# Patient Record
Sex: FEMALE | Race: BLACK OR AFRICAN AMERICAN | NOT HISPANIC OR LATINO | Employment: OTHER | URBAN - METROPOLITAN AREA
[De-identification: names, ages, dates, MRNs, and addresses within clinical notes are randomized per-mention and may not be internally consistent; named-entity substitution may affect disease eponyms.]

---

## 2020-02-06 ENCOUNTER — OFFICE VISIT (OUTPATIENT)
Dept: OBSTETRICS AND GYNECOLOGY | Facility: CLINIC | Age: 65
End: 2020-02-06
Payer: MEDICARE

## 2020-02-06 VITALS
SYSTOLIC BLOOD PRESSURE: 124 MMHG | DIASTOLIC BLOOD PRESSURE: 76 MMHG | HEIGHT: 64 IN | WEIGHT: 222.69 LBS | BODY MASS INDEX: 38.02 KG/M2

## 2020-02-06 DIAGNOSIS — Z01.419 ENCOUNTER FOR GYNECOLOGICAL EXAMINATION (GENERAL) (ROUTINE) WITHOUT ABNORMAL FINDINGS: Primary | ICD-10-CM

## 2020-02-06 DIAGNOSIS — N90.89 VULVAR IRRITATION: ICD-10-CM

## 2020-02-06 DIAGNOSIS — Z12.4 SCREENING FOR CERVICAL CANCER: ICD-10-CM

## 2020-02-06 DIAGNOSIS — N81.11 CYSTOCELE, MIDLINE: ICD-10-CM

## 2020-02-06 PROCEDURE — 3078F DIAST BP <80 MM HG: CPT | Mod: CPTII,S$GLB,, | Performed by: OBSTETRICS & GYNECOLOGY

## 2020-02-06 PROCEDURE — G0101 CA SCREEN;PELVIC/BREAST EXAM: HCPCS | Mod: S$GLB,,, | Performed by: OBSTETRICS & GYNECOLOGY

## 2020-02-06 PROCEDURE — 3074F SYST BP LT 130 MM HG: CPT | Mod: CPTII,S$GLB,, | Performed by: OBSTETRICS & GYNECOLOGY

## 2020-02-06 PROCEDURE — 3078F PR MOST RECENT DIASTOLIC BLOOD PRESSURE < 80 MM HG: ICD-10-PCS | Mod: CPTII,S$GLB,, | Performed by: OBSTETRICS & GYNECOLOGY

## 2020-02-06 PROCEDURE — 99999 PR PBB SHADOW E&M-NEW PATIENT-LVL II: ICD-10-PCS | Mod: PBBFAC,,, | Performed by: OBSTETRICS & GYNECOLOGY

## 2020-02-06 PROCEDURE — 3074F PR MOST RECENT SYSTOLIC BLOOD PRESSURE < 130 MM HG: ICD-10-PCS | Mod: CPTII,S$GLB,, | Performed by: OBSTETRICS & GYNECOLOGY

## 2020-02-06 PROCEDURE — 99999 PR PBB SHADOW E&M-NEW PATIENT-LVL II: CPT | Mod: PBBFAC,,, | Performed by: OBSTETRICS & GYNECOLOGY

## 2020-02-06 PROCEDURE — G0101 PR CA SCREEN;PELVIC/BREAST EXAM: ICD-10-PCS | Mod: S$GLB,,, | Performed by: OBSTETRICS & GYNECOLOGY

## 2020-02-06 RX ORDER — ESTRADIOL 10 UG/1
INSERT VAGINAL
Qty: 18 TABLET | Refills: 11 | Status: SHIPPED | OUTPATIENT
Start: 2020-02-06 | End: 2020-02-10 | Stop reason: SDUPTHER

## 2020-02-06 RX ORDER — CLOTRIMAZOLE AND BETAMETHASONE DIPROPIONATE 10; .64 MG/G; MG/G
CREAM TOPICAL 2 TIMES DAILY
Qty: 45 G | Refills: 0 | Status: SHIPPED | OUTPATIENT
Start: 2020-02-06 | End: 2020-02-10 | Stop reason: SDUPTHER

## 2020-02-06 RX ORDER — PANTOPRAZOLE SODIUM 40 MG/1
TABLET, DELAYED RELEASE ORAL DAILY
COMMUNITY
Start: 2020-01-16 | End: 2023-12-11 | Stop reason: SDUPTHER

## 2020-02-06 NOTE — PROGRESS NOTES
Subjective:       Patient ID: Mariah Meza is a 64 y.o. female.    Chief Complaint:  Well Woman      History of Present Illness  HPI  Annual Exam-Postmenopausal  Patient presents for annual exam. The patient c/op increase vaginal pressure and burning sensation since 2019.pt reports takes a long time to urinate--denies leakage;  The patient is not currently sexually active--last intercourse last year; ; . GYN screening history: last pap: was normal and patient does not recall when last pap was and last mammogram: approximate date 10/2019 and was normal. The patient is not currently taking hormone replacement therapy. Patient denies post-menopausal vaginal bleeding. The patient wears seatbelts: yes. The patient participates in regular exercise: no. Has the patient ever been transfused or tattooed?: no. The patient reports that there is not domestic violence in her life.    Denies dysuria;   Previously seen by urologist (dr espinosa); started on 3 different ax;      colonscopy , due in   Reports had bmd  GYN & OB History  Patient's last menstrual period was 02/10/1984 (approximate).   Date of Last Pap: No result found    OB History    Para Term  AB Living   2 2 2     2   SAB TAB Ectopic Multiple Live Births           2      # Outcome Date GA Lbr Gonsalo/2nd Weight Sex Delivery Anes PTL Lv   2 Term      Vag-Spont   HAROON   1 Term      Vag-Spont   HAROON       Review of Systems  Review of Systems   Genitourinary: Positive for dyspareunia, vaginal pain and vaginal dryness.   All other systems reviewed and are negative.          Objective:      Physical Exam:   Constitutional: She appears well-developed.     Eyes: Pupils are equal, round, and reactive to light. Conjunctivae and EOM are normal.    Neck: Normal range of motion. Neck supple.     Pulmonary/Chest: Effort normal. Right breast exhibits no mass, no nipple discharge, no skin change and no tenderness. Left breast exhibits no mass, no  nipple discharge, no skin change and no tenderness. Breasts are symmetrical.        Abdominal: Soft.     Genitourinary: Rectum normal and vagina normal. Pelvic exam was performed with patient supine. Uterus is absent. Right adnexum displays no mass and no tenderness. Left adnexum displays no mass and no tenderness. There is cystocele in the vagina. No erythema, bleeding, rectocele or unspecified prolapse of vaginal walls in the vagina. No vaginal discharge found. Vaginal cuff normal.Labial bartholins normal.Cervix exhibits absence.   Genitourinary Comments: atrophic           Musculoskeletal: Normal range of motion.       Neurological: She is alert.    Skin: Skin is warm.    Psychiatric: She has a normal mood and affect.           Assessment:     Encounter Diagnoses   Name Primary?    Encounter for gynecological examination (general) (routine) without abnormal findings Yes    Screening for cervical cancer     Cystocele, midline     Vulvar irritation              Plan:      Continue annual well woman exam.  Pap not indicated due to hx of nml pap and hx of lamar  mammo current continue yearly until age 73  rx sent for lotrisone, continue gentle skin cleaning  Advised vaginal estrogen  reviewed use of pessary vs anterior repair; pt prefers observation  Osteoporosis prevention; 1200mg calcium/d with source of vitamin d; will get bmd results for review  Continue diet, exercise, weight loss

## 2020-02-07 ENCOUNTER — TELEPHONE (OUTPATIENT)
Dept: OBSTETRICS AND GYNECOLOGY | Facility: CLINIC | Age: 65
End: 2020-02-07

## 2020-02-07 DIAGNOSIS — N90.89 VULVAR IRRITATION: ICD-10-CM

## 2020-02-07 DIAGNOSIS — Z01.419 ENCOUNTER FOR GYNECOLOGICAL EXAMINATION (GENERAL) (ROUTINE) WITHOUT ABNORMAL FINDINGS: ICD-10-CM

## 2020-02-07 DIAGNOSIS — N81.11 CYSTOCELE, MIDLINE: ICD-10-CM

## 2020-02-07 NOTE — TELEPHONE ENCOUNTER
Spoke with pt, pt states insurance is not paying for estradiol vaginal suppository , and would like to know what else she can get/ take. Message forward to Dr Anthony. Marilu MONTIEL

## 2020-02-07 NOTE — TELEPHONE ENCOUNTER
Spoke with pt. Pt states she wants rx sent to [facility] mail in pharmacy. Pt also states current prescription can be left the same and that she will just pay for those. Thank you. LYN Luna      ----- Message from Yvonne Mcfarlane sent at 2/7/2020  1:38 PM CST -----  Contact: Pt   Pt is calling regarding requesting to have nurse call pt  back. Pt stets that call is in reference to a script that pt needs to be sent to another script. Pt did not want to name the script. .[phone] (home)         .Thank You  Yvonne Mcfarlane

## 2020-02-10 RX ORDER — ESTRADIOL 10 UG/1
INSERT VAGINAL
Qty: 18 TABLET | Refills: 11 | Status: SHIPPED | OUTPATIENT
Start: 2020-02-10 | End: 2021-05-27

## 2020-02-10 RX ORDER — CLOTRIMAZOLE AND BETAMETHASONE DIPROPIONATE 10; .64 MG/G; MG/G
CREAM TOPICAL 2 TIMES DAILY
Qty: 45 G | Refills: 0 | Status: SHIPPED | OUTPATIENT
Start: 2020-02-10 | End: 2021-05-27

## 2020-02-10 NOTE — TELEPHONE ENCOUNTER
rx was prescribed for vagifem and lotrisone    Does she want both meds to be sent to [facility]?

## 2020-02-14 ENCOUNTER — TELEPHONE (OUTPATIENT)
Dept: OBSTETRICS AND GYNECOLOGY | Facility: CLINIC | Age: 65
End: 2020-02-14

## 2020-02-14 NOTE — TELEPHONE ENCOUNTER
----- Message from Winnie Garduno sent at 2/14/2020  8:52 AM CST -----  Contact: Pt  Please give pt a call at [phone] regarding some questions about a medication.

## 2020-02-14 NOTE — TELEPHONE ENCOUNTER
Patient asking if prescription sent to [facility], and it was.  Patient asking about pessary and was told she will need an appointment for that procedure.  Patient stated she would call back to schedule.

## 2020-02-18 ENCOUNTER — TELEPHONE (OUTPATIENT)
Dept: OBSTETRICS AND GYNECOLOGY | Facility: CLINIC | Age: 65
End: 2020-02-18

## 2020-02-18 NOTE — TELEPHONE ENCOUNTER
Left message for patient that only one dose vaginal estrogen.  Welcome to bring formulary or supply web site. Can try OTC Replens.    Patient can return call to [phone].

## 2020-02-18 NOTE — TELEPHONE ENCOUNTER
Spoke with pt. Instructed pt to call formulary and see which medication is covered. Pt verbalized understanding and will call clinic back later today. LYN Luna

## 2020-02-18 NOTE — TELEPHONE ENCOUNTER
There is only one dose of vaginal estrogen    Pt is welcomed to bring in her formulary for us to review or give us a web site    Since there are no alternatives can use otc replens

## 2020-02-18 NOTE — TELEPHONE ENCOUNTER
----- Message from Susanne Blood sent at 2/18/2020 10:10 AM CST -----  Contact: pt  Please call pt @ [phone] regarding Estradiol medication, pt states it's to expensive, pt need something else call in to [employer] [facility]

## 2020-02-18 NOTE — TELEPHONE ENCOUNTER
Pt states that the mg is too high and that the medication is a tier 4 medication that is why the medications price is extremely high. Formulary states if she can use a lower mg then the medication will be cheaper. Pt would like a lower mg in same medication. Thanks! LYN Luna      ----- Message from Markus Garcia sent at 2/18/2020  3:21 PM CST -----  Contact: Pt  Pt is calling the staff regarding a medication  Pt call back [phone]    Thanks

## 2020-02-19 ENCOUNTER — TELEPHONE (OUTPATIENT)
Dept: OBSTETRICS AND GYNECOLOGY | Facility: CLINIC | Age: 65
End: 2020-02-19

## 2020-02-19 NOTE — TELEPHONE ENCOUNTER
Spoke to the pt and advised her that there is only one dose vaginal estrogen.  Welcome to bring formulary or supply web site. Can try OTC Replens. Pt acknowledged understanding. marty Coy    Left message for patient that only one dose vaginal estrogen.  Welcome to bring formulary or supply web site. Can try OTC Replens.

## 2020-12-01 ENCOUNTER — TELEPHONE (OUTPATIENT)
Dept: OBSTETRICS AND GYNECOLOGY | Facility: CLINIC | Age: 65
End: 2020-12-01

## 2020-12-01 NOTE — TELEPHONE ENCOUNTER
----- Message from Tl Flores sent at 12/1/2020  2:31 PM CST -----      Name of Who is Calling: MACK MORSE [[id number]]      What is the request in detail: Pt would like to speak with the Dr regarding a strong smell from her vagina.Please contact to further discuss and advise.          Can the clinic reply by [facility]: N      What Number to Call Back if not in [facility]: [phone]

## 2020-12-02 ENCOUNTER — TELEPHONE (OUTPATIENT)
Dept: OBSTETRICS AND GYNECOLOGY | Facility: CLINIC | Age: 65
End: 2020-12-02

## 2020-12-02 NOTE — TELEPHONE ENCOUNTER
----- Message from Lizbeth Garcia sent at 12/2/2020  8:41 AM CST -----  Regarding: return call  .Type:  Patient Returning Call    Who Called: pt  Who Left Message for Patient: Cori   Does the patient know what this is regarding?:   Would the patient rather a call back or a response via [facility]? Call back   Best Call Back Number: [phone]  Additional Information:

## 2021-04-13 ENCOUNTER — OFFICE VISIT (OUTPATIENT)
Dept: INTERNAL MEDICINE | Facility: CLINIC | Age: 66
End: 2021-04-13
Payer: MEDICARE

## 2021-04-13 ENCOUNTER — APPOINTMENT (OUTPATIENT)
Dept: RADIOLOGY | Facility: HOSPITAL | Age: 66
End: 2021-04-13
Attending: FAMILY MEDICINE
Payer: MEDICARE

## 2021-04-13 VITALS
BODY MASS INDEX: 39.41 KG/M2 | HEART RATE: 64 BPM | TEMPERATURE: 97 F | WEIGHT: 229.63 LBS | OXYGEN SATURATION: 100 % | RESPIRATION RATE: 18 BRPM | SYSTOLIC BLOOD PRESSURE: 136 MMHG | DIASTOLIC BLOOD PRESSURE: 80 MMHG

## 2021-04-13 DIAGNOSIS — M54.2 ACUTE NECK PAIN: Primary | ICD-10-CM

## 2021-04-13 DIAGNOSIS — M54.12 CERVICAL RADICULOPATHY AT C5: ICD-10-CM

## 2021-04-13 DIAGNOSIS — M54.2 ACUTE NECK PAIN: ICD-10-CM

## 2021-04-13 PROCEDURE — 72050 XR CERVICAL SPINE COMPLETE 5 VIEW: ICD-10-PCS | Mod: 26,,, | Performed by: RADIOLOGY

## 2021-04-13 PROCEDURE — 99999 PR PBB SHADOW E&M-EST. PATIENT-LVL IV: ICD-10-PCS | Mod: PBBFAC,,, | Performed by: FAMILY MEDICINE

## 2021-04-13 PROCEDURE — 72050 X-RAY EXAM NECK SPINE 4/5VWS: CPT | Mod: TC,PO

## 2021-04-13 PROCEDURE — 1159F MED LIST DOCD IN RCRD: CPT | Mod: S$GLB,,, | Performed by: FAMILY MEDICINE

## 2021-04-13 PROCEDURE — 99204 OFFICE O/P NEW MOD 45 MIN: CPT | Mod: 25,S$GLB,, | Performed by: FAMILY MEDICINE

## 2021-04-13 PROCEDURE — 96372 PR INJECTION,THERAP/PROPH/DIAG2ST, IM OR SUBCUT: ICD-10-PCS | Mod: S$GLB,,, | Performed by: FAMILY MEDICINE

## 2021-04-13 PROCEDURE — 96372 THER/PROPH/DIAG INJ SC/IM: CPT | Mod: S$GLB,,, | Performed by: FAMILY MEDICINE

## 2021-04-13 PROCEDURE — 1159F PR MEDICATION LIST DOCUMENTED IN MEDICAL RECORD: ICD-10-PCS | Mod: S$GLB,,, | Performed by: FAMILY MEDICINE

## 2021-04-13 PROCEDURE — 1125F PR PAIN SEVERITY QUANTIFIED, PAIN PRESENT: ICD-10-PCS | Mod: S$GLB,,, | Performed by: FAMILY MEDICINE

## 2021-04-13 PROCEDURE — 72050 X-RAY EXAM NECK SPINE 4/5VWS: CPT | Mod: 26,,, | Performed by: RADIOLOGY

## 2021-04-13 PROCEDURE — 3008F BODY MASS INDEX DOCD: CPT | Mod: CPTII,S$GLB,, | Performed by: FAMILY MEDICINE

## 2021-04-13 PROCEDURE — 99204 PR OFFICE/OUTPT VISIT, NEW, LEVL IV, 45-59 MIN: ICD-10-PCS | Mod: 25,S$GLB,, | Performed by: FAMILY MEDICINE

## 2021-04-13 PROCEDURE — 3008F PR BODY MASS INDEX (BMI) DOCUMENTED: ICD-10-PCS | Mod: CPTII,S$GLB,, | Performed by: FAMILY MEDICINE

## 2021-04-13 PROCEDURE — 99999 PR PBB SHADOW E&M-EST. PATIENT-LVL IV: CPT | Mod: PBBFAC,,, | Performed by: FAMILY MEDICINE

## 2021-04-13 PROCEDURE — 1125F AMNT PAIN NOTED PAIN PRSNT: CPT | Mod: S$GLB,,, | Performed by: FAMILY MEDICINE

## 2021-04-13 RX ORDER — KETOROLAC TROMETHAMINE 30 MG/ML
30 INJECTION, SOLUTION INTRAMUSCULAR; INTRAVENOUS ONCE
Status: COMPLETED | OUTPATIENT
Start: 2021-04-13 | End: 2021-04-13

## 2021-04-13 RX ORDER — KETOROLAC TROMETHAMINE 30 MG/ML
30 INJECTION, SOLUTION INTRAMUSCULAR; INTRAVENOUS ONCE
Status: DISCONTINUED | OUTPATIENT
Start: 2021-04-13 | End: 2021-04-13

## 2021-04-13 RX ORDER — VALSARTAN AND HYDROCHLOROTHIAZIDE 320; 25 MG/1; MG/1
1 TABLET, FILM COATED ORAL DAILY
COMMUNITY
End: 2021-08-06

## 2021-04-13 RX ADMIN — KETOROLAC TROMETHAMINE 30 MG: 30 INJECTION, SOLUTION INTRAMUSCULAR; INTRAVENOUS at 03:04

## 2021-05-10 ENCOUNTER — TELEPHONE (OUTPATIENT)
Dept: OBSTETRICS AND GYNECOLOGY | Facility: CLINIC | Age: 66
End: 2021-05-10

## 2021-05-10 DIAGNOSIS — Z12.31 ENCOUNTER FOR SCREENING MAMMOGRAM FOR BREAST CANCER: Primary | ICD-10-CM

## 2021-05-13 ENCOUNTER — APPOINTMENT (OUTPATIENT)
Dept: RADIOLOGY | Facility: HOSPITAL | Age: 66
End: 2021-05-13
Attending: FAMILY MEDICINE
Payer: MEDICARE

## 2021-05-13 ENCOUNTER — OFFICE VISIT (OUTPATIENT)
Dept: INTERNAL MEDICINE | Facility: CLINIC | Age: 66
End: 2021-05-13
Payer: MEDICARE

## 2021-05-13 VITALS
WEIGHT: 230.69 LBS | HEART RATE: 73 BPM | SYSTOLIC BLOOD PRESSURE: 112 MMHG | DIASTOLIC BLOOD PRESSURE: 70 MMHG | OXYGEN SATURATION: 97 % | BODY MASS INDEX: 39.6 KG/M2 | RESPIRATION RATE: 18 BRPM | TEMPERATURE: 99 F

## 2021-05-13 DIAGNOSIS — E66.01 CLASS 3 SEVERE OBESITY WITH SERIOUS COMORBIDITY AND BODY MASS INDEX (BMI) OF 40.0 TO 44.9 IN ADULT, UNSPECIFIED OBESITY TYPE: ICD-10-CM

## 2021-05-13 DIAGNOSIS — E74.818 OTHER DISORDERS OF GLUCOSE TRANSPORT: ICD-10-CM

## 2021-05-13 DIAGNOSIS — Z13.820 SCREENING FOR OSTEOPOROSIS: ICD-10-CM

## 2021-05-13 DIAGNOSIS — M25.551 RIGHT HIP PAIN: ICD-10-CM

## 2021-05-13 DIAGNOSIS — R10.9 FLANK PAIN: Primary | ICD-10-CM

## 2021-05-13 DIAGNOSIS — M94.0 COSTOCHONDRITIS: ICD-10-CM

## 2021-05-13 DIAGNOSIS — Z00.00 ENCOUNTER FOR WELLNESS EXAMINATION IN ADULT: ICD-10-CM

## 2021-05-13 DIAGNOSIS — M54.12 CERVICAL RADICULOPATHY: ICD-10-CM

## 2021-05-13 DIAGNOSIS — M81.8 OTHER OSTEOPOROSIS WITHOUT CURRENT PATHOLOGICAL FRACTURE: ICD-10-CM

## 2021-05-13 LAB
BILIRUB SERPL-MCNC: ABNORMAL MG/DL
BLOOD URINE, POC: ABNORMAL
COLOR, POC UA: ABNORMAL
GLUCOSE UR QL STRIP: ABNORMAL
KETONES UR QL STRIP: ABNORMAL
LEUKOCYTE ESTERASE URINE, POC: ABNORMAL
NITRITE, POC UA: ABNORMAL
PH, POC UA: 5
PROTEIN, POC: ABNORMAL
SPECIFIC GRAVITY, POC UA: 1.01
UROBILINOGEN, POC UA: ABNORMAL

## 2021-05-13 PROCEDURE — 99999 PR PBB SHADOW E&M-EST. PATIENT-LVL V: ICD-10-PCS | Mod: PBBFAC,,, | Performed by: FAMILY MEDICINE

## 2021-05-13 PROCEDURE — 81001 POCT URINALYSIS, DIPSTICK OR TABLET REAGENT, AUTOMATED, WITH MICROSCOP: ICD-10-PCS | Mod: S$GLB,,, | Performed by: FAMILY MEDICINE

## 2021-05-13 PROCEDURE — 1159F PR MEDICATION LIST DOCUMENTED IN MEDICAL RECORD: ICD-10-PCS | Mod: S$GLB,,, | Performed by: FAMILY MEDICINE

## 2021-05-13 PROCEDURE — 1159F MED LIST DOCD IN RCRD: CPT | Mod: S$GLB,,, | Performed by: FAMILY MEDICINE

## 2021-05-13 PROCEDURE — 73521 X-RAY EXAM HIPS BI 2 VIEWS: CPT | Mod: 26,,, | Performed by: RADIOLOGY

## 2021-05-13 PROCEDURE — 3008F BODY MASS INDEX DOCD: CPT | Mod: CPTII,S$GLB,, | Performed by: FAMILY MEDICINE

## 2021-05-13 PROCEDURE — 3008F PR BODY MASS INDEX (BMI) DOCUMENTED: ICD-10-PCS | Mod: CPTII,S$GLB,, | Performed by: FAMILY MEDICINE

## 2021-05-13 PROCEDURE — 99999 PR PBB SHADOW E&M-EST. PATIENT-LVL V: CPT | Mod: PBBFAC,,, | Performed by: FAMILY MEDICINE

## 2021-05-13 PROCEDURE — 81001 URINALYSIS AUTO W/SCOPE: CPT | Mod: S$GLB,,, | Performed by: FAMILY MEDICINE

## 2021-05-13 PROCEDURE — 99214 OFFICE O/P EST MOD 30 MIN: CPT | Mod: 25,S$GLB,, | Performed by: FAMILY MEDICINE

## 2021-05-13 PROCEDURE — 1125F AMNT PAIN NOTED PAIN PRSNT: CPT | Mod: S$GLB,,, | Performed by: FAMILY MEDICINE

## 2021-05-13 PROCEDURE — 73521 X-RAY EXAM HIPS BI 2 VIEWS: CPT | Mod: TC,PO

## 2021-05-13 PROCEDURE — 99214 PR OFFICE/OUTPT VISIT, EST, LEVL IV, 30-39 MIN: ICD-10-PCS | Mod: 25,S$GLB,, | Performed by: FAMILY MEDICINE

## 2021-05-13 PROCEDURE — 73521 XR HIPS BILATERAL 2 VIEW INCL AP PELVIS: ICD-10-PCS | Mod: 26,,, | Performed by: RADIOLOGY

## 2021-05-13 PROCEDURE — 87086 URINE CULTURE/COLONY COUNT: CPT | Performed by: FAMILY MEDICINE

## 2021-05-13 PROCEDURE — 1125F PR PAIN SEVERITY QUANTIFIED, PAIN PRESENT: ICD-10-PCS | Mod: S$GLB,,, | Performed by: FAMILY MEDICINE

## 2021-05-13 RX ORDER — DICLOFENAC SODIUM 25 MG/1
25 TABLET, DELAYED RELEASE ORAL DAILY PRN
Qty: 30 TABLET | Refills: 1 | Status: SHIPPED | OUTPATIENT
Start: 2021-05-13 | End: 2021-06-07 | Stop reason: SINTOL

## 2021-05-15 LAB — BACTERIA UR CULT: NO GROWTH

## 2021-05-25 ENCOUNTER — TELEPHONE (OUTPATIENT)
Dept: INTERNAL MEDICINE | Facility: CLINIC | Age: 66
End: 2021-05-25

## 2021-05-26 ENCOUNTER — PATIENT OUTREACH (OUTPATIENT)
Dept: ADMINISTRATIVE | Facility: OTHER | Age: 66
End: 2021-05-26

## 2021-05-27 ENCOUNTER — OFFICE VISIT (OUTPATIENT)
Dept: OBSTETRICS AND GYNECOLOGY | Facility: CLINIC | Age: 66
End: 2021-05-27
Payer: MEDICARE

## 2021-05-27 ENCOUNTER — HOSPITAL ENCOUNTER (OUTPATIENT)
Dept: RADIOLOGY | Facility: HOSPITAL | Age: 66
Discharge: HOME | End: 2021-05-27
Attending: OBSTETRICS & GYNECOLOGY
Payer: MEDICARE

## 2021-05-27 VITALS
SYSTOLIC BLOOD PRESSURE: 140 MMHG | WEIGHT: 231.69 LBS | BODY MASS INDEX: 39.55 KG/M2 | DIASTOLIC BLOOD PRESSURE: 72 MMHG | HEIGHT: 64 IN

## 2021-05-27 DIAGNOSIS — Z01.419 ENCOUNTER FOR GYNECOLOGICAL EXAMINATION (GENERAL) (ROUTINE) WITHOUT ABNORMAL FINDINGS: Primary | ICD-10-CM

## 2021-05-27 DIAGNOSIS — Z12.31 ENCOUNTER FOR SCREENING MAMMOGRAM FOR BREAST CANCER: ICD-10-CM

## 2021-05-27 DIAGNOSIS — Z12.31 SCREENING MAMMOGRAM, ENCOUNTER FOR: ICD-10-CM

## 2021-05-27 DIAGNOSIS — L75.0 URINARY BODY ODOR: ICD-10-CM

## 2021-05-27 DIAGNOSIS — N95.1 SYMPTOMATIC MENOPAUSAL OR FEMALE CLIMACTERIC STATES: ICD-10-CM

## 2021-05-27 DIAGNOSIS — Z12.4 SCREENING FOR CERVICAL CANCER: ICD-10-CM

## 2021-05-27 DIAGNOSIS — R82.90 UNSPECIFIED ABNORMAL FINDINGS IN URINE: ICD-10-CM

## 2021-05-27 PROCEDURE — G0101 PR CA SCREEN;PELVIC/BREAST EXAM: ICD-10-PCS | Mod: S$GLB,,, | Performed by: OBSTETRICS & GYNECOLOGY

## 2021-05-27 PROCEDURE — 1101F PR PT FALLS ASSESS DOC 0-1 FALLS W/OUT INJ PAST YR: ICD-10-PCS | Mod: CPTII,S$GLB,, | Performed by: OBSTETRICS & GYNECOLOGY

## 2021-05-27 PROCEDURE — 1126F PR PAIN SEVERITY QUANTIFIED, NO PAIN PRESENT: ICD-10-PCS | Mod: S$GLB,,, | Performed by: OBSTETRICS & GYNECOLOGY

## 2021-05-27 PROCEDURE — 99999 PR PBB SHADOW E&M-EST. PATIENT-LVL III: ICD-10-PCS | Mod: PBBFAC,,, | Performed by: OBSTETRICS & GYNECOLOGY

## 2021-05-27 PROCEDURE — 77067 SCR MAMMO BI INCL CAD: CPT | Mod: 26,,, | Performed by: RADIOLOGY

## 2021-05-27 PROCEDURE — 77063 BREAST TOMOSYNTHESIS BI: CPT | Mod: 26,,, | Performed by: RADIOLOGY

## 2021-05-27 PROCEDURE — 3288F PR FALLS RISK ASSESSMENT DOCUMENTED: ICD-10-PCS | Mod: CPTII,S$GLB,, | Performed by: OBSTETRICS & GYNECOLOGY

## 2021-05-27 PROCEDURE — G0101 CA SCREEN;PELVIC/BREAST EXAM: HCPCS | Mod: S$GLB,,, | Performed by: OBSTETRICS & GYNECOLOGY

## 2021-05-27 PROCEDURE — 77063 MAMMO DIGITAL SCREENING BILAT WITH TOMO: ICD-10-PCS | Mod: 26,,, | Performed by: RADIOLOGY

## 2021-05-27 PROCEDURE — 1101F PT FALLS ASSESS-DOCD LE1/YR: CPT | Mod: CPTII,S$GLB,, | Performed by: OBSTETRICS & GYNECOLOGY

## 2021-05-27 PROCEDURE — 77067 SCR MAMMO BI INCL CAD: CPT | Mod: TC

## 2021-05-27 PROCEDURE — 3008F PR BODY MASS INDEX (BMI) DOCUMENTED: ICD-10-PCS | Mod: CPTII,S$GLB,, | Performed by: OBSTETRICS & GYNECOLOGY

## 2021-05-27 PROCEDURE — 99999 PR PBB SHADOW E&M-EST. PATIENT-LVL III: CPT | Mod: PBBFAC,,, | Performed by: OBSTETRICS & GYNECOLOGY

## 2021-05-27 PROCEDURE — 77067 MAMMO DIGITAL SCREENING BILAT WITH TOMO: ICD-10-PCS | Mod: 26,,, | Performed by: RADIOLOGY

## 2021-05-27 PROCEDURE — 1126F AMNT PAIN NOTED NONE PRSNT: CPT | Mod: S$GLB,,, | Performed by: OBSTETRICS & GYNECOLOGY

## 2021-05-27 PROCEDURE — 3288F FALL RISK ASSESSMENT DOCD: CPT | Mod: CPTII,S$GLB,, | Performed by: OBSTETRICS & GYNECOLOGY

## 2021-05-27 PROCEDURE — 3008F BODY MASS INDEX DOCD: CPT | Mod: CPTII,S$GLB,, | Performed by: OBSTETRICS & GYNECOLOGY

## 2021-06-01 ENCOUNTER — TELEPHONE (OUTPATIENT)
Dept: OBSTETRICS AND GYNECOLOGY | Facility: CLINIC | Age: 66
End: 2021-06-01

## 2021-06-07 ENCOUNTER — LAB VISIT (OUTPATIENT)
Dept: LAB | Facility: HOSPITAL | Age: 66
End: 2021-06-07
Attending: FAMILY MEDICINE
Payer: MEDICARE

## 2021-06-07 ENCOUNTER — OFFICE VISIT (OUTPATIENT)
Dept: INTERNAL MEDICINE | Facility: CLINIC | Age: 66
End: 2021-06-07
Payer: MEDICARE

## 2021-06-07 VITALS
DIASTOLIC BLOOD PRESSURE: 74 MMHG | OXYGEN SATURATION: 97 % | HEIGHT: 64 IN | WEIGHT: 234.31 LBS | BODY MASS INDEX: 40 KG/M2 | SYSTOLIC BLOOD PRESSURE: 138 MMHG | HEART RATE: 63 BPM | TEMPERATURE: 98 F

## 2021-06-07 DIAGNOSIS — R22.41 LOCALIZED SWELLING OF RIGHT LOWER EXTREMITY: Primary | ICD-10-CM

## 2021-06-07 DIAGNOSIS — M79.89 SWELLING OF LEFT LOWER EXTREMITY: ICD-10-CM

## 2021-06-07 DIAGNOSIS — R22.41 LOCALIZED SWELLING OF RIGHT LOWER EXTREMITY: ICD-10-CM

## 2021-06-07 DIAGNOSIS — E78.2 MIXED HYPERLIPIDEMIA: ICD-10-CM

## 2021-06-07 DIAGNOSIS — R60.1 GENERALIZED EDEMA: ICD-10-CM

## 2021-06-07 DIAGNOSIS — I11.9 HYPERTENSIVE HEART DISEASE WITHOUT HEART FAILURE: ICD-10-CM

## 2021-06-07 DIAGNOSIS — M94.0 COSTOCHONDRITIS: ICD-10-CM

## 2021-06-07 PROCEDURE — 1125F AMNT PAIN NOTED PAIN PRSNT: CPT | Mod: S$GLB,,, | Performed by: FAMILY MEDICINE

## 2021-06-07 PROCEDURE — 3008F PR BODY MASS INDEX (BMI) DOCUMENTED: ICD-10-PCS | Mod: CPTII,S$GLB,, | Performed by: FAMILY MEDICINE

## 2021-06-07 PROCEDURE — 83880 ASSAY OF NATRIURETIC PEPTIDE: CPT | Performed by: FAMILY MEDICINE

## 2021-06-07 PROCEDURE — 1159F MED LIST DOCD IN RCRD: CPT | Mod: S$GLB,,, | Performed by: FAMILY MEDICINE

## 2021-06-07 PROCEDURE — 99999 PR PBB SHADOW E&M-EST. PATIENT-LVL IV: CPT | Mod: PBBFAC,,, | Performed by: FAMILY MEDICINE

## 2021-06-07 PROCEDURE — 80053 COMPREHEN METABOLIC PANEL: CPT | Performed by: FAMILY MEDICINE

## 2021-06-07 PROCEDURE — 3008F BODY MASS INDEX DOCD: CPT | Mod: CPTII,S$GLB,, | Performed by: FAMILY MEDICINE

## 2021-06-07 PROCEDURE — 84484 ASSAY OF TROPONIN QUANT: CPT | Performed by: FAMILY MEDICINE

## 2021-06-07 PROCEDURE — 99214 OFFICE O/P EST MOD 30 MIN: CPT | Mod: S$GLB,,, | Performed by: FAMILY MEDICINE

## 2021-06-07 PROCEDURE — 99999 PR PBB SHADOW E&M-EST. PATIENT-LVL IV: ICD-10-PCS | Mod: PBBFAC,,, | Performed by: FAMILY MEDICINE

## 2021-06-07 PROCEDURE — 1159F PR MEDICATION LIST DOCUMENTED IN MEDICAL RECORD: ICD-10-PCS | Mod: S$GLB,,, | Performed by: FAMILY MEDICINE

## 2021-06-07 PROCEDURE — 99214 PR OFFICE/OUTPT VISIT, EST, LEVL IV, 30-39 MIN: ICD-10-PCS | Mod: S$GLB,,, | Performed by: FAMILY MEDICINE

## 2021-06-07 PROCEDURE — 36415 COLL VENOUS BLD VENIPUNCTURE: CPT | Mod: PO | Performed by: FAMILY MEDICINE

## 2021-06-07 PROCEDURE — 1125F PR PAIN SEVERITY QUANTIFIED, PAIN PRESENT: ICD-10-PCS | Mod: S$GLB,,, | Performed by: FAMILY MEDICINE

## 2021-06-07 RX ORDER — FUROSEMIDE 40 MG/1
40 TABLET ORAL 2 TIMES DAILY PRN
Qty: 60 TABLET | Refills: 1 | Status: SHIPPED | OUTPATIENT
Start: 2021-06-07 | End: 2022-08-30

## 2021-06-07 RX ORDER — TIZANIDINE HYDROCHLORIDE 2 MG/1
2 CAPSULE, GELATIN COATED ORAL
COMMUNITY
End: 2021-09-29

## 2021-06-07 RX ORDER — DEXTROMETHORPHAN HYDROBROMIDE, GUAIFENESIN 5; 100 MG/5ML; MG/5ML
650 LIQUID ORAL DAILY PRN
COMMUNITY

## 2021-06-07 RX ORDER — LORATADINE 10 MG/1
10 TABLET ORAL DAILY PRN
COMMUNITY
End: 2022-06-23

## 2021-06-07 RX ORDER — CALCIUM CARBONATE 600 MG
600 TABLET ORAL ONCE
COMMUNITY
End: 2021-08-06

## 2021-06-08 LAB
ALBUMIN SERPL BCP-MCNC: 4.1 G/DL (ref 3.5–5.2)
ALP SERPL-CCNC: 52 U/L (ref 55–135)
ALT SERPL W/O P-5'-P-CCNC: 23 U/L (ref 10–44)
ANION GAP SERPL CALC-SCNC: 14 MMOL/L (ref 8–16)
AST SERPL-CCNC: 22 U/L (ref 10–40)
BILIRUB SERPL-MCNC: 0.6 MG/DL (ref 0.1–1)
BNP SERPL-MCNC: 36 PG/ML (ref 0–99)
BUN SERPL-MCNC: 18 MG/DL (ref 8–23)
CALCIUM SERPL-MCNC: 10 MG/DL (ref 8.7–10.5)
CHLORIDE SERPL-SCNC: 107 MMOL/L (ref 95–110)
CO2 SERPL-SCNC: 21 MMOL/L (ref 23–29)
CREAT SERPL-MCNC: 0.9 MG/DL (ref 0.5–1.4)
EST. GFR  (AFRICAN AMERICAN): >60 ML/MIN/1.73 M^2
EST. GFR  (NON AFRICAN AMERICAN): >60 ML/MIN/1.73 M^2
GLUCOSE SERPL-MCNC: 75 MG/DL (ref 70–110)
POTASSIUM SERPL-SCNC: 3.9 MMOL/L (ref 3.5–5.1)
PROT SERPL-MCNC: 8 G/DL (ref 6–8.4)
SODIUM SERPL-SCNC: 142 MMOL/L (ref 136–145)
TROPONIN I SERPL DL<=0.01 NG/ML-MCNC: <0.006 NG/ML (ref 0–0.03)

## 2021-06-14 ENCOUNTER — OFFICE VISIT (OUTPATIENT)
Dept: INTERNAL MEDICINE | Facility: CLINIC | Age: 66
End: 2021-06-14
Payer: MEDICARE

## 2021-06-14 VITALS
WEIGHT: 232.81 LBS | DIASTOLIC BLOOD PRESSURE: 66 MMHG | HEART RATE: 61 BPM | RESPIRATION RATE: 18 BRPM | SYSTOLIC BLOOD PRESSURE: 124 MMHG | TEMPERATURE: 98 F | BODY MASS INDEX: 39.96 KG/M2 | OXYGEN SATURATION: 97 %

## 2021-06-14 DIAGNOSIS — M79.89 LEG SWELLING: Primary | ICD-10-CM

## 2021-06-14 PROCEDURE — 1159F PR MEDICATION LIST DOCUMENTED IN MEDICAL RECORD: ICD-10-PCS | Mod: S$GLB,,, | Performed by: FAMILY MEDICINE

## 2021-06-14 PROCEDURE — 99999 PR PBB SHADOW E&M-EST. PATIENT-LVL III: ICD-10-PCS | Mod: PBBFAC,,, | Performed by: FAMILY MEDICINE

## 2021-06-14 PROCEDURE — 3288F PR FALLS RISK ASSESSMENT DOCUMENTED: ICD-10-PCS | Mod: CPTII,S$GLB,, | Performed by: FAMILY MEDICINE

## 2021-06-14 PROCEDURE — 1159F MED LIST DOCD IN RCRD: CPT | Mod: S$GLB,,, | Performed by: FAMILY MEDICINE

## 2021-06-14 PROCEDURE — 1125F AMNT PAIN NOTED PAIN PRSNT: CPT | Mod: S$GLB,,, | Performed by: FAMILY MEDICINE

## 2021-06-14 PROCEDURE — 3288F FALL RISK ASSESSMENT DOCD: CPT | Mod: CPTII,S$GLB,, | Performed by: FAMILY MEDICINE

## 2021-06-14 PROCEDURE — 1125F PR PAIN SEVERITY QUANTIFIED, PAIN PRESENT: ICD-10-PCS | Mod: S$GLB,,, | Performed by: FAMILY MEDICINE

## 2021-06-14 PROCEDURE — 99214 OFFICE O/P EST MOD 30 MIN: CPT | Mod: S$GLB,,, | Performed by: FAMILY MEDICINE

## 2021-06-14 PROCEDURE — 99214 PR OFFICE/OUTPT VISIT, EST, LEVL IV, 30-39 MIN: ICD-10-PCS | Mod: S$GLB,,, | Performed by: FAMILY MEDICINE

## 2021-06-14 PROCEDURE — 3008F PR BODY MASS INDEX (BMI) DOCUMENTED: ICD-10-PCS | Mod: CPTII,S$GLB,, | Performed by: FAMILY MEDICINE

## 2021-06-14 PROCEDURE — 1101F PR PT FALLS ASSESS DOC 0-1 FALLS W/OUT INJ PAST YR: ICD-10-PCS | Mod: CPTII,S$GLB,, | Performed by: FAMILY MEDICINE

## 2021-06-14 PROCEDURE — 3008F BODY MASS INDEX DOCD: CPT | Mod: CPTII,S$GLB,, | Performed by: FAMILY MEDICINE

## 2021-06-14 PROCEDURE — 99999 PR PBB SHADOW E&M-EST. PATIENT-LVL III: CPT | Mod: PBBFAC,,, | Performed by: FAMILY MEDICINE

## 2021-06-14 PROCEDURE — 1101F PT FALLS ASSESS-DOCD LE1/YR: CPT | Mod: CPTII,S$GLB,, | Performed by: FAMILY MEDICINE

## 2021-06-15 ENCOUNTER — OFFICE VISIT (OUTPATIENT)
Dept: OPHTHALMOLOGY | Facility: CLINIC | Age: 66
End: 2021-06-15
Payer: COMMERCIAL

## 2021-06-15 DIAGNOSIS — H04.123 DRY EYES, BILATERAL: Primary | ICD-10-CM

## 2021-06-15 DIAGNOSIS — I10 ESSENTIAL HYPERTENSION: ICD-10-CM

## 2021-06-15 DIAGNOSIS — H52.7 REFRACTIVE DISORDER: ICD-10-CM

## 2021-06-15 PROCEDURE — 92015 PR REFRACTION: ICD-10-PCS | Mod: S$GLB,,, | Performed by: STUDENT IN AN ORGANIZED HEALTH CARE EDUCATION/TRAINING PROGRAM

## 2021-06-15 PROCEDURE — 99999 PR PBB SHADOW E&M-EST. PATIENT-LVL II: CPT | Mod: PBBFAC,,, | Performed by: STUDENT IN AN ORGANIZED HEALTH CARE EDUCATION/TRAINING PROGRAM

## 2021-06-15 PROCEDURE — 99999 PR PBB SHADOW E&M-EST. PATIENT-LVL II: ICD-10-PCS | Mod: PBBFAC,,, | Performed by: STUDENT IN AN ORGANIZED HEALTH CARE EDUCATION/TRAINING PROGRAM

## 2021-06-15 PROCEDURE — 92004 PR EYE EXAM, NEW PATIENT,COMPREHESV: ICD-10-PCS | Mod: S$GLB,,, | Performed by: STUDENT IN AN ORGANIZED HEALTH CARE EDUCATION/TRAINING PROGRAM

## 2021-06-15 PROCEDURE — 92004 COMPRE OPH EXAM NEW PT 1/>: CPT | Mod: S$GLB,,, | Performed by: STUDENT IN AN ORGANIZED HEALTH CARE EDUCATION/TRAINING PROGRAM

## 2021-06-15 PROCEDURE — 92015 DETERMINE REFRACTIVE STATE: CPT | Mod: S$GLB,,, | Performed by: STUDENT IN AN ORGANIZED HEALTH CARE EDUCATION/TRAINING PROGRAM

## 2021-07-01 ENCOUNTER — TELEPHONE (OUTPATIENT)
Dept: INTERNAL MEDICINE | Facility: CLINIC | Age: 66
End: 2021-07-01

## 2021-07-01 DIAGNOSIS — I10 ESSENTIAL HYPERTENSION: Primary | ICD-10-CM

## 2021-07-01 RX ORDER — OLMESARTAN MEDOXOMIL AND HYDROCHLOROTHIAZIDE 20/12.5 20; 12.5 MG/1; MG/1
1 TABLET ORAL DAILY
Qty: 30 TABLET | Refills: 0 | Status: SHIPPED | OUTPATIENT
Start: 2021-07-01 | End: 2021-08-06 | Stop reason: SDUPTHER

## 2021-07-08 ENCOUNTER — CLINICAL SUPPORT (OUTPATIENT)
Dept: INTERNAL MEDICINE | Facility: CLINIC | Age: 66
End: 2021-07-08
Payer: MEDICARE

## 2021-07-08 VITALS — DIASTOLIC BLOOD PRESSURE: 72 MMHG | SYSTOLIC BLOOD PRESSURE: 138 MMHG

## 2021-07-15 ENCOUNTER — HOSPITAL ENCOUNTER (OUTPATIENT)
Dept: CARDIOLOGY | Facility: HOSPITAL | Age: 66
Discharge: HOME | End: 2021-07-15
Attending: FAMILY MEDICINE
Payer: MEDICARE

## 2021-07-15 VITALS — WEIGHT: 232 LBS | HEIGHT: 65 IN | BODY MASS INDEX: 38.65 KG/M2

## 2021-07-15 DIAGNOSIS — R60.1 GENERALIZED EDEMA: ICD-10-CM

## 2021-07-15 DIAGNOSIS — I11.9 HYPERTENSIVE HEART DISEASE WITHOUT HEART FAILURE: ICD-10-CM

## 2021-07-15 DIAGNOSIS — E78.2 MIXED HYPERLIPIDEMIA: ICD-10-CM

## 2021-07-15 DIAGNOSIS — M79.89 SWELLING OF LEFT LOWER EXTREMITY: ICD-10-CM

## 2021-07-15 DIAGNOSIS — R22.41 LOCALIZED SWELLING OF RIGHT LOWER EXTREMITY: ICD-10-CM

## 2021-07-15 LAB
ASCENDING AORTA: 2.62 CM
AV INDEX (PROSTH): 0.71
AV MEAN GRADIENT: 6 MMHG
AV PEAK GRADIENT: 12 MMHG
AV VALVE AREA: 2.17 CM2
AV VELOCITY RATIO: 0.73
BSA FOR ECHO PROCEDURE: 2.2 M2
CV ECHO LV RWT: 0.49 CM
CV STRESS BASE HR: 67 BPM
DIASTOLIC BLOOD PRESSURE: 75 MMHG
DOP CALC AO PEAK VEL: 1.75 M/S
DOP CALC AO VTI: 40.11 CM
DOP CALC LVOT AREA: 3.1 CM2
DOP CALC LVOT DIAMETER: 1.98 CM
DOP CALC LVOT PEAK VEL: 1.28 M/S
DOP CALC LVOT STROKE VOLUME: 87.06 CM3
DOP CALC RVOT PEAK VEL: 0.72 M/S
DOP CALC RVOT VTI: 17.78 CM
DOP CALCLVOT PEAK VEL VTI: 28.29 CM
E WAVE DECELERATION TIME: 194.16 MSEC
E/A RATIO: 1.13
E/E' RATIO: 9.29 M/S
ECHO LV POSTERIOR WALL: 1.1 CM (ref 0.6–1.1)
EJECTION FRACTION: 60 %
FRACTIONAL SHORTENING: 33 % (ref 28–44)
INTERVENTRICULAR SEPTUM: 1.13 CM (ref 0.6–1.1)
IVRT: 82.78 MSEC
LA MAJOR: 4.43 CM
LA MINOR: 4.73 CM
LA WIDTH: 3.57 CM
LEFT ATRIUM SIZE: 3.64 CM
LEFT ATRIUM VOLUME INDEX: 23.9 ML/M2
LEFT ATRIUM VOLUME: 50.53 CM3
LEFT INTERNAL DIMENSION IN SYSTOLE: 2.99 CM (ref 2.1–4)
LEFT VENTRICLE DIASTOLIC VOLUME INDEX: 43.36 ML/M2
LEFT VENTRICLE DIASTOLIC VOLUME: 91.49 ML
LEFT VENTRICLE MASS INDEX: 84 G/M2
LEFT VENTRICLE SYSTOLIC VOLUME INDEX: 16.5 ML/M2
LEFT VENTRICLE SYSTOLIC VOLUME: 34.85 ML
LEFT VENTRICULAR INTERNAL DIMENSION IN DIASTOLE: 4.48 CM (ref 3.5–6)
LEFT VENTRICULAR MASS: 177.15 G
LV LATERAL E/E' RATIO: 9.88 M/S
LV SEPTAL E/E' RATIO: 8.78 M/S
MV PEAK A VEL: 0.7 M/S
MV PEAK E VEL: 0.79 M/S
MV STENOSIS PRESSURE HALF TIME: 56.31 MS
MV VALVE AREA P 1/2 METHOD: 3.91 CM2
OHS CV CPX 1 MINUTE RECOVERY HEART RATE: 130 BPM
OHS CV CPX 85 PERCENT MAX PREDICTED HEART RATE MALE: 125
OHS CV CPX MAX PREDICTED HEART RATE: 147
OHS CV CPX PATIENT IS FEMALE: 1
OHS CV CPX PATIENT IS MALE: 0
OHS CV CPX PEAK DIASTOLIC BLOOD PRESSURE: 36 MMHG
OHS CV CPX PEAK HEAR RATE: 131 BPM
OHS CV CPX PEAK RATE PRESSURE PRODUCT: ABNORMAL
OHS CV CPX PEAK SYSTOLIC BLOOD PRESSURE: 210 MMHG
OHS CV CPX PERCENT MAX PREDICTED HEART RATE ACHIEVED: 89
OHS CV CPX RATE PRESSURE PRODUCT PRESENTING: ABNORMAL
PISA TR MAX VEL: 2.5 M/S
PULM VEIN S/D RATIO: 1.38
PV MEAN GRADIENT: 1 MMHG
PV PEAK D VEL: 0.45 M/S
PV PEAK S VEL: 0.62 M/S
PV PEAK VELOCITY: 0.83 CM/S
RA MAJOR: 4.33 CM
RA WIDTH: 3.25 CM
RIGHT VENTRICULAR END-DIASTOLIC DIMENSION: 3.03 CM
SINUS: 2.25 CM
STJ: 1.98 CM
STRESS ECHO POST EXERCISE DUR MIN: 9 MINUTES
STRESS ECHO POST EXERCISE DUR SEC: 0 SECONDS
SYSTOLIC BLOOD PRESSURE: 157 MMHG
TDI LATERAL: 0.08 M/S
TDI SEPTAL: 0.09 M/S
TDI: 0.09 M/S
TR MAX PG: 25 MMHG

## 2021-07-15 PROCEDURE — 93351 STRESS TTE COMPLETE: CPT

## 2021-07-15 PROCEDURE — 93351 STRESS TTE COMPLETE: CPT | Mod: 26,,, | Performed by: INTERNAL MEDICINE

## 2021-07-15 PROCEDURE — 63600175 PHARM REV CODE 636 W HCPCS: Performed by: FAMILY MEDICINE

## 2021-07-15 PROCEDURE — 93352 ADMIN ECG CONTRAST AGENT: CPT | Mod: ,,, | Performed by: INTERNAL MEDICINE

## 2021-07-15 PROCEDURE — 93351 STRESS ECHO (CUPID ONLY): ICD-10-PCS | Mod: 26,,, | Performed by: INTERNAL MEDICINE

## 2021-07-15 PROCEDURE — 93352 STRESS ECHO (CUPID ONLY): ICD-10-PCS | Mod: ,,, | Performed by: INTERNAL MEDICINE

## 2021-07-15 PROCEDURE — 25500020 PHARM REV CODE 255: Performed by: FAMILY MEDICINE

## 2021-07-15 RX ORDER — ATROPINE SULFATE 0.1 MG/ML
0.25 INJECTION INTRAVENOUS ONCE
Status: COMPLETED | OUTPATIENT
Start: 2021-07-15 | End: 2021-07-15

## 2021-07-15 RX ORDER — DOBUTAMINE HYDROCHLORIDE 400 MG/100ML
10 INJECTION INTRAVENOUS CONTINUOUS
Status: DISCONTINUED | OUTPATIENT
Start: 2021-07-15 | End: 2024-03-04

## 2021-07-15 RX ADMIN — DOBUTAMINE HYDROCHLORIDE 10 MCG/KG/MIN: 400 INJECTION INTRAVENOUS at 11:07

## 2021-07-15 RX ADMIN — HUMAN ALBUMIN MICROSPHERES AND PERFLUTREN 0.66 MG: 10; .22 INJECTION, SOLUTION INTRAVENOUS at 10:07

## 2021-07-15 RX ADMIN — ATROPINE SULFATE 0.25 MG: 0.1 INJECTION INTRAVENOUS at 11:07

## 2021-07-19 ENCOUNTER — TELEPHONE (OUTPATIENT)
Dept: INTERNAL MEDICINE | Facility: CLINIC | Age: 66
End: 2021-07-19

## 2021-07-20 ENCOUNTER — TELEPHONE (OUTPATIENT)
Dept: INTERNAL MEDICINE | Facility: CLINIC | Age: 66
End: 2021-07-20

## 2021-08-06 ENCOUNTER — OFFICE VISIT (OUTPATIENT)
Dept: INTERNAL MEDICINE | Facility: CLINIC | Age: 66
End: 2021-08-06
Payer: MEDICARE

## 2021-08-06 VITALS
DIASTOLIC BLOOD PRESSURE: 88 MMHG | OXYGEN SATURATION: 98 % | HEART RATE: 67 BPM | HEIGHT: 65 IN | WEIGHT: 230.63 LBS | BODY MASS INDEX: 38.42 KG/M2 | SYSTOLIC BLOOD PRESSURE: 128 MMHG | TEMPERATURE: 98 F

## 2021-08-06 DIAGNOSIS — Z09 FOLLOW UP: Primary | ICD-10-CM

## 2021-08-06 DIAGNOSIS — R00.2 PALPITATIONS: ICD-10-CM

## 2021-08-06 DIAGNOSIS — I10 ESSENTIAL HYPERTENSION: ICD-10-CM

## 2021-08-06 PROCEDURE — 93010 ELECTROCARDIOGRAM REPORT: CPT | Mod: S$GLB,,, | Performed by: INTERNAL MEDICINE

## 2021-08-06 PROCEDURE — 3074F SYST BP LT 130 MM HG: CPT | Mod: CPTII,S$GLB,, | Performed by: NURSE PRACTITIONER

## 2021-08-06 PROCEDURE — 99999 PR PBB SHADOW E&M-EST. PATIENT-LVL IV: CPT | Mod: PBBFAC,,, | Performed by: NURSE PRACTITIONER

## 2021-08-06 PROCEDURE — 3079F PR MOST RECENT DIASTOLIC BLOOD PRESSURE 80-89 MM HG: ICD-10-PCS | Mod: CPTII,S$GLB,, | Performed by: NURSE PRACTITIONER

## 2021-08-06 PROCEDURE — 1159F PR MEDICATION LIST DOCUMENTED IN MEDICAL RECORD: ICD-10-PCS | Mod: CPTII,S$GLB,, | Performed by: NURSE PRACTITIONER

## 2021-08-06 PROCEDURE — 1159F MED LIST DOCD IN RCRD: CPT | Mod: CPTII,S$GLB,, | Performed by: NURSE PRACTITIONER

## 2021-08-06 PROCEDURE — 3074F PR MOST RECENT SYSTOLIC BLOOD PRESSURE < 130 MM HG: ICD-10-PCS | Mod: CPTII,S$GLB,, | Performed by: NURSE PRACTITIONER

## 2021-08-06 PROCEDURE — 3008F PR BODY MASS INDEX (BMI) DOCUMENTED: ICD-10-PCS | Mod: CPTII,S$GLB,, | Performed by: NURSE PRACTITIONER

## 2021-08-06 PROCEDURE — 93005 EKG 12-LEAD: ICD-10-PCS | Mod: S$GLB,,, | Performed by: NURSE PRACTITIONER

## 2021-08-06 PROCEDURE — 99999 PR PBB SHADOW E&M-EST. PATIENT-LVL IV: ICD-10-PCS | Mod: PBBFAC,,, | Performed by: NURSE PRACTITIONER

## 2021-08-06 PROCEDURE — 1126F AMNT PAIN NOTED NONE PRSNT: CPT | Mod: CPTII,S$GLB,, | Performed by: NURSE PRACTITIONER

## 2021-08-06 PROCEDURE — 99213 OFFICE O/P EST LOW 20 MIN: CPT | Mod: S$GLB,,, | Performed by: NURSE PRACTITIONER

## 2021-08-06 PROCEDURE — 3044F PR MOST RECENT HEMOGLOBIN A1C LEVEL <7.0%: ICD-10-PCS | Mod: CPTII,S$GLB,, | Performed by: NURSE PRACTITIONER

## 2021-08-06 PROCEDURE — 3079F DIAST BP 80-89 MM HG: CPT | Mod: CPTII,S$GLB,, | Performed by: NURSE PRACTITIONER

## 2021-08-06 PROCEDURE — 99213 PR OFFICE/OUTPT VISIT, EST, LEVL III, 20-29 MIN: ICD-10-PCS | Mod: S$GLB,,, | Performed by: NURSE PRACTITIONER

## 2021-08-06 PROCEDURE — 93005 ELECTROCARDIOGRAM TRACING: CPT | Mod: S$GLB,,, | Performed by: NURSE PRACTITIONER

## 2021-08-06 PROCEDURE — 1160F PR REVIEW ALL MEDS BY PRESCRIBER/CLIN PHARMACIST DOCUMENTED: ICD-10-PCS | Mod: CPTII,S$GLB,, | Performed by: NURSE PRACTITIONER

## 2021-08-06 PROCEDURE — 1126F PR PAIN SEVERITY QUANTIFIED, NO PAIN PRESENT: ICD-10-PCS | Mod: CPTII,S$GLB,, | Performed by: NURSE PRACTITIONER

## 2021-08-06 PROCEDURE — 93010 EKG 12-LEAD: ICD-10-PCS | Mod: S$GLB,,, | Performed by: INTERNAL MEDICINE

## 2021-08-06 PROCEDURE — 1160F RVW MEDS BY RX/DR IN RCRD: CPT | Mod: CPTII,S$GLB,, | Performed by: NURSE PRACTITIONER

## 2021-08-06 PROCEDURE — 3008F BODY MASS INDEX DOCD: CPT | Mod: CPTII,S$GLB,, | Performed by: NURSE PRACTITIONER

## 2021-08-06 PROCEDURE — 3044F HG A1C LEVEL LT 7.0%: CPT | Mod: CPTII,S$GLB,, | Performed by: NURSE PRACTITIONER

## 2021-08-06 RX ORDER — OLMESARTAN MEDOXOMIL AND HYDROCHLOROTHIAZIDE 20/12.5 20; 12.5 MG/1; MG/1
1 TABLET ORAL DAILY
Qty: 30 TABLET | Refills: 1 | Status: SHIPPED | OUTPATIENT
Start: 2021-08-06 | End: 2021-08-20 | Stop reason: SINTOL

## 2021-08-20 ENCOUNTER — OFFICE VISIT (OUTPATIENT)
Dept: INTERNAL MEDICINE | Facility: CLINIC | Age: 66
End: 2021-08-20
Payer: MEDICARE

## 2021-08-20 VITALS
DIASTOLIC BLOOD PRESSURE: 97 MMHG | TEMPERATURE: 98 F | SYSTOLIC BLOOD PRESSURE: 165 MMHG | OXYGEN SATURATION: 96 % | WEIGHT: 233.69 LBS | BODY MASS INDEX: 38.93 KG/M2 | HEIGHT: 65 IN | HEART RATE: 68 BPM

## 2021-08-20 DIAGNOSIS — R39.11 URINARY HESITANCY: ICD-10-CM

## 2021-08-20 DIAGNOSIS — R51.9 ACUTE NONINTRACTABLE HEADACHE, UNSPECIFIED HEADACHE TYPE: ICD-10-CM

## 2021-08-20 DIAGNOSIS — I10 ESSENTIAL HYPERTENSION: Primary | ICD-10-CM

## 2021-08-20 PROCEDURE — 81001 URINALYSIS AUTO W/SCOPE: CPT | Performed by: NURSE PRACTITIONER

## 2021-08-20 PROCEDURE — 1125F AMNT PAIN NOTED PAIN PRSNT: CPT | Mod: CPTII,S$GLB,, | Performed by: NURSE PRACTITIONER

## 2021-08-20 PROCEDURE — 1159F PR MEDICATION LIST DOCUMENTED IN MEDICAL RECORD: ICD-10-PCS | Mod: CPTII,S$GLB,, | Performed by: NURSE PRACTITIONER

## 2021-08-20 PROCEDURE — 3044F PR MOST RECENT HEMOGLOBIN A1C LEVEL <7.0%: ICD-10-PCS | Mod: CPTII,S$GLB,, | Performed by: NURSE PRACTITIONER

## 2021-08-20 PROCEDURE — 3008F PR BODY MASS INDEX (BMI) DOCUMENTED: ICD-10-PCS | Mod: CPTII,S$GLB,, | Performed by: NURSE PRACTITIONER

## 2021-08-20 PROCEDURE — 3288F PR FALLS RISK ASSESSMENT DOCUMENTED: ICD-10-PCS | Mod: CPTII,S$GLB,, | Performed by: NURSE PRACTITIONER

## 2021-08-20 PROCEDURE — 1159F MED LIST DOCD IN RCRD: CPT | Mod: CPTII,S$GLB,, | Performed by: NURSE PRACTITIONER

## 2021-08-20 PROCEDURE — 1125F PR PAIN SEVERITY QUANTIFIED, PAIN PRESENT: ICD-10-PCS | Mod: CPTII,S$GLB,, | Performed by: NURSE PRACTITIONER

## 2021-08-20 PROCEDURE — 87086 URINE CULTURE/COLONY COUNT: CPT | Performed by: NURSE PRACTITIONER

## 2021-08-20 PROCEDURE — 3008F BODY MASS INDEX DOCD: CPT | Mod: CPTII,S$GLB,, | Performed by: NURSE PRACTITIONER

## 2021-08-20 PROCEDURE — 1160F PR REVIEW ALL MEDS BY PRESCRIBER/CLIN PHARMACIST DOCUMENTED: ICD-10-PCS | Mod: CPTII,S$GLB,, | Performed by: NURSE PRACTITIONER

## 2021-08-20 PROCEDURE — 1101F PR PT FALLS ASSESS DOC 0-1 FALLS W/OUT INJ PAST YR: ICD-10-PCS | Mod: CPTII,S$GLB,, | Performed by: NURSE PRACTITIONER

## 2021-08-20 PROCEDURE — 1101F PT FALLS ASSESS-DOCD LE1/YR: CPT | Mod: CPTII,S$GLB,, | Performed by: NURSE PRACTITIONER

## 2021-08-20 PROCEDURE — 99214 PR OFFICE/OUTPT VISIT, EST, LEVL IV, 30-39 MIN: ICD-10-PCS | Mod: S$GLB,,, | Performed by: NURSE PRACTITIONER

## 2021-08-20 PROCEDURE — 99999 PR PBB SHADOW E&M-EST. PATIENT-LVL IV: CPT | Mod: PBBFAC,,, | Performed by: NURSE PRACTITIONER

## 2021-08-20 PROCEDURE — 3080F DIAST BP >= 90 MM HG: CPT | Mod: CPTII,S$GLB,, | Performed by: NURSE PRACTITIONER

## 2021-08-20 PROCEDURE — 3077F SYST BP >= 140 MM HG: CPT | Mod: CPTII,S$GLB,, | Performed by: NURSE PRACTITIONER

## 2021-08-20 PROCEDURE — 3044F HG A1C LEVEL LT 7.0%: CPT | Mod: CPTII,S$GLB,, | Performed by: NURSE PRACTITIONER

## 2021-08-20 PROCEDURE — 3288F FALL RISK ASSESSMENT DOCD: CPT | Mod: CPTII,S$GLB,, | Performed by: NURSE PRACTITIONER

## 2021-08-20 PROCEDURE — 1160F RVW MEDS BY RX/DR IN RCRD: CPT | Mod: CPTII,S$GLB,, | Performed by: NURSE PRACTITIONER

## 2021-08-20 PROCEDURE — 99214 OFFICE O/P EST MOD 30 MIN: CPT | Mod: S$GLB,,, | Performed by: NURSE PRACTITIONER

## 2021-08-20 PROCEDURE — 3077F PR MOST RECENT SYSTOLIC BLOOD PRESSURE >= 140 MM HG: ICD-10-PCS | Mod: CPTII,S$GLB,, | Performed by: NURSE PRACTITIONER

## 2021-08-20 PROCEDURE — 99999 PR PBB SHADOW E&M-EST. PATIENT-LVL IV: ICD-10-PCS | Mod: PBBFAC,,, | Performed by: NURSE PRACTITIONER

## 2021-08-20 PROCEDURE — 3080F PR MOST RECENT DIASTOLIC BLOOD PRESSURE >= 90 MM HG: ICD-10-PCS | Mod: CPTII,S$GLB,, | Performed by: NURSE PRACTITIONER

## 2021-08-20 RX ORDER — OLMESARTAN MEDOXOMIL AND HYDROCHLOROTHIAZIDE 40/25 40; 25 MG/1; MG/1
1 TABLET ORAL DAILY
Qty: 30 TABLET | Refills: 0 | Status: SHIPPED | OUTPATIENT
Start: 2021-08-20 | End: 2021-08-26

## 2021-08-21 LAB
BACTERIA #/AREA URNS AUTO: ABNORMAL /HPF
BILIRUB UR QL STRIP: NEGATIVE
CLARITY UR REFRACT.AUTO: ABNORMAL
COLOR UR AUTO: YELLOW
GLUCOSE UR QL STRIP: NEGATIVE
HGB UR QL STRIP: NEGATIVE
HYALINE CASTS UR QL AUTO: 1 /LPF
KETONES UR QL STRIP: NEGATIVE
LEUKOCYTE ESTERASE UR QL STRIP: ABNORMAL
MICROSCOPIC COMMENT: ABNORMAL
NITRITE UR QL STRIP: NEGATIVE
PH UR STRIP: 5 [PH] (ref 5–8)
PROT UR QL STRIP: NEGATIVE
RBC #/AREA URNS AUTO: 0 /HPF (ref 0–4)
SP GR UR STRIP: 1.02 (ref 1–1.03)
SQUAMOUS #/AREA URNS AUTO: 2 /HPF
URN SPEC COLLECT METH UR: ABNORMAL
WBC #/AREA URNS AUTO: 2 /HPF (ref 0–5)

## 2021-08-22 LAB — BACTERIA UR CULT: NO GROWTH

## 2021-08-24 ENCOUNTER — TELEPHONE (OUTPATIENT)
Dept: INTERNAL MEDICINE | Facility: CLINIC | Age: 66
End: 2021-08-24

## 2021-08-26 RX ORDER — OLMESARTAN MEDOXOMIL AND HYDROCHLOROTHIAZIDE 20/12.5 20; 12.5 MG/1; MG/1
1 TABLET ORAL DAILY
Qty: 90 TABLET | Refills: 3 | Status: SHIPPED | OUTPATIENT
Start: 2021-08-26 | End: 2021-09-22

## 2021-08-31 ENCOUNTER — HOSPITAL ENCOUNTER (OUTPATIENT)
Dept: RADIOLOGY | Facility: HOSPITAL | Age: 66
Discharge: HOME | End: 2021-08-31
Attending: NURSE PRACTITIONER
Payer: MEDICARE

## 2021-08-31 DIAGNOSIS — R51.9 ACUTE NONINTRACTABLE HEADACHE, UNSPECIFIED HEADACHE TYPE: ICD-10-CM

## 2021-08-31 DIAGNOSIS — I10 ESSENTIAL HYPERTENSION: ICD-10-CM

## 2021-08-31 PROCEDURE — 70450 CT HEAD/BRAIN W/O DYE: CPT | Mod: 26,,, | Performed by: RADIOLOGY

## 2021-08-31 PROCEDURE — 70450 CT HEAD/BRAIN W/O DYE: CPT | Mod: TC

## 2021-08-31 PROCEDURE — 70450 CT HEAD WITHOUT CONTRAST: ICD-10-PCS | Mod: 26,,, | Performed by: RADIOLOGY

## 2021-09-03 ENCOUNTER — TELEPHONE (OUTPATIENT)
Dept: INTERNAL MEDICINE | Facility: CLINIC | Age: 66
End: 2021-09-03

## 2021-09-17 ENCOUNTER — OFFICE VISIT (OUTPATIENT)
Dept: INTERNAL MEDICINE | Facility: CLINIC | Age: 66
End: 2021-09-17
Payer: MEDICARE

## 2021-09-17 ENCOUNTER — TELEPHONE (OUTPATIENT)
Dept: INTERNAL MEDICINE | Facility: CLINIC | Age: 66
End: 2021-09-17

## 2021-09-17 VITALS
WEIGHT: 236.31 LBS | SYSTOLIC BLOOD PRESSURE: 156 MMHG | HEIGHT: 64 IN | OXYGEN SATURATION: 98 % | HEART RATE: 66 BPM | DIASTOLIC BLOOD PRESSURE: 80 MMHG | BODY MASS INDEX: 40.34 KG/M2 | TEMPERATURE: 99 F

## 2021-09-17 DIAGNOSIS — E78.2 MIXED HYPERLIPIDEMIA: ICD-10-CM

## 2021-09-17 DIAGNOSIS — Z09 FOLLOW UP: Primary | ICD-10-CM

## 2021-09-17 DIAGNOSIS — M54.50 BILATERAL LOW BACK PAIN, UNSPECIFIED CHRONICITY, UNSPECIFIED WHETHER SCIATICA PRESENT: ICD-10-CM

## 2021-09-17 DIAGNOSIS — R10.9 RIGHT FLANK DISCOMFORT: ICD-10-CM

## 2021-09-17 DIAGNOSIS — I10 ESSENTIAL HYPERTENSION: ICD-10-CM

## 2021-09-17 PROCEDURE — 3008F PR BODY MASS INDEX (BMI) DOCUMENTED: ICD-10-PCS | Mod: CPTII,S$GLB,, | Performed by: NURSE PRACTITIONER

## 2021-09-17 PROCEDURE — 99213 PR OFFICE/OUTPT VISIT, EST, LEVL III, 20-29 MIN: ICD-10-PCS | Mod: S$GLB,,, | Performed by: NURSE PRACTITIONER

## 2021-09-17 PROCEDURE — 3079F PR MOST RECENT DIASTOLIC BLOOD PRESSURE 80-89 MM HG: ICD-10-PCS | Mod: CPTII,S$GLB,, | Performed by: NURSE PRACTITIONER

## 2021-09-17 PROCEDURE — 3079F DIAST BP 80-89 MM HG: CPT | Mod: CPTII,S$GLB,, | Performed by: NURSE PRACTITIONER

## 2021-09-17 PROCEDURE — 1101F PR PT FALLS ASSESS DOC 0-1 FALLS W/OUT INJ PAST YR: ICD-10-PCS | Mod: CPTII,S$GLB,, | Performed by: NURSE PRACTITIONER

## 2021-09-17 PROCEDURE — 3077F SYST BP >= 140 MM HG: CPT | Mod: CPTII,S$GLB,, | Performed by: NURSE PRACTITIONER

## 2021-09-17 PROCEDURE — 1160F PR REVIEW ALL MEDS BY PRESCRIBER/CLIN PHARMACIST DOCUMENTED: ICD-10-PCS | Mod: CPTII,S$GLB,, | Performed by: NURSE PRACTITIONER

## 2021-09-17 PROCEDURE — 3077F PR MOST RECENT SYSTOLIC BLOOD PRESSURE >= 140 MM HG: ICD-10-PCS | Mod: CPTII,S$GLB,, | Performed by: NURSE PRACTITIONER

## 2021-09-17 PROCEDURE — 1125F PR PAIN SEVERITY QUANTIFIED, PAIN PRESENT: ICD-10-PCS | Mod: CPTII,S$GLB,, | Performed by: NURSE PRACTITIONER

## 2021-09-17 PROCEDURE — 1160F RVW MEDS BY RX/DR IN RCRD: CPT | Mod: CPTII,S$GLB,, | Performed by: NURSE PRACTITIONER

## 2021-09-17 PROCEDURE — 3288F PR FALLS RISK ASSESSMENT DOCUMENTED: ICD-10-PCS | Mod: CPTII,S$GLB,, | Performed by: NURSE PRACTITIONER

## 2021-09-17 PROCEDURE — 99999 PR PBB SHADOW E&M-EST. PATIENT-LVL IV: ICD-10-PCS | Mod: PBBFAC,,, | Performed by: NURSE PRACTITIONER

## 2021-09-17 PROCEDURE — 3044F PR MOST RECENT HEMOGLOBIN A1C LEVEL <7.0%: ICD-10-PCS | Mod: CPTII,S$GLB,, | Performed by: NURSE PRACTITIONER

## 2021-09-17 PROCEDURE — 99999 PR PBB SHADOW E&M-EST. PATIENT-LVL IV: CPT | Mod: PBBFAC,,, | Performed by: NURSE PRACTITIONER

## 2021-09-17 PROCEDURE — 3008F BODY MASS INDEX DOCD: CPT | Mod: CPTII,S$GLB,, | Performed by: NURSE PRACTITIONER

## 2021-09-17 PROCEDURE — 99213 OFFICE O/P EST LOW 20 MIN: CPT | Mod: S$GLB,,, | Performed by: NURSE PRACTITIONER

## 2021-09-17 PROCEDURE — 1159F PR MEDICATION LIST DOCUMENTED IN MEDICAL RECORD: ICD-10-PCS | Mod: CPTII,S$GLB,, | Performed by: NURSE PRACTITIONER

## 2021-09-17 PROCEDURE — 3288F FALL RISK ASSESSMENT DOCD: CPT | Mod: CPTII,S$GLB,, | Performed by: NURSE PRACTITIONER

## 2021-09-17 PROCEDURE — 1101F PT FALLS ASSESS-DOCD LE1/YR: CPT | Mod: CPTII,S$GLB,, | Performed by: NURSE PRACTITIONER

## 2021-09-17 PROCEDURE — 3044F HG A1C LEVEL LT 7.0%: CPT | Mod: CPTII,S$GLB,, | Performed by: NURSE PRACTITIONER

## 2021-09-17 PROCEDURE — 1159F MED LIST DOCD IN RCRD: CPT | Mod: CPTII,S$GLB,, | Performed by: NURSE PRACTITIONER

## 2021-09-17 PROCEDURE — 1125F AMNT PAIN NOTED PAIN PRSNT: CPT | Mod: CPTII,S$GLB,, | Performed by: NURSE PRACTITIONER

## 2021-09-20 ENCOUNTER — TELEPHONE (OUTPATIENT)
Dept: INTERNAL MEDICINE | Facility: CLINIC | Age: 66
End: 2021-09-20

## 2021-09-20 ENCOUNTER — PATIENT OUTREACH (OUTPATIENT)
Dept: ADMINISTRATIVE | Facility: HOSPITAL | Age: 66
End: 2021-09-20

## 2021-09-22 ENCOUNTER — TELEPHONE (OUTPATIENT)
Dept: INTERNAL MEDICINE | Facility: CLINIC | Age: 66
End: 2021-09-22

## 2021-09-22 RX ORDER — OLMESARTAN MEDOXOMIL AND HYDROCHLOROTHIAZIDE 40/25 40; 25 MG/1; MG/1
1 TABLET ORAL DAILY
Qty: 30 TABLET | Refills: 11 | Status: SHIPPED | OUTPATIENT
Start: 2021-09-22 | End: 2021-09-23 | Stop reason: SDUPTHER

## 2021-09-23 RX ORDER — OLMESARTAN MEDOXOMIL AND HYDROCHLOROTHIAZIDE 40/25 40; 25 MG/1; MG/1
1 TABLET ORAL DAILY
Qty: 90 TABLET | Refills: 3 | Status: SHIPPED | OUTPATIENT
Start: 2021-09-23 | End: 2022-06-01

## 2021-09-25 ENCOUNTER — NURSE TRIAGE (OUTPATIENT)
Dept: ADMINISTRATIVE | Facility: CLINIC | Age: 66
End: 2021-09-25

## 2021-09-27 ENCOUNTER — TELEPHONE (OUTPATIENT)
Dept: INTERNAL MEDICINE | Facility: CLINIC | Age: 66
End: 2021-09-27

## 2021-09-27 DIAGNOSIS — R79.89 ELEVATED SERUM CREATININE: Primary | ICD-10-CM

## 2021-09-29 ENCOUNTER — OFFICE VISIT (OUTPATIENT)
Dept: INTERNAL MEDICINE | Facility: CLINIC | Age: 66
End: 2021-09-29
Payer: MEDICARE

## 2021-09-29 ENCOUNTER — LAB VISIT (OUTPATIENT)
Dept: LAB | Facility: HOSPITAL | Age: 66
End: 2021-09-29
Attending: NURSE PRACTITIONER
Payer: MEDICARE

## 2021-09-29 VITALS
BODY MASS INDEX: 40.16 KG/M2 | TEMPERATURE: 99 F | HEIGHT: 64 IN | OXYGEN SATURATION: 96 % | HEART RATE: 62 BPM | SYSTOLIC BLOOD PRESSURE: 156 MMHG | DIASTOLIC BLOOD PRESSURE: 86 MMHG | WEIGHT: 235.25 LBS

## 2021-09-29 DIAGNOSIS — R22.1 NECK SWELLING: ICD-10-CM

## 2021-09-29 DIAGNOSIS — M47.26 OSTEOARTHRITIS OF SPINE WITH RADICULOPATHY, LUMBAR REGION: ICD-10-CM

## 2021-09-29 DIAGNOSIS — Z09 FOLLOW UP: Primary | ICD-10-CM

## 2021-09-29 DIAGNOSIS — R79.89 ELEVATED SERUM CREATININE: ICD-10-CM

## 2021-09-29 DIAGNOSIS — E78.2 MIXED HYPERLIPIDEMIA: ICD-10-CM

## 2021-09-29 DIAGNOSIS — I10 ESSENTIAL HYPERTENSION: ICD-10-CM

## 2021-09-29 LAB
ANION GAP SERPL CALC-SCNC: 9 MMOL/L (ref 8–16)
ANION GAP SERPL CALC-SCNC: 9 MMOL/L (ref 8–16)
BUN SERPL-MCNC: 14 MG/DL (ref 8–23)
BUN SERPL-MCNC: 14 MG/DL (ref 8–23)
CALCIUM SERPL-MCNC: 10 MG/DL (ref 8.7–10.5)
CALCIUM SERPL-MCNC: 10 MG/DL (ref 8.7–10.5)
CHLORIDE SERPL-SCNC: 106 MMOL/L (ref 95–110)
CHLORIDE SERPL-SCNC: 106 MMOL/L (ref 95–110)
CHOLEST SERPL-MCNC: 194 MG/DL (ref 120–199)
CHOLEST/HDLC SERPL: 3.7 {RATIO} (ref 2–5)
CO2 SERPL-SCNC: 28 MMOL/L (ref 23–29)
CO2 SERPL-SCNC: 28 MMOL/L (ref 23–29)
CREAT SERPL-MCNC: 1 MG/DL (ref 0.5–1.4)
CREAT SERPL-MCNC: 1 MG/DL (ref 0.5–1.4)
EST. GFR  (AFRICAN AMERICAN): >60 ML/MIN/1.73 M^2
EST. GFR  (AFRICAN AMERICAN): >60 ML/MIN/1.73 M^2
EST. GFR  (NON AFRICAN AMERICAN): 58.4 ML/MIN/1.73 M^2
EST. GFR  (NON AFRICAN AMERICAN): 58.4 ML/MIN/1.73 M^2
GLUCOSE SERPL-MCNC: 91 MG/DL (ref 70–110)
GLUCOSE SERPL-MCNC: 91 MG/DL (ref 70–110)
HDLC SERPL-MCNC: 52 MG/DL (ref 40–75)
HDLC SERPL: 26.8 % (ref 20–50)
LDLC SERPL CALC-MCNC: 124.6 MG/DL (ref 63–159)
NONHDLC SERPL-MCNC: 142 MG/DL
POTASSIUM SERPL-SCNC: 3.4 MMOL/L (ref 3.5–5.1)
POTASSIUM SERPL-SCNC: 3.4 MMOL/L (ref 3.5–5.1)
SODIUM SERPL-SCNC: 143 MMOL/L (ref 136–145)
SODIUM SERPL-SCNC: 143 MMOL/L (ref 136–145)
TRIGL SERPL-MCNC: 87 MG/DL (ref 30–150)

## 2021-09-29 PROCEDURE — 3044F HG A1C LEVEL LT 7.0%: CPT | Mod: CPTII,S$GLB,, | Performed by: NURSE PRACTITIONER

## 2021-09-29 PROCEDURE — 1159F MED LIST DOCD IN RCRD: CPT | Mod: CPTII,S$GLB,, | Performed by: NURSE PRACTITIONER

## 2021-09-29 PROCEDURE — 99214 PR OFFICE/OUTPT VISIT, EST, LEVL IV, 30-39 MIN: ICD-10-PCS | Mod: 25,S$GLB,, | Performed by: NURSE PRACTITIONER

## 2021-09-29 PROCEDURE — 1125F PR PAIN SEVERITY QUANTIFIED, PAIN PRESENT: ICD-10-PCS | Mod: CPTII,S$GLB,, | Performed by: NURSE PRACTITIONER

## 2021-09-29 PROCEDURE — 3044F PR MOST RECENT HEMOGLOBIN A1C LEVEL <7.0%: ICD-10-PCS | Mod: CPTII,S$GLB,, | Performed by: NURSE PRACTITIONER

## 2021-09-29 PROCEDURE — 3008F BODY MASS INDEX DOCD: CPT | Mod: CPTII,S$GLB,, | Performed by: NURSE PRACTITIONER

## 2021-09-29 PROCEDURE — 96372 PR INJECTION,THERAP/PROPH/DIAG2ST, IM OR SUBCUT: ICD-10-PCS | Mod: S$GLB,,, | Performed by: NURSE PRACTITIONER

## 2021-09-29 PROCEDURE — 80048 BASIC METABOLIC PNL TOTAL CA: CPT | Performed by: NURSE PRACTITIONER

## 2021-09-29 PROCEDURE — 99214 OFFICE O/P EST MOD 30 MIN: CPT | Mod: 25,S$GLB,, | Performed by: NURSE PRACTITIONER

## 2021-09-29 PROCEDURE — 3288F PR FALLS RISK ASSESSMENT DOCUMENTED: ICD-10-PCS | Mod: CPTII,S$GLB,, | Performed by: NURSE PRACTITIONER

## 2021-09-29 PROCEDURE — 3079F PR MOST RECENT DIASTOLIC BLOOD PRESSURE 80-89 MM HG: ICD-10-PCS | Mod: CPTII,S$GLB,, | Performed by: NURSE PRACTITIONER

## 2021-09-29 PROCEDURE — 36415 COLL VENOUS BLD VENIPUNCTURE: CPT | Mod: PO | Performed by: NURSE PRACTITIONER

## 2021-09-29 PROCEDURE — 99999 PR PBB SHADOW E&M-EST. PATIENT-LVL V: CPT | Mod: PBBFAC,,, | Performed by: NURSE PRACTITIONER

## 2021-09-29 PROCEDURE — 1101F PT FALLS ASSESS-DOCD LE1/YR: CPT | Mod: CPTII,S$GLB,, | Performed by: NURSE PRACTITIONER

## 2021-09-29 PROCEDURE — 3288F FALL RISK ASSESSMENT DOCD: CPT | Mod: CPTII,S$GLB,, | Performed by: NURSE PRACTITIONER

## 2021-09-29 PROCEDURE — 3077F SYST BP >= 140 MM HG: CPT | Mod: CPTII,S$GLB,, | Performed by: NURSE PRACTITIONER

## 2021-09-29 PROCEDURE — 99999 PR PBB SHADOW E&M-EST. PATIENT-LVL V: ICD-10-PCS | Mod: PBBFAC,,, | Performed by: NURSE PRACTITIONER

## 2021-09-29 PROCEDURE — 1159F PR MEDICATION LIST DOCUMENTED IN MEDICAL RECORD: ICD-10-PCS | Mod: CPTII,S$GLB,, | Performed by: NURSE PRACTITIONER

## 2021-09-29 PROCEDURE — 3079F DIAST BP 80-89 MM HG: CPT | Mod: CPTII,S$GLB,, | Performed by: NURSE PRACTITIONER

## 2021-09-29 PROCEDURE — 1125F AMNT PAIN NOTED PAIN PRSNT: CPT | Mod: CPTII,S$GLB,, | Performed by: NURSE PRACTITIONER

## 2021-09-29 PROCEDURE — 3077F PR MOST RECENT SYSTOLIC BLOOD PRESSURE >= 140 MM HG: ICD-10-PCS | Mod: CPTII,S$GLB,, | Performed by: NURSE PRACTITIONER

## 2021-09-29 PROCEDURE — 96372 THER/PROPH/DIAG INJ SC/IM: CPT | Mod: S$GLB,,, | Performed by: NURSE PRACTITIONER

## 2021-09-29 PROCEDURE — 80061 LIPID PANEL: CPT | Performed by: NURSE PRACTITIONER

## 2021-09-29 PROCEDURE — 3008F PR BODY MASS INDEX (BMI) DOCUMENTED: ICD-10-PCS | Mod: CPTII,S$GLB,, | Performed by: NURSE PRACTITIONER

## 2021-09-29 PROCEDURE — 1101F PR PT FALLS ASSESS DOC 0-1 FALLS W/OUT INJ PAST YR: ICD-10-PCS | Mod: CPTII,S$GLB,, | Performed by: NURSE PRACTITIONER

## 2021-09-29 RX ORDER — CYCLOBENZAPRINE HCL 10 MG
10 TABLET ORAL 3 TIMES DAILY PRN
Qty: 30 TABLET | Refills: 0 | Status: SHIPPED | OUTPATIENT
Start: 2021-09-29 | End: 2021-10-09

## 2021-09-29 RX ORDER — KETOROLAC TROMETHAMINE 30 MG/ML
30 INJECTION, SOLUTION INTRAMUSCULAR; INTRAVENOUS
Status: COMPLETED | OUTPATIENT
Start: 2021-09-29 | End: 2021-09-29

## 2021-09-29 RX ADMIN — KETOROLAC TROMETHAMINE 30 MG: 30 INJECTION, SOLUTION INTRAMUSCULAR; INTRAVENOUS at 10:09

## 2021-10-20 ENCOUNTER — PATIENT OUTREACH (OUTPATIENT)
Dept: ADMINISTRATIVE | Facility: OTHER | Age: 66
End: 2021-10-20

## 2021-10-21 ENCOUNTER — HOSPITAL ENCOUNTER (OUTPATIENT)
Dept: RADIOLOGY | Facility: HOSPITAL | Age: 66
Discharge: HOME | End: 2021-10-21
Attending: NURSE PRACTITIONER
Payer: MEDICARE

## 2021-10-21 ENCOUNTER — OFFICE VISIT (OUTPATIENT)
Dept: PHYSICAL MEDICINE AND REHAB | Facility: CLINIC | Age: 66
End: 2021-10-21
Payer: MEDICARE

## 2021-10-21 VITALS
RESPIRATION RATE: 13 BRPM | SYSTOLIC BLOOD PRESSURE: 223 MMHG | HEIGHT: 64 IN | HEART RATE: 62 BPM | WEIGHT: 235 LBS | BODY MASS INDEX: 40.12 KG/M2 | DIASTOLIC BLOOD PRESSURE: 103 MMHG

## 2021-10-21 DIAGNOSIS — R22.1 NECK SWELLING: ICD-10-CM

## 2021-10-21 DIAGNOSIS — E04.2 MULTIPLE THYROID NODULES: ICD-10-CM

## 2021-10-21 DIAGNOSIS — M54.16 LUMBAR RADICULOPATHY: ICD-10-CM

## 2021-10-21 PROCEDURE — 3044F HG A1C LEVEL LT 7.0%: CPT | Mod: CPTII,S$GLB,, | Performed by: PHYSICAL MEDICINE & REHABILITATION

## 2021-10-21 PROCEDURE — 95886 PR EMG COMPLETE, W/ NERVE CONDUCTION STUDIES, 5+ MUSCLES: ICD-10-PCS | Mod: S$GLB,,, | Performed by: PHYSICAL MEDICINE & REHABILITATION

## 2021-10-21 PROCEDURE — 1125F PR PAIN SEVERITY QUANTIFIED, PAIN PRESENT: ICD-10-PCS | Mod: CPTII,S$GLB,, | Performed by: PHYSICAL MEDICINE & REHABILITATION

## 2021-10-21 PROCEDURE — 3008F BODY MASS INDEX DOCD: CPT | Mod: CPTII,S$GLB,, | Performed by: PHYSICAL MEDICINE & REHABILITATION

## 2021-10-21 PROCEDURE — 99999 PR PBB SHADOW E&M-EST. PATIENT-LVL III: ICD-10-PCS | Mod: PBBFAC,,, | Performed by: PHYSICAL MEDICINE & REHABILITATION

## 2021-10-21 PROCEDURE — 99203 OFFICE O/P NEW LOW 30 MIN: CPT | Mod: 25,S$GLB,, | Performed by: PHYSICAL MEDICINE & REHABILITATION

## 2021-10-21 PROCEDURE — 95886 MUSC TEST DONE W/N TEST COMP: CPT | Mod: S$GLB,,, | Performed by: PHYSICAL MEDICINE & REHABILITATION

## 2021-10-21 PROCEDURE — 95908 PR NERVE CONDUCTION STUDY; 3-4 STUDIES: ICD-10-PCS | Mod: S$GLB,,, | Performed by: PHYSICAL MEDICINE & REHABILITATION

## 2021-10-21 PROCEDURE — 1160F RVW MEDS BY RX/DR IN RCRD: CPT | Mod: CPTII,S$GLB,, | Performed by: PHYSICAL MEDICINE & REHABILITATION

## 2021-10-21 PROCEDURE — 1160F PR REVIEW ALL MEDS BY PRESCRIBER/CLIN PHARMACIST DOCUMENTED: ICD-10-PCS | Mod: CPTII,S$GLB,, | Performed by: PHYSICAL MEDICINE & REHABILITATION

## 2021-10-21 PROCEDURE — 99203 PR OFFICE/OUTPT VISIT, NEW, LEVL III, 30-44 MIN: ICD-10-PCS | Mod: 25,S$GLB,, | Performed by: PHYSICAL MEDICINE & REHABILITATION

## 2021-10-21 PROCEDURE — 76536 US EXAM OF HEAD AND NECK: CPT | Mod: 26,,, | Performed by: RADIOLOGY

## 2021-10-21 PROCEDURE — 3080F DIAST BP >= 90 MM HG: CPT | Mod: CPTII,S$GLB,, | Performed by: PHYSICAL MEDICINE & REHABILITATION

## 2021-10-21 PROCEDURE — 1125F AMNT PAIN NOTED PAIN PRSNT: CPT | Mod: CPTII,S$GLB,, | Performed by: PHYSICAL MEDICINE & REHABILITATION

## 2021-10-21 PROCEDURE — 3080F PR MOST RECENT DIASTOLIC BLOOD PRESSURE >= 90 MM HG: ICD-10-PCS | Mod: CPTII,S$GLB,, | Performed by: PHYSICAL MEDICINE & REHABILITATION

## 2021-10-21 PROCEDURE — 1159F MED LIST DOCD IN RCRD: CPT | Mod: CPTII,S$GLB,, | Performed by: PHYSICAL MEDICINE & REHABILITATION

## 2021-10-21 PROCEDURE — 76536 US EXAM OF HEAD AND NECK: CPT | Mod: TC

## 2021-10-21 PROCEDURE — 3008F PR BODY MASS INDEX (BMI) DOCUMENTED: ICD-10-PCS | Mod: CPTII,S$GLB,, | Performed by: PHYSICAL MEDICINE & REHABILITATION

## 2021-10-21 PROCEDURE — 3077F PR MOST RECENT SYSTOLIC BLOOD PRESSURE >= 140 MM HG: ICD-10-PCS | Mod: CPTII,S$GLB,, | Performed by: PHYSICAL MEDICINE & REHABILITATION

## 2021-10-21 PROCEDURE — 3077F SYST BP >= 140 MM HG: CPT | Mod: CPTII,S$GLB,, | Performed by: PHYSICAL MEDICINE & REHABILITATION

## 2021-10-21 PROCEDURE — 99999 PR PBB SHADOW E&M-EST. PATIENT-LVL III: CPT | Mod: PBBFAC,,, | Performed by: PHYSICAL MEDICINE & REHABILITATION

## 2021-10-21 PROCEDURE — 95908 NRV CNDJ TST 3-4 STUDIES: CPT | Mod: S$GLB,,, | Performed by: PHYSICAL MEDICINE & REHABILITATION

## 2021-10-21 PROCEDURE — 76536 US SOFT TISSUE HEAD NECK THYROID: ICD-10-PCS | Mod: 26,,, | Performed by: RADIOLOGY

## 2021-10-21 PROCEDURE — 1159F PR MEDICATION LIST DOCUMENTED IN MEDICAL RECORD: ICD-10-PCS | Mod: CPTII,S$GLB,, | Performed by: PHYSICAL MEDICINE & REHABILITATION

## 2021-10-21 PROCEDURE — 3044F PR MOST RECENT HEMOGLOBIN A1C LEVEL <7.0%: ICD-10-PCS | Mod: CPTII,S$GLB,, | Performed by: PHYSICAL MEDICINE & REHABILITATION

## 2021-10-25 ENCOUNTER — OFFICE VISIT (OUTPATIENT)
Dept: OTOLARYNGOLOGY | Facility: CLINIC | Age: 66
End: 2021-10-25
Payer: MEDICARE

## 2021-10-25 VITALS — WEIGHT: 232.81 LBS | BODY MASS INDEX: 39.96 KG/M2 | TEMPERATURE: 98 F

## 2021-10-25 DIAGNOSIS — E04.2 MULTIPLE THYROID NODULES: ICD-10-CM

## 2021-10-25 DIAGNOSIS — H69.93 DYSFUNCTION OF BOTH EUSTACHIAN TUBES: Primary | ICD-10-CM

## 2021-10-25 DIAGNOSIS — K21.9 GASTROESOPHAGEAL REFLUX DISEASE, UNSPECIFIED WHETHER ESOPHAGITIS PRESENT: ICD-10-CM

## 2021-10-25 PROCEDURE — 3008F BODY MASS INDEX DOCD: CPT | Mod: CPTII,S$GLB,, | Performed by: OTOLARYNGOLOGY

## 2021-10-25 PROCEDURE — 1160F PR REVIEW ALL MEDS BY PRESCRIBER/CLIN PHARMACIST DOCUMENTED: ICD-10-PCS | Mod: CPTII,S$GLB,, | Performed by: OTOLARYNGOLOGY

## 2021-10-25 PROCEDURE — 3044F PR MOST RECENT HEMOGLOBIN A1C LEVEL <7.0%: ICD-10-PCS | Mod: CPTII,S$GLB,, | Performed by: OTOLARYNGOLOGY

## 2021-10-25 PROCEDURE — 1160F RVW MEDS BY RX/DR IN RCRD: CPT | Mod: CPTII,S$GLB,, | Performed by: OTOLARYNGOLOGY

## 2021-10-25 PROCEDURE — 3008F PR BODY MASS INDEX (BMI) DOCUMENTED: ICD-10-PCS | Mod: CPTII,S$GLB,, | Performed by: OTOLARYNGOLOGY

## 2021-10-25 PROCEDURE — 1126F AMNT PAIN NOTED NONE PRSNT: CPT | Mod: CPTII,S$GLB,, | Performed by: OTOLARYNGOLOGY

## 2021-10-25 PROCEDURE — 99999 PR PBB SHADOW E&M-EST. PATIENT-LVL III: ICD-10-PCS | Mod: PBBFAC,,, | Performed by: OTOLARYNGOLOGY

## 2021-10-25 PROCEDURE — 3044F HG A1C LEVEL LT 7.0%: CPT | Mod: CPTII,S$GLB,, | Performed by: OTOLARYNGOLOGY

## 2021-10-25 PROCEDURE — 99203 OFFICE O/P NEW LOW 30 MIN: CPT | Mod: S$GLB,,, | Performed by: OTOLARYNGOLOGY

## 2021-10-25 PROCEDURE — 99999 PR PBB SHADOW E&M-EST. PATIENT-LVL III: CPT | Mod: PBBFAC,,, | Performed by: OTOLARYNGOLOGY

## 2021-10-25 PROCEDURE — 1126F PR PAIN SEVERITY QUANTIFIED, NO PAIN PRESENT: ICD-10-PCS | Mod: CPTII,S$GLB,, | Performed by: OTOLARYNGOLOGY

## 2021-10-25 PROCEDURE — 99203 PR OFFICE/OUTPT VISIT, NEW, LEVL III, 30-44 MIN: ICD-10-PCS | Mod: S$GLB,,, | Performed by: OTOLARYNGOLOGY

## 2021-10-25 PROCEDURE — 1159F PR MEDICATION LIST DOCUMENTED IN MEDICAL RECORD: ICD-10-PCS | Mod: CPTII,S$GLB,, | Performed by: OTOLARYNGOLOGY

## 2021-10-25 PROCEDURE — 1159F MED LIST DOCD IN RCRD: CPT | Mod: CPTII,S$GLB,, | Performed by: OTOLARYNGOLOGY

## 2021-10-27 ENCOUNTER — OFFICE VISIT (OUTPATIENT)
Dept: INTERNAL MEDICINE | Facility: CLINIC | Age: 66
End: 2021-10-27
Payer: MEDICARE

## 2021-10-27 VITALS
BODY MASS INDEX: 39.75 KG/M2 | HEART RATE: 74 BPM | HEIGHT: 64 IN | WEIGHT: 232.81 LBS | SYSTOLIC BLOOD PRESSURE: 156 MMHG | OXYGEN SATURATION: 97 % | DIASTOLIC BLOOD PRESSURE: 88 MMHG | TEMPERATURE: 98 F

## 2021-10-27 DIAGNOSIS — E04.2 MULTIPLE THYROID NODULES: ICD-10-CM

## 2021-10-27 DIAGNOSIS — Z09 FOLLOW UP: Primary | ICD-10-CM

## 2021-10-27 DIAGNOSIS — M54.16 LUMBAR RADICULOPATHY: ICD-10-CM

## 2021-10-27 DIAGNOSIS — I10 PRIMARY HYPERTENSION: ICD-10-CM

## 2021-10-27 PROCEDURE — 3077F SYST BP >= 140 MM HG: CPT | Mod: CPTII,S$GLB,, | Performed by: NURSE PRACTITIONER

## 2021-10-27 PROCEDURE — 3044F HG A1C LEVEL LT 7.0%: CPT | Mod: CPTII,S$GLB,, | Performed by: NURSE PRACTITIONER

## 2021-10-27 PROCEDURE — 96372 THER/PROPH/DIAG INJ SC/IM: CPT | Mod: S$GLB,,, | Performed by: NURSE PRACTITIONER

## 2021-10-27 PROCEDURE — 99999 PR PBB SHADOW E&M-EST. PATIENT-LVL IV: CPT | Mod: PBBFAC,,, | Performed by: NURSE PRACTITIONER

## 2021-10-27 PROCEDURE — 1159F PR MEDICATION LIST DOCUMENTED IN MEDICAL RECORD: ICD-10-PCS | Mod: CPTII,S$GLB,, | Performed by: NURSE PRACTITIONER

## 2021-10-27 PROCEDURE — 3008F BODY MASS INDEX DOCD: CPT | Mod: CPTII,S$GLB,, | Performed by: NURSE PRACTITIONER

## 2021-10-27 PROCEDURE — 96372 PR INJECTION,THERAP/PROPH/DIAG2ST, IM OR SUBCUT: ICD-10-PCS | Mod: S$GLB,,, | Performed by: NURSE PRACTITIONER

## 2021-10-27 PROCEDURE — 1125F PR PAIN SEVERITY QUANTIFIED, PAIN PRESENT: ICD-10-PCS | Mod: CPTII,S$GLB,, | Performed by: NURSE PRACTITIONER

## 2021-10-27 PROCEDURE — 3008F PR BODY MASS INDEX (BMI) DOCUMENTED: ICD-10-PCS | Mod: CPTII,S$GLB,, | Performed by: NURSE PRACTITIONER

## 2021-10-27 PROCEDURE — 99214 OFFICE O/P EST MOD 30 MIN: CPT | Mod: 25,S$GLB,, | Performed by: NURSE PRACTITIONER

## 2021-10-27 PROCEDURE — 3077F PR MOST RECENT SYSTOLIC BLOOD PRESSURE >= 140 MM HG: ICD-10-PCS | Mod: CPTII,S$GLB,, | Performed by: NURSE PRACTITIONER

## 2021-10-27 PROCEDURE — 3288F FALL RISK ASSESSMENT DOCD: CPT | Mod: CPTII,S$GLB,, | Performed by: NURSE PRACTITIONER

## 2021-10-27 PROCEDURE — 99214 PR OFFICE/OUTPT VISIT, EST, LEVL IV, 30-39 MIN: ICD-10-PCS | Mod: 25,S$GLB,, | Performed by: NURSE PRACTITIONER

## 2021-10-27 PROCEDURE — 1160F PR REVIEW ALL MEDS BY PRESCRIBER/CLIN PHARMACIST DOCUMENTED: ICD-10-PCS | Mod: CPTII,S$GLB,, | Performed by: NURSE PRACTITIONER

## 2021-10-27 PROCEDURE — 3044F PR MOST RECENT HEMOGLOBIN A1C LEVEL <7.0%: ICD-10-PCS | Mod: CPTII,S$GLB,, | Performed by: NURSE PRACTITIONER

## 2021-10-27 PROCEDURE — 1101F PR PT FALLS ASSESS DOC 0-1 FALLS W/OUT INJ PAST YR: ICD-10-PCS | Mod: CPTII,S$GLB,, | Performed by: NURSE PRACTITIONER

## 2021-10-27 PROCEDURE — 1160F RVW MEDS BY RX/DR IN RCRD: CPT | Mod: CPTII,S$GLB,, | Performed by: NURSE PRACTITIONER

## 2021-10-27 PROCEDURE — 3079F DIAST BP 80-89 MM HG: CPT | Mod: CPTII,S$GLB,, | Performed by: NURSE PRACTITIONER

## 2021-10-27 PROCEDURE — 1159F MED LIST DOCD IN RCRD: CPT | Mod: CPTII,S$GLB,, | Performed by: NURSE PRACTITIONER

## 2021-10-27 PROCEDURE — 1101F PT FALLS ASSESS-DOCD LE1/YR: CPT | Mod: CPTII,S$GLB,, | Performed by: NURSE PRACTITIONER

## 2021-10-27 PROCEDURE — 1125F AMNT PAIN NOTED PAIN PRSNT: CPT | Mod: CPTII,S$GLB,, | Performed by: NURSE PRACTITIONER

## 2021-10-27 PROCEDURE — 3079F PR MOST RECENT DIASTOLIC BLOOD PRESSURE 80-89 MM HG: ICD-10-PCS | Mod: CPTII,S$GLB,, | Performed by: NURSE PRACTITIONER

## 2021-10-27 PROCEDURE — 3288F PR FALLS RISK ASSESSMENT DOCUMENTED: ICD-10-PCS | Mod: CPTII,S$GLB,, | Performed by: NURSE PRACTITIONER

## 2021-10-27 PROCEDURE — 99999 PR PBB SHADOW E&M-EST. PATIENT-LVL IV: ICD-10-PCS | Mod: PBBFAC,,, | Performed by: NURSE PRACTITIONER

## 2021-10-27 RX ORDER — METHYLPREDNISOLONE ACETATE 40 MG/ML
40 INJECTION, SUSPENSION INTRA-ARTICULAR; INTRALESIONAL; INTRAMUSCULAR; SOFT TISSUE
Status: COMPLETED | OUTPATIENT
Start: 2021-10-27 | End: 2021-10-27

## 2021-10-27 RX ORDER — NIFEDIPINE 30 MG/1
30 TABLET, FILM COATED, EXTENDED RELEASE ORAL DAILY
Qty: 30 TABLET | Refills: 0 | Status: SHIPPED | OUTPATIENT
Start: 2021-10-27 | End: 2022-01-27

## 2021-10-27 RX ADMIN — METHYLPREDNISOLONE ACETATE 40 MG: 40 INJECTION, SUSPENSION INTRA-ARTICULAR; INTRALESIONAL; INTRAMUSCULAR; SOFT TISSUE at 10:10

## 2021-11-01 ENCOUNTER — PROCEDURE VISIT (OUTPATIENT)
Dept: OTOLARYNGOLOGY | Facility: CLINIC | Age: 66
End: 2021-11-01
Payer: MEDICARE

## 2021-11-01 VITALS — BODY MASS INDEX: 39.48 KG/M2 | TEMPERATURE: 97 F | HEIGHT: 64 IN | WEIGHT: 231.25 LBS

## 2021-11-01 DIAGNOSIS — E04.2 MULTIPLE THYROID NODULES: ICD-10-CM

## 2021-11-01 PROCEDURE — 88173 PR  INTERPRETATION OF FNA SMEAR: ICD-10-PCS | Mod: 26,,, | Performed by: PATHOLOGY

## 2021-11-01 PROCEDURE — 10005 FNA BX W/US GDN 1ST LES: CPT | Mod: S$GLB,,, | Performed by: OTOLARYNGOLOGY

## 2021-11-01 PROCEDURE — 88173 CYTOPATH EVAL FNA REPORT: CPT | Mod: 26,,, | Performed by: PATHOLOGY

## 2021-11-01 PROCEDURE — 10006 PR FINE NEEDLE ASP BIOPSY, W/US GUIDANCE, EA ADDTL LESION: ICD-10-PCS | Mod: S$GLB,,, | Performed by: OTOLARYNGOLOGY

## 2021-11-01 PROCEDURE — 10006 FNA BX W/US GDN EA ADDL: CPT | Mod: S$GLB,,, | Performed by: OTOLARYNGOLOGY

## 2021-11-01 PROCEDURE — 10005 PR FINE NEEDLE ASP BIOPSY, W/US GUIDANCE, 1ST LESION: ICD-10-PCS | Mod: S$GLB,,, | Performed by: OTOLARYNGOLOGY

## 2021-11-01 PROCEDURE — 88173 CYTOPATH EVAL FNA REPORT: CPT | Performed by: PATHOLOGY

## 2021-11-05 ENCOUNTER — TELEPHONE (OUTPATIENT)
Dept: OTOLARYNGOLOGY | Facility: CLINIC | Age: 66
End: 2021-11-05
Payer: MEDICARE

## 2021-11-05 LAB
FINAL PATHOLOGIC DIAGNOSIS: ABNORMAL
FINAL PATHOLOGIC DIAGNOSIS: ABNORMAL
FINAL PATHOLOGIC DIAGNOSIS: NORMAL
Lab: ABNORMAL
Lab: ABNORMAL
Lab: NORMAL

## 2021-11-11 ENCOUNTER — OFFICE VISIT (OUTPATIENT)
Dept: OTOLARYNGOLOGY | Facility: CLINIC | Age: 66
End: 2021-11-11
Payer: MEDICARE

## 2021-11-11 VITALS
DIASTOLIC BLOOD PRESSURE: 90 MMHG | HEART RATE: 68 BPM | BODY MASS INDEX: 39.66 KG/M2 | WEIGHT: 231.06 LBS | SYSTOLIC BLOOD PRESSURE: 192 MMHG

## 2021-11-11 DIAGNOSIS — C73 PAPILLARY THYROID CARCINOMA: Primary | ICD-10-CM

## 2021-11-11 PROCEDURE — 1126F AMNT PAIN NOTED NONE PRSNT: CPT | Mod: CPTII,S$GLB,, | Performed by: OTOLARYNGOLOGY

## 2021-11-11 PROCEDURE — 1126F PR PAIN SEVERITY QUANTIFIED, NO PAIN PRESENT: ICD-10-PCS | Mod: CPTII,S$GLB,, | Performed by: OTOLARYNGOLOGY

## 2021-11-11 PROCEDURE — 3077F SYST BP >= 140 MM HG: CPT | Mod: CPTII,S$GLB,, | Performed by: OTOLARYNGOLOGY

## 2021-11-11 PROCEDURE — 99215 PR OFFICE/OUTPT VISIT, EST, LEVL V, 40-54 MIN: ICD-10-PCS | Mod: S$GLB,,, | Performed by: OTOLARYNGOLOGY

## 2021-11-11 PROCEDURE — 3288F PR FALLS RISK ASSESSMENT DOCUMENTED: ICD-10-PCS | Mod: CPTII,S$GLB,, | Performed by: OTOLARYNGOLOGY

## 2021-11-11 PROCEDURE — 3044F PR MOST RECENT HEMOGLOBIN A1C LEVEL <7.0%: ICD-10-PCS | Mod: CPTII,S$GLB,, | Performed by: OTOLARYNGOLOGY

## 2021-11-11 PROCEDURE — 3077F PR MOST RECENT SYSTOLIC BLOOD PRESSURE >= 140 MM HG: ICD-10-PCS | Mod: CPTII,S$GLB,, | Performed by: OTOLARYNGOLOGY

## 2021-11-11 PROCEDURE — 3008F BODY MASS INDEX DOCD: CPT | Mod: CPTII,S$GLB,, | Performed by: OTOLARYNGOLOGY

## 2021-11-11 PROCEDURE — 3080F DIAST BP >= 90 MM HG: CPT | Mod: CPTII,S$GLB,, | Performed by: OTOLARYNGOLOGY

## 2021-11-11 PROCEDURE — 1101F PT FALLS ASSESS-DOCD LE1/YR: CPT | Mod: CPTII,S$GLB,, | Performed by: OTOLARYNGOLOGY

## 2021-11-11 PROCEDURE — 99999 PR PBB SHADOW E&M-EST. PATIENT-LVL V: CPT | Mod: PBBFAC,,, | Performed by: OTOLARYNGOLOGY

## 2021-11-11 PROCEDURE — 1159F MED LIST DOCD IN RCRD: CPT | Mod: CPTII,S$GLB,, | Performed by: OTOLARYNGOLOGY

## 2021-11-11 PROCEDURE — 1159F PR MEDICATION LIST DOCUMENTED IN MEDICAL RECORD: ICD-10-PCS | Mod: CPTII,S$GLB,, | Performed by: OTOLARYNGOLOGY

## 2021-11-11 PROCEDURE — 3288F FALL RISK ASSESSMENT DOCD: CPT | Mod: CPTII,S$GLB,, | Performed by: OTOLARYNGOLOGY

## 2021-11-11 PROCEDURE — 3080F PR MOST RECENT DIASTOLIC BLOOD PRESSURE >= 90 MM HG: ICD-10-PCS | Mod: CPTII,S$GLB,, | Performed by: OTOLARYNGOLOGY

## 2021-11-11 PROCEDURE — 1101F PR PT FALLS ASSESS DOC 0-1 FALLS W/OUT INJ PAST YR: ICD-10-PCS | Mod: CPTII,S$GLB,, | Performed by: OTOLARYNGOLOGY

## 2021-11-11 PROCEDURE — 3008F PR BODY MASS INDEX (BMI) DOCUMENTED: ICD-10-PCS | Mod: CPTII,S$GLB,, | Performed by: OTOLARYNGOLOGY

## 2021-11-11 PROCEDURE — 99215 OFFICE O/P EST HI 40 MIN: CPT | Mod: S$GLB,,, | Performed by: OTOLARYNGOLOGY

## 2021-11-11 PROCEDURE — 3044F HG A1C LEVEL LT 7.0%: CPT | Mod: CPTII,S$GLB,, | Performed by: OTOLARYNGOLOGY

## 2021-11-11 PROCEDURE — 99999 PR PBB SHADOW E&M-EST. PATIENT-LVL V: ICD-10-PCS | Mod: PBBFAC,,, | Performed by: OTOLARYNGOLOGY

## 2021-11-15 ENCOUNTER — PATIENT OUTREACH (OUTPATIENT)
Dept: ADMINISTRATIVE | Facility: HOSPITAL | Age: 66
End: 2021-11-15
Payer: MEDICARE

## 2021-11-29 ENCOUNTER — TELEPHONE (OUTPATIENT)
Dept: OTOLARYNGOLOGY | Facility: CLINIC | Age: 66
End: 2021-11-29
Payer: MEDICARE

## 2021-11-30 ENCOUNTER — TELEPHONE (OUTPATIENT)
Dept: OTOLARYNGOLOGY | Facility: CLINIC | Age: 66
End: 2021-11-30
Payer: MEDICARE

## 2021-11-30 ENCOUNTER — OFFICE VISIT (OUTPATIENT)
Dept: INTERNAL MEDICINE | Facility: CLINIC | Age: 66
End: 2021-11-30
Payer: MEDICARE

## 2021-11-30 VITALS
DIASTOLIC BLOOD PRESSURE: 68 MMHG | RESPIRATION RATE: 16 BRPM | BODY MASS INDEX: 39.82 KG/M2 | OXYGEN SATURATION: 96 % | SYSTOLIC BLOOD PRESSURE: 140 MMHG | WEIGHT: 233.25 LBS | TEMPERATURE: 99 F | HEART RATE: 77 BPM | HEIGHT: 64 IN

## 2021-11-30 DIAGNOSIS — I10 PRIMARY HYPERTENSION: ICD-10-CM

## 2021-11-30 DIAGNOSIS — C73 PAPILLARY THYROID CARCINOMA: ICD-10-CM

## 2021-11-30 DIAGNOSIS — Z09 FOLLOW UP: Primary | ICD-10-CM

## 2021-11-30 PROCEDURE — 99999 PR PBB SHADOW E&M-EST. PATIENT-LVL IV: CPT | Mod: PBBFAC,,, | Performed by: NURSE PRACTITIONER

## 2021-11-30 PROCEDURE — G0008 ADMIN INFLUENZA VIRUS VAC: HCPCS | Mod: S$GLB,,, | Performed by: NURSE PRACTITIONER

## 2021-11-30 PROCEDURE — 99999 PR PBB SHADOW E&M-EST. PATIENT-LVL IV: ICD-10-PCS | Mod: PBBFAC,,, | Performed by: NURSE PRACTITIONER

## 2021-11-30 PROCEDURE — G0008 FLU VACCINE - QUADRIVALENT - ADJUVANTED: ICD-10-PCS | Mod: S$GLB,,, | Performed by: NURSE PRACTITIONER

## 2021-11-30 PROCEDURE — 90694 FLU VACCINE - QUADRIVALENT - ADJUVANTED: ICD-10-PCS | Mod: S$GLB,,, | Performed by: NURSE PRACTITIONER

## 2021-11-30 PROCEDURE — 99213 PR OFFICE/OUTPT VISIT, EST, LEVL III, 20-29 MIN: ICD-10-PCS | Mod: S$GLB,,, | Performed by: NURSE PRACTITIONER

## 2021-11-30 PROCEDURE — 99213 OFFICE O/P EST LOW 20 MIN: CPT | Mod: S$GLB,,, | Performed by: NURSE PRACTITIONER

## 2021-11-30 PROCEDURE — 90694 VACC AIIV4 NO PRSRV 0.5ML IM: CPT | Mod: S$GLB,,, | Performed by: NURSE PRACTITIONER

## 2021-12-01 ENCOUNTER — TELEPHONE (OUTPATIENT)
Dept: OTOLARYNGOLOGY | Facility: CLINIC | Age: 66
End: 2021-12-01
Payer: MEDICARE

## 2021-12-01 ENCOUNTER — HOSPITAL ENCOUNTER (OUTPATIENT)
Dept: CARDIOLOGY | Facility: HOSPITAL | Age: 66
Discharge: HOME | End: 2021-12-01
Attending: OTOLARYNGOLOGY
Payer: MEDICARE

## 2021-12-01 ENCOUNTER — HOSPITAL ENCOUNTER (OUTPATIENT)
Dept: RADIOLOGY | Facility: HOSPITAL | Age: 66
Discharge: HOME | End: 2021-12-01
Attending: OTOLARYNGOLOGY
Payer: MEDICARE

## 2021-12-01 DIAGNOSIS — C73 PAPILLARY THYROID CARCINOMA: ICD-10-CM

## 2021-12-01 PROCEDURE — 71046 X-RAY EXAM CHEST 2 VIEWS: CPT | Mod: 26,,, | Performed by: RADIOLOGY

## 2021-12-01 PROCEDURE — 71046 XR CHEST PA AND LATERAL: ICD-10-PCS | Mod: 26,,, | Performed by: RADIOLOGY

## 2021-12-01 PROCEDURE — 93010 EKG 12-LEAD: ICD-10-PCS | Mod: ,,, | Performed by: INTERNAL MEDICINE

## 2021-12-01 PROCEDURE — 93010 ELECTROCARDIOGRAM REPORT: CPT | Mod: ,,, | Performed by: INTERNAL MEDICINE

## 2021-12-01 PROCEDURE — 93005 ELECTROCARDIOGRAM TRACING: CPT

## 2021-12-01 PROCEDURE — 71046 X-RAY EXAM CHEST 2 VIEWS: CPT | Mod: TC

## 2021-12-09 ENCOUNTER — HOSPITAL ENCOUNTER (OUTPATIENT)
Dept: PREADMISSION TESTING | Facility: HOSPITAL | Age: 66
Discharge: HOME | End: 2021-12-09
Attending: OTOLARYNGOLOGY
Payer: MEDICARE

## 2021-12-09 VITALS
OXYGEN SATURATION: 98 % | TEMPERATURE: 98 F | DIASTOLIC BLOOD PRESSURE: 68 MMHG | HEART RATE: 77 BPM | RESPIRATION RATE: 16 BRPM | SYSTOLIC BLOOD PRESSURE: 140 MMHG

## 2021-12-09 DIAGNOSIS — I10 PRIMARY HYPERTENSION: ICD-10-CM

## 2021-12-09 DIAGNOSIS — C73 PAPILLARY THYROID CARCINOMA: ICD-10-CM

## 2021-12-09 RX ORDER — CYCLOBENZAPRINE HCL 10 MG
10 TABLET ORAL 3 TIMES DAILY PRN
COMMUNITY
End: 2022-04-11 | Stop reason: SDUPTHER

## 2021-12-15 ENCOUNTER — ANESTHESIA EVENT (OUTPATIENT)
Dept: SURGERY | Facility: HOSPITAL | Age: 66
End: 2021-12-15
Payer: MEDICARE

## 2021-12-20 ENCOUNTER — ANESTHESIA (OUTPATIENT)
Dept: SURGERY | Facility: HOSPITAL | Age: 66
End: 2021-12-20
Payer: MEDICARE

## 2021-12-20 ENCOUNTER — HOSPITAL ENCOUNTER (OUTPATIENT)
Facility: HOSPITAL | Age: 66
Discharge: HOME | End: 2021-12-21
Attending: OTOLARYNGOLOGY | Admitting: OTOLARYNGOLOGY
Payer: MEDICARE

## 2021-12-20 DIAGNOSIS — C73 PAPILLARY THYROID CARCINOMA: Primary | ICD-10-CM

## 2021-12-20 PROBLEM — T88.4XXA HARD TO INTUBATE: Status: ACTIVE | Noted: 2021-12-20

## 2021-12-20 LAB
CALCIUM SERPL-MCNC: 9.7 MG/DL (ref 8.7–10.5)
MAGNESIUM SERPL-MCNC: 1.9 MG/DL (ref 1.6–2.6)
PTH-INTACT SERPL-MCNC: 12 PG/ML (ref 9–77)

## 2021-12-20 PROCEDURE — 25000003 PHARM REV CODE 250: Performed by: ANESTHESIOLOGY

## 2021-12-20 PROCEDURE — 37000008 HC ANESTHESIA 1ST 15 MINUTES: Performed by: OTOLARYNGOLOGY

## 2021-12-20 PROCEDURE — 71000033 HC RECOVERY, INTIAL HOUR: Performed by: OTOLARYNGOLOGY

## 2021-12-20 PROCEDURE — 88331 PATH CONSLTJ SURG 1 BLK 1SPC: CPT | Performed by: STUDENT IN AN ORGANIZED HEALTH CARE EDUCATION/TRAINING PROGRAM

## 2021-12-20 PROCEDURE — 27200708 HC INTUBATION/EXCHANGE WAND: Performed by: ANESTHESIOLOGY

## 2021-12-20 PROCEDURE — D9220A PRA ANESTHESIA: ICD-10-PCS | Mod: ANES,,, | Performed by: ANESTHESIOLOGY

## 2021-12-20 PROCEDURE — 63600175 PHARM REV CODE 636 W HCPCS: Performed by: NURSE ANESTHETIST, CERTIFIED REGISTERED

## 2021-12-20 PROCEDURE — 60500 EXPLORE PARATHYROID GLANDS: CPT | Mod: 59,,, | Performed by: OTOLARYNGOLOGY

## 2021-12-20 PROCEDURE — 60252 PR THYROIDECTOMY,MALIG,LTD NECK SURG: ICD-10-PCS | Mod: ,,, | Performed by: OTOLARYNGOLOGY

## 2021-12-20 PROCEDURE — 88307 TISSUE EXAM BY PATHOLOGIST: CPT | Mod: 26,,, | Performed by: STUDENT IN AN ORGANIZED HEALTH CARE EDUCATION/TRAINING PROGRAM

## 2021-12-20 PROCEDURE — 88307 PR  SURG PATH,LEVEL V: ICD-10-PCS | Mod: 26,,, | Performed by: STUDENT IN AN ORGANIZED HEALTH CARE EDUCATION/TRAINING PROGRAM

## 2021-12-20 PROCEDURE — 25000003 PHARM REV CODE 250: Performed by: OTOLARYNGOLOGY

## 2021-12-20 PROCEDURE — 27201423 OPTIME MED/SURG SUP & DEVICES STERILE SUPPLY: Performed by: OTOLARYNGOLOGY

## 2021-12-20 PROCEDURE — 60500 PR EXPLORE PARATHYROID GLANDS: ICD-10-PCS | Mod: 59,,, | Performed by: OTOLARYNGOLOGY

## 2021-12-20 PROCEDURE — D9220A PRA ANESTHESIA: Mod: CRNA,,, | Performed by: NURSE ANESTHETIST, CERTIFIED REGISTERED

## 2021-12-20 PROCEDURE — 83970 ASSAY OF PARATHORMONE: CPT | Performed by: OTOLARYNGOLOGY

## 2021-12-20 PROCEDURE — 27000221 HC OXYGEN, UP TO 24 HOURS

## 2021-12-20 PROCEDURE — 60252 REMOVAL OF THYROID: CPT | Mod: ,,, | Performed by: OTOLARYNGOLOGY

## 2021-12-20 PROCEDURE — 63600175 PHARM REV CODE 636 W HCPCS: Performed by: ANESTHESIOLOGY

## 2021-12-20 PROCEDURE — 63600175 PHARM REV CODE 636 W HCPCS: Performed by: STUDENT IN AN ORGANIZED HEALTH CARE EDUCATION/TRAINING PROGRAM

## 2021-12-20 PROCEDURE — 37000009 HC ANESTHESIA EA ADD 15 MINS: Performed by: OTOLARYNGOLOGY

## 2021-12-20 PROCEDURE — C1729 CATH, DRAINAGE: HCPCS | Performed by: OTOLARYNGOLOGY

## 2021-12-20 PROCEDURE — 88305 TISSUE EXAM BY PATHOLOGIST: CPT | Mod: 26,,, | Performed by: STUDENT IN AN ORGANIZED HEALTH CARE EDUCATION/TRAINING PROGRAM

## 2021-12-20 PROCEDURE — D9220A PRA ANESTHESIA: ICD-10-PCS | Mod: CRNA,,, | Performed by: NURSE ANESTHETIST, CERTIFIED REGISTERED

## 2021-12-20 PROCEDURE — 88307 TISSUE EXAM BY PATHOLOGIST: CPT | Performed by: STUDENT IN AN ORGANIZED HEALTH CARE EDUCATION/TRAINING PROGRAM

## 2021-12-20 PROCEDURE — 25000003 PHARM REV CODE 250: Performed by: NURSE ANESTHETIST, CERTIFIED REGISTERED

## 2021-12-20 PROCEDURE — D9220A PRA ANESTHESIA: Mod: ANES,,, | Performed by: ANESTHESIOLOGY

## 2021-12-20 PROCEDURE — 27200702 HC TUBE,ENDO XOMED EMG: Performed by: ANESTHESIOLOGY

## 2021-12-20 PROCEDURE — 71000039 HC RECOVERY, EACH ADD'L HOUR: Performed by: OTOLARYNGOLOGY

## 2021-12-20 PROCEDURE — 36000707: Performed by: OTOLARYNGOLOGY

## 2021-12-20 PROCEDURE — 88305 TISSUE EXAM BY PATHOLOGIST: CPT | Performed by: STUDENT IN AN ORGANIZED HEALTH CARE EDUCATION/TRAINING PROGRAM

## 2021-12-20 PROCEDURE — 88331 PR  PATH CONSULT IN SURG,W FRZ SEC: ICD-10-PCS | Mod: 26,,, | Performed by: STUDENT IN AN ORGANIZED HEALTH CARE EDUCATION/TRAINING PROGRAM

## 2021-12-20 PROCEDURE — 36000706: Performed by: OTOLARYNGOLOGY

## 2021-12-20 PROCEDURE — 88331 PATH CONSLTJ SURG 1 BLK 1SPC: CPT | Mod: 26,,, | Performed by: STUDENT IN AN ORGANIZED HEALTH CARE EDUCATION/TRAINING PROGRAM

## 2021-12-20 PROCEDURE — 82310 ASSAY OF CALCIUM: CPT | Performed by: OTOLARYNGOLOGY

## 2021-12-20 PROCEDURE — 88305 TISSUE EXAM BY PATHOLOGIST: ICD-10-PCS | Mod: 26,,, | Performed by: STUDENT IN AN ORGANIZED HEALTH CARE EDUCATION/TRAINING PROGRAM

## 2021-12-20 PROCEDURE — 83735 ASSAY OF MAGNESIUM: CPT | Performed by: OTOLARYNGOLOGY

## 2021-12-20 RX ORDER — CALCIUM CARBONATE 200(500)MG
1000 TABLET,CHEWABLE ORAL 2 TIMES DAILY
Status: DISCONTINUED | OUTPATIENT
Start: 2021-12-20 | End: 2021-12-21 | Stop reason: HOSPADM

## 2021-12-20 RX ORDER — DIPHENHYDRAMINE HYDROCHLORIDE 50 MG/ML
25 INJECTION INTRAMUSCULAR; INTRAVENOUS EVERY 6 HOURS PRN
Status: DISCONTINUED | OUTPATIENT
Start: 2021-12-20 | End: 2021-12-21 | Stop reason: HOSPADM

## 2021-12-20 RX ORDER — LIDOCAINE HYDROCHLORIDE 20 MG/ML
INJECTION, SOLUTION EPIDURAL; INFILTRATION; INTRACAUDAL; PERINEURAL
Status: DISCONTINUED | OUTPATIENT
Start: 2021-12-20 | End: 2021-12-20

## 2021-12-20 RX ORDER — ALBUTEROL SULFATE 0.83 MG/ML
2.5 SOLUTION RESPIRATORY (INHALATION) EVERY 4 HOURS PRN
Status: DISCONTINUED | OUTPATIENT
Start: 2021-12-20 | End: 2021-12-21 | Stop reason: HOSPADM

## 2021-12-20 RX ORDER — ACETAMINOPHEN 10 MG/ML
INJECTION, SOLUTION INTRAVENOUS
Status: DISCONTINUED | OUTPATIENT
Start: 2021-12-20 | End: 2021-12-20

## 2021-12-20 RX ORDER — ONDANSETRON 2 MG/ML
INJECTION INTRAMUSCULAR; INTRAVENOUS
Status: DISCONTINUED | OUTPATIENT
Start: 2021-12-20 | End: 2021-12-20

## 2021-12-20 RX ORDER — PHENYLEPHRINE HYDROCHLORIDE 10 MG/ML
INJECTION INTRAVENOUS
Status: DISCONTINUED | OUTPATIENT
Start: 2021-12-20 | End: 2021-12-20

## 2021-12-20 RX ORDER — LIDOCAINE HYDROCHLORIDE 10 MG/ML
1 INJECTION, SOLUTION EPIDURAL; INFILTRATION; INTRACAUDAL; PERINEURAL ONCE
Status: DISCONTINUED | OUTPATIENT
Start: 2021-12-20 | End: 2021-12-20

## 2021-12-20 RX ORDER — SODIUM CHLORIDE, SODIUM LACTATE, POTASSIUM CHLORIDE, CALCIUM CHLORIDE 600; 310; 30; 20 MG/100ML; MG/100ML; MG/100ML; MG/100ML
INJECTION, SOLUTION INTRAVENOUS CONTINUOUS
Status: DISCONTINUED | OUTPATIENT
Start: 2021-12-20 | End: 2021-12-20

## 2021-12-20 RX ORDER — PROPOFOL 10 MG/ML
VIAL (ML) INTRAVENOUS
Status: DISCONTINUED | OUTPATIENT
Start: 2021-12-20 | End: 2021-12-20

## 2021-12-20 RX ORDER — LANOLIN ALCOHOL/MO/W.PET/CERES
400 CREAM (GRAM) TOPICAL ONCE
Status: COMPLETED | OUTPATIENT
Start: 2021-12-20 | End: 2021-12-20

## 2021-12-20 RX ORDER — DOCUSATE SODIUM 100 MG/1
100 CAPSULE, LIQUID FILLED ORAL DAILY
Status: DISCONTINUED | OUTPATIENT
Start: 2021-12-20 | End: 2021-12-21 | Stop reason: HOSPADM

## 2021-12-20 RX ORDER — BUPIVACAINE HYDROCHLORIDE AND EPINEPHRINE 5; 5 MG/ML; UG/ML
INJECTION, SOLUTION EPIDURAL; INTRACAUDAL; PERINEURAL
Status: DISPENSED
Start: 2021-12-20 | End: 2021-12-20

## 2021-12-20 RX ORDER — CYCLOBENZAPRINE HCL 5 MG
10 TABLET ORAL 3 TIMES DAILY PRN
Status: DISCONTINUED | OUTPATIENT
Start: 2021-12-20 | End: 2021-12-21 | Stop reason: HOSPADM

## 2021-12-20 RX ORDER — NIFEDIPINE 30 MG/1
30 TABLET, EXTENDED RELEASE ORAL DAILY
Status: DISCONTINUED | OUTPATIENT
Start: 2021-12-20 | End: 2021-12-21 | Stop reason: HOSPADM

## 2021-12-20 RX ORDER — CALCITRIOL 0.25 UG/1
0.25 CAPSULE ORAL 2 TIMES DAILY
Status: DISCONTINUED | OUTPATIENT
Start: 2021-12-20 | End: 2021-12-21 | Stop reason: HOSPADM

## 2021-12-20 RX ORDER — CETIRIZINE HYDROCHLORIDE 10 MG/1
10 TABLET ORAL DAILY
Status: DISCONTINUED | OUTPATIENT
Start: 2021-12-20 | End: 2021-12-21 | Stop reason: HOSPADM

## 2021-12-20 RX ORDER — OXYCODONE HYDROCHLORIDE 5 MG/1
10 TABLET ORAL EVERY 4 HOURS PRN
Status: DISCONTINUED | OUTPATIENT
Start: 2021-12-20 | End: 2021-12-21 | Stop reason: HOSPADM

## 2021-12-20 RX ORDER — FENTANYL CITRATE 50 UG/ML
INJECTION, SOLUTION INTRAMUSCULAR; INTRAVENOUS
Status: DISCONTINUED | OUTPATIENT
Start: 2021-12-20 | End: 2021-12-20

## 2021-12-20 RX ORDER — CEFAZOLIN SODIUM 1 G/3ML
INJECTION, POWDER, FOR SOLUTION INTRAMUSCULAR; INTRAVENOUS
Status: DISCONTINUED | OUTPATIENT
Start: 2021-12-20 | End: 2021-12-20

## 2021-12-20 RX ORDER — PANTOPRAZOLE SODIUM 40 MG/1
40 TABLET, DELAYED RELEASE ORAL DAILY
Status: DISCONTINUED | OUTPATIENT
Start: 2021-12-20 | End: 2021-12-21 | Stop reason: HOSPADM

## 2021-12-20 RX ORDER — MIDAZOLAM HYDROCHLORIDE 1 MG/ML
INJECTION, SOLUTION INTRAMUSCULAR; INTRAVENOUS
Status: DISCONTINUED | OUTPATIENT
Start: 2021-12-20 | End: 2021-12-20

## 2021-12-20 RX ORDER — MEPERIDINE HYDROCHLORIDE 25 MG/ML
12.5 INJECTION INTRAMUSCULAR; INTRAVENOUS; SUBCUTANEOUS ONCE
Status: DISCONTINUED | OUTPATIENT
Start: 2021-12-20 | End: 2021-12-21 | Stop reason: HOSPADM

## 2021-12-20 RX ORDER — ONDANSETRON 2 MG/ML
8 INJECTION INTRAMUSCULAR; INTRAVENOUS EVERY 12 HOURS PRN
Status: DISCONTINUED | OUTPATIENT
Start: 2021-12-20 | End: 2021-12-21 | Stop reason: HOSPADM

## 2021-12-20 RX ORDER — ROCURONIUM BROMIDE 10 MG/ML
INJECTION, SOLUTION INTRAVENOUS
Status: DISCONTINUED | OUTPATIENT
Start: 2021-12-20 | End: 2021-12-20

## 2021-12-20 RX ORDER — FUROSEMIDE 20 MG/1
40 TABLET ORAL DAILY
Status: DISCONTINUED | OUTPATIENT
Start: 2021-12-20 | End: 2021-12-21 | Stop reason: HOSPADM

## 2021-12-20 RX ORDER — LIDOCAINE HYDROCHLORIDE 10 MG/ML
1 INJECTION, SOLUTION EPIDURAL; INFILTRATION; INTRACAUDAL; PERINEURAL ONCE AS NEEDED
Status: DISCONTINUED | OUTPATIENT
Start: 2021-12-20 | End: 2021-12-20

## 2021-12-20 RX ORDER — FENTANYL CITRATE 50 UG/ML
25 INJECTION, SOLUTION INTRAMUSCULAR; INTRAVENOUS EVERY 5 MIN PRN
Status: DISCONTINUED | OUTPATIENT
Start: 2021-12-20 | End: 2021-12-21 | Stop reason: HOSPADM

## 2021-12-20 RX ORDER — OXYCODONE HYDROCHLORIDE 5 MG/1
5 TABLET ORAL EVERY 4 HOURS PRN
Status: DISCONTINUED | OUTPATIENT
Start: 2021-12-20 | End: 2021-12-21 | Stop reason: HOSPADM

## 2021-12-20 RX ORDER — SUCCINYLCHOLINE CHLORIDE 20 MG/ML
INJECTION INTRAMUSCULAR; INTRAVENOUS
Status: DISCONTINUED | OUTPATIENT
Start: 2021-12-20 | End: 2021-12-20

## 2021-12-20 RX ORDER — DEXAMETHASONE SODIUM PHOSPHATE 4 MG/ML
INJECTION, SOLUTION INTRA-ARTICULAR; INTRALESIONAL; INTRAMUSCULAR; INTRAVENOUS; SOFT TISSUE
Status: DISCONTINUED | OUTPATIENT
Start: 2021-12-20 | End: 2021-12-20

## 2021-12-20 RX ORDER — ACETAMINOPHEN 325 MG/1
650 TABLET ORAL EVERY 4 HOURS PRN
Status: DISCONTINUED | OUTPATIENT
Start: 2021-12-20 | End: 2021-12-21 | Stop reason: HOSPADM

## 2021-12-20 RX ORDER — HYDROCODONE BITARTRATE AND ACETAMINOPHEN 5; 325 MG/1; MG/1
1 TABLET ORAL
Status: DISCONTINUED | OUTPATIENT
Start: 2021-12-20 | End: 2021-12-21 | Stop reason: HOSPADM

## 2021-12-20 RX ORDER — ONDANSETRON 2 MG/ML
4 INJECTION INTRAMUSCULAR; INTRAVENOUS ONCE AS NEEDED
Status: DISCONTINUED | OUTPATIENT
Start: 2021-12-20 | End: 2021-12-21 | Stop reason: HOSPADM

## 2021-12-20 RX ORDER — MUPIROCIN 20 MG/G
OINTMENT TOPICAL
Status: DISCONTINUED
Start: 2021-12-20 | End: 2021-12-20 | Stop reason: WASHOUT

## 2021-12-20 RX ORDER — BUPIVACAINE HYDROCHLORIDE AND EPINEPHRINE 5; 5 MG/ML; UG/ML
INJECTION, SOLUTION EPIDURAL; INTRACAUDAL; PERINEURAL
Status: DISCONTINUED | OUTPATIENT
Start: 2021-12-20 | End: 2021-12-20 | Stop reason: HOSPADM

## 2021-12-20 RX ADMIN — PHENYLEPHRINE HYDROCHLORIDE 100 MCG: 10 INJECTION INTRAVENOUS at 09:12

## 2021-12-20 RX ADMIN — PHENYLEPHRINE HYDROCHLORIDE 200 MCG: 10 INJECTION INTRAVENOUS at 08:12

## 2021-12-20 RX ADMIN — CALCIUM CARBONATE 1000 MG: 500 TABLET, CHEWABLE ORAL at 09:12

## 2021-12-20 RX ADMIN — PROPOFOL 50 MG: 10 INJECTION, EMULSION INTRAVENOUS at 08:12

## 2021-12-20 RX ADMIN — CEFAZOLIN 2 G: 1 INJECTION, POWDER, FOR SOLUTION INTRAMUSCULAR; INTRAVENOUS at 08:12

## 2021-12-20 RX ADMIN — PHENYLEPHRINE HYDROCHLORIDE 100 MCG: 10 INJECTION INTRAVENOUS at 08:12

## 2021-12-20 RX ADMIN — ACETAMINOPHEN 1000 MG: 10 INJECTION, SOLUTION INTRAVENOUS at 08:12

## 2021-12-20 RX ADMIN — FENTANYL CITRATE 25 MCG: 50 INJECTION INTRAMUSCULAR; INTRAVENOUS at 11:12

## 2021-12-20 RX ADMIN — PROPOFOL 50 MG: 10 INJECTION, EMULSION INTRAVENOUS at 07:12

## 2021-12-20 RX ADMIN — LIDOCAINE HYDROCHLORIDE 100 MG: 20 INJECTION, SOLUTION EPIDURAL; INFILTRATION; INTRACAUDAL; PERINEURAL at 07:12

## 2021-12-20 RX ADMIN — FENTANYL CITRATE 50 MCG: 50 INJECTION, SOLUTION INTRAMUSCULAR; INTRAVENOUS at 08:12

## 2021-12-20 RX ADMIN — PHENYLEPHRINE HYDROCHLORIDE 200 MCG: 10 INJECTION INTRAVENOUS at 09:12

## 2021-12-20 RX ADMIN — PROPOFOL 150 MG: 10 INJECTION, EMULSION INTRAVENOUS at 07:12

## 2021-12-20 RX ADMIN — CALCITRIOL CAPSULES 0.25 MCG 0.25 MCG: 0.25 CAPSULE ORAL at 03:12

## 2021-12-20 RX ADMIN — SUCCINYLCHOLINE CHLORIDE 140 MG: 20 INJECTION, SOLUTION INTRAMUSCULAR; INTRAVENOUS at 07:12

## 2021-12-20 RX ADMIN — SUCCINYLCHOLINE CHLORIDE 60 MG: 20 INJECTION, SOLUTION INTRAMUSCULAR; INTRAVENOUS at 07:12

## 2021-12-20 RX ADMIN — CALCITRIOL CAPSULES 0.25 MCG 0.25 MCG: 0.25 CAPSULE ORAL at 09:12

## 2021-12-20 RX ADMIN — FENTANYL CITRATE 50 MCG: 50 INJECTION, SOLUTION INTRAMUSCULAR; INTRAVENOUS at 07:12

## 2021-12-20 RX ADMIN — MIDAZOLAM 1 MG: 1 INJECTION INTRAMUSCULAR; INTRAVENOUS at 07:12

## 2021-12-20 RX ADMIN — ONDANSETRON 4 MG: 2 INJECTION, SOLUTION INTRAMUSCULAR; INTRAVENOUS at 09:12

## 2021-12-20 RX ADMIN — SODIUM CHLORIDE, SODIUM LACTATE, POTASSIUM CHLORIDE, AND CALCIUM CHLORIDE: 600; 310; 30; 20 INJECTION, SOLUTION INTRAVENOUS at 09:12

## 2021-12-20 RX ADMIN — CALCIUM CARBONATE 1000 MG: 500 TABLET, CHEWABLE ORAL at 03:12

## 2021-12-20 RX ADMIN — HYDROCODONE BITARTRATE AND ACETAMINOPHEN 1 TABLET: 5; 325 TABLET ORAL at 07:12

## 2021-12-20 RX ADMIN — ROCURONIUM BROMIDE 10 MG: 10 INJECTION, SOLUTION INTRAVENOUS at 07:12

## 2021-12-20 RX ADMIN — DEXAMETHASONE SODIUM PHOSPHATE 8 MG: 4 INJECTION, SOLUTION INTRA-ARTICULAR; INTRALESIONAL; INTRAMUSCULAR; INTRAVENOUS; SOFT TISSUE at 07:12

## 2021-12-20 RX ADMIN — SODIUM CHLORIDE, SODIUM LACTATE, POTASSIUM CHLORIDE, AND CALCIUM CHLORIDE: 600; 310; 30; 20 INJECTION, SOLUTION INTRAVENOUS at 06:12

## 2021-12-20 RX ADMIN — Medication 400 MG: at 03:12

## 2021-12-21 ENCOUNTER — TELEPHONE (OUTPATIENT)
Dept: OTOLARYNGOLOGY | Facility: CLINIC | Age: 66
End: 2021-12-21
Payer: MEDICARE

## 2021-12-21 ENCOUNTER — PATIENT OUTREACH (OUTPATIENT)
Dept: ADMINISTRATIVE | Facility: OTHER | Age: 66
End: 2021-12-21
Payer: MEDICARE

## 2021-12-21 VITALS
WEIGHT: 227.31 LBS | RESPIRATION RATE: 18 BRPM | OXYGEN SATURATION: 96 % | HEART RATE: 66 BPM | DIASTOLIC BLOOD PRESSURE: 72 MMHG | TEMPERATURE: 98 F | BODY MASS INDEX: 38.81 KG/M2 | HEIGHT: 64 IN | SYSTOLIC BLOOD PRESSURE: 164 MMHG

## 2021-12-21 LAB — CALCIUM SERPL-MCNC: 9.5 MG/DL (ref 8.7–10.5)

## 2021-12-21 PROCEDURE — 25000003 PHARM REV CODE 250: Performed by: OTOLARYNGOLOGY

## 2021-12-21 PROCEDURE — 36415 COLL VENOUS BLD VENIPUNCTURE: CPT | Performed by: OTOLARYNGOLOGY

## 2021-12-21 PROCEDURE — 82310 ASSAY OF CALCIUM: CPT | Performed by: OTOLARYNGOLOGY

## 2021-12-21 RX ORDER — ONDANSETRON HYDROCHLORIDE 8 MG/1
8 TABLET, FILM COATED ORAL EVERY 8 HOURS PRN
Qty: 20 TABLET | Refills: 0 | Status: SHIPPED | OUTPATIENT
Start: 2021-12-21 | End: 2021-12-31

## 2021-12-21 RX ORDER — CALCITRIOL 0.5 UG/1
0.5 CAPSULE ORAL DAILY
Qty: 30 CAPSULE | Refills: 0 | Status: SHIPPED | OUTPATIENT
Start: 2021-12-21 | End: 2022-01-20

## 2021-12-21 RX ORDER — LEVOTHYROXINE SODIUM 175 UG/1
175 TABLET ORAL
Qty: 30 TABLET | Refills: 11 | Status: SHIPPED | OUTPATIENT
Start: 2021-12-21 | End: 2022-02-23

## 2021-12-21 RX ORDER — OXYCODONE AND ACETAMINOPHEN 5; 325 MG/1; MG/1
1 TABLET ORAL EVERY 4 HOURS PRN
Qty: 42 TABLET | Refills: 0 | Status: SHIPPED | OUTPATIENT
Start: 2021-12-21 | End: 2021-12-28

## 2021-12-21 RX ORDER — HYDROCODONE BITARTRATE AND ACETAMINOPHEN 5; 325 MG/1; MG/1
1 TABLET ORAL EVERY 4 HOURS PRN
Status: CANCELLED | OUTPATIENT
Start: 2021-12-21

## 2021-12-21 RX ADMIN — LEVOTHYROXINE SODIUM 175 MCG: 0.12 TABLET ORAL at 06:12

## 2021-12-21 RX ADMIN — DOCUSATE SODIUM 100 MG: 100 CAPSULE, LIQUID FILLED ORAL at 08:12

## 2021-12-21 RX ADMIN — NIFEDIPINE 30 MG: 30 TABLET, FILM COATED, EXTENDED RELEASE ORAL at 09:12

## 2021-12-21 RX ADMIN — CALCIUM CARBONATE 1000 MG: 500 TABLET, CHEWABLE ORAL at 08:12

## 2021-12-21 RX ADMIN — ACETAMINOPHEN 650 MG: 325 TABLET ORAL at 01:12

## 2021-12-21 RX ADMIN — PANTOPRAZOLE SODIUM 40 MG: 40 TABLET, DELAYED RELEASE ORAL at 08:12

## 2021-12-21 RX ADMIN — CALCITRIOL CAPSULES 0.25 MCG 0.25 MCG: 0.25 CAPSULE ORAL at 08:12

## 2021-12-22 ENCOUNTER — OFFICE VISIT (OUTPATIENT)
Dept: OTOLARYNGOLOGY | Facility: CLINIC | Age: 66
End: 2021-12-22
Payer: MEDICARE

## 2021-12-22 VITALS — WEIGHT: 229.94 LBS | TEMPERATURE: 98 F | BODY MASS INDEX: 39.47 KG/M2

## 2021-12-22 DIAGNOSIS — E89.0 S/P TOTAL THYROIDECTOMY: Primary | ICD-10-CM

## 2021-12-22 PROCEDURE — 3044F HG A1C LEVEL LT 7.0%: CPT | Mod: CPTII,S$GLB,, | Performed by: PHYSICIAN ASSISTANT

## 2021-12-22 PROCEDURE — 99999 PR PBB SHADOW E&M-EST. PATIENT-LVL III: ICD-10-PCS | Mod: PBBFAC,,, | Performed by: PHYSICIAN ASSISTANT

## 2021-12-22 PROCEDURE — 99024 PR POST-OP FOLLOW-UP VISIT: ICD-10-PCS | Mod: S$GLB,,, | Performed by: PHYSICIAN ASSISTANT

## 2021-12-22 PROCEDURE — 99999 PR PBB SHADOW E&M-EST. PATIENT-LVL III: CPT | Mod: PBBFAC,,, | Performed by: PHYSICIAN ASSISTANT

## 2021-12-22 PROCEDURE — 1159F PR MEDICATION LIST DOCUMENTED IN MEDICAL RECORD: ICD-10-PCS | Mod: CPTII,S$GLB,, | Performed by: PHYSICIAN ASSISTANT

## 2021-12-22 PROCEDURE — 1159F MED LIST DOCD IN RCRD: CPT | Mod: CPTII,S$GLB,, | Performed by: PHYSICIAN ASSISTANT

## 2021-12-22 PROCEDURE — 99024 POSTOP FOLLOW-UP VISIT: CPT | Mod: S$GLB,,, | Performed by: PHYSICIAN ASSISTANT

## 2021-12-22 PROCEDURE — 3044F PR MOST RECENT HEMOGLOBIN A1C LEVEL <7.0%: ICD-10-PCS | Mod: CPTII,S$GLB,, | Performed by: PHYSICIAN ASSISTANT

## 2021-12-22 PROCEDURE — 3008F PR BODY MASS INDEX (BMI) DOCUMENTED: ICD-10-PCS | Mod: CPTII,S$GLB,, | Performed by: PHYSICIAN ASSISTANT

## 2021-12-22 PROCEDURE — 3008F BODY MASS INDEX DOCD: CPT | Mod: CPTII,S$GLB,, | Performed by: PHYSICIAN ASSISTANT

## 2021-12-29 ENCOUNTER — LAB VISIT (OUTPATIENT)
Dept: LAB | Facility: HOSPITAL | Age: 66
End: 2021-12-29
Attending: PHYSICIAN ASSISTANT
Payer: MEDICARE

## 2021-12-29 ENCOUNTER — TELEPHONE (OUTPATIENT)
Dept: OTOLARYNGOLOGY | Facility: CLINIC | Age: 66
End: 2021-12-29
Payer: MEDICARE

## 2021-12-29 DIAGNOSIS — E89.0 S/P TOTAL THYROIDECTOMY: Primary | ICD-10-CM

## 2021-12-29 DIAGNOSIS — E89.0 S/P TOTAL THYROIDECTOMY: ICD-10-CM

## 2021-12-29 LAB
CALCIUM SERPL-MCNC: 8.6 MG/DL (ref 8.7–10.5)
FINAL PATHOLOGIC DIAGNOSIS: NORMAL
FROZEN SECTION DIAGNOSIS: NORMAL
FROZEN SECTION FOOTNOTE: NORMAL
GROSS: NORMAL
Lab: NORMAL
MICROSCOPIC EXAM: NORMAL

## 2021-12-29 PROCEDURE — 36415 COLL VENOUS BLD VENIPUNCTURE: CPT | Mod: PO | Performed by: PHYSICIAN ASSISTANT

## 2021-12-29 PROCEDURE — 82310 ASSAY OF CALCIUM: CPT | Performed by: PHYSICIAN ASSISTANT

## 2021-12-30 ENCOUNTER — TELEPHONE (OUTPATIENT)
Dept: OTOLARYNGOLOGY | Facility: CLINIC | Age: 66
End: 2021-12-30
Payer: MEDICARE

## 2022-01-02 NOTE — PROGRESS NOTES
Subjective:   Patient: Mariah Meza [id number], :1955   Visit date:1/3/2022 1:44 PM    Chief Complaint:  No chief complaint on file.    HPI:  Mariah is a 66 y.o. female who is here for follow-up after surgery:    Subjective: Still sore on right.  Voice fluctuating. Was feeling unwell and had labs checked last week.  Calcium was moderately low.  Taking 1000mg calcium daily.  Also on calcitriol.  Synthroid 175.     Surgery date: 21    Operations performed: Total thyroid, central neck, left superior parathyroidectomy    Pathology:    1. Parathyroid, left superior, biopsy:   - Hypercellular parathyroid tissue (10 mg)   2. 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 Lymph nodes, paratracheal, neck dissection:   - Four benign lymph nodes, negative for metastatic carcinoma (0/4, pN0)   4. Parathyroid, left superior, parathyroidectomy:   - Hypercellular parathyroid tissue (107 mg), consistent with hyperplastic   parathyroid     CAP Synoptic Checklist for Thyroid Neoplasms:     - Predisposing Conditions: No known predisposing conditions (EPIC,         Progress Note, 2021)     - Procedure: Total thyroidectomy     - Tumor Focality: Unifocal     - Tumor Site: Isthmus     - Tumor Size: 1.2 x 1.0 x 0.8 cm     - Histologic Type: Papillary carcinoma, classic     - Mitotic Rate: Less than 1 mitosis per 2 mm^2     - Tumor Necrosis: Not identified     - Angioinvasion: Not identified     - Lymphatic Invasion: Not identified     - Perineural Invasion: Not identified     - Extrathyroidal Extension:  Not identified     - Margin Status: All margins negative for carcinoma         - Distance from Invasive Carcinoma to Closest Margin: 0.1 cm to capsular            surface     - Regional Lymph Nodes: All regional lymph nodes negative for tumor         - Number of Lymph Nodes with Tumor: 0         - Number of Lymph Nodes Examined: 4     - Distant Metastasis: Not applicable     - Pathologic Stage Classification: pT1b pN0     - Additional Findings: Multiple adenomatoid and hyperplastic nodules;     Noteworthy Findings in surgery:  Enlarged left superior parathyroid gland    Lab Results   Component Value Date    PTH 93.9 (H) 12/01/2021    IOPTH 12.0 12/20/2021    CALCIUM 8.6 (L) 12/29/2021    CALCIUM 9.5 12/21/2021    CALCIUM 9.7 12/20/2021    CALCIUM 10.1 12/01/2021    CALCIUM 10.0 09/29/2021    CALCIUM 10.0 09/29/2021         Review of Systems:  -     Allergic/Immunologic: is allergic to iodine and iodide containing products, shellfish containing products, and statins-hmg-coa reductase inhibitors..  -     Constitutional: Current temp:      Her meds, allergies, medical, surgical, social & family histories were reviewed & updated:  -     She has a current medication list which includes the following prescription(s): acetaminophen, calcitriol, calcium carbonate, cyclobenzaprine, furosemide, levothyroxine, linaclotide, loratadine, multivit,min/folic acid/rvg954, nifedipine, olmesartan-hydrochlorothiazide, pantoprazole, and psyllium husk/aspartame, and the following Facility-Administered Medications: dobutamine.  -     She  has a past medical history of Chronic rhinitis, DJD (degenerative joint disease), lumbar, GERD (gastroesophageal reflux disease), Hiatal hernia, Hyperlipidemia, Hypertension, Liver disease, Low back pain, Obesity, PONV (postoperative nausea and vomiting), and Thyroid disease.   -     She does not have any pertinent problems on file.   -     She  has a past surgical history that includes Total vaginal  hysterectomy (1985); Bilateral oophorectomy (2002); Carpal tunnel release; Rotator cuff repair; Rotator cuff repair (Left, 2010); Cholecystectomy; Hysterectomy (1984); thyroidectomy, bilateral (Bilateral, 12/20/2021); Dissection of neck (Bilateral, 12/20/2021); and Neck exploration (Bilateral, 12/20/2021).  -     She  reports that she quit smoking about 43 years ago. Her smoking use included cigarettes. She has a 4.00 pack-year smoking history. She has never used smokeless tobacco. She reports that she does not drink alcohol and does not use drugs.  -     Her family history includes Diabetes in her mother and sister; Hearing loss in her mother; Heart attack in her mother; Heart disease in her mother; Hyperlipidemia in her mother; Hypertension in her mother; Lupus in her other; Sickle cell anemia in her other; Stroke in her mother.  -     She is allergic to iodine and iodide containing products, shellfish containing products, and statins-hmg-coa reductase inhibitors.    Objective:     Physical Exam:  Vitals:  LMP 02/10/1984 (Approximate)   General appearance:  Well developed, well nourished, no apparent distress    Surgical site: incision c/d/i    Fiberoptic exam: normal vocal fold mobility bilaterally    Assessment & Plan:   Diagnoses and all orders for this visit:    Papillary thyroid carcinoma      Cancer Staging  Papillary thyroid carcinoma  Staging form: Thyroid - Differentiated, AJCC 8th Edition  - Clinical stage from 11/11/2021: Stage I (cT2, cN0, cM0, Age at diagnosis: >= 55 years) - Signed by Deejay Olsen MD on 11/11/2021  - Pathologic stage from 12/20/2021: Stage I (pT1b, pN0, cM0, Age at diagnosis: >= 55 years) - Signed by Deejay Olsen MD on 1/2/2022    Low risk 5%:HENDERSON not indicated  On 175 mcg levothyroxine  Dynamic risk stratification: Tg/TgAb around 1/20/22  Check TSH Calcium and PTH  at the same time

## 2022-01-03 ENCOUNTER — OFFICE VISIT (OUTPATIENT)
Dept: OTOLARYNGOLOGY | Facility: CLINIC | Age: 67
End: 2022-01-03
Payer: MEDICARE

## 2022-01-03 VITALS — WEIGHT: 235.44 LBS | BODY MASS INDEX: 40.19 KG/M2 | HEIGHT: 64 IN

## 2022-01-03 DIAGNOSIS — C73 PAPILLARY THYROID CARCINOMA: Primary | ICD-10-CM

## 2022-01-03 PROCEDURE — 3288F PR FALLS RISK ASSESSMENT DOCUMENTED: ICD-10-PCS | Mod: CPTII,S$GLB,, | Performed by: OTOLARYNGOLOGY

## 2022-01-03 PROCEDURE — 99999 PR PBB SHADOW E&M-EST. PATIENT-LVL III: ICD-10-PCS | Mod: PBBFAC,,, | Performed by: OTOLARYNGOLOGY

## 2022-01-03 PROCEDURE — 1101F PR PT FALLS ASSESS DOC 0-1 FALLS W/OUT INJ PAST YR: ICD-10-PCS | Mod: CPTII,S$GLB,, | Performed by: OTOLARYNGOLOGY

## 2022-01-03 PROCEDURE — 1159F MED LIST DOCD IN RCRD: CPT | Mod: CPTII,S$GLB,, | Performed by: OTOLARYNGOLOGY

## 2022-01-03 PROCEDURE — 1159F PR MEDICATION LIST DOCUMENTED IN MEDICAL RECORD: ICD-10-PCS | Mod: CPTII,S$GLB,, | Performed by: OTOLARYNGOLOGY

## 2022-01-03 PROCEDURE — 3008F PR BODY MASS INDEX (BMI) DOCUMENTED: ICD-10-PCS | Mod: CPTII,S$GLB,, | Performed by: OTOLARYNGOLOGY

## 2022-01-03 PROCEDURE — 3008F BODY MASS INDEX DOCD: CPT | Mod: CPTII,S$GLB,, | Performed by: OTOLARYNGOLOGY

## 2022-01-03 PROCEDURE — 99024 POSTOP FOLLOW-UP VISIT: CPT | Mod: S$GLB,,, | Performed by: OTOLARYNGOLOGY

## 2022-01-03 PROCEDURE — 1101F PT FALLS ASSESS-DOCD LE1/YR: CPT | Mod: CPTII,S$GLB,, | Performed by: OTOLARYNGOLOGY

## 2022-01-03 PROCEDURE — 1126F PR PAIN SEVERITY QUANTIFIED, NO PAIN PRESENT: ICD-10-PCS | Mod: CPTII,S$GLB,, | Performed by: OTOLARYNGOLOGY

## 2022-01-03 PROCEDURE — 3288F FALL RISK ASSESSMENT DOCD: CPT | Mod: CPTII,S$GLB,, | Performed by: OTOLARYNGOLOGY

## 2022-01-03 PROCEDURE — 1126F AMNT PAIN NOTED NONE PRSNT: CPT | Mod: CPTII,S$GLB,, | Performed by: OTOLARYNGOLOGY

## 2022-01-03 PROCEDURE — 99999 PR PBB SHADOW E&M-EST. PATIENT-LVL III: CPT | Mod: PBBFAC,,, | Performed by: OTOLARYNGOLOGY

## 2022-01-03 PROCEDURE — 99024 PR POST-OP FOLLOW-UP VISIT: ICD-10-PCS | Mod: S$GLB,,, | Performed by: OTOLARYNGOLOGY

## 2022-01-20 ENCOUNTER — LAB VISIT (OUTPATIENT)
Dept: LAB | Facility: HOSPITAL | Age: 67
End: 2022-01-20
Attending: OTOLARYNGOLOGY
Payer: MEDICARE

## 2022-01-20 DIAGNOSIS — C73 PAPILLARY THYROID CARCINOMA: ICD-10-CM

## 2022-01-20 LAB
CALCIUM SERPL-MCNC: 9.6 MG/DL (ref 8.7–10.5)
PTH-INTACT SERPL-MCNC: 19.6 PG/ML (ref 9–77)
T4 FREE SERPL-MCNC: 1.37 NG/DL (ref 0.71–1.51)
THYROGLOB AB SERPL IA-ACNC: <4 IU/ML (ref 0–3.9)
TSH SERPL DL<=0.005 MIU/L-ACNC: 0.02 UIU/ML (ref 0.4–4)

## 2022-01-20 PROCEDURE — 84443 ASSAY THYROID STIM HORMONE: CPT | Performed by: OTOLARYNGOLOGY

## 2022-01-20 PROCEDURE — 82310 ASSAY OF CALCIUM: CPT | Performed by: OTOLARYNGOLOGY

## 2022-01-20 PROCEDURE — 84432 ASSAY OF THYROGLOBULIN: CPT | Performed by: OTOLARYNGOLOGY

## 2022-01-20 PROCEDURE — 86800 THYROGLOBULIN ANTIBODY: CPT | Mod: 91 | Performed by: OTOLARYNGOLOGY

## 2022-01-20 PROCEDURE — 84439 ASSAY OF FREE THYROXINE: CPT | Performed by: OTOLARYNGOLOGY

## 2022-01-20 PROCEDURE — 83970 ASSAY OF PARATHORMONE: CPT | Performed by: OTOLARYNGOLOGY

## 2022-01-24 ENCOUNTER — TELEPHONE (OUTPATIENT)
Dept: OTOLARYNGOLOGY | Facility: CLINIC | Age: 67
End: 2022-01-24
Payer: MEDICARE

## 2022-01-24 DIAGNOSIS — C73 PAPILLARY THYROID CARCINOMA: Primary | ICD-10-CM

## 2022-01-24 DIAGNOSIS — E89.0 S/P TOTAL THYROIDECTOMY: ICD-10-CM

## 2022-01-24 PROBLEM — E04.2 MULTIPLE THYROID NODULES: Status: RESOLVED | Noted: 2021-10-21 | Resolved: 2022-01-24

## 2022-01-24 LAB
THRYOGLOBULIN INTERPRETATION: ABNORMAL
THYROGLOB AB SERPL-ACNC: <1.8 IU/ML
THYROGLOB SERPL-MCNC: 6.2 NG/ML

## 2022-01-24 NOTE — TELEPHONE ENCOUNTER
Spoke with patient to notify of the referral to Endocrinology from Dr. Olsen and the reasoning. Patient verbalized understanding. Advised if she had not heard from the department regarding scheduling to contact the main line to help coordinate direction to getting her appointment scheduled virtually.

## 2022-01-27 ENCOUNTER — OFFICE VISIT (OUTPATIENT)
Dept: INTERNAL MEDICINE | Facility: CLINIC | Age: 67
End: 2022-01-27
Payer: MEDICARE

## 2022-01-27 VITALS
RESPIRATION RATE: 18 BRPM | HEIGHT: 64 IN | WEIGHT: 233.81 LBS | BODY MASS INDEX: 39.91 KG/M2 | HEART RATE: 78 BPM | SYSTOLIC BLOOD PRESSURE: 126 MMHG | OXYGEN SATURATION: 98 % | DIASTOLIC BLOOD PRESSURE: 58 MMHG | TEMPERATURE: 98 F

## 2022-01-27 DIAGNOSIS — E89.0 S/P THYROIDECTOMY: ICD-10-CM

## 2022-01-27 DIAGNOSIS — Z09 FOLLOW UP: Primary | ICD-10-CM

## 2022-01-27 DIAGNOSIS — R51.9 FREQUENT HEADACHES: ICD-10-CM

## 2022-01-27 DIAGNOSIS — N64.4 BREAST PAIN, LEFT: ICD-10-CM

## 2022-01-27 DIAGNOSIS — I10 PRIMARY HYPERTENSION: ICD-10-CM

## 2022-01-27 PROBLEM — Z90.89 S/P THYROIDECTOMY: Status: ACTIVE | Noted: 2022-01-27

## 2022-01-27 PROBLEM — Z98.890 S/P THYROIDECTOMY: Status: ACTIVE | Noted: 2022-01-27

## 2022-01-27 PROCEDURE — 3008F PR BODY MASS INDEX (BMI) DOCUMENTED: ICD-10-PCS | Mod: CPTII,S$GLB,, | Performed by: NURSE PRACTITIONER

## 2022-01-27 PROCEDURE — 1101F PR PT FALLS ASSESS DOC 0-1 FALLS W/OUT INJ PAST YR: ICD-10-PCS | Mod: CPTII,S$GLB,, | Performed by: NURSE PRACTITIONER

## 2022-01-27 PROCEDURE — 1125F PR PAIN SEVERITY QUANTIFIED, PAIN PRESENT: ICD-10-PCS | Mod: CPTII,S$GLB,, | Performed by: NURSE PRACTITIONER

## 2022-01-27 PROCEDURE — 99999 PR PBB SHADOW E&M-EST. PATIENT-LVL V: ICD-10-PCS | Mod: PBBFAC,,, | Performed by: NURSE PRACTITIONER

## 2022-01-27 PROCEDURE — 1159F MED LIST DOCD IN RCRD: CPT | Mod: CPTII,S$GLB,, | Performed by: NURSE PRACTITIONER

## 2022-01-27 PROCEDURE — 1125F AMNT PAIN NOTED PAIN PRSNT: CPT | Mod: CPTII,S$GLB,, | Performed by: NURSE PRACTITIONER

## 2022-01-27 PROCEDURE — 3074F SYST BP LT 130 MM HG: CPT | Mod: CPTII,S$GLB,, | Performed by: NURSE PRACTITIONER

## 2022-01-27 PROCEDURE — 3074F PR MOST RECENT SYSTOLIC BLOOD PRESSURE < 130 MM HG: ICD-10-PCS | Mod: CPTII,S$GLB,, | Performed by: NURSE PRACTITIONER

## 2022-01-27 PROCEDURE — 99999 PR PBB SHADOW E&M-EST. PATIENT-LVL V: CPT | Mod: PBBFAC,,, | Performed by: NURSE PRACTITIONER

## 2022-01-27 PROCEDURE — 3078F DIAST BP <80 MM HG: CPT | Mod: CPTII,S$GLB,, | Performed by: NURSE PRACTITIONER

## 2022-01-27 PROCEDURE — 99213 OFFICE O/P EST LOW 20 MIN: CPT | Mod: S$GLB,,, | Performed by: NURSE PRACTITIONER

## 2022-01-27 PROCEDURE — 1101F PT FALLS ASSESS-DOCD LE1/YR: CPT | Mod: CPTII,S$GLB,, | Performed by: NURSE PRACTITIONER

## 2022-01-27 PROCEDURE — 1160F PR REVIEW ALL MEDS BY PRESCRIBER/CLIN PHARMACIST DOCUMENTED: ICD-10-PCS | Mod: CPTII,S$GLB,, | Performed by: NURSE PRACTITIONER

## 2022-01-27 PROCEDURE — 3008F BODY MASS INDEX DOCD: CPT | Mod: CPTII,S$GLB,, | Performed by: NURSE PRACTITIONER

## 2022-01-27 PROCEDURE — 3288F PR FALLS RISK ASSESSMENT DOCUMENTED: ICD-10-PCS | Mod: CPTII,S$GLB,, | Performed by: NURSE PRACTITIONER

## 2022-01-27 PROCEDURE — 1160F RVW MEDS BY RX/DR IN RCRD: CPT | Mod: CPTII,S$GLB,, | Performed by: NURSE PRACTITIONER

## 2022-01-27 PROCEDURE — 3078F PR MOST RECENT DIASTOLIC BLOOD PRESSURE < 80 MM HG: ICD-10-PCS | Mod: CPTII,S$GLB,, | Performed by: NURSE PRACTITIONER

## 2022-01-27 PROCEDURE — 3288F FALL RISK ASSESSMENT DOCD: CPT | Mod: CPTII,S$GLB,, | Performed by: NURSE PRACTITIONER

## 2022-01-27 PROCEDURE — 1159F PR MEDICATION LIST DOCUMENTED IN MEDICAL RECORD: ICD-10-PCS | Mod: CPTII,S$GLB,, | Performed by: NURSE PRACTITIONER

## 2022-01-27 PROCEDURE — 99213 PR OFFICE/OUTPT VISIT, EST, LEVL III, 20-29 MIN: ICD-10-PCS | Mod: S$GLB,,, | Performed by: NURSE PRACTITIONER

## 2022-01-27 RX ORDER — NIFEDIPINE 60 MG/1
TABLET, EXTENDED RELEASE ORAL
COMMUNITY
Start: 2022-01-16 | End: 2023-02-07

## 2022-01-27 NOTE — PATIENT INSTRUCTIONS
Patient Education       Thyroidectomy   Why is this procedure done?   This surgery is done to take out all or part of your thyroid gland. Your thyroid gland is in the front of your neck. This gland helps regulate your metabolism. Surgery is done if you have:  · An overactive thyroid that makes too many hormones  · A large thyroid gland called a goiter  · Thyroid cancer  · Small growths or lumps in the thyroid gland  · Swelling of the thyroid gland that causes you to have trouble breathing or swallowing     What will the results be?   Your results will be based on why you had the surgery. After surgery you may have:  · Less signs of:  ? Fast heartbeat  ? Weight loss  ? Nervousness  ? Feeling hot and sweating  ? Feeling tired  · Better swallowing or breathing  · Cancer will be treated  You may need to take a drug if your entire gland was removed. This drug replaces the hormone that was made by the thyroid.  What happens before the procedure?   Your doctor will ask you about your health history. Talk to the doctor about:  · All the drugs you are taking. Be sure to include all prescription and over-the-counter (OTC) drugs, and herbal supplements. Tell the doctor about any drug allergy. Bring a list of drugs you take with you.  · Any bleeding problems. Be sure to tell your doctor if you are taking any drugs that may cause bleeding. Some of these are warfarin, rivaroxaban, apixaban, ticagrelor, clopidogrel, ketorolac, ibuprofen, naproxen, or aspirin. Certain vitamins and herbs, such as garlic and fish oil, may also add to the risk for bleeding. You may need to stop these drugs as well. Talk to your doctor about them.  · When you need to stop eating or drinking before your surgery.  Your doctor will do an exam and may order:  · Ultrasound  · MRI  · CT scan  · A sample of tissue from your thyroid to be collected. This is called a fine needle aspiration.  What happens during the procedure?   Once you are in the operating  room, the staff will put an IV in your arm to give you fluids and drugs. You will be given a drug to make you sleepy. It will also help you stay pain free during the surgery.  The doctor makes a cut in front of your neck. Then, the doctor removes all or part of the gland. A small drain tube may be placed to drain blood and other fluids from around the site to help healing. It will be removed before you go home. The cut will be closed with stitches or skin glue and covered with a bandage. This surgery may last from 2 to 4 hours.  What happens after the procedure?   You will go to the Recovery Room and the staff will watch you closely. You may have to stay in the hospital for 1 day. Before you go home you may have:  · Blood tests to check your calcium level  · Radioactive treatments if you have cancer  · Tests to make sure you can swallow water and food  What drugs may be needed?   The doctor may order drugs to:  · Help with pain. You may feel pain in your neck for a few days.  · Hormone to replace the work of the thyroid  · Replace calcium levels  What problems could happen?   · Bleeding  · Infection  · Scarring  · Injury to other glands or nerves near the thyroid  · Trouble breathing  · Change in voice. This is rare.  · Too much thyroid hormone released. This is rare.  Last Reviewed Date   2019-04-30  Consumer Information Use and Disclaimer   This information is not specific medical advice and does not replace information you receive from your health care provider. This is only a brief summary of general information. It does NOT include all information about conditions, illnesses, injuries, tests, procedures, treatments, therapies, discharge instructions or life-style choices that may apply to you. You must talk with your health care provider for complete information about your health and treatment options. This information should not be used to decide whether or not to accept your health care providers advice,  instructions or recommendations. Only your health care provider has the knowledge and training to provide advice that is right for you.  Copyright   Copyright © 2021 [employer] and its affiliates and/or licensors. All rights reserved.

## 2022-01-27 NOTE — PROGRESS NOTES
Subjective:       Patient ID: Mariah Meza is a 66 y.o. female.    Chief Complaint: Follow-up (3 mo)    Patient presents for a 3 month follow up.  Had Total thyroidectomy in Dec 2021.  Taking levothyroxine 175 mcg.  Feeling well.  Some fatigue.     Blood pressure is normal.  Having some swelling possibly from the CCB.      Reports headaches.  Worse at night.  Reports she can not describe pain.  Left side.  Negative CT scan. Some vision changes/floaters.  Defers to see neurologist at this time.      Neck pain/stiffness.  Tried warm compresses.  Helps.      Review of Systems   Constitutional: Positive for fatigue. Negative for activity change, appetite change and fever.   HENT: Negative for nasal congestion, ear discharge, ear pain, mouth sores, nosebleeds, sore throat and tinnitus.    Eyes: Positive for visual disturbance. Negative for discharge, redness and itching.   Respiratory: Negative for apnea, cough and shortness of breath.    Cardiovascular: Negative for chest pain and leg swelling.   Gastrointestinal: Negative for abdominal distention, abdominal pain, blood in stool and constipation.   Endocrine: Negative for polydipsia, polyphagia and polyuria.   Genitourinary: Negative for difficulty urinating, flank pain, frequency and hematuria.   Musculoskeletal: Negative for back pain, gait problem, neck pain and neck stiffness.   Integumentary:  Positive for breast tenderness. Negative for rash, wound and breast discharge.   Allergic/Immunologic: Negative for environmental allergies, food allergies and immunocompromised state.   Neurological: Positive for headaches. Negative for dizziness, seizures, syncope and numbness.   Hematological: Negative for adenopathy. Does not bruise/bleed easily.   Psychiatric/Behavioral: Negative for agitation, confusion, hallucinations, self-injury and suicidal ideas.   Breast: Positive for tenderness.        Objective:      Physical Exam  Vitals reviewed.   Constitutional:        Appearance: Normal appearance.   Cardiovascular:      Rate and Rhythm: Normal rate and regular rhythm.   Pulmonary:      Effort: Pulmonary effort is normal.      Breath sounds: Normal breath sounds.   Chest:      Chest wall: Tenderness present. No mass or swelling.          Comments: Left breast tenderness.  Nodular area palpated   Neurological:      General: No focal deficit present.      Mental Status: She is alert and oriented to person, place, and time.   Psychiatric:         Mood and Affect: Mood normal.         Behavior: Behavior normal. Behavior is cooperative.         Assessment:       Problem List Items Addressed This Visit     Hypertension    S/P thyroidectomy    Frequent headaches      Other Visit Diagnoses     Follow up    -  Primary    Breast pain, left        Relevant Orders    Mammo Digital Diagnostic Left    US Breast Left Complete          Plan:         Follow up    S/P thyroidectomy    Frequent headaches    Primary hypertension    Breast pain, left  -     Mammo Digital Diagnostic Left; Future; Expected date: 01/27/2022  -     US Breast Left Complete; Future; Expected date: 01/27/2022      Concerned if she needs to continue the vitamin D. Will ask ENT staff.     Recommend breast imaging     Follow up in 3 months.

## 2022-02-02 ENCOUNTER — TELEPHONE (OUTPATIENT)
Dept: INTERNAL MEDICINE | Facility: CLINIC | Age: 67
End: 2022-02-02

## 2022-02-02 NOTE — TELEPHONE ENCOUNTER
I tried to return a call to the pt however there was no answer this was regarding a mammogram order and a Rx. //kah

## 2022-02-02 NOTE — TELEPHONE ENCOUNTER
----- Message from Dalila Braxton sent at 2/2/2022  9:43 AM CST -----  Regarding: Prescription and Mammography  Mrs Lemus would like a call back in regards to Prescription and Mammo [phone] Thanks

## 2022-02-02 NOTE — TELEPHONE ENCOUNTER
I called the pt back and answered her questions regarding whether she should be taking vit D that was given in Dec and after speaking with Taina FREEMAN she stated she no longer need to take and it was only for thiry days . The pt verbalized understanding.  Then I got her over to Radiology to assist with scheduling her mammo. //kah

## 2022-02-08 ENCOUNTER — HOSPITAL ENCOUNTER (OUTPATIENT)
Dept: RADIOLOGY | Facility: HOSPITAL | Age: 67
Discharge: HOME | End: 2022-02-08
Attending: NURSE PRACTITIONER
Payer: MEDICARE

## 2022-02-08 VITALS — WEIGHT: 233 LBS | BODY MASS INDEX: 39.78 KG/M2 | HEIGHT: 64 IN

## 2022-02-08 DIAGNOSIS — N64.4 BREAST PAIN, LEFT: ICD-10-CM

## 2022-02-08 PROCEDURE — 77065 DX MAMMO INCL CAD UNI: CPT | Mod: TC,LT

## 2022-02-08 PROCEDURE — 77065 DX MAMMO INCL CAD UNI: CPT | Mod: 26,LT,, | Performed by: RADIOLOGY

## 2022-02-08 PROCEDURE — 77065 MAMMO DIGITAL DIAGNOSTIC LEFT WITH TOMO: ICD-10-PCS | Mod: 26,LT,, | Performed by: RADIOLOGY

## 2022-02-08 PROCEDURE — 77061 MAMMO DIGITAL DIAGNOSTIC LEFT WITH TOMO: ICD-10-PCS | Mod: 26,LT,, | Performed by: RADIOLOGY

## 2022-02-08 PROCEDURE — 77061 BREAST TOMOSYNTHESIS UNI: CPT | Mod: 26,LT,, | Performed by: RADIOLOGY

## 2022-02-09 ENCOUNTER — OFFICE VISIT (OUTPATIENT)
Dept: ENDOCRINOLOGY | Facility: CLINIC | Age: 67
End: 2022-02-09
Payer: MEDICARE

## 2022-02-09 VITALS
HEIGHT: 64 IN | RESPIRATION RATE: 18 BRPM | BODY MASS INDEX: 40.02 KG/M2 | WEIGHT: 234.44 LBS | DIASTOLIC BLOOD PRESSURE: 74 MMHG | HEART RATE: 88 BPM | SYSTOLIC BLOOD PRESSURE: 132 MMHG

## 2022-02-09 DIAGNOSIS — Z90.89 H/O PARATHYROIDECTOMY: ICD-10-CM

## 2022-02-09 DIAGNOSIS — E89.0 POST-OPERATIVE HYPOTHYROIDISM: ICD-10-CM

## 2022-02-09 DIAGNOSIS — I10 PRIMARY HYPERTENSION: ICD-10-CM

## 2022-02-09 DIAGNOSIS — E66.01 CLASS 3 SEVERE OBESITY DUE TO EXCESS CALORIES WITH SERIOUS COMORBIDITY AND BODY MASS INDEX (BMI) OF 40.0 TO 44.9 IN ADULT: ICD-10-CM

## 2022-02-09 DIAGNOSIS — Z98.890 H/O PARATHYROIDECTOMY: ICD-10-CM

## 2022-02-09 DIAGNOSIS — C73 PAPILLARY THYROID CARCINOMA: Primary | ICD-10-CM

## 2022-02-09 PROBLEM — E89.2 H/O PARATHYROIDECTOMY: Status: ACTIVE | Noted: 2022-02-09

## 2022-02-09 PROCEDURE — 3008F BODY MASS INDEX DOCD: CPT | Mod: CPTII,S$GLB,, | Performed by: INTERNAL MEDICINE

## 2022-02-09 PROCEDURE — 1159F MED LIST DOCD IN RCRD: CPT | Mod: CPTII,S$GLB,, | Performed by: INTERNAL MEDICINE

## 2022-02-09 PROCEDURE — 99999 PR PBB SHADOW E&M-EST. PATIENT-LVL V: ICD-10-PCS | Mod: PBBFAC,,, | Performed by: INTERNAL MEDICINE

## 2022-02-09 PROCEDURE — 1159F PR MEDICATION LIST DOCUMENTED IN MEDICAL RECORD: ICD-10-PCS | Mod: CPTII,S$GLB,, | Performed by: INTERNAL MEDICINE

## 2022-02-09 PROCEDURE — 3008F PR BODY MASS INDEX (BMI) DOCUMENTED: ICD-10-PCS | Mod: CPTII,S$GLB,, | Performed by: INTERNAL MEDICINE

## 2022-02-09 PROCEDURE — 3078F PR MOST RECENT DIASTOLIC BLOOD PRESSURE < 80 MM HG: ICD-10-PCS | Mod: CPTII,S$GLB,, | Performed by: INTERNAL MEDICINE

## 2022-02-09 PROCEDURE — 99204 OFFICE O/P NEW MOD 45 MIN: CPT | Mod: S$GLB,,, | Performed by: INTERNAL MEDICINE

## 2022-02-09 PROCEDURE — 1125F PR PAIN SEVERITY QUANTIFIED, PAIN PRESENT: ICD-10-PCS | Mod: CPTII,S$GLB,, | Performed by: INTERNAL MEDICINE

## 2022-02-09 PROCEDURE — 3288F FALL RISK ASSESSMENT DOCD: CPT | Mod: CPTII,S$GLB,, | Performed by: INTERNAL MEDICINE

## 2022-02-09 PROCEDURE — 3078F DIAST BP <80 MM HG: CPT | Mod: CPTII,S$GLB,, | Performed by: INTERNAL MEDICINE

## 2022-02-09 PROCEDURE — 3075F PR MOST RECENT SYSTOLIC BLOOD PRESS GE 130-139MM HG: ICD-10-PCS | Mod: CPTII,S$GLB,, | Performed by: INTERNAL MEDICINE

## 2022-02-09 PROCEDURE — 1125F AMNT PAIN NOTED PAIN PRSNT: CPT | Mod: CPTII,S$GLB,, | Performed by: INTERNAL MEDICINE

## 2022-02-09 PROCEDURE — 1160F RVW MEDS BY RX/DR IN RCRD: CPT | Mod: CPTII,S$GLB,, | Performed by: INTERNAL MEDICINE

## 2022-02-09 PROCEDURE — 3288F PR FALLS RISK ASSESSMENT DOCUMENTED: ICD-10-PCS | Mod: CPTII,S$GLB,, | Performed by: INTERNAL MEDICINE

## 2022-02-09 PROCEDURE — 1101F PT FALLS ASSESS-DOCD LE1/YR: CPT | Mod: CPTII,S$GLB,, | Performed by: INTERNAL MEDICINE

## 2022-02-09 PROCEDURE — 99999 PR PBB SHADOW E&M-EST. PATIENT-LVL V: CPT | Mod: PBBFAC,,, | Performed by: INTERNAL MEDICINE

## 2022-02-09 PROCEDURE — 99204 PR OFFICE/OUTPT VISIT, NEW, LEVL IV, 45-59 MIN: ICD-10-PCS | Mod: S$GLB,,, | Performed by: INTERNAL MEDICINE

## 2022-02-09 PROCEDURE — 3075F SYST BP GE 130 - 139MM HG: CPT | Mod: CPTII,S$GLB,, | Performed by: INTERNAL MEDICINE

## 2022-02-09 PROCEDURE — 1160F PR REVIEW ALL MEDS BY PRESCRIBER/CLIN PHARMACIST DOCUMENTED: ICD-10-PCS | Mod: CPTII,S$GLB,, | Performed by: INTERNAL MEDICINE

## 2022-02-09 PROCEDURE — 1101F PR PT FALLS ASSESS DOC 0-1 FALLS W/OUT INJ PAST YR: ICD-10-PCS | Mod: CPTII,S$GLB,, | Performed by: INTERNAL MEDICINE

## 2022-02-09 NOTE — PROGRESS NOTES
Subjective:    Patient ID:  Mariah Meza is a 66 y.o. female.    Chief Complaint:  Thyroid Nodule (Thyroidectomy in Dec 2021. Now trying to regulate thyroid. All tests and labs in Epic)      Pt presents to establish care for papillary thyroid cancer and review of chronic medical conditions as listed in the visit diagnosis section of this encounter.     Reviewed referring provider's last office note, Dr. Medrano    Found to have a MNG. Underwent FNA of 3 nodules and path on two nodules showed suspicion for malignancy. Is now s/p total thyroidectomy.     10/21/2021 US SOFT TISSUE HEAD NECK THYROID     CLINICAL HISTORY:  left soft tissue cyst?; Localized swelling, mass and lump, neck     TECHNIQUE:  Ultrasound of the thyroid and cervical lymph nodes was performed.     COMPARISON:  None     FINDINGS:  The thyroid gland demonstrates a homogenous echotexture.  The isthmus measures 2.6 mm in thickness.  The right lobe of the thyroid gland measures 6.4 cm in length.  The left lobe of the thyroid gland measures 6.9 cm in length.     There is a cystic/solid heterogeneous nodule within the mid right thyroid gland that measures 2.7 x 2.0 x 2.7 cm and demonstrates punctate internal echogenic foci.  The isthmus demonstrates a 1.2 cm very hypoechoic solid nodule with a mildly irregular margin.  There is also a isoechoic solid nodule seen within the lower pole of the left lobe of the thyroid gland that measures up to 2.6 cm in size.  There is a 5 mm hypoechoic solid nodule within the upper pole of the left lobe.  An additional 5 mm hypoechoic solid nodule noted within the midpole.  There is also an 8 mm cystic/solid nodule within the lower pole of the left lobe.     Impression:     1. Technically 3 nodules within the thyroid gland meeting TI-RADS criteria for biopsy including the nodule within the single nodule within the right lobe of the thyroid gland, the very hypoechoic nodule within the isthmus and the isoechoic solid  nodule seen projecting off the lower pole of the left lobe.  Of the 3, the lower pole nodule projecting off the left lobe of the thyroid gland is the least suspicious by scoring.        Electronically signed by: Nik Reyes DO  Date:                                            10/21/2021      Final Pathologic Diagnosis 1. Parathyroid, left superior, biopsy:   - Hypercellular parathyroid tissue (10 mg)   2. 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 Lymph nodes, paratracheal, neck dissection:   - Four benign lymph nodes, negative for metastatic carcinoma (0/4, pN0)   4. Parathyroid, left superior, parathyroidectomy:   - Hypercellular parathyroid tissue (107 mg), consistent with hyperplastic   parathyroid     CAP Synoptic Checklist for Thyroid Neoplasms:     - Predisposing Conditions: No known predisposing conditions (EPIC,         Progress Note, 11/17/2021)     - Procedure: Total thyroidectomy     - Tumor Focality: Unifocal     - Tumor Site: Isthmus     - Tumor Size: 1.2 x 1.0 x 0.8 cm     - Histologic Type: Papillary carcinoma, classic     - Mitotic Rate: Less than 1 mitosis per 2 mm^2     - Tumor Necrosis: Not identified     - Angioinvasion: Not identified     - Lymphatic Invasion: Not identified     - Perineural Invasion: Not identified     - Extrathyroidal Extension: Not identified     - Margin Status: All margins negative for carcinoma         - Distance from Invasive Carcinoma to Closest Margin: 0.1 cm to capsular            surface     - Regional Lymph Nodes: All regional lymph nodes negative  "for tumor         - Number of Lymph Nodes with Tumor: 0         - Number of Lymph Nodes Examined: 4     - Distant Metastasis: Not applicable     - Pathologic Stage Classification: pT1b pN0     - Additional Findings: Multiple adenomatoid and hyperplastic nodules;        - Parathyroid glands: 3     - Designate Block for Future Studies: [id number]   Comment: Interp By Eitan Vick MD, PhD, Signed on 12/29/2021 at 11:43  Frozen Section Diagnosis 1. Mildly cellular parathyroid   Verbally communicated to Dr. Olsen   Microscopic Exam Microscopic examination performed.   Gross 1:  Surgery ID:  [id number];  Pathology ID:  [id number]   1. Received fresh for frozen section labeled "left superior parathyroid" is a   0.6 x 0.4 x 0.3 cm piece tan soft tissue, weighing 0.01 g. The specimen is   entirely submitted for frozen section diagnosis.  Frozen section remnant is   entirely embedded in 1 cassette.   TOT--1-A-FS   Grossed by: Tommie Guaman    2: Surgery ID:  [id number];  Pathology ID:  [id number]   2. Received in formalin labeled "total thyroid" is a 49 g product of a total   thyroidectomy.  The left thyroid lobe measures 5 x 3 x 2 cm, the right   thyroid lobe measures 5.5 x 2.8 x 2.5 cm, and isthmus with pyramidal lobe   measures 3 x 1 x 0.6 cm.  The specimen is differentially inked left anterior   blue, right anterior green, isthmus anterior orange, and posterior black.   Sectioning the left thyroid lobe reveals a 4 x 2 x 1.8 cm cystic nodule at   the lower pole with a bennett-colored, gelatinous cut surface.  A 2.5 x 1.7 x   1.7 cm nodule is identified in the right lower lobe, with a tan, fleshy,   slightly hemorrhagic, well-circumscribed cut surface.  The isthmus lobe   displays a 1.2 x 1.0 x 0.8 cm well-circumscribed, gelatinous nodule with   hemorrhagic components.  The remaining uninvolved parenchyma is beefy red.   Representative sections are embedded cassettes 3370, 2A-2R.   2A-2G:  Left thyroid lobe, every other slice " "submitted sequentially from   superior to inferior   2H-2O:  Right thyroid lobe, every other slice submitted sequentially from   superior to inferior   2P-2R:  Isthmus, representative sampled   Grossed by: Tommie Guaman    3: Surgery ID:  [id number];  Pathology ID:  [id number]   3. Received in formalin labeled "paratracheal lymph nodes" are 2 pieces of   yellow fatty tissue, 2.8 x 2.7 x 0.6 cm in aggregate.  The specimen is   entirely embedded in 2 cassettes.   VOK--3-A   UUL--3-B   Grossed by: Tommie Guaman    4: Surgery ID:  [id number];  Pathology ID:  [id number]   4. Received in formalin labeled "remainder of left superior parathyroid" is a   0.8 x 0.6 x 0.3 cm piece of tan soft tissue, weighing 0.107 g.  The specimen   is entirely embedded in 1 cassette.   KAL--4-A   Grossed by: Tommie Guaman   Frozen Section Footnote Frozen section performed at [facility], [address] [place], [place], [address]   Disclaimer Unless the case is a 'gross only' or additional testing only, the final   diagnosis for each specimen is based on a microscopic examination of   appropriate tissue sections.     Post op TG was 6. Pt referred to endo for further work up.     Current medication:  Generic levothyroxine 175 mcg daily. Takes thyroid medication with other meds in the morning.     Current symptoms:   fatigue +ve  Has some voice changes post surgery. Voice gets weak and raspy by the end of the day. Has some dysphagia. No head/neck exposure to XRT.     3 parathyroid glands were also removed during thyroidectomy.  No sx's of hypocalcemia.         Review of Systems   Constitutional: Positive for fatigue.   HENT: Positive for postnasal drip and tinnitus.    Respiratory: Negative for shortness of breath.    Cardiovascular: Negative for palpitations.   Endocrine: Positive for heat intolerance.        Chronic, unchanged   Neurological: Positive for tremors.   Psychiatric/Behavioral: The patient is " nervous/anxious.         Past Medical History:   Diagnosis Date    Chronic rhinitis     DJD (degenerative joint disease), lumbar     GERD (gastroesophageal reflux disease)     Hiatal hernia     Hyperlipidemia     Hypertension     Liver disease     Fatty Liver    Low back pain     Obesity     PONV (postoperative nausea and vomiting)     Thyroid disease       Social History     Tobacco Use    Smoking status: Former Smoker     Packs/day: 0.50     Years: 8.00     Pack years: 4.00     Types: Cigarettes     Quit date: 1979     Years since quittin.1    Smokeless tobacco: Never Used   Substance Use Topics    Alcohol use: No    Drug use: Never     Family History   Problem Relation Age of Onset    Diabetes Mother     Hypertension Mother     Hyperlipidemia Mother     Heart disease Mother         CHF    Stroke Mother     Heart attack Mother     Hearing loss Mother     Diabetes Sister     Sickle cell anemia Other         nieces, nephews    Lupus Other         niece      Past Surgical History:   Procedure Laterality Date    BILATERAL OOPHORECTOMY  2002    CARPAL TUNNEL RELEASE      Left wrist    CHOLECYSTECTOMY      DISSECTION OF NECK Bilateral 2021    Procedure: DISSECTION, NECK;  Surgeon: Deejay Olsen MD;  Location: [facility] OR;  Service: ENT;  Laterality: Bilateral;  Central neck dissection    HYSTERECTOMY  1984    NECK EXPLORATION Bilateral 2021    Procedure: EXPLORATION, NECK;  Surgeon: Deejay Olsen MD;  Location: [facility] OR;  Service: ENT;  Laterality: Bilateral;  Parathyroid exploration    ROTATOR CUFF REPAIR      Right shoulder    ROTATOR CUFF REPAIR Left 2010    THYROIDECTOMY, BILATERAL Bilateral 2021    Procedure: THYROIDECTOMY,BILATERAL;  Surgeon: Deejay Olsen MD;  Location: [facility] OR;  Service: ENT;  Laterality: Bilateral;    TOTAL VAGINAL HYSTERECTOMY            Current Outpatient Medications:     acetaminophen (TYLENOL) 650 MG TbSR, Take 650 mg by mouth daily as  "needed., Disp: , Rfl:     calcium carbonate 470 mg calcium (1,177 mg) Chew, Take one tablet by mouth twice daily, Disp: 60 each, Rfl: 0    cyclobenzaprine (FLEXERIL) 10 MG tablet, Take 10 mg by mouth 3 (three) times daily as needed for Muscle spasms., Disp: , Rfl:     furosemide (LASIX) 40 MG tablet, Take 1 tablet (40 mg total) by mouth 2 (two) times daily as needed., Disp: 60 tablet, Rfl: 1    levothyroxine (SYNTHROID, LEVOTHROID) 175 MCG tablet, Take 1 tablet (175 mcg total) by mouth before breakfast., Disp: 30 tablet, Rfl: 11    linaclotide (LINZESS ORAL), Take 72 mg by mouth once daily., Disp: , Rfl:     loratadine (CLARITIN) 10 mg tablet, Take 10 mg by mouth daily as needed., Disp: , Rfl:     multivit,min/folic acid/bvk352 (MENOPAUSE SUPPLEMENT ORAL), Take 400 mg by mouth once daily. , Disp: , Rfl:     NIFEdipine (ADALAT CC) 60 MG TbSR, , Disp: , Rfl:     olmesartan-hydrochlorothiazide (BENICAR HCT) 40-25 mg per tablet, Take 1 tablet by mouth once daily., Disp: 90 tablet, Rfl: 3    pantoprazole (PROTONIX) 40 MG tablet, Take by mouth once daily. , Disp: , Rfl:     psyllium husk/aspartame (METAMUCIL FIBER SINGLES ORAL), Take by mouth., Disp: , Rfl:     Current Facility-Administered Medications:     DOBUTamine 1000 mg in D5W 250 mL infusion (premix titrating), 10 mcg/kg/min, Intravenous, Continuous, Naina Roque MD, Last Rate: 15.8 mL/hr at 07/15/21 1108, 10 mcg/kg/min at 07/15/21 1108     Review of patient's allergies indicates:   Allergen Reactions    Iodine and iodide containing products Itching    Shellfish containing products Swelling    Statins-hmg-coa reductase inhibitors Other (See Comments)     Myalgias        Objective:   /74   Pulse 88   Resp 18   Ht 5' 4" (1.626 m)   Wt 106.4 kg (234 lb 7.4 oz)   LMP 02/10/1984 (Approximate)   BMI 40.24 kg/m²   BP Readings from Last 3 Encounters:   02/09/22 132/74   01/27/22 (!) 126/58   12/21/21 (!) 164/72     Wt Readings from Last 3 " Encounters:   02/09/22 1304 106.4 kg (234 lb 7.4 oz)   02/08/22 0940 105.7 kg (233 lb)   01/27/22 1602 106.1 kg (233 lb 12.8 oz)          Physical Exam  Vitals reviewed.   Constitutional:       General: She is not in acute distress.     Appearance: Normal appearance. She is well-developed. She is not ill-appearing, toxic-appearing or diaphoretic.   HENT:      Head: Normocephalic and atraumatic.   Eyes:      General: No scleral icterus.  Neck:      Trachea: No tracheal deviation.      Comments: No thyroid tissue in the thyroid bed.    Fullness of L neck level 3  Cardiovascular:      Rate and Rhythm: Normal rate and regular rhythm.   Pulmonary:      Effort: Pulmonary effort is normal. No respiratory distress.   Lymphadenopathy:      Cervical: Cervical adenopathy present.   Neurological:      Mental Status: She is alert and oriented to person, place, and time.   Psychiatric:         Thought Content: Thought content normal.           Lab Results   Component Value Date     12/01/2021    K 3.8 12/01/2021     12/01/2021    CO2 28 12/01/2021    BUN 13 12/01/2021    CREATININE 0.9 12/01/2021     12/01/2021    HGBA1C 4.8 05/13/2021    MG 1.9 12/20/2021    AST 19 12/01/2021    ALT 21 12/01/2021    ALBUMIN 3.9 12/01/2021    PROT 7.1 12/01/2021    BILITOT 1.0 12/01/2021    WBC 4.28 12/01/2021    HGB 12.1 12/01/2021    HCT 37.7 12/01/2021    MCV 91 12/01/2021    MCH 29.2 12/01/2021     12/01/2021    MPV 10.6 12/01/2021    GRAN 2.0 12/01/2021    GRAN 46.0 12/01/2021    LYMPH 1.5 12/01/2021    LYMPH 34.8 12/01/2021    CHOL 194 09/29/2021    HDL 52 09/29/2021    LDLCALC 124.6 09/29/2021    TRIG 87 09/29/2021       Lab Results   Component Value Date    TSH 0.018 (L) 01/20/2022    FREET4 1.37 01/20/2022        Thyroid Labs Latest Ref Rng & Units 12/29/2021 1/20/2022 1/20/2022   TSH 0.400 - 4.000 uIU/mL - - 0.018(L)   Free T4 0.71 - 1.51 ng/dL - - 1.37   Sodium 136 - 145 mmol/L - - -   Potassium 3.5 - 5.1  mmol/L - - -   Chloride 95 - 110 mmol/L - - -   Carbon Dioxide 23 - 29 mmol/L - - -   Glucose 70 - 110 mg/dL - - -   Blood Urea Nitrogen 8 - 23 mg/dL - - -   Creatinine 0.5 - 1.4 mg/dL - - -   Calcium 8.7 - 10.5 mg/dL 8.6(L) - 9.6   Total Protein 6.0 - 8.4 g/dL - - -   Albumin 3.5 - 5.2 g/dL - - -   Total Bilirubin 0.1 - 1.0 mg/dL - - -   AST 10 - 40 U/L - - -   ALT 10 - 44 U/L - - -   Anion Gap 8 - 16 mmol/L - - -   eGFR (African American) >60 mL/min/1.73 m:2 - - -   eGFR (Non-African American) >60 mL/min/1.73 m:2 - - -   WBC 3.90 - 12.70 K/uL - - -   RBC 4.00 - 5.40 M/uL - - -   Hemoglobin 12.0 - 16.0 g/dL - - -   Hematocrit 37.0 - 48.5 % - - -   MCV 82 - 98 fL - - -   MCH 27.0 - 31.0 pg - - -   MCHC 32.0 - 36.0 g/dL - - -   RDW 11.5 - 14.5 % - - -   Platelets 150 - 450 K/uL - - -   MPV 9.2 - 12.9 fL - - -   Gran # 1.8 - 7.7 K/uL - - -   Lymph # 1.0 - 4.8 K/uL - - -   Mono # 0.3 - 1.0 K/uL - - -   Eos # 0.0 - 0.5 K/uL - - -   Baso # 0.00 - 0.20 K/uL - - -   Gran % 38.0 - 73.0 % - - -   Lymph % 18.0 - 48.0 % - - -   Mono% 4.0 - 15.0 % - - -   Eos % 0.0 - 8.0 % - - -   Baso % 0.0 - 1.9 % - - -   Thyroglobulin, Tumor Marker ng/mL - - 6.2(H)   Thyroglobulin, Antibody Screen <1.8 IU/mL - <1.8 <4.0           Hemoglobin A1C   Date Value Ref Range Status   05/13/2021 4.8 4.0 - 5.6 % Final     Comment:     ADA Screening Guidelines:  5.7-6.4%  Consistent with prediabetes  >or=6.5%  Consistent with diabetes    High levels of fetal hemoglobin interfere with the HbA1C  assay. Heterozygous hemoglobin variants (HbS, HgC, etc)do  not significantly interfere with this assay.   However, presence of multiple variants may affect accuracy.           Assessment and plan:     Problem List Items Addressed This Visit        Cardiac/Vascular    Hypertension     BP controlled. Continue current meds. Pt to notify PCP if BP consistently more than 140/90.              Oncology    Papillary thyroid carcinoma - Primary     Reviewed general  care    Post op TG more than would be expected. Regional lymph node dissection was neg.     Check post surgery thyroid US. ? LAD L neck around level 3.    Discussed indications and role of I-131 in treatment of DTC.    TSH goals discussed with patient. Based on path is MARISSA low risk but will continue current dose of thyroid hormone due to elevated Tg for now.     RTC after thyroid US.                  Relevant Orders    US Soft Tissue Head Neck Thyroid       Endocrine    Post-operative hypothyroidism     Clinically pt with insomnia and fatigue. Most recent TSH suppressed. Could likely cut back some. Seemingly MARISSA low risk but post op Tg elevated.          H/O parathyroidectomy     Pt s/p removal of 3 parathyroid glands. Most recent ca wnls and is asymptomatic. Monitor.             Other    Class 3 severe obesity due to excess calories with serious comorbidity and body mass index (BMI) of 40.0 to 44.9 in adult     BMI 40. Encouraged healthy low fat low carb diet and increase physical activity.                  Thyroid US (in [place], now)     RTC once results available            Disclaimer: This note has been generated using voice-recognition software. There may be typographical errors that have been missed during proof-reading.

## 2022-02-09 NOTE — ASSESSMENT & PLAN NOTE
Clinically pt with insomnia and fatigue. Most recent TSH suppressed. Could likely cut back some. Seemingly MARISSA low risk but post op Tg elevated.

## 2022-02-09 NOTE — ASSESSMENT & PLAN NOTE
Reviewed general care    Post op TG more than would be expected. Regional lymph node dissection was neg.     Check post surgery thyroid US. ? LAD L neck around level 3.    Discussed indications and role of I-131 in treatment of DTC.    TSH goals discussed with patient. Based on path is MARISSA low risk but will continue current dose of thyroid hormone due to elevated Tg for now.     RTC after thyroid US.

## 2022-02-14 ENCOUNTER — HOSPITAL ENCOUNTER (OUTPATIENT)
Dept: RADIOLOGY | Facility: HOSPITAL | Age: 67
Discharge: HOME | End: 2022-02-14
Attending: INTERNAL MEDICINE
Payer: MEDICARE

## 2022-02-14 DIAGNOSIS — C73 PAPILLARY THYROID CARCINOMA: ICD-10-CM

## 2022-02-14 PROCEDURE — 76536 US EXAM OF HEAD AND NECK: CPT | Mod: 26,,, | Performed by: RADIOLOGY

## 2022-02-14 PROCEDURE — 76536 US EXAM OF HEAD AND NECK: CPT | Mod: TC

## 2022-02-14 PROCEDURE — 76536 US SOFT TISSUE HEAD NECK THYROID: ICD-10-PCS | Mod: 26,,, | Performed by: RADIOLOGY

## 2022-02-16 ENCOUNTER — TELEPHONE (OUTPATIENT)
Dept: ENDOCRINOLOGY | Facility: CLINIC | Age: 67
End: 2022-02-16
Payer: MEDICARE

## 2022-02-16 NOTE — TELEPHONE ENCOUNTER
Let pt know thyroid US shows a lymph node which has benign characteristics but no other reason to explain her Tg of 6. I would like to do HENDERSON ablation with a pretreatment scan. She should RTC virtual visit to discuss further.

## 2022-02-16 NOTE — TELEPHONE ENCOUNTER
S/w pt, informed of Dr. Sandifer's message below. Verbalized understanding. Has upcoming appt on 2/23.

## 2022-02-22 ENCOUNTER — PATIENT OUTREACH (OUTPATIENT)
Dept: ADMINISTRATIVE | Facility: OTHER | Age: 67
End: 2022-02-22
Payer: MEDICARE

## 2022-02-22 NOTE — PROGRESS NOTES
Health Maintenance Due   Topic Date Due    Hepatitis C Screening  Never done    TETANUS VACCINE  Never done     Updates were requested from care everywhere.  Chart was reviewed for overdue Proactive [facility] Encounters ([facility]) topics (CRS, Breast Cancer Screening, Eye exam)  Health Maintenance has been updated.  LINKS immunization registry triggered.  Immunizations were reconciled.

## 2022-02-23 ENCOUNTER — OFFICE VISIT (OUTPATIENT)
Dept: ENDOCRINOLOGY | Facility: CLINIC | Age: 67
End: 2022-02-23
Payer: MEDICARE

## 2022-02-23 ENCOUNTER — LAB VISIT (OUTPATIENT)
Dept: LAB | Facility: HOSPITAL | Age: 67
End: 2022-02-23
Attending: INTERNAL MEDICINE
Payer: MEDICARE

## 2022-02-23 VITALS
OXYGEN SATURATION: 100 % | WEIGHT: 233.25 LBS | SYSTOLIC BLOOD PRESSURE: 138 MMHG | HEIGHT: 64 IN | BODY MASS INDEX: 39.82 KG/M2 | HEART RATE: 67 BPM | DIASTOLIC BLOOD PRESSURE: 70 MMHG

## 2022-02-23 DIAGNOSIS — C73 PAPILLARY THYROID CARCINOMA: ICD-10-CM

## 2022-02-23 DIAGNOSIS — I10 PRIMARY HYPERTENSION: ICD-10-CM

## 2022-02-23 DIAGNOSIS — R07.89 OTHER CHEST PAIN: ICD-10-CM

## 2022-02-23 DIAGNOSIS — E89.0 POST-OPERATIVE HYPOTHYROIDISM: Primary | ICD-10-CM

## 2022-02-23 DIAGNOSIS — E66.01 CLASS 3 SEVERE OBESITY DUE TO EXCESS CALORIES WITH SERIOUS COMORBIDITY AND BODY MASS INDEX (BMI) OF 40.0 TO 44.9 IN ADULT: ICD-10-CM

## 2022-02-23 PROCEDURE — 84432 ASSAY OF THYROGLOBULIN: CPT | Performed by: INTERNAL MEDICINE

## 2022-02-23 PROCEDURE — 3008F PR BODY MASS INDEX (BMI) DOCUMENTED: ICD-10-PCS | Mod: CPTII,S$GLB,, | Performed by: INTERNAL MEDICINE

## 2022-02-23 PROCEDURE — 1101F PR PT FALLS ASSESS DOC 0-1 FALLS W/OUT INJ PAST YR: ICD-10-PCS | Mod: CPTII,S$GLB,, | Performed by: INTERNAL MEDICINE

## 2022-02-23 PROCEDURE — 99999 PR PBB SHADOW E&M-EST. PATIENT-LVL IV: CPT | Mod: PBBFAC,,, | Performed by: INTERNAL MEDICINE

## 2022-02-23 PROCEDURE — 1126F AMNT PAIN NOTED NONE PRSNT: CPT | Mod: CPTII,S$GLB,, | Performed by: INTERNAL MEDICINE

## 2022-02-23 PROCEDURE — 3288F PR FALLS RISK ASSESSMENT DOCUMENTED: ICD-10-PCS | Mod: CPTII,S$GLB,, | Performed by: INTERNAL MEDICINE

## 2022-02-23 PROCEDURE — 1101F PT FALLS ASSESS-DOCD LE1/YR: CPT | Mod: CPTII,S$GLB,, | Performed by: INTERNAL MEDICINE

## 2022-02-23 PROCEDURE — 1126F PR PAIN SEVERITY QUANTIFIED, NO PAIN PRESENT: ICD-10-PCS | Mod: CPTII,S$GLB,, | Performed by: INTERNAL MEDICINE

## 2022-02-23 PROCEDURE — 99214 PR OFFICE/OUTPT VISIT, EST, LEVL IV, 30-39 MIN: ICD-10-PCS | Mod: S$GLB,,, | Performed by: INTERNAL MEDICINE

## 2022-02-23 PROCEDURE — 1160F RVW MEDS BY RX/DR IN RCRD: CPT | Mod: CPTII,S$GLB,, | Performed by: INTERNAL MEDICINE

## 2022-02-23 PROCEDURE — 3075F PR MOST RECENT SYSTOLIC BLOOD PRESS GE 130-139MM HG: ICD-10-PCS | Mod: CPTII,S$GLB,, | Performed by: INTERNAL MEDICINE

## 2022-02-23 PROCEDURE — 99999 PR PBB SHADOW E&M-EST. PATIENT-LVL IV: ICD-10-PCS | Mod: PBBFAC,,, | Performed by: INTERNAL MEDICINE

## 2022-02-23 PROCEDURE — 99214 OFFICE O/P EST MOD 30 MIN: CPT | Mod: S$GLB,,, | Performed by: INTERNAL MEDICINE

## 2022-02-23 PROCEDURE — 3078F DIAST BP <80 MM HG: CPT | Mod: CPTII,S$GLB,, | Performed by: INTERNAL MEDICINE

## 2022-02-23 PROCEDURE — 1159F MED LIST DOCD IN RCRD: CPT | Mod: CPTII,S$GLB,, | Performed by: INTERNAL MEDICINE

## 2022-02-23 PROCEDURE — 1160F PR REVIEW ALL MEDS BY PRESCRIBER/CLIN PHARMACIST DOCUMENTED: ICD-10-PCS | Mod: CPTII,S$GLB,, | Performed by: INTERNAL MEDICINE

## 2022-02-23 PROCEDURE — 3078F PR MOST RECENT DIASTOLIC BLOOD PRESSURE < 80 MM HG: ICD-10-PCS | Mod: CPTII,S$GLB,, | Performed by: INTERNAL MEDICINE

## 2022-02-23 PROCEDURE — 3008F BODY MASS INDEX DOCD: CPT | Mod: CPTII,S$GLB,, | Performed by: INTERNAL MEDICINE

## 2022-02-23 PROCEDURE — 1159F PR MEDICATION LIST DOCUMENTED IN MEDICAL RECORD: ICD-10-PCS | Mod: CPTII,S$GLB,, | Performed by: INTERNAL MEDICINE

## 2022-02-23 PROCEDURE — 3288F FALL RISK ASSESSMENT DOCD: CPT | Mod: CPTII,S$GLB,, | Performed by: INTERNAL MEDICINE

## 2022-02-23 PROCEDURE — 3075F SYST BP GE 130 - 139MM HG: CPT | Mod: CPTII,S$GLB,, | Performed by: INTERNAL MEDICINE

## 2022-02-23 PROCEDURE — 36415 COLL VENOUS BLD VENIPUNCTURE: CPT | Mod: PO | Performed by: INTERNAL MEDICINE

## 2022-02-23 RX ORDER — LEVOTHYROXINE SODIUM 150 UG/1
150 TABLET ORAL
Qty: 30 TABLET | Refills: 11 | Status: SHIPPED | OUTPATIENT
Start: 2022-02-23 | End: 2022-11-07

## 2022-02-23 NOTE — PROGRESS NOTES
Subjective:    Patient ID:  Mariah Meza is a 66 y.o. female.    Chief Complaint:  Results (Thyroid US)      Pt presents to establish care for papillary thyroid cancer and review of chronic medical conditions as listed in the visit diagnosis section of this encounter.      Reviewed referring provider, Dr. Medrano     Found to have a MNG. Underwent FNA of 3 nodules and path on two nodules showed suspicion for malignancy. Is now s/p total thyroidectomy.      10/21/2021      US SOFT TISSUE HEAD NECK THYROID     CLINICAL HISTORY:  left soft tissue cyst?; Localized swelling, mass and lump, neck     TECHNIQUE:  Ultrasound of the thyroid and cervical lymph nodes was performed.     COMPARISON:  None     FINDINGS:  The thyroid gland demonstrates a homogenous echotexture.  The isthmus measures 2.6 mm in thickness.  The right lobe of the thyroid gland measures 6.4 cm in length.  The left lobe of the thyroid gland measures 6.9 cm in length.     There is a cystic/solid heterogeneous nodule within the mid right thyroid gland that measures 2.7 x 2.0 x 2.7 cm and demonstrates punctate internal echogenic foci.  The isthmus demonstrates a 1.2 cm very hypoechoic solid nodule with a mildly irregular margin.  There is also a isoechoic solid nodule seen within the lower pole of the left lobe of the thyroid gland that measures up to 2.6 cm in size.  There is a 5 mm hypoechoic solid nodule within the upper pole of the left lobe.  An additional 5 mm hypoechoic solid nodule noted within the midpole.  There is also an 8 mm cystic/solid nodule within the lower pole of the left lobe.     Impression:     1. Technically 3 nodules within the thyroid gland meeting TI-RADS criteria for biopsy including the nodule within the single nodule within the right lobe of the thyroid gland, the very hypoechoic nodule within the isthmus and the isoechoic solid nodule seen projecting off the lower pole of the left lobe.  Of the 3, the lower pole nodule  projecting off the left lobe of the thyroid gland is the least suspicious by scoring.        Electronically signed by: Nik Reyes,   Date:                                            10/21/2021        Final Pathologic Diagnosis     1. Parathyroid, left superior, biopsy:   - Hypercellular parathyroid tissue (10 mg)   2. 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 Lymph nodes, paratracheal, neck dissection:   - Four benign lymph nodes, negative for metastatic carcinoma (0/4, pN0)   4. Parathyroid, left superior, parathyroidectomy:   - Hypercellular parathyroid tissue (107 mg), consistent with hyperplastic   parathyroid     CAP Synoptic Checklist for Thyroid Neoplasms:     - Predisposing Conditions: No known predisposing conditions (EPIC,         Progress Note, 11/17/2021)     - Procedure: Total thyroidectomy     - Tumor Focality: Unifocal     - Tumor Site: Isthmus     - Tumor Size: 1.2 x 1.0 x 0.8 cm     - Histologic Type: Papillary carcinoma, classic     - Mitotic Rate: Less than 1 mitosis per 2 mm^2     - Tumor Necrosis: Not identified     - Angioinvasion: Not identified     - Lymphatic Invasion: Not identified     - Perineural Invasion: Not identified     - Extrathyroidal Extension: Not identified     - Margin Status: All margins negative for carcinoma         - Distance from Invasive Carcinoma to Closest Margin: 0.1 cm to capsular            surface     - Regional Lymph Nodes: All regional lymph nodes negative for tumor         - Number of Lymph Nodes with Tumor: 0         - Number of Lymph Nodes  "Examined: 4     - Distant Metastasis: Not applicable     - Pathologic Stage Classification: pT1b pN0     - Additional Findings: Multiple adenomatoid and hyperplastic nodules;        - Parathyroid glands: 3     - Designate Block for Future Studies: [id number]   Comment: Interp By Eitan Vick MD, PhD, Signed on 12/29/2021 at 11:43  Frozen Section Diagnosis       1. Mildly cellular parathyroid   Verbally communicated to Dr. Olsen   Microscopic Exam       Microscopic examination performed.   Gross   1:  Surgery ID:  [id number];  Pathology ID:  [id number]   1. Received fresh for frozen section labeled "left superior parathyroid" is a   0.6 x 0.4 x 0.3 cm piece tan soft tissue, weighing 0.01 g. The specimen is   entirely submitted for frozen section diagnosis.  Frozen section remnant is   entirely embedded in 1 cassette.   YVO--1-A-FS   Grossed by: Tommie Guaman    2: Surgery ID:  [id number];  Pathology ID:  [id number]   2. Received in formalin labeled "total thyroid" is a 49 g product of a total   thyroidectomy.  The left thyroid lobe measures 5 x 3 x 2 cm, the right   thyroid lobe measures 5.5 x 2.8 x 2.5 cm, and isthmus with pyramidal lobe   measures 3 x 1 x 0.6 cm.  The specimen is differentially inked left anterior   blue, right anterior green, isthmus anterior orange, and posterior black.   Sectioning the left thyroid lobe reveals a 4 x 2 x 1.8 cm cystic nodule at   the lower pole with a bennett-colored, gelatinous cut surface.  A 2.5 x 1.7 x   1.7 cm nodule is identified in the right lower lobe, with a tan, fleshy,   slightly hemorrhagic, well-circumscribed cut surface.  The isthmus lobe   displays a 1.2 x 1.0 x 0.8 cm well-circumscribed, gelatinous nodule with   hemorrhagic components.  The remaining uninvolved parenchyma is beefy red.   Representative sections are embedded cassettes 3370, 2A-2R.   2A-2G:  Left thyroid lobe, every other slice submitted sequentially from   superior to inferior   2H-2O:  Right thyroid " "lobe, every other slice submitted sequentially from   superior to inferior   2P-2R:  Isthmus, representative sampled   Grossed by: Tommie Guaman    3: Surgery ID:  [id number];  Pathology ID:  [id number]   3. Received in formalin labeled "paratracheal lymph nodes" are 2 pieces of   yellow fatty tissue, 2.8 x 2.7 x 0.6 cm in aggregate.  The specimen is   entirely embedded in 2 cassettes.   NDH--3-A   XBQ--3-B   Grossed by: Tommie Guaman    4: Surgery ID:  [id number];  Pathology ID:  [id number]   4. Received in formalin labeled "remainder of left superior parathyroid" is a   0.8 x 0.6 x 0.3 cm piece of tan soft tissue, weighing 0.107 g.  The specimen   is entirely embedded in 1 cassette.   HTF--4-A   Grossed by: Tommie Guaman   Frozen Section Footnote        Frozen section performed at [facility], [address] [place], [address]   Disclaimer       Unless the case is a 'gross only' or additional testing only, the final   diagnosis for each specimen is based on a microscopic examination of   appropriate tissue sections.      Post op TG was 6. Pt referred to endo for further work up.      Current medication:  Generic levothyroxine 175 mcg daily. Takes thyroid medication with other meds in the morning.      Current symptoms:   fatigue +ve  Has some voice changes post surgery. Voice gets weak and raspy by the end of the day. Has some dysphagia. No head/neck exposure to XRT.      3 parathyroid glands were also removed during thyroidectomy.  No sx's of hypocalcemia.      Pt presents today to review results of thyroid ultrasound.     2/14/2022 US SOFT TISSUE HEAD NECK THYROID     CLINICAL HISTORY:  h/o thyroid cancer s/p total thyroidectomy with high post thyroglobulin. Look for residual disease;.  Malignant neoplasm of thyroid gland     TECHNIQUE:  Ultrasound of the thyroid and cervical lymph nodes was performed.     COMPARISON:  Thyroid sonogram October 21, 2021.     FINDINGS:  The " patient is status post total thyroidectomy.  No residual or recurrent mass is identified in the thyroid fossa.     Location and size of visualized cervical lymph nodes is as follows:     No right cervical node identified.     There is a single mildly enlarged 1.6 x 0.4 x 1.1 cm left level 3 cervical node which lacks discrete fatty radha.  No other left cervical node identified.     Impression:     1.  Status post total thyroidectomy.  No residual or recurrent mass within the thyroid fossa.     2.  Single mildly enlarged left level 3 cervical node.        Electronically signed by: Keshawn Traylor  Date:                                            2022  Time:                                           09:30            Review of Systems   HENT: Positive for postnasal drip.         +ve ear popping and post nasal drainage. Claritin helps.    Cardiovascular: Positive for chest pain and palpitations.        Over L side. Intermittent. Associated with numbness of L arm. No diaphoresis.         Past Medical History:   Diagnosis Date    Chronic rhinitis     DJD (degenerative joint disease), lumbar     GERD (gastroesophageal reflux disease)     Hiatal hernia     Hyperlipidemia     Hypertension     Liver disease     Fatty Liver    Low back pain     Obesity     PONV (postoperative nausea and vomiting)     Thyroid disease       Social History     Tobacco Use    Smoking status: Former Smoker     Packs/day: 0.50     Years: 8.00     Pack years: 4.00     Types: Cigarettes     Quit date: 1979     Years since quittin.1    Smokeless tobacco: Never Used   Substance Use Topics    Alcohol use: No    Drug use: Never     Family History   Problem Relation Age of Onset    Diabetes Mother     Hypertension Mother     Hyperlipidemia Mother     Heart disease Mother         CHF    Stroke Mother     Heart attack Mother     Hearing loss Mother     Diabetes Sister     Sickle cell anemia Other         nieces, nephews     Lupus Other         niece      Past Surgical History:   Procedure Laterality Date    BILATERAL OOPHORECTOMY  2002    CARPAL TUNNEL RELEASE      Left wrist    CHOLECYSTECTOMY      DISSECTION OF NECK Bilateral 12/20/2021    Procedure: DISSECTION, NECK;  Surgeon: Deejay Olsen MD;  Location: [facility] OR;  Service: ENT;  Laterality: Bilateral;  Central neck dissection    HYSTERECTOMY  1984    NECK EXPLORATION Bilateral 12/20/2021    Procedure: EXPLORATION, NECK;  Surgeon: Deejay Olsen MD;  Location: [facility] OR;  Service: ENT;  Laterality: Bilateral;  Parathyroid exploration    ROTATOR CUFF REPAIR      Right shoulder    ROTATOR CUFF REPAIR Left 2010    THYROIDECTOMY, BILATERAL Bilateral 12/20/2021    Procedure: THYROIDECTOMY,BILATERAL;  Surgeon: Deejay Olsen MD;  Location: [facility] OR;  Service: ENT;  Laterality: Bilateral;    TOTAL VAGINAL HYSTERECTOMY  1985          Current Outpatient Medications:     acetaminophen (TYLENOL) 650 MG TbSR, Take 650 mg by mouth daily as needed., Disp: , Rfl:     calcium carbonate 470 mg calcium (1,177 mg) Chew, Take one tablet by mouth twice daily, Disp: 60 each, Rfl: 0    cyclobenzaprine (FLEXERIL) 10 MG tablet, Take 10 mg by mouth 3 (three) times daily as needed for Muscle spasms., Disp: , Rfl:     furosemide (LASIX) 40 MG tablet, Take 1 tablet (40 mg total) by mouth 2 (two) times daily as needed., Disp: 60 tablet, Rfl: 1    linaclotide (LINZESS ORAL), Take 72 mg by mouth once daily., Disp: , Rfl:     loratadine (CLARITIN) 10 mg tablet, Take 10 mg by mouth daily as needed., Disp: , Rfl:     NIFEdipine (ADALAT CC) 60 MG TbSR, , Disp: , Rfl:     olmesartan-hydrochlorothiazide (BENICAR HCT) 40-25 mg per tablet, Take 1 tablet by mouth once daily., Disp: 90 tablet, Rfl: 3    pantoprazole (PROTONIX) 40 MG tablet, Take by mouth once daily. , Disp: , Rfl:     psyllium husk/aspartame (METAMUCIL FIBER SINGLES ORAL), Take by mouth., Disp: , Rfl:     levothyroxine (SYNTHROID) 150 MCG tablet,  "Take 1 tablet (150 mcg total) by mouth before breakfast., Disp: 30 tablet, Rfl: 11    multivit,min/folic acid/boy550 (MENOPAUSE SUPPLEMENT ORAL), Take 400 mg by mouth once daily. , Disp: , Rfl:     Current Facility-Administered Medications:     DOBUTamine 1000 mg in D5W 250 mL infusion (premix titrating), 10 mcg/kg/min, Intravenous, Continuous, Naina Roque MD, Last Rate: 15.8 mL/hr at 07/15/21 1108, 10 mcg/kg/min at 07/15/21 1108     Review of patient's allergies indicates:   Allergen Reactions    Iodine and iodide containing products Itching    Shellfish containing products Swelling    Statins-hmg-coa reductase inhibitors Other (See Comments)     Myalgias        Objective:   /70   Pulse 67   Ht 5' 4" (1.626 m)   Wt 105.8 kg (233 lb 4 oz)   LMP 02/10/1984 (Approximate)   SpO2 100%   BMI 40.04 kg/m²   BP Readings from Last 3 Encounters:   02/23/22 138/70   02/09/22 132/74   01/27/22 (!) 126/58     Wt Readings from Last 3 Encounters:   02/23/22 1055 105.8 kg (233 lb 4 oz)   02/09/22 1304 106.4 kg (234 lb 7.4 oz)   02/08/22 0940 105.7 kg (233 lb)          Physical Exam  Vitals reviewed.   Constitutional:       General: She is not in acute distress.     Appearance: Normal appearance. She is well-developed. She is not ill-appearing, toxic-appearing or diaphoretic.   HENT:      Head: Normocephalic and atraumatic.   Eyes:      General: No scleral icterus.  Neck:      Trachea: No tracheal deviation.   Pulmonary:      Effort: Pulmonary effort is normal. No respiratory distress.   Neurological:      Mental Status: She is alert and oriented to person, place, and time.   Psychiatric:         Thought Content: Thought content normal.           Lab Results   Component Value Date     12/01/2021    K 3.8 12/01/2021     12/01/2021    CO2 28 12/01/2021    BUN 13 12/01/2021    CREATININE 0.9 12/01/2021     12/01/2021    HGBA1C 4.8 05/13/2021    MG 1.9 12/20/2021    AST 19 12/01/2021    ALT 21 " 12/01/2021    ALBUMIN 3.9 12/01/2021    PROT 7.1 12/01/2021    BILITOT 1.0 12/01/2021    WBC 4.28 12/01/2021    HGB 12.1 12/01/2021    HCT 37.7 12/01/2021    MCV 91 12/01/2021    MCH 29.2 12/01/2021     12/01/2021    MPV 10.6 12/01/2021    GRAN 2.0 12/01/2021    GRAN 46.0 12/01/2021    LYMPH 1.5 12/01/2021    LYMPH 34.8 12/01/2021    CHOL 194 09/29/2021    HDL 52 09/29/2021    LDLCALC 124.6 09/29/2021    TRIG 87 09/29/2021       Lab Results   Component Value Date    TSH 0.018 (L) 01/20/2022    FREET4 1.37 01/20/2022        Thyroid Labs Latest Ref Rng & Units 12/29/2021 1/20/2022 1/20/2022   TSH 0.400 - 4.000 uIU/mL - - 0.018(L)   Free T4 0.71 - 1.51 ng/dL - - 1.37   Sodium 136 - 145 mmol/L - - -   Potassium 3.5 - 5.1 mmol/L - - -   Chloride 95 - 110 mmol/L - - -   Carbon Dioxide 23 - 29 mmol/L - - -   Glucose 70 - 110 mg/dL - - -   Blood Urea Nitrogen 8 - 23 mg/dL - - -   Creatinine 0.5 - 1.4 mg/dL - - -   Calcium 8.7 - 10.5 mg/dL 8.6(L) - 9.6   Total Protein 6.0 - 8.4 g/dL - - -   Albumin 3.5 - 5.2 g/dL - - -   Total Bilirubin 0.1 - 1.0 mg/dL - - -   AST 10 - 40 U/L - - -   ALT 10 - 44 U/L - - -   Anion Gap 8 - 16 mmol/L - - -   eGFR (African American) >60 mL/min/1.73 m:2 - - -   eGFR (Non-African American) >60 mL/min/1.73 m:2 - - -   WBC 3.90 - 12.70 K/uL - - -   RBC 4.00 - 5.40 M/uL - - -   Hemoglobin 12.0 - 16.0 g/dL - - -   Hematocrit 37.0 - 48.5 % - - -   MCV 82 - 98 fL - - -   MCH 27.0 - 31.0 pg - - -   MCHC 32.0 - 36.0 g/dL - - -   RDW 11.5 - 14.5 % - - -   Platelets 150 - 450 K/uL - - -   MPV 9.2 - 12.9 fL - - -   Gran # 1.8 - 7.7 K/uL - - -   Lymph # 1.0 - 4.8 K/uL - - -   Mono # 0.3 - 1.0 K/uL - - -   Eos # 0.0 - 0.5 K/uL - - -   Baso # 0.00 - 0.20 K/uL - - -   Gran % 38.0 - 73.0 % - - -   Lymph % 18.0 - 48.0 % - - -   Mono% 4.0 - 15.0 % - - -   Eos % 0.0 - 8.0 % - - -   Baso % 0.0 - 1.9 % - - -   Thyroglobulin, Tumor Marker ng/mL - - 6.2(H)   Thyroglobulin, Antibody Screen <1.8 IU/mL - <1.8 <4.0            Hemoglobin A1C   Date Value Ref Range Status   05/13/2021 4.8 4.0 - 5.6 % Final     Comment:     ADA Screening Guidelines:  5.7-6.4%  Consistent with prediabetes  >or=6.5%  Consistent with diabetes    High levels of fetal hemoglobin interfere with the HbA1C  assay. Heterozygous hemoglobin variants (HbS, HgC, etc)do  not significantly interfere with this assay.   However, presence of multiple variants may affect accuracy.           Assessment and plan:     Problem List Items Addressed This Visit        Cardiac/Vascular    Hypertension     BP controlled. Continue current meds. Pt to notify PCP if BP consistently more than 140/90.                Oncology    Papillary thyroid carcinoma     Reviewed general care    Post op TG more than would be expected. Regional lymph node dissection was neg. Post surgery thyroid US with one lymph node with nl vascular flow and low suspicion for malignancy.       Discussed indications and role of I-131 in treatment of DTC.    Will recheck TG now (? Lab error with prior lab).     Reviewed potential risk and benefits of HENDERSON ablation. Pt ok with tx. Start low iodine diet now. Refer to nuclear medicine. Will try to arrange thyrogen in [place].    TSH goals discussed with patient. Based on path has low risk thyroid cancer and US without other concerning features.  Will cut back thyroid hormone suppression to <0.5.                      Relevant Medications    levothyroxine (SYNTHROID) 150 MCG tablet    Other Relevant Orders    Thyroglobulin    TSH    T4, Free       Endocrine    Post-operative hypothyroidism - Primary     Clinically pt with fatigue. Most recent TSH suppressed. Will cut back some on thyroid suppression. On path, MARISSA low risk but post opTg elevated. However, nothing on post op neck US to explain such findings. Send for HENDERSON. Do pre-tx scan. Goal TSH <0.5 for now.           Relevant Medications    levothyroxine (SYNTHROID) 150 MCG tablet    Other Relevant Orders     TSH    T4, Free       Other    Class 3 severe obesity due to excess calories with serious comorbidity and body mass index (BMI) of 40.0 to 44.9 in adult     BMI 40. Encouraged healthy low fat low carb diet and increase physical activity.               Other chest pain     Reviewed pt could have palpations from excess thyroid hormone. However, would not expect chest pain. Recommend cardiac work. Pt to f/u with PCP.                 Recheck Tg now.    TSH, Free T4 6 weeks    Low iodine diet info    Send to nuclear medicine, Dr. Greenberg for HENDERSON         Disclaimer: This note has been generated using voice-recognition software. There may be typographical errors that have been missed during proof-reading.

## 2022-02-23 NOTE — ASSESSMENT & PLAN NOTE
Reviewed pt could have palpations from excess thyroid hormone. However, would not expect chest pain. Recommend cardiac work. Pt to f/u with PCP.

## 2022-02-23 NOTE — ASSESSMENT & PLAN NOTE
Clinically pt with fatigue. Most recent TSH suppressed. Will cut back some on thyroid suppression. On path, MARISSA low risk but post opTg elevated. However, nothing on post op neck US to explain such findings. Send for HENDERSON. Do pre-tx scan. Goal TSH <0.5 for now.

## 2022-02-23 NOTE — ASSESSMENT & PLAN NOTE
Reviewed general care    Post op TG more than would be expected. Regional lymph node dissection was neg. Post surgery thyroid US with one lymph node with nl vascular flow and low suspicion for malignancy.       Discussed indications and role of I-131 in treatment of DTC.    Will recheck TG now (? Lab error with prior lab).     Reviewed potential risk and benefits of HENDERSON ablation. Pt ok with tx. Start low iodine diet now. Refer to nuclear medicine. Will try to arrange thyrogen in [place].    TSH goals discussed with patient. Based on path has low risk thyroid cancer and US without other concerning features.  Will cut back thyroid hormone suppression to <0.5.

## 2022-02-24 ENCOUNTER — TELEPHONE (OUTPATIENT)
Dept: ENDOCRINOLOGY | Facility: CLINIC | Age: 67
End: 2022-02-24
Payer: MEDICARE

## 2022-02-24 NOTE — TELEPHONE ENCOUNTER
Dr. Sandifer would like to refer this pt to Dr. Greenberg for HENDERSON/ pre-tx scan. Please assist in scheduling. Thank you!

## 2022-02-25 LAB
THRYOGLOBULIN INTERPRETATION: ABNORMAL
THYROGLOB AB SERPL-ACNC: <1.8 IU/ML
THYROGLOB SERPL-MCNC: 0.4 NG/ML

## 2022-02-25 NOTE — TELEPHONE ENCOUNTER
Repeat thyroglobulin was much lower at 0.4. Still recommend remnant ablation (f/u with Dr. Greenberg).

## 2022-03-02 NOTE — PROGRESS NOTES
"Referring Provider:    Aaareferral Self  No address on file  Subjective:   Patient: Mariah Meza [id number], :1955   Visit date:3/7/2022 1:18 PM    Chief Complaint:  Tinnitus (Bilateral ) and Otalgia (Left ear )    HPI:    Prior notes reviewed by myself.  Clinical documentation obtained by nursing staff reviewed.       L otalgia x several weeks, AS, intermittent popping. No otorrhea or HL  No cold ssx    Hx of papillary thyroid ca s/p thyroidectomy in Dec 2021, following with endocrine, HENDERSON planned within next 1-2 weeks. C/o voice goes in and out on her      Objective:     Physical Exam:  Vitals:  BP (!) 147/73   Pulse 68   Ht 5' 4" (1.626 m)   Wt 105.1 kg (231 lb 11.3 oz)   LMP 02/10/1984 (Approximate)   BMI 39.77 kg/m²   General appearance:  Well developed, well nourished; hoarse voice    Ears:  R TM and EAC WNL; L TM with moderate retraction, clear, intact, EAC WNL    Nose:  No masses/lesions of external nose, nasal mucosa, septum, and turbinates were within normal limits.    Mouth:  No mass/lesion of lips, teeth, gums, hard/soft palate, tongue, tonsils, or oropharynx.    Neck & Lymphatics:  No cervical lymphadenopathy, no neck mass/crepitus/ asymmetry, trachea is midline, no thyroid enlargement/tenderness/mass.        Assessment & Plan:   Dysfunction of left eustachian tube    Other orders  -     fluticasone propionate (FLONASE) 50 mcg/actuation nasal spray; 2 sprays (100 mcg total) by Each Nostril route 2 (two) times daily.  Dispense: 9.9 mL; Refill: 11    Flonase x 1 mo and recheck    F/u with Dr. Olsen, if dysphonia persists scope exam      Thank you for allowing me to participate in the care of Mariah.        Latanya Dugan PA-C  [facility]   [facility]  [address]  [place] [id number]  P: [phone]  F: [phone]      "

## 2022-03-07 ENCOUNTER — OFFICE VISIT (OUTPATIENT)
Dept: OTOLARYNGOLOGY | Facility: CLINIC | Age: 67
End: 2022-03-07
Payer: MEDICARE

## 2022-03-07 VITALS
BODY MASS INDEX: 39.55 KG/M2 | WEIGHT: 231.69 LBS | HEIGHT: 64 IN | SYSTOLIC BLOOD PRESSURE: 147 MMHG | DIASTOLIC BLOOD PRESSURE: 73 MMHG | HEART RATE: 68 BPM

## 2022-03-07 DIAGNOSIS — H69.92 DYSFUNCTION OF LEFT EUSTACHIAN TUBE: Primary | ICD-10-CM

## 2022-03-07 PROCEDURE — 3288F PR FALLS RISK ASSESSMENT DOCUMENTED: ICD-10-PCS | Mod: CPTII,S$GLB,, | Performed by: PHYSICIAN ASSISTANT

## 2022-03-07 PROCEDURE — 3077F SYST BP >= 140 MM HG: CPT | Mod: CPTII,S$GLB,, | Performed by: PHYSICIAN ASSISTANT

## 2022-03-07 PROCEDURE — 3077F PR MOST RECENT SYSTOLIC BLOOD PRESSURE >= 140 MM HG: ICD-10-PCS | Mod: CPTII,S$GLB,, | Performed by: PHYSICIAN ASSISTANT

## 2022-03-07 PROCEDURE — 3288F FALL RISK ASSESSMENT DOCD: CPT | Mod: CPTII,S$GLB,, | Performed by: PHYSICIAN ASSISTANT

## 2022-03-07 PROCEDURE — 3008F BODY MASS INDEX DOCD: CPT | Mod: CPTII,S$GLB,, | Performed by: PHYSICIAN ASSISTANT

## 2022-03-07 PROCEDURE — 3078F PR MOST RECENT DIASTOLIC BLOOD PRESSURE < 80 MM HG: ICD-10-PCS | Mod: CPTII,S$GLB,, | Performed by: PHYSICIAN ASSISTANT

## 2022-03-07 PROCEDURE — 1125F AMNT PAIN NOTED PAIN PRSNT: CPT | Mod: CPTII,S$GLB,, | Performed by: PHYSICIAN ASSISTANT

## 2022-03-07 PROCEDURE — 99213 PR OFFICE/OUTPT VISIT, EST, LEVL III, 20-29 MIN: ICD-10-PCS | Mod: 24,S$GLB,, | Performed by: PHYSICIAN ASSISTANT

## 2022-03-07 PROCEDURE — 1125F PR PAIN SEVERITY QUANTIFIED, PAIN PRESENT: ICD-10-PCS | Mod: CPTII,S$GLB,, | Performed by: PHYSICIAN ASSISTANT

## 2022-03-07 PROCEDURE — 99213 OFFICE O/P EST LOW 20 MIN: CPT | Mod: 24,S$GLB,, | Performed by: PHYSICIAN ASSISTANT

## 2022-03-07 PROCEDURE — 1101F PT FALLS ASSESS-DOCD LE1/YR: CPT | Mod: CPTII,S$GLB,, | Performed by: PHYSICIAN ASSISTANT

## 2022-03-07 PROCEDURE — 1101F PR PT FALLS ASSESS DOC 0-1 FALLS W/OUT INJ PAST YR: ICD-10-PCS | Mod: CPTII,S$GLB,, | Performed by: PHYSICIAN ASSISTANT

## 2022-03-07 PROCEDURE — 3078F DIAST BP <80 MM HG: CPT | Mod: CPTII,S$GLB,, | Performed by: PHYSICIAN ASSISTANT

## 2022-03-07 PROCEDURE — 1159F PR MEDICATION LIST DOCUMENTED IN MEDICAL RECORD: ICD-10-PCS | Mod: CPTII,S$GLB,, | Performed by: PHYSICIAN ASSISTANT

## 2022-03-07 PROCEDURE — 1159F MED LIST DOCD IN RCRD: CPT | Mod: CPTII,S$GLB,, | Performed by: PHYSICIAN ASSISTANT

## 2022-03-07 PROCEDURE — 3008F PR BODY MASS INDEX (BMI) DOCUMENTED: ICD-10-PCS | Mod: CPTII,S$GLB,, | Performed by: PHYSICIAN ASSISTANT

## 2022-03-07 PROCEDURE — 99999 PR PBB SHADOW E&M-EST. PATIENT-LVL III: CPT | Mod: PBBFAC,,, | Performed by: PHYSICIAN ASSISTANT

## 2022-03-07 PROCEDURE — 99999 PR PBB SHADOW E&M-EST. PATIENT-LVL III: ICD-10-PCS | Mod: PBBFAC,,, | Performed by: PHYSICIAN ASSISTANT

## 2022-03-07 RX ORDER — FLUTICASONE PROPIONATE 50 MCG
2 SPRAY, SUSPENSION (ML) NASAL 2 TIMES DAILY
Qty: 9.9 ML | Refills: 11 | Status: SHIPPED | OUTPATIENT
Start: 2022-03-07 | End: 2023-03-01

## 2022-03-15 ENCOUNTER — OFFICE VISIT (OUTPATIENT)
Dept: RADIOLOGY | Facility: CLINIC | Age: 67
End: 2022-03-15
Payer: MEDICARE

## 2022-03-15 DIAGNOSIS — C73 THYROID CANCER: Primary | ICD-10-CM

## 2022-03-15 PROCEDURE — 1160F PR REVIEW ALL MEDS BY PRESCRIBER/CLIN PHARMACIST DOCUMENTED: ICD-10-PCS | Mod: CPTII,95,, | Performed by: RADIOLOGY

## 2022-03-15 PROCEDURE — 99204 OFFICE O/P NEW MOD 45 MIN: CPT | Mod: 95,,, | Performed by: RADIOLOGY

## 2022-03-15 PROCEDURE — 1160F RVW MEDS BY RX/DR IN RCRD: CPT | Mod: CPTII,95,, | Performed by: RADIOLOGY

## 2022-03-15 PROCEDURE — 99204 PR OFFICE/OUTPT VISIT, NEW, LEVL IV, 45-59 MIN: ICD-10-PCS | Mod: 95,,, | Performed by: RADIOLOGY

## 2022-03-15 PROCEDURE — 1159F MED LIST DOCD IN RCRD: CPT | Mod: CPTII,95,, | Performed by: RADIOLOGY

## 2022-03-15 PROCEDURE — 1159F PR MEDICATION LIST DOCUMENTED IN MEDICAL RECORD: ICD-10-PCS | Mod: CPTII,95,, | Performed by: RADIOLOGY

## 2022-03-15 NOTE — PROGRESS NOTES
Nuclear Medicine Attending Consultation for Thyroid Cancer    The patient location is: home in [place]  The chief complaint leading to consultation is: thyroid cancer    Visit type: audiovisual    Face to Face time with patient: 45  50 minutes of total time spent on the encounter, which includes face to face time and non-face to face time preparing to see the patient (eg, review of tests), Obtaining and/or reviewing separately obtained history, Documenting clinical information in the electronic or other health record, Independently interpreting results (not separately reported) and communicating results to the patient/family/caregiver, or Care coordination (not separately reported).         Each patient to whom he or she provides medical services by telemedicine is:  (1) informed of the relationship between the physician and patient and the respective role of any other health care provider with respect to management of the patient; and (2) notified that he or she may decline to receive medical services by telemedicine and may withdraw from such care at any time.    Notes:    Ms. Meza is a 66 year-old woman with papillary thyroid cancer.  She underwent total thyroidectomy by Dr. Deejay Olsen on 12/20/21.  Pathology revealed a 1.2 cm PTC with negative surgical margins.  None of 4 lymph nodes removed contained tumor.    Informed consent for treatment was obtained verbally.  The indication for, risks and benefits of, and alternatives to, treatment with radioactive iodine were reviewed with the patient.  Specifically, the risks for nausea and vomiting, temporary or permanent injury to the salivary glands, and secondary malignancies such as leukemia were discussed.  All questions were answered to her satisfaction, and she would like to proceed with treatment.     The importance of preparation with the low iodine diet was reviewed, and the principles of radiation safety precaution were also reviewed.      She does endorse a  dry mouth.  Otherwise, she has no special medical needs, and her home and travel situations are appropriate.     Impression/Plan: Ms. Meza is a 66 year-old woman with papillary thyroid cancer, and she is an appropriate candidate for post-surgical ablation with I-131 as an outpatient.       Given the status/extent of her disease, an administered activity of 100 mCi of I-131 would ordinarily be considered.  However, in view of her pre-existing dry mouth, a reduced activity of 50 mCi I-131 would be appropriate.  Written radiation safety precautions will be provided, and the informed consent form can be signed on the date of treatment.     A proposed schedule is as follows:    Continue low iodine diet  Monday, 3/28/22:  Receive 1st Thyrogen injection, to be arranged in [place] if possible  Tuesday, 3/29/22: Receive 2nd Thyrogen injection followed by TSH assay  Wednesday, 3/30/22:  Receive 50 mCi I-131 at Nuclear Medicine, [facility]  Saturday, 4/2/22:  Resume normal diet  On or about Wednesday, 4/6/22:  Undergo post-treatment whole body scan in [place]

## 2022-03-16 DIAGNOSIS — C73 PAPILLARY THYROID CARCINOMA: Primary | ICD-10-CM

## 2022-03-17 ENCOUNTER — TELEPHONE (OUTPATIENT)
Dept: ENDOCRINOLOGY | Facility: CLINIC | Age: 67
End: 2022-03-17
Payer: MEDICARE

## 2022-03-17 NOTE — TELEPHONE ENCOUNTER
Attempted to contact pt to review whole body scan appointments. No answer. LVM to call clinic back. Pt not active on [facility].

## 2022-03-18 ENCOUNTER — TELEPHONE (OUTPATIENT)
Dept: ENDOCRINOLOGY | Facility: CLINIC | Age: 67
End: 2022-03-18
Payer: MEDICARE

## 2022-03-18 RX ORDER — ONDANSETRON 4 MG/1
TABLET, ORALLY DISINTEGRATING ORAL
Qty: 10 TABLET | Refills: 0 | Status: SHIPPED | OUTPATIENT
Start: 2022-03-18 | End: 2022-11-01

## 2022-03-18 NOTE — TELEPHONE ENCOUNTER
----- Message from [place] sent at 3/18/2022  4:11 PM CDT -----  Contact: pt  Who Called: PT  Regarding: The pt is returning the nurse call and requesting a callback.   Would the patient rather a call back or a response via [facility]? Call back  Best Call Back Number: [phone]  Additional Information:

## 2022-03-18 NOTE — TELEPHONE ENCOUNTER
S/w pt. Reviewed upcoming appts for thyrogen/labs/WBS scan w/ nuc med. See appt desk. Pt verbalized understanding.

## 2022-03-18 NOTE — TELEPHONE ENCOUNTER
Attempted to contact pt x2 to review whole body scan appointments. No answer. LVM to call clinic back. Pt not active on [facility].

## 2022-03-28 ENCOUNTER — CLINICAL SUPPORT (OUTPATIENT)
Dept: ENDOCRINOLOGY | Facility: CLINIC | Age: 67
End: 2022-03-28
Payer: MEDICARE

## 2022-03-28 DIAGNOSIS — C73 PAPILLARY THYROID CARCINOMA: Primary | ICD-10-CM

## 2022-03-28 PROCEDURE — 96372 PR INJECTION,THERAP/PROPH/DIAG2ST, IM OR SUBCUT: ICD-10-PCS | Mod: S$GLB,,, | Performed by: INTERNAL MEDICINE

## 2022-03-28 PROCEDURE — 96372 THER/PROPH/DIAG INJ SC/IM: CPT | Mod: S$GLB,,, | Performed by: INTERNAL MEDICINE

## 2022-03-29 ENCOUNTER — LAB VISIT (OUTPATIENT)
Dept: LAB | Facility: HOSPITAL | Age: 67
End: 2022-03-29
Attending: RADIOLOGY
Payer: MEDICARE

## 2022-03-29 ENCOUNTER — CLINICAL SUPPORT (OUTPATIENT)
Dept: ENDOCRINOLOGY | Facility: CLINIC | Age: 67
End: 2022-03-29
Payer: MEDICARE

## 2022-03-29 DIAGNOSIS — C73 PAPILLARY THYROID CARCINOMA: ICD-10-CM

## 2022-03-29 PROCEDURE — 96372 THER/PROPH/DIAG INJ SC/IM: CPT | Mod: S$GLB,,, | Performed by: INTERNAL MEDICINE

## 2022-03-29 PROCEDURE — 84443 ASSAY THYROID STIM HORMONE: CPT | Performed by: RADIOLOGY

## 2022-03-29 PROCEDURE — 96372 PR INJECTION,THERAP/PROPH/DIAG2ST, IM OR SUBCUT: ICD-10-PCS | Mod: S$GLB,,, | Performed by: INTERNAL MEDICINE

## 2022-03-29 PROCEDURE — 84432 ASSAY OF THYROGLOBULIN: CPT | Performed by: INTERNAL MEDICINE

## 2022-03-29 PROCEDURE — 84439 ASSAY OF FREE THYROXINE: CPT | Performed by: RADIOLOGY

## 2022-03-29 PROCEDURE — 36415 COLL VENOUS BLD VENIPUNCTURE: CPT | Mod: PO | Performed by: INTERNAL MEDICINE

## 2022-03-30 ENCOUNTER — HOSPITAL ENCOUNTER (OUTPATIENT)
Dept: RADIOLOGY | Facility: HOSPITAL | Age: 67
Discharge: HOME | End: 2022-03-30
Attending: INTERNAL MEDICINE
Payer: MEDICARE

## 2022-03-30 DIAGNOSIS — C73 PAPILLARY THYROID CARCINOMA: ICD-10-CM

## 2022-03-30 LAB
T4 FREE SERPL-MCNC: 1.85 NG/DL (ref 0.71–1.51)
TSH SERPL DL<=0.005 MIU/L-ACNC: 116.26 UIU/ML (ref 0.4–4)

## 2022-03-30 PROCEDURE — 79005 NM THERAPY BY ORAL ADMINISTRATION: ICD-10-PCS | Mod: 26,,, | Performed by: STUDENT IN AN ORGANIZED HEALTH CARE EDUCATION/TRAINING PROGRAM

## 2022-03-30 PROCEDURE — 79005 NUCLEAR RX ORAL ADMIN: CPT | Mod: 26,,, | Performed by: STUDENT IN AN ORGANIZED HEALTH CARE EDUCATION/TRAINING PROGRAM

## 2022-03-30 PROCEDURE — A9517 I131 IODIDE CAP, RX: HCPCS | Mod: JG

## 2022-03-31 ENCOUNTER — TELEPHONE (OUTPATIENT)
Dept: ENDOCRINOLOGY | Facility: CLINIC | Age: 67
End: 2022-03-31

## 2022-03-31 LAB
THRYOGLOBULIN INTERPRETATION: ABNORMAL
THYROGLOB AB SERPL-ACNC: <1.8 IU/ML
THYROGLOB SERPL-MCNC: 0.3 NG/ML

## 2022-03-31 NOTE — PROGRESS NOTES
Pt arrived for thyrogen #2 injection 3/29/22. See MAR. Pt tolerated well. Advised to wait 15 mins in lobby after injection. Pt escorted to lab for next appointment.

## 2022-04-06 ENCOUNTER — HOSPITAL ENCOUNTER (OUTPATIENT)
Dept: RADIOLOGY | Facility: HOSPITAL | Age: 67
Discharge: HOME | End: 2022-04-06
Attending: INTERNAL MEDICINE
Payer: MEDICARE

## 2022-04-06 DIAGNOSIS — C73 PAPILLARY THYROID CARCINOMA: ICD-10-CM

## 2022-04-06 PROCEDURE — 78018 THYROID MET IMAGING BODY: CPT | Mod: 26,,, | Performed by: RADIOLOGY

## 2022-04-06 PROCEDURE — 78018 THYROID MET IMAGING BODY: CPT | Mod: TC

## 2022-04-06 PROCEDURE — 78018 NM THYROID METASTATIC CANCER WHOLE BODY SCAN: ICD-10-PCS | Mod: 26,,, | Performed by: RADIOLOGY

## 2022-04-07 ENCOUNTER — TELEPHONE (OUTPATIENT)
Dept: ENDOCRINOLOGY | Facility: CLINIC | Age: 67
End: 2022-04-07
Payer: MEDICARE

## 2022-04-07 NOTE — TELEPHONE ENCOUNTER
Post treatment scan with no activity outside the thyroid bed. Continue current dose of thyroid hormone. RTC with any full endo provider in 6 months

## 2022-04-08 ENCOUNTER — OFFICE VISIT (OUTPATIENT)
Dept: INTERNAL MEDICINE | Facility: CLINIC | Age: 67
End: 2022-04-08
Payer: MEDICARE

## 2022-04-08 ENCOUNTER — LAB VISIT (OUTPATIENT)
Dept: LAB | Facility: HOSPITAL | Age: 67
End: 2022-04-08
Attending: NURSE PRACTITIONER
Payer: MEDICARE

## 2022-04-08 VITALS
BODY MASS INDEX: 38.73 KG/M2 | DIASTOLIC BLOOD PRESSURE: 80 MMHG | HEIGHT: 64 IN | WEIGHT: 226.88 LBS | SYSTOLIC BLOOD PRESSURE: 162 MMHG | OXYGEN SATURATION: 99 % | HEART RATE: 84 BPM | RESPIRATION RATE: 18 BRPM | TEMPERATURE: 98 F

## 2022-04-08 DIAGNOSIS — B34.9 ACUTE VIRAL SYNDROME: ICD-10-CM

## 2022-04-08 DIAGNOSIS — K59.00 CONSTIPATION, UNSPECIFIED CONSTIPATION TYPE: ICD-10-CM

## 2022-04-08 DIAGNOSIS — B34.9 ACUTE VIRAL SYNDROME: Primary | ICD-10-CM

## 2022-04-08 LAB
ALBUMIN SERPL BCP-MCNC: 3.9 G/DL (ref 3.5–5.2)
ALP SERPL-CCNC: 61 U/L (ref 55–135)
ALT SERPL W/O P-5'-P-CCNC: 15 U/L (ref 10–44)
ANION GAP SERPL CALC-SCNC: 10 MMOL/L (ref 8–16)
AST SERPL-CCNC: 17 U/L (ref 10–40)
BASOPHILS # BLD AUTO: 0.05 K/UL (ref 0–0.2)
BASOPHILS NFR BLD: 0.9 % (ref 0–1.9)
BILIRUB SERPL-MCNC: 0.7 MG/DL (ref 0.1–1)
BILIRUB SERPL-MCNC: ABNORMAL MG/DL
BLOOD URINE, POC: ABNORMAL
BUN SERPL-MCNC: 11 MG/DL (ref 8–23)
CALCIUM SERPL-MCNC: 8.8 MG/DL (ref 8.7–10.5)
CHLORIDE SERPL-SCNC: 103 MMOL/L (ref 95–110)
CO2 SERPL-SCNC: 28 MMOL/L (ref 23–29)
COLOR, POC UA: YELLOW
CREAT SERPL-MCNC: 1 MG/DL (ref 0.5–1.4)
CTP QC/QA: YES
CTP QC/QA: YES
DIFFERENTIAL METHOD: ABNORMAL
EOSINOPHIL # BLD AUTO: 0.2 K/UL (ref 0–0.5)
EOSINOPHIL NFR BLD: 3.2 % (ref 0–8)
ERYTHROCYTE [DISTWIDTH] IN BLOOD BY AUTOMATED COUNT: 12.4 % (ref 11.5–14.5)
EST. GFR  (AFRICAN AMERICAN): >60 ML/MIN/1.73 M^2
EST. GFR  (NON AFRICAN AMERICAN): 58 ML/MIN/1.73 M^2
FLUAV AG NPH QL: NEGATIVE
FLUBV AG NPH QL: NEGATIVE
GLUCOSE SERPL-MCNC: 95 MG/DL (ref 70–110)
GLUCOSE UR QL STRIP: ABNORMAL
HCT VFR BLD AUTO: 35.9 % (ref 37–48.5)
HGB BLD-MCNC: 11.6 G/DL (ref 12–16)
IMM GRANULOCYTES # BLD AUTO: 0.02 K/UL (ref 0–0.04)
IMM GRANULOCYTES NFR BLD AUTO: 0.4 % (ref 0–0.5)
KETONES UR QL STRIP: ABNORMAL
LEUKOCYTE ESTERASE URINE, POC: ABNORMAL
LYMPHOCYTES # BLD AUTO: 1.5 K/UL (ref 1–4.8)
LYMPHOCYTES NFR BLD: 27.1 % (ref 18–48)
MCH RBC QN AUTO: 29 PG (ref 27–31)
MCHC RBC AUTO-ENTMCNC: 32.3 G/DL (ref 32–36)
MCV RBC AUTO: 90 FL (ref 82–98)
MONOCYTES # BLD AUTO: 0.9 K/UL (ref 0.3–1)
MONOCYTES NFR BLD: 16.1 % (ref 4–15)
NEUTROPHILS # BLD AUTO: 2.9 K/UL (ref 1.8–7.7)
NEUTROPHILS NFR BLD: 52.3 % (ref 38–73)
NITRITE, POC UA: ABNORMAL
NRBC BLD-RTO: 0 /100 WBC
PH, POC UA: 6
PLATELET # BLD AUTO: 372 K/UL (ref 150–450)
PMV BLD AUTO: 10.8 FL (ref 9.2–12.9)
POTASSIUM SERPL-SCNC: 3.7 MMOL/L (ref 3.5–5.1)
PROT SERPL-MCNC: 7.8 G/DL (ref 6–8.4)
PROTEIN, POC: ABNORMAL
RBC # BLD AUTO: 4 M/UL (ref 4–5.4)
SARS-COV-2 RDRP RESP QL NAA+PROBE: NEGATIVE
SODIUM SERPL-SCNC: 141 MMOL/L (ref 136–145)
SPECIFIC GRAVITY, POC UA: 1
UROBILINOGEN, POC UA: ABNORMAL
WBC # BLD AUTO: 5.54 K/UL (ref 3.9–12.7)

## 2022-04-08 PROCEDURE — 81001 URINALYSIS AUTO W/SCOPE: CPT | Mod: S$GLB,,, | Performed by: NURSE PRACTITIONER

## 2022-04-08 PROCEDURE — 87804 INFLUENZA ASSAY W/OPTIC: CPT | Mod: QW,S$GLB,, | Performed by: NURSE PRACTITIONER

## 2022-04-08 PROCEDURE — U0002 COVID-19 LAB TEST NON-CDC: HCPCS | Mod: QW,S$GLB,, | Performed by: NURSE PRACTITIONER

## 2022-04-08 PROCEDURE — 1160F PR REVIEW ALL MEDS BY PRESCRIBER/CLIN PHARMACIST DOCUMENTED: ICD-10-PCS | Mod: CPTII,S$GLB,, | Performed by: NURSE PRACTITIONER

## 2022-04-08 PROCEDURE — 80053 COMPREHEN METABOLIC PANEL: CPT | Performed by: NURSE PRACTITIONER

## 2022-04-08 PROCEDURE — 3288F PR FALLS RISK ASSESSMENT DOCUMENTED: ICD-10-PCS | Mod: CPTII,S$GLB,, | Performed by: NURSE PRACTITIONER

## 2022-04-08 PROCEDURE — 3077F SYST BP >= 140 MM HG: CPT | Mod: CPTII,S$GLB,, | Performed by: NURSE PRACTITIONER

## 2022-04-08 PROCEDURE — 36415 COLL VENOUS BLD VENIPUNCTURE: CPT | Mod: PO | Performed by: NURSE PRACTITIONER

## 2022-04-08 PROCEDURE — 1101F PT FALLS ASSESS-DOCD LE1/YR: CPT | Mod: CPTII,S$GLB,, | Performed by: NURSE PRACTITIONER

## 2022-04-08 PROCEDURE — U0002: ICD-10-PCS | Mod: QW,S$GLB,, | Performed by: NURSE PRACTITIONER

## 2022-04-08 PROCEDURE — 3288F FALL RISK ASSESSMENT DOCD: CPT | Mod: CPTII,S$GLB,, | Performed by: NURSE PRACTITIONER

## 2022-04-08 PROCEDURE — 3077F PR MOST RECENT SYSTOLIC BLOOD PRESSURE >= 140 MM HG: ICD-10-PCS | Mod: CPTII,S$GLB,, | Performed by: NURSE PRACTITIONER

## 2022-04-08 PROCEDURE — 1159F MED LIST DOCD IN RCRD: CPT | Mod: CPTII,S$GLB,, | Performed by: NURSE PRACTITIONER

## 2022-04-08 PROCEDURE — 3079F DIAST BP 80-89 MM HG: CPT | Mod: CPTII,S$GLB,, | Performed by: NURSE PRACTITIONER

## 2022-04-08 PROCEDURE — 99213 PR OFFICE/OUTPT VISIT, EST, LEVL III, 20-29 MIN: ICD-10-PCS | Mod: S$GLB,,, | Performed by: NURSE PRACTITIONER

## 2022-04-08 PROCEDURE — 81001 POCT URINALYSIS, DIPSTICK OR TABLET REAGENT, AUTOMATED, WITH MICROSCOP: ICD-10-PCS | Mod: S$GLB,,, | Performed by: NURSE PRACTITIONER

## 2022-04-08 PROCEDURE — 1101F PR PT FALLS ASSESS DOC 0-1 FALLS W/OUT INJ PAST YR: ICD-10-PCS | Mod: CPTII,S$GLB,, | Performed by: NURSE PRACTITIONER

## 2022-04-08 PROCEDURE — 3079F PR MOST RECENT DIASTOLIC BLOOD PRESSURE 80-89 MM HG: ICD-10-PCS | Mod: CPTII,S$GLB,, | Performed by: NURSE PRACTITIONER

## 2022-04-08 PROCEDURE — 99213 OFFICE O/P EST LOW 20 MIN: CPT | Mod: S$GLB,,, | Performed by: NURSE PRACTITIONER

## 2022-04-08 PROCEDURE — 87804 POCT INFLUENZA A/B: ICD-10-PCS | Mod: QW,S$GLB,, | Performed by: NURSE PRACTITIONER

## 2022-04-08 PROCEDURE — 3008F BODY MASS INDEX DOCD: CPT | Mod: CPTII,S$GLB,, | Performed by: NURSE PRACTITIONER

## 2022-04-08 PROCEDURE — 1159F PR MEDICATION LIST DOCUMENTED IN MEDICAL RECORD: ICD-10-PCS | Mod: CPTII,S$GLB,, | Performed by: NURSE PRACTITIONER

## 2022-04-08 PROCEDURE — 1160F RVW MEDS BY RX/DR IN RCRD: CPT | Mod: CPTII,S$GLB,, | Performed by: NURSE PRACTITIONER

## 2022-04-08 PROCEDURE — 3008F PR BODY MASS INDEX (BMI) DOCUMENTED: ICD-10-PCS | Mod: CPTII,S$GLB,, | Performed by: NURSE PRACTITIONER

## 2022-04-08 PROCEDURE — 85025 COMPLETE CBC W/AUTO DIFF WBC: CPT | Performed by: NURSE PRACTITIONER

## 2022-04-08 PROCEDURE — 99999 PR PBB SHADOW E&M-EST. PATIENT-LVL V: ICD-10-PCS | Mod: PBBFAC,,, | Performed by: NURSE PRACTITIONER

## 2022-04-08 PROCEDURE — 99999 PR PBB SHADOW E&M-EST. PATIENT-LVL V: CPT | Mod: PBBFAC,,, | Performed by: NURSE PRACTITIONER

## 2022-04-08 PROCEDURE — 1125F PR PAIN SEVERITY QUANTIFIED, PAIN PRESENT: ICD-10-PCS | Mod: CPTII,S$GLB,, | Performed by: NURSE PRACTITIONER

## 2022-04-08 PROCEDURE — 1125F AMNT PAIN NOTED PAIN PRSNT: CPT | Mod: CPTII,S$GLB,, | Performed by: NURSE PRACTITIONER

## 2022-04-08 NOTE — PROGRESS NOTES
Subjective:       Patient ID: Mariah Meza is a 66 y.o. female.    Chief Complaint: Generalized Body Aches (Waist up x 3 days)    Patient presents with fatigue, change in taste, and body aches.  Started 2 days ago.   Tried muscle relaxer-no improvement.      No abdominal pain.  Bloating improved.      Reports last bowel movement about 3 days ago    Review of Systems   Constitutional: Positive for appetite change, chills and fatigue. Negative for fever.   Respiratory: Positive for cough. Negative for shortness of breath.    Gastrointestinal: Positive for constipation. Negative for abdominal pain and diarrhea.   Genitourinary: Positive for flank pain.   Musculoskeletal: Positive for back pain.   Psychiatric/Behavioral: Negative for agitation and confusion.         Objective:      Physical Exam  Vitals reviewed.   Constitutional:       Appearance: Normal appearance.   Cardiovascular:      Rate and Rhythm: Normal rate and regular rhythm.   Pulmonary:      Effort: Pulmonary effort is normal.      Breath sounds: Normal breath sounds.   Abdominal:      General: Bowel sounds are normal. There is distension.      Tenderness: There is no abdominal tenderness.   Musculoskeletal:         General: Tenderness present.   Skin:     General: Skin is warm.   Neurological:      General: No focal deficit present.      Mental Status: She is alert.   Psychiatric:         Mood and Affect: Mood normal.         Behavior: Behavior is cooperative.         Assessment:       Problem List Items Addressed This Visit    None     Visit Diagnoses     Acute viral syndrome    -  Primary    Relevant Orders    POCT URINE DIPSTICK WITH MICROSCOPE, AUTOMATED (Completed)    CBC Auto Differential (Completed)    Comprehensive Metabolic Panel (Completed)    X-Ray Abdomen Flat And Erect (Completed)    POCT Influenza A/B (Completed)    POCT COVID-19 Rapid Screening (Completed)    Constipation, unspecified constipation type        Relevant Orders    X-Ray  Abdomen Flat And Erect (Completed)          Plan:         Acute viral syndrome  -     POCT URINE DIPSTICK WITH MICROSCOPE, AUTOMATED  -     CBC Auto Differential; Future; Expected date: 04/08/2022  -     Comprehensive Metabolic Panel; Future; Expected date: 04/08/2022  -     X-Ray Abdomen Flat And Erect; Future; Expected date: 04/08/2022  -     POCT Influenza A/B  -     POCT COVID-19 Rapid Screening    Constipation, unspecified constipation type  -     X-Ray Abdomen Flat And Erect; Future; Expected date: 04/08/2022        Reviewed imaging before patient left clinic.  Concerned area noted to right upper abdomen (expanded area).  Advised to go to ER for CT of abdomen to rule out obstruction.      Verbalized understanding.     Covid and flu test were negative.       Follow up next week.

## 2022-04-11 ENCOUNTER — CLINICAL SUPPORT (OUTPATIENT)
Dept: INTERNAL MEDICINE | Facility: CLINIC | Age: 67
End: 2022-04-11
Payer: MEDICARE

## 2022-04-11 ENCOUNTER — TELEPHONE (OUTPATIENT)
Dept: ENDOCRINOLOGY | Facility: CLINIC | Age: 67
End: 2022-04-11
Payer: MEDICARE

## 2022-04-11 ENCOUNTER — TELEPHONE (OUTPATIENT)
Dept: INTERNAL MEDICINE | Facility: CLINIC | Age: 67
End: 2022-04-11

## 2022-04-11 DIAGNOSIS — R51.9 FREQUENT HEADACHES: ICD-10-CM

## 2022-04-11 DIAGNOSIS — R45.89 FEELING ANXIOUS: ICD-10-CM

## 2022-04-11 DIAGNOSIS — R25.1 OCCASIONAL TREMORS: ICD-10-CM

## 2022-04-11 DIAGNOSIS — R10.9 RIGHT FLANK PAIN: Primary | ICD-10-CM

## 2022-04-11 PROCEDURE — 99999 PR PBB SHADOW E&M-EST. PATIENT-LVL III: CPT | Mod: PBBFAC,,,

## 2022-04-11 PROCEDURE — 99999 PR PBB SHADOW E&M-EST. PATIENT-LVL III: ICD-10-PCS | Mod: PBBFAC,,,

## 2022-04-11 PROCEDURE — 96372 THER/PROPH/DIAG INJ SC/IM: CPT | Mod: S$GLB,,, | Performed by: NURSE PRACTITIONER

## 2022-04-11 PROCEDURE — 96372 PR INJECTION,THERAP/PROPH/DIAG2ST, IM OR SUBCUT: ICD-10-PCS | Mod: S$GLB,,, | Performed by: NURSE PRACTITIONER

## 2022-04-11 PROCEDURE — 99212 PR OFFICE/OUTPT VISIT, EST, LEVL II, 10-19 MIN: ICD-10-PCS | Mod: 25,S$GLB,, | Performed by: FAMILY MEDICINE

## 2022-04-11 PROCEDURE — 99212 OFFICE O/P EST SF 10 MIN: CPT | Mod: 25,S$GLB,, | Performed by: FAMILY MEDICINE

## 2022-04-11 RX ORDER — KETOROLAC TROMETHAMINE 30 MG/ML
30 INJECTION, SOLUTION INTRAMUSCULAR; INTRAVENOUS
Status: COMPLETED | OUTPATIENT
Start: 2022-04-11 | End: 2022-04-11

## 2022-04-11 RX ORDER — HYDROXYZINE PAMOATE 25 MG/1
25 CAPSULE ORAL 2 TIMES DAILY PRN
Qty: 20 CAPSULE | Refills: 0 | Status: SHIPPED | OUTPATIENT
Start: 2022-04-11 | End: 2022-06-23

## 2022-04-11 RX ORDER — CYCLOBENZAPRINE HCL 10 MG
10 TABLET ORAL 3 TIMES DAILY PRN
Qty: 30 TABLET | Refills: 0 | Status: SHIPPED | OUTPATIENT
Start: 2022-04-11 | End: 2023-05-11 | Stop reason: SDUPTHER

## 2022-04-11 RX ORDER — KETOROLAC TROMETHAMINE 30 MG/ML
15 INJECTION, SOLUTION INTRAMUSCULAR; INTRAVENOUS
Status: DISCONTINUED | OUTPATIENT
Start: 2022-04-11 | End: 2022-04-11

## 2022-04-11 RX ADMIN — KETOROLAC TROMETHAMINE 30 MG: 30 INJECTION, SOLUTION INTRAMUSCULAR; INTRAVENOUS at 04:04

## 2022-04-11 NOTE — TELEPHONE ENCOUNTER
----- Message from Laura Faustin sent at 4/11/2022  8:20 AM CDT -----  Contact: pt  Type: Return Call    Who Called: Nurse     Who Left Message: Melba    Does the patient know what this is regarding: yes    Best Call Back Number: [phone]    Thank you~

## 2022-04-11 NOTE — PROGRESS NOTES
Patient presents for a Toradol injection.        Reports some tremors and feeling nervous.  Reports getting weak and hand start tremors.  Reports talking and have difficulty with words.      Reports never seeing neurologist.  Requested to hold off after thyroid cancer diagnoses.     Noticed when writing her check.     Not sure if she's feeling overwhelmed.  Has a ill father in law.   is now home.  Retired.

## 2022-04-11 NOTE — TELEPHONE ENCOUNTER
Called and spoke to the pt and she was scheduled a nurse visit for pain of her back and the provider will administer injection. //kah

## 2022-04-27 ENCOUNTER — OFFICE VISIT (OUTPATIENT)
Dept: INTERNAL MEDICINE | Facility: CLINIC | Age: 67
End: 2022-04-27
Payer: MEDICARE

## 2022-04-27 VITALS
BODY MASS INDEX: 38.59 KG/M2 | OXYGEN SATURATION: 99 % | DIASTOLIC BLOOD PRESSURE: 66 MMHG | SYSTOLIC BLOOD PRESSURE: 146 MMHG | HEIGHT: 64 IN | RESPIRATION RATE: 18 BRPM | HEART RATE: 73 BPM | TEMPERATURE: 98 F | WEIGHT: 226.06 LBS

## 2022-04-27 DIAGNOSIS — Z09 FOLLOW UP: Primary | ICD-10-CM

## 2022-04-27 DIAGNOSIS — I10 PRIMARY HYPERTENSION: ICD-10-CM

## 2022-04-27 DIAGNOSIS — G89.29 CHRONIC RIGHT-SIDED THORACIC BACK PAIN: ICD-10-CM

## 2022-04-27 DIAGNOSIS — M54.6 CHRONIC RIGHT-SIDED THORACIC BACK PAIN: ICD-10-CM

## 2022-04-27 PROCEDURE — 99213 PR OFFICE/OUTPT VISIT, EST, LEVL III, 20-29 MIN: ICD-10-PCS | Mod: 25,S$GLB,, | Performed by: NURSE PRACTITIONER

## 2022-04-27 PROCEDURE — 1101F PT FALLS ASSESS-DOCD LE1/YR: CPT | Mod: CPTII,S$GLB,, | Performed by: NURSE PRACTITIONER

## 2022-04-27 PROCEDURE — 3288F PR FALLS RISK ASSESSMENT DOCUMENTED: ICD-10-PCS | Mod: CPTII,S$GLB,, | Performed by: NURSE PRACTITIONER

## 2022-04-27 PROCEDURE — 3008F BODY MASS INDEX DOCD: CPT | Mod: CPTII,S$GLB,, | Performed by: NURSE PRACTITIONER

## 2022-04-27 PROCEDURE — 1160F RVW MEDS BY RX/DR IN RCRD: CPT | Mod: CPTII,S$GLB,, | Performed by: NURSE PRACTITIONER

## 2022-04-27 PROCEDURE — 99999 PR PBB SHADOW E&M-EST. PATIENT-LVL V: ICD-10-PCS | Mod: PBBFAC,,, | Performed by: NURSE PRACTITIONER

## 2022-04-27 PROCEDURE — 1159F MED LIST DOCD IN RCRD: CPT | Mod: CPTII,S$GLB,, | Performed by: NURSE PRACTITIONER

## 2022-04-27 PROCEDURE — 3078F PR MOST RECENT DIASTOLIC BLOOD PRESSURE < 80 MM HG: ICD-10-PCS | Mod: CPTII,S$GLB,, | Performed by: NURSE PRACTITIONER

## 2022-04-27 PROCEDURE — 99213 OFFICE O/P EST LOW 20 MIN: CPT | Mod: 25,S$GLB,, | Performed by: NURSE PRACTITIONER

## 2022-04-27 PROCEDURE — 3008F PR BODY MASS INDEX (BMI) DOCUMENTED: ICD-10-PCS | Mod: CPTII,S$GLB,, | Performed by: NURSE PRACTITIONER

## 2022-04-27 PROCEDURE — 3077F SYST BP >= 140 MM HG: CPT | Mod: CPTII,S$GLB,, | Performed by: NURSE PRACTITIONER

## 2022-04-27 PROCEDURE — 3288F FALL RISK ASSESSMENT DOCD: CPT | Mod: CPTII,S$GLB,, | Performed by: NURSE PRACTITIONER

## 2022-04-27 PROCEDURE — 99999 PR PBB SHADOW E&M-EST. PATIENT-LVL V: CPT | Mod: PBBFAC,,, | Performed by: NURSE PRACTITIONER

## 2022-04-27 PROCEDURE — 1101F PR PT FALLS ASSESS DOC 0-1 FALLS W/OUT INJ PAST YR: ICD-10-PCS | Mod: CPTII,S$GLB,, | Performed by: NURSE PRACTITIONER

## 2022-04-27 PROCEDURE — 1159F PR MEDICATION LIST DOCUMENTED IN MEDICAL RECORD: ICD-10-PCS | Mod: CPTII,S$GLB,, | Performed by: NURSE PRACTITIONER

## 2022-04-27 PROCEDURE — 1125F PR PAIN SEVERITY QUANTIFIED, PAIN PRESENT: ICD-10-PCS | Mod: CPTII,S$GLB,, | Performed by: NURSE PRACTITIONER

## 2022-04-27 PROCEDURE — 3078F DIAST BP <80 MM HG: CPT | Mod: CPTII,S$GLB,, | Performed by: NURSE PRACTITIONER

## 2022-04-27 PROCEDURE — 1160F PR REVIEW ALL MEDS BY PRESCRIBER/CLIN PHARMACIST DOCUMENTED: ICD-10-PCS | Mod: CPTII,S$GLB,, | Performed by: NURSE PRACTITIONER

## 2022-04-27 PROCEDURE — 96372 THER/PROPH/DIAG INJ SC/IM: CPT | Mod: S$GLB,,, | Performed by: NURSE PRACTITIONER

## 2022-04-27 PROCEDURE — 96372 PR INJECTION,THERAP/PROPH/DIAG2ST, IM OR SUBCUT: ICD-10-PCS | Mod: S$GLB,,, | Performed by: NURSE PRACTITIONER

## 2022-04-27 PROCEDURE — 1125F AMNT PAIN NOTED PAIN PRSNT: CPT | Mod: CPTII,S$GLB,, | Performed by: NURSE PRACTITIONER

## 2022-04-27 PROCEDURE — 3077F PR MOST RECENT SYSTOLIC BLOOD PRESSURE >= 140 MM HG: ICD-10-PCS | Mod: CPTII,S$GLB,, | Performed by: NURSE PRACTITIONER

## 2022-04-27 RX ORDER — KETOROLAC TROMETHAMINE 30 MG/ML
30 INJECTION, SOLUTION INTRAMUSCULAR; INTRAVENOUS
Status: COMPLETED | OUTPATIENT
Start: 2022-04-27 | End: 2022-04-27

## 2022-04-27 RX ADMIN — KETOROLAC TROMETHAMINE 30 MG: 30 INJECTION, SOLUTION INTRAMUSCULAR; INTRAVENOUS at 10:04

## 2022-04-27 NOTE — PROGRESS NOTES
Subjective:       Patient ID: Mariah Meza is a 66 y.o. female.    Chief Complaint: Follow-up (3 mo)    Patient presents for routine follow up.   Continues to have right side mid back pain.  Not as intense.  Toradol injection helped.     Activity makes it worse.      Saw her GI provider.   Reports having ultrasound of liver at [facility].  Has follow up with GI provider on 04/28/2022.      Stopped taking her nifedipine 60 mg and reports her pain decreased.      Review of Systems   Constitutional: Negative for chills, fatigue and fever.   Respiratory: Negative for cough and shortness of breath.    Gastrointestinal: Positive for abdominal pain (RUQ).   Genitourinary: Positive for flank pain.   Musculoskeletal: Positive for arthralgias and myalgias.   Psychiatric/Behavioral: Negative for agitation and confusion.         Objective:      Physical Exam  Vitals reviewed.   Constitutional:       Appearance: Normal appearance.   Cardiovascular:      Rate and Rhythm: Normal rate and regular rhythm.   Pulmonary:      Effort: Pulmonary effort is normal.      Breath sounds: Normal breath sounds.   Abdominal:      Tenderness: There is abdominal tenderness in the right upper quadrant.   Musculoskeletal:         General: Normal range of motion.      Thoracic back: Bony tenderness present.        Back:       Comments: Described to be burning, throbbing pain    Skin:     General: Skin is warm.   Neurological:      General: No focal deficit present.      Mental Status: She is alert and oriented to person, place, and time.   Psychiatric:         Mood and Affect: Mood normal.         Behavior: Behavior is cooperative.                     Assessment:       Problem List Items Addressed This Visit     Hypertension      Other Visit Diagnoses     Follow up    -  Primary    Chronic right-sided thoracic back pain        Relevant Medications    ketorolac injection 30 mg (Completed)          Plan:         Follow up    Chronic  right-sided thoracic back pain  -     ketorolac injection 30 mg    Primary hypertension        If follow up reports with GI provider is normal, then I will order MRI of thoracic area.      Patient brought in note from GI provider.  Will hold off on MRI.  She's having further work up on liver.      One month follow up

## 2022-04-27 NOTE — PROGRESS NOTES
Pt name/ verified. Allergies/meds reviewed. ketorlac 30 administered per order to r dorsalgluteal. Pt tolerated well without any issues. Lot 2022. Pt instructed to wait approximate 15 min wait period to monitor for adverse reaction. No s/s distress noted.

## 2022-05-10 ENCOUNTER — PATIENT OUTREACH (OUTPATIENT)
Dept: ADMINISTRATIVE | Facility: OTHER | Age: 67
End: 2022-05-10
Payer: MEDICARE

## 2022-05-11 ENCOUNTER — OFFICE VISIT (OUTPATIENT)
Dept: ENDOCRINOLOGY | Facility: CLINIC | Age: 67
End: 2022-05-11
Payer: MEDICARE

## 2022-05-11 VITALS
BODY MASS INDEX: 38.59 KG/M2 | OXYGEN SATURATION: 98 % | HEIGHT: 64 IN | WEIGHT: 226.06 LBS | SYSTOLIC BLOOD PRESSURE: 122 MMHG | DIASTOLIC BLOOD PRESSURE: 78 MMHG | HEART RATE: 60 BPM

## 2022-05-11 DIAGNOSIS — R23.2 HOT FLASHES: ICD-10-CM

## 2022-05-11 DIAGNOSIS — Z90.89 H/O PARATHYROIDECTOMY: ICD-10-CM

## 2022-05-11 DIAGNOSIS — K21.9 GASTROESOPHAGEAL REFLUX DISEASE, UNSPECIFIED WHETHER ESOPHAGITIS PRESENT: ICD-10-CM

## 2022-05-11 DIAGNOSIS — I10 PRIMARY HYPERTENSION: ICD-10-CM

## 2022-05-11 DIAGNOSIS — E89.0 POST-OPERATIVE HYPOTHYROIDISM: ICD-10-CM

## 2022-05-11 DIAGNOSIS — E66.01 CLASS 2 SEVERE OBESITY DUE TO EXCESS CALORIES WITH SERIOUS COMORBIDITY AND BODY MASS INDEX (BMI) OF 38.0 TO 38.9 IN ADULT: ICD-10-CM

## 2022-05-11 DIAGNOSIS — Z98.890 H/O PARATHYROIDECTOMY: ICD-10-CM

## 2022-05-11 DIAGNOSIS — C73 PAPILLARY THYROID CARCINOMA: ICD-10-CM

## 2022-05-11 PROCEDURE — 1160F RVW MEDS BY RX/DR IN RCRD: CPT | Mod: CPTII,S$GLB,, | Performed by: INTERNAL MEDICINE

## 2022-05-11 PROCEDURE — 1159F PR MEDICATION LIST DOCUMENTED IN MEDICAL RECORD: ICD-10-PCS | Mod: CPTII,S$GLB,, | Performed by: INTERNAL MEDICINE

## 2022-05-11 PROCEDURE — 1160F PR REVIEW ALL MEDS BY PRESCRIBER/CLIN PHARMACIST DOCUMENTED: ICD-10-PCS | Mod: CPTII,S$GLB,, | Performed by: INTERNAL MEDICINE

## 2022-05-11 PROCEDURE — 1159F MED LIST DOCD IN RCRD: CPT | Mod: CPTII,S$GLB,, | Performed by: INTERNAL MEDICINE

## 2022-05-11 PROCEDURE — 1126F AMNT PAIN NOTED NONE PRSNT: CPT | Mod: CPTII,S$GLB,, | Performed by: INTERNAL MEDICINE

## 2022-05-11 PROCEDURE — 3008F BODY MASS INDEX DOCD: CPT | Mod: CPTII,S$GLB,, | Performed by: INTERNAL MEDICINE

## 2022-05-11 PROCEDURE — 3288F PR FALLS RISK ASSESSMENT DOCUMENTED: ICD-10-PCS | Mod: CPTII,S$GLB,, | Performed by: INTERNAL MEDICINE

## 2022-05-11 PROCEDURE — 99999 PR PBB SHADOW E&M-EST. PATIENT-LVL IV: CPT | Mod: PBBFAC,,, | Performed by: INTERNAL MEDICINE

## 2022-05-11 PROCEDURE — 3074F SYST BP LT 130 MM HG: CPT | Mod: CPTII,S$GLB,, | Performed by: INTERNAL MEDICINE

## 2022-05-11 PROCEDURE — 99999 PR PBB SHADOW E&M-EST. PATIENT-LVL IV: ICD-10-PCS | Mod: PBBFAC,,, | Performed by: INTERNAL MEDICINE

## 2022-05-11 PROCEDURE — 99214 PR OFFICE/OUTPT VISIT, EST, LEVL IV, 30-39 MIN: ICD-10-PCS | Mod: S$GLB,,, | Performed by: INTERNAL MEDICINE

## 2022-05-11 PROCEDURE — 3074F PR MOST RECENT SYSTOLIC BLOOD PRESSURE < 130 MM HG: ICD-10-PCS | Mod: CPTII,S$GLB,, | Performed by: INTERNAL MEDICINE

## 2022-05-11 PROCEDURE — 1126F PR PAIN SEVERITY QUANTIFIED, NO PAIN PRESENT: ICD-10-PCS | Mod: CPTII,S$GLB,, | Performed by: INTERNAL MEDICINE

## 2022-05-11 PROCEDURE — 3008F PR BODY MASS INDEX (BMI) DOCUMENTED: ICD-10-PCS | Mod: CPTII,S$GLB,, | Performed by: INTERNAL MEDICINE

## 2022-05-11 PROCEDURE — 99214 OFFICE O/P EST MOD 30 MIN: CPT | Mod: S$GLB,,, | Performed by: INTERNAL MEDICINE

## 2022-05-11 PROCEDURE — 3078F PR MOST RECENT DIASTOLIC BLOOD PRESSURE < 80 MM HG: ICD-10-PCS | Mod: CPTII,S$GLB,, | Performed by: INTERNAL MEDICINE

## 2022-05-11 PROCEDURE — 1101F PT FALLS ASSESS-DOCD LE1/YR: CPT | Mod: CPTII,S$GLB,, | Performed by: INTERNAL MEDICINE

## 2022-05-11 PROCEDURE — 3078F DIAST BP <80 MM HG: CPT | Mod: CPTII,S$GLB,, | Performed by: INTERNAL MEDICINE

## 2022-05-11 PROCEDURE — 1101F PR PT FALLS ASSESS DOC 0-1 FALLS W/OUT INJ PAST YR: ICD-10-PCS | Mod: CPTII,S$GLB,, | Performed by: INTERNAL MEDICINE

## 2022-05-11 PROCEDURE — 3288F FALL RISK ASSESSMENT DOCD: CPT | Mod: CPTII,S$GLB,, | Performed by: INTERNAL MEDICINE

## 2022-05-11 NOTE — ASSESSMENT & PLAN NOTE
Pt with papillary thyroid cancer s/p total thyroidectomy.    Initial post op TG was more than would be expected. Regional lymph node dissection was neg. Post surgery thyroid US with one lymph node with nl vascular flow and low suspicion for malignancy.     Repeat TG levels trended down and have been stable.     Had pt do WBS that was negative.    Reviewed potential risk and benefits of HENDERSON ablation. Since post op Tg has trended down, does not need HENDERSON. Continue to monitor Tg levels and treat if indicated.     TSH goals discussed with patient. Based on path has low risk thyroid cancer and US without other concerning features. TSH goal <2.

## 2022-05-11 NOTE — PROGRESS NOTES
Subjective:    Patient ID:  Mariah Meza is a 66 y.o. female.    Chief Complaint:  Thyroid Cancer (Post operative hypothyroidism )        Pt presents to follow up for papillary thyroid cancer and review of chronic medical conditions as listed in the visit diagnosis section of this encounter.      Reviewed referring provider, Dr. Medrano     Found to have a MNG. Underwent FNA of 3 nodules and path on two nodules showed suspicion for malignancy. Is now s/p total thyroidectomy.      10/21/2021      US SOFT TISSUE HEAD NECK THYROID     CLINICAL HISTORY:  left soft tissue cyst?; Localized swelling, mass and lump, neck     TECHNIQUE:  Ultrasound of the thyroid and cervical lymph nodes was performed.     COMPARISON:  None     FINDINGS:  The thyroid gland demonstrates a homogenous echotexture.  The isthmus measures 2.6 mm in thickness.  The right lobe of the thyroid gland measures 6.4 cm in length.  The left lobe of the thyroid gland measures 6.9 cm in length.     There is a cystic/solid heterogeneous nodule within the mid right thyroid gland that measures 2.7 x 2.0 x 2.7 cm and demonstrates punctate internal echogenic foci.  The isthmus demonstrates a 1.2 cm very hypoechoic solid nodule with a mildly irregular margin.  There is also a isoechoic solid nodule seen within the lower pole of the left lobe of the thyroid gland that measures up to 2.6 cm in size.  There is a 5 mm hypoechoic solid nodule within the upper pole of the left lobe.  An additional 5 mm hypoechoic solid nodule noted within the midpole.  There is also an 8 mm cystic/solid nodule within the lower pole of the left lobe.     Impression:     1. Technically 3 nodules within the thyroid gland meeting TI-RADS criteria for biopsy including the nodule within the single nodule within the right lobe of the thyroid gland, the very hypoechoic nodule within the isthmus and the isoechoic solid nodule seen projecting off the lower pole of the left lobe.  Of the  3, the lower pole nodule projecting off the left lobe of the thyroid gland is the least suspicious by scoring.        Electronically signed by: Nik Reyes,   Date:                                            10/21/2021        Final Pathologic Diagnosis     1. Parathyroid, left superior, biopsy:   - Hypercellular parathyroid tissue (10 mg)   2. 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 Lymph nodes, paratracheal, neck dissection:   - Four benign lymph nodes, negative for metastatic carcinoma (0/4, pN0)   4. Parathyroid, left superior, parathyroidectomy:   - Hypercellular parathyroid tissue (107 mg), consistent with hyperplastic   parathyroid     CAP Synoptic Checklist for Thyroid Neoplasms:     - Predisposing Conditions: No known predisposing conditions (EPIC,         Progress Note, 11/17/2021)     - Procedure: Total thyroidectomy     - Tumor Focality: Unifocal     - Tumor Site: Isthmus     - Tumor Size: 1.2 x 1.0 x 0.8 cm     - Histologic Type: Papillary carcinoma, classic     - Mitotic Rate: Less than 1 mitosis per 2 mm^2     - Tumor Necrosis: Not identified     - Angioinvasion: Not identified     - Lymphatic Invasion: Not identified     - Perineural Invasion: Not identified     - Extrathyroidal Extension: Not identified     - Margin Status: All margins negative for carcinoma         - Distance from Invasive Carcinoma to Closest Margin: 0.1 cm to capsular            surface     - Regional Lymph Nodes: All regional lymph nodes negative for tumor         - Number of Lymph Nodes with Tumor: 0          "- Number of Lymph Nodes Examined: 4     - Distant Metastasis: Not applicable     - Pathologic Stage Classification: pT1b pN0     - Additional Findings: Multiple adenomatoid and hyperplastic nodules;        - Parathyroid glands: 3     - Designate Block for Future Studies: [id number]   Comment: Interp By Eitan Vick MD, PhD, Signed on 12/29/2021 at 11:43  Frozen Section Diagnosis       1. Mildly cellular parathyroid   Verbally communicated to Dr. Olsen   Microscopic Exam       Microscopic examination performed.   Gross   1:  Surgery ID:  [id number];  Pathology ID:  [id number]   1. Received fresh for frozen section labeled "left superior parathyroid" is a   0.6 x 0.4 x 0.3 cm piece tan soft tissue, weighing 0.01 g. The specimen is   entirely submitted for frozen section diagnosis.  Frozen section remnant is   entirely embedded in 1 cassette.   IKZ--1-A-FS   Grossed by: Tommie Guaman    2: Surgery ID:  [id number];  Pathology ID:  [id number]   2. Received in formalin labeled "total thyroid" is a 49 g product of a total   thyroidectomy.  The left thyroid lobe measures 5 x 3 x 2 cm, the right   thyroid lobe measures 5.5 x 2.8 x 2.5 cm, and isthmus with pyramidal lobe   measures 3 x 1 x 0.6 cm.  The specimen is differentially inked left anterior   blue, right anterior green, isthmus anterior orange, and posterior black.   Sectioning the left thyroid lobe reveals a 4 x 2 x 1.8 cm cystic nodule at   the lower pole with a bennett-colored, gelatinous cut surface.  A 2.5 x 1.7 x   1.7 cm nodule is identified in the right lower lobe, with a tan, fleshy,   slightly hemorrhagic, well-circumscribed cut surface.  The isthmus lobe   displays a 1.2 x 1.0 x 0.8 cm well-circumscribed, gelatinous nodule with   hemorrhagic components.  The remaining uninvolved parenchyma is beefy red.   Representative sections are embedded cassettes 3370, 2A-2R.   2A-2G:  Left thyroid lobe, every other slice submitted sequentially from   superior to inferior " "  2H-2O:  Right thyroid lobe, every other slice submitted sequentially from   superior to inferior   2P-2R:  Isthmus, representative sampled   Grossed by: Tommie Guaman    3: Surgery ID:  [id number];  Pathology ID:  [id number]   3. Received in formalin labeled "paratracheal lymph nodes" are 2 pieces of   yellow fatty tissue, 2.8 x 2.7 x 0.6 cm in aggregate.  The specimen is   entirely embedded in 2 cassettes.   BEC--3-A   QLC--3-B   Grossed by: Tommie Guaman    4: Surgery ID:  [id number];  Pathology ID:  [id number]   4. Received in formalin labeled "remainder of left superior parathyroid" is a   0.8 x 0.6 x 0.3 cm piece of tan soft tissue, weighing 0.107 g.  The specimen   is entirely embedded in 1 cassette.   ROV--4-A   Grossed by: Tommie Guaman   Frozen Section Footnote        Frozen section performed at [facility], [address] [place], [place], [address]   Disclaimer       Unless the case is a 'gross only' or additional testing only, the final   diagnosis for each specimen is based on a microscopic examination of   appropriate tissue sections.      Post op TG was 6. Pt referred to endo for further work up.      Current medication:  Generic levothyroxine 150 mcg daily. Takes thyroid medication with other meds in the morning.      Current symptoms:   fatigue +ve  Has some voice changes post surgery. Voice gets weak and raspy by the end of the day. Has some dysphagia. No head/neck exposure to XRT.      3 parathyroid glands were also removed during thyroidectomy.  No sx's of hypocalcemia.      2/14/2022 US SOFT TISSUE HEAD NECK THYROID     CLINICAL HISTORY:  h/o thyroid cancer s/p total thyroidectomy with high post thyroglobulin. Look for residual disease;.  Malignant neoplasm of thyroid gland     TECHNIQUE:  Ultrasound of the thyroid and cervical lymph nodes was performed.     COMPARISON:  Thyroid sonogram October 21, 2021.     FINDINGS:  The patient is status post total " thyroidectomy.  No residual or recurrent mass is identified in the thyroid fossa.     Location and size of visualized cervical lymph nodes is as follows:     No right cervical node identified.     There is a single mildly enlarged 1.6 x 0.4 x 1.1 cm left level 3 cervical node which lacks discrete fatty radha.  No other left cervical node identified.     Impression:     1.  Status post total thyroidectomy.  No residual or recurrent mass within the thyroid fossa.     2.  Single mildly enlarged left level 3 cervical node.     Electronically signed by: Keshawn Traylor  Date:                                            2022  Time:                                           09:30        H/o HPT that was managed by PCP/ENT pre surgery. Now s/p parathyroidectomy.       Review of Systems   Constitutional: Positive for diaphoresis.   Endocrine: Positive for heat intolerance.   Musculoskeletal: Positive for back pain.        Past Medical History:   Diagnosis Date    Chronic rhinitis     DJD (degenerative joint disease), lumbar     GERD (gastroesophageal reflux disease)     Hiatal hernia     Hyperlipidemia     Hypertension     Liver disease     Fatty Liver    Low back pain     Obesity     PONV (postoperative nausea and vomiting)     Thyroid disease       Social History     Tobacco Use    Smoking status: Former Smoker     Packs/day: 0.50     Years: 8.00     Pack years: 4.00     Types: Cigarettes     Quit date: 1979     Years since quittin.3    Smokeless tobacco: Never Used   Substance Use Topics    Alcohol use: No    Drug use: Never     Family History   Problem Relation Age of Onset    Diabetes Mother     Hypertension Mother     Hyperlipidemia Mother     Heart disease Mother         CHF    Stroke Mother     Heart attack Mother     Hearing loss Mother     Diabetes Sister     Sickle cell anemia Other         nieces, nephews    Lupus Other         niece      Past Surgical History:   Procedure  Laterality Date    BILATERAL OOPHORECTOMY  2002    CARPAL TUNNEL RELEASE      Left wrist    CHOLECYSTECTOMY      DISSECTION OF NECK Bilateral 12/20/2021    Procedure: DISSECTION, NECK;  Surgeon: Deejay Olsen MD;  Location: [facility] OR;  Service: ENT;  Laterality: Bilateral;  Central neck dissection    HYSTERECTOMY  1984    NECK EXPLORATION Bilateral 12/20/2021    Procedure: EXPLORATION, NECK;  Surgeon: Deejay Olsen MD;  Location: [facility] OR;  Service: ENT;  Laterality: Bilateral;  Parathyroid exploration    ROTATOR CUFF REPAIR      Right shoulder    ROTATOR CUFF REPAIR Left 2010    THYROIDECTOMY, BILATERAL Bilateral 12/20/2021    Procedure: THYROIDECTOMY,BILATERAL;  Surgeon: Deejay Olsen MD;  Location: [facility] OR;  Service: ENT;  Laterality: Bilateral;    TOTAL VAGINAL HYSTERECTOMY  1985          Current Outpatient Medications:     acetaminophen (TYLENOL) 650 MG TbSR, Take 650 mg by mouth daily as needed., Disp: , Rfl:     calcium carbonate 470 mg calcium (1,177 mg) Chew, Take one tablet by mouth twice daily, Disp: 60 each, Rfl: 0    cyclobenzaprine (FLEXERIL) 10 MG tablet, Take 1 tablet (10 mg total) by mouth 3 (three) times daily as needed for Muscle spasms., Disp: 30 tablet, Rfl: 0    fluticasone propionate (FLONASE) 50 mcg/actuation nasal spray, 2 sprays (100 mcg total) by Each Nostril route 2 (two) times daily., Disp: 9.9 mL, Rfl: 11    furosemide (LASIX) 40 MG tablet, Take 1 tablet (40 mg total) by mouth 2 (two) times daily as needed., Disp: 60 tablet, Rfl: 1    hydrOXYzine pamoate (VISTARIL) 25 MG Cap, Take 1 capsule (25 mg total) by mouth 2 (two) times daily as needed (anxiety or insomnia)., Disp: 20 capsule, Rfl: 0    levothyroxine (SYNTHROID) 150 MCG tablet, Take 1 tablet (150 mcg total) by mouth before breakfast., Disp: 30 tablet, Rfl: 11    linaclotide (LINZESS ORAL), Take 72 mg by mouth once daily., Disp: , Rfl:     loratadine (CLARITIN) 10 mg tablet, Take 10 mg by mouth daily as needed., Disp: ,  "Rfl:     multivit,min/folic acid/klw086 (MENOPAUSE SUPPLEMENT ORAL), Take 400 mg by mouth once daily. , Disp: , Rfl:     NIFEdipine (ADALAT CC) 60 MG TbSR, , Disp: , Rfl:     olmesartan-hydrochlorothiazide (BENICAR HCT) 40-25 mg per tablet, Take 1 tablet by mouth once daily., Disp: 90 tablet, Rfl: 3    ondansetron (ZOFRAN-ODT) 4 MG TbDL, Bring prescription to radioactive iodine treatment.  Sig 1 PO with or immediately before treatment with radioactive iodine.  Then sig 1 PO TID PRN nausea., Disp: 10 tablet, Rfl: 0    pantoprazole (PROTONIX) 40 MG tablet, Take by mouth once daily. , Disp: , Rfl:     psyllium husk/aspartame (METAMUCIL FIBER SINGLES ORAL), Take by mouth., Disp: , Rfl:     Current Facility-Administered Medications:     DOBUTamine 1000 mg in D5W 250 mL infusion (premix titrating), 10 mcg/kg/min, Intravenous, Continuous, Naina Roque MD, Last Rate: 15.8 mL/hr at 07/15/21 1108, 10 mcg/kg/min at 07/15/21 1108     Review of patient's allergies indicates:   Allergen Reactions    Iodine and iodide containing products Itching    Shellfish containing products Swelling    Statins-hmg-coa reductase inhibitors Other (See Comments)     Myalgias        Objective:   /78 (BP Method: X-Large (Manual))   Pulse 60   Ht 5' 4" (1.626 m)   Wt 102.5 kg (226 lb 1.3 oz)   LMP 02/10/1984 (Approximate)   SpO2 98%   BMI 38.81 kg/m²   BP Readings from Last 3 Encounters:   05/11/22 122/78   04/27/22 (!) 146/66   04/08/22 (!) 162/80     Wt Readings from Last 3 Encounters:   05/11/22 1341 102.5 kg (226 lb 1.3 oz)   04/27/22 1002 102.6 kg (226 lb 1.3 oz)   04/08/22 1539 102.9 kg (226 lb 13.7 oz)          Physical Exam  Vitals reviewed.   Constitutional:       General: She is not in acute distress.     Appearance: Normal appearance. She is well-developed. She is not ill-appearing, toxic-appearing or diaphoretic.   HENT:      Head: Normocephalic and atraumatic.   Eyes:      General: No scleral icterus.  Neck:     "  Trachea: No tracheal deviation.   Pulmonary:      Effort: Pulmonary effort is normal. No respiratory distress.   Neurological:      Mental Status: She is alert and oriented to person, place, and time.   Psychiatric:         Thought Content: Thought content normal.           Lab Results   Component Value Date     04/08/2022    K 3.7 04/08/2022     04/08/2022    CO2 28 04/08/2022    BUN 11 04/08/2022    CREATININE 1.0 04/08/2022    GLU 95 04/08/2022    HGBA1C 4.8 05/13/2021    MG 1.9 12/20/2021    AST 17 04/08/2022    ALT 15 04/08/2022    ALBUMIN 3.9 04/08/2022    PROT 7.8 04/08/2022    BILITOT 0.7 04/08/2022    WBC 5.54 04/08/2022    HGB 11.6 (L) 04/08/2022    HCT 35.9 (L) 04/08/2022    MCV 90 04/08/2022    MCH 29.0 04/08/2022     04/08/2022    MPV 10.8 04/08/2022    GRAN 2.9 04/08/2022    GRAN 52.3 04/08/2022    LYMPH 1.5 04/08/2022    LYMPH 27.1 04/08/2022    CHOL 194 09/29/2021    HDL 52 09/29/2021    LDLCALC 124.6 09/29/2021    TRIG 87 09/29/2021       Lab Results   Component Value Date    TSH 0.491 04/06/2022    FREET4 2.08 (H) 04/06/2022        Thyroid Labs Latest Ref Rng & Units 3/29/2022 4/6/2022 4/8/2022   TSH 0.400 - 4.000 uIU/mL 116.260(H) 0.491 -   Free T4 0.71 - 1.51 ng/dL 1.85(H) 2.08(H) -   Sodium 136 - 145 mmol/L - - 141   Potassium 3.5 - 5.1 mmol/L - - 3.7   Chloride 95 - 110 mmol/L - - 103   Carbon Dioxide 23 - 29 mmol/L - - 28   Glucose 70 - 110 mg/dL - - 95   Blood Urea Nitrogen 8 - 23 mg/dL - - 11   Creatinine 0.5 - 1.4 mg/dL - - 1.0   Calcium 8.7 - 10.5 mg/dL - - 8.8   Total Protein 6.0 - 8.4 g/dL - - 7.8   Albumin 3.5 - 5.2 g/dL - - 3.9   Total Bilirubin 0.1 - 1.0 mg/dL - - 0.7   AST 10 - 40 U/L - - 17   ALT 10 - 44 U/L - - 15   Anion Gap 8 - 16 mmol/L - - 10   eGFR (African American) >60 mL/min/1.73 m:2 - - >60.0   eGFR (Non-African American) >60 mL/min/1.73 m:2 - - 58.0(A)   WBC 3.90 - 12.70 K/uL - - 5.54   RBC 4.00 - 5.40 M/uL - - 4.00   Hemoglobin 12.0 - 16.0 g/dL - -  11.6(L)   Hematocrit 37.0 - 48.5 % - - 35.9(L)   MCV 82 - 98 fL - - 90   MCH 27.0 - 31.0 pg - - 29.0   MCHC 32.0 - 36.0 g/dL - - 32.3   RDW 11.5 - 14.5 % - - 12.4   Platelets 150 - 450 K/uL - - 372   MPV 9.2 - 12.9 fL - - 10.8   Gran # 1.8 - 7.7 K/uL - - 2.9   Lymph # 1.0 - 4.8 K/uL - - 1.5   Mono # 0.3 - 1.0 K/uL - - 0.9   Eos # 0.0 - 0.5 K/uL - - 0.2   Baso # 0.00 - 0.20 K/uL - - 0.05   Gran % 38.0 - 73.0 % - - 52.3   Lymph % 18.0 - 48.0 % - - 27.1   Mono% 4.0 - 15.0 % - - 16.1(H)   Eos % 0.0 - 8.0 % - - 3.2   Baso % 0.0 - 1.9 % - - 0.9   Thyroglobulin, Tumor Marker ng/mL 0.3(H) - -   Thyroglobulin, Antibody Screen <1.8 IU/mL <1.8 - -           Hemoglobin A1C   Date Value Ref Range Status   05/13/2021 4.8 4.0 - 5.6 % Final     Comment:     ADA Screening Guidelines:  5.7-6.4%  Consistent with prediabetes  >or=6.5%  Consistent with diabetes    High levels of fetal hemoglobin interfere with the HbA1C  assay. Heterozygous hemoglobin variants (HbS, HgC, etc)do  not significantly interfere with this assay.   However, presence of multiple variants may affect accuracy.           Assessment and plan:     Problem List Items Addressed This Visit        Cardiac/Vascular    Hypertension     BP controlled. Continue current meds. Pt to notify PCP if BP consistently more than 140/90.                Oncology    Papillary thyroid carcinoma     Pt with papillary thyroid cancer s/p total thyroidectomy.    Initial post op TG was more than would be expected. Regional lymph node dissection was neg. Post surgery thyroid US with one lymph node with nl vascular flow and low suspicion for malignancy.     Repeat TG levels trended down and have been stable.     Had pt do WBS that was negative.    Reviewed potential risk and benefits of HENDERSON ablation. Since post op Tg has trended down, does not need HENDERSON. Continue to monitor Tg levels and treat if indicated.     TSH goals discussed with patient. Based on path has low risk thyroid cancer and US  without other concerning features. TSH goal <2.                        Endocrine    Post-operative hypothyroidism     Clinically pt with hot flashes. Most recent TSH at goal. MARISSA low risk goal TSH <2. Continue current dose of thyroid hormone.           H/O parathyroidectomy     Pt s/p removal of 3 parathyroid glands. Most recent ca wnls and is asymptomatic. Monitor.            Class 2 severe obesity due to excess calories with serious comorbidity and body mass index (BMI) of 38.0 to 38.9 in adult     BMI 38. Encouraged healthy lifestyle.              GI    GERD (gastroesophageal reflux disease)     Has GERD with sensation of fullness in throat. Sx's improved with tx of GERD. Recommend ent or gi eval if sx's worse. Sx's are unrelated to her thyroid.               Other    Hot flashes     F/u with gyn to resume HRT or consider other therapy for symptoms.                   RTC in 1 year with Tg and TSH prior             Disclaimer: This note has been generated using voice-recognition software. There may be typographical errors that have been missed during proof-reading.

## 2022-05-11 NOTE — PROGRESS NOTES
Health Maintenance Due   Topic Date Due    Hepatitis C Screening  Never done    TETANUS VACCINE  Never done    COVID-19 Vaccine (4 - Booster for Pfizer series) 05/20/2022     Updates were requested from care everywhere.  Chart was reviewed for overdue Proactive [facility] Encounters ([facility]) topics (CRS, Breast Cancer Screening, Eye exam)  Health Maintenance has been updated.  [place] immunization registry triggered.  Immunizations were reconciled.

## 2022-05-11 NOTE — ASSESSMENT & PLAN NOTE
Has GERD with sensation of fullness in throat. Sx's improved with tx of GERD. Recommend ent or gi eval if sx's worse. Sx's are unrelated to her thyroid.

## 2022-05-11 NOTE — ASSESSMENT & PLAN NOTE
Clinically pt with hot flashes. Most recent TSH at goal. MARISSA low risk goal TSH <2. Continue current dose of thyroid hormone.

## 2022-06-01 ENCOUNTER — OFFICE VISIT (OUTPATIENT)
Dept: INTERNAL MEDICINE | Facility: CLINIC | Age: 67
End: 2022-06-01
Payer: MEDICARE

## 2022-06-01 VITALS
RESPIRATION RATE: 18 BRPM | DIASTOLIC BLOOD PRESSURE: 82 MMHG | BODY MASS INDEX: 38.75 KG/M2 | HEART RATE: 65 BPM | SYSTOLIC BLOOD PRESSURE: 136 MMHG | OXYGEN SATURATION: 98 % | TEMPERATURE: 98 F | WEIGHT: 225.75 LBS

## 2022-06-01 DIAGNOSIS — R51.9 FREQUENT HEADACHES: ICD-10-CM

## 2022-06-01 DIAGNOSIS — I10 PRIMARY HYPERTENSION: ICD-10-CM

## 2022-06-01 DIAGNOSIS — Z09 FOLLOW UP: Primary | ICD-10-CM

## 2022-06-01 DIAGNOSIS — N39.0 URINARY TRACT INFECTION WITHOUT HEMATURIA, SITE UNSPECIFIED: ICD-10-CM

## 2022-06-01 DIAGNOSIS — Z90.89 H/O PARATHYROIDECTOMY: ICD-10-CM

## 2022-06-01 DIAGNOSIS — Z98.890 H/O PARATHYROIDECTOMY: ICD-10-CM

## 2022-06-01 LAB
BILIRUB SERPL-MCNC: ABNORMAL MG/DL
BLOOD URINE, POC: ABNORMAL
COLOR, POC UA: ABNORMAL
GLUCOSE UR QL STRIP: ABNORMAL
KETONES UR QL STRIP: ABNORMAL
LEUKOCYTE ESTERASE URINE, POC: ABNORMAL
NITRITE, POC UA: ABNORMAL
PH, POC UA: 7
PROTEIN, POC: ABNORMAL
SPECIFIC GRAVITY, POC UA: 1
UROBILINOGEN, POC UA: ABNORMAL

## 2022-06-01 PROCEDURE — 3075F PR MOST RECENT SYSTOLIC BLOOD PRESS GE 130-139MM HG: ICD-10-PCS | Mod: CPTII,S$GLB,, | Performed by: NURSE PRACTITIONER

## 2022-06-01 PROCEDURE — 1125F PR PAIN SEVERITY QUANTIFIED, PAIN PRESENT: ICD-10-PCS | Mod: CPTII,S$GLB,, | Performed by: NURSE PRACTITIONER

## 2022-06-01 PROCEDURE — 1160F PR REVIEW ALL MEDS BY PRESCRIBER/CLIN PHARMACIST DOCUMENTED: ICD-10-PCS | Mod: CPTII,S$GLB,, | Performed by: NURSE PRACTITIONER

## 2022-06-01 PROCEDURE — 1159F PR MEDICATION LIST DOCUMENTED IN MEDICAL RECORD: ICD-10-PCS | Mod: CPTII,S$GLB,, | Performed by: NURSE PRACTITIONER

## 2022-06-01 PROCEDURE — 99213 PR OFFICE/OUTPT VISIT, EST, LEVL III, 20-29 MIN: ICD-10-PCS | Mod: S$GLB,,, | Performed by: NURSE PRACTITIONER

## 2022-06-01 PROCEDURE — 3079F PR MOST RECENT DIASTOLIC BLOOD PRESSURE 80-89 MM HG: ICD-10-PCS | Mod: CPTII,S$GLB,, | Performed by: NURSE PRACTITIONER

## 2022-06-01 PROCEDURE — 1160F RVW MEDS BY RX/DR IN RCRD: CPT | Mod: CPTII,S$GLB,, | Performed by: NURSE PRACTITIONER

## 2022-06-01 PROCEDURE — 1101F PT FALLS ASSESS-DOCD LE1/YR: CPT | Mod: CPTII,S$GLB,, | Performed by: NURSE PRACTITIONER

## 2022-06-01 PROCEDURE — 87086 URINE CULTURE/COLONY COUNT: CPT | Performed by: NURSE PRACTITIONER

## 2022-06-01 PROCEDURE — 1101F PR PT FALLS ASSESS DOC 0-1 FALLS W/OUT INJ PAST YR: ICD-10-PCS | Mod: CPTII,S$GLB,, | Performed by: NURSE PRACTITIONER

## 2022-06-01 PROCEDURE — 3075F SYST BP GE 130 - 139MM HG: CPT | Mod: CPTII,S$GLB,, | Performed by: NURSE PRACTITIONER

## 2022-06-01 PROCEDURE — 99213 OFFICE O/P EST LOW 20 MIN: CPT | Mod: S$GLB,,, | Performed by: NURSE PRACTITIONER

## 2022-06-01 PROCEDURE — 81001 POCT URINALYSIS, DIPSTICK OR TABLET REAGENT, AUTOMATED, WITH MICROSCOP: ICD-10-PCS | Mod: S$GLB,,, | Performed by: NURSE PRACTITIONER

## 2022-06-01 PROCEDURE — 99999 PR PBB SHADOW E&M-EST. PATIENT-LVL V: CPT | Mod: PBBFAC,,, | Performed by: NURSE PRACTITIONER

## 2022-06-01 PROCEDURE — 3008F PR BODY MASS INDEX (BMI) DOCUMENTED: ICD-10-PCS | Mod: CPTII,S$GLB,, | Performed by: NURSE PRACTITIONER

## 2022-06-01 PROCEDURE — 3288F PR FALLS RISK ASSESSMENT DOCUMENTED: ICD-10-PCS | Mod: CPTII,S$GLB,, | Performed by: NURSE PRACTITIONER

## 2022-06-01 PROCEDURE — 81001 URINALYSIS AUTO W/SCOPE: CPT | Mod: S$GLB,,, | Performed by: NURSE PRACTITIONER

## 2022-06-01 PROCEDURE — 1159F MED LIST DOCD IN RCRD: CPT | Mod: CPTII,S$GLB,, | Performed by: NURSE PRACTITIONER

## 2022-06-01 PROCEDURE — 1125F AMNT PAIN NOTED PAIN PRSNT: CPT | Mod: CPTII,S$GLB,, | Performed by: NURSE PRACTITIONER

## 2022-06-01 PROCEDURE — 99999 PR PBB SHADOW E&M-EST. PATIENT-LVL V: ICD-10-PCS | Mod: PBBFAC,,, | Performed by: NURSE PRACTITIONER

## 2022-06-01 PROCEDURE — 3008F BODY MASS INDEX DOCD: CPT | Mod: CPTII,S$GLB,, | Performed by: NURSE PRACTITIONER

## 2022-06-01 PROCEDURE — 3079F DIAST BP 80-89 MM HG: CPT | Mod: CPTII,S$GLB,, | Performed by: NURSE PRACTITIONER

## 2022-06-01 PROCEDURE — 3288F FALL RISK ASSESSMENT DOCD: CPT | Mod: CPTII,S$GLB,, | Performed by: NURSE PRACTITIONER

## 2022-06-01 RX ORDER — CIPROFLOXACIN 250 MG/1
250 TABLET, FILM COATED ORAL 2 TIMES DAILY
Qty: 10 TABLET | Refills: 0 | Status: SHIPPED | OUTPATIENT
Start: 2022-06-01 | End: 2022-07-18

## 2022-06-01 NOTE — PROGRESS NOTES
Subjective:       Patient ID: Mariah Meza is a 66 y.o. female.    Chief Complaint: Follow-up (Would like u/a, slighting burning when urinating )    Patient presents for one month follow up.  Reports abdominal pain is better.  Was started on an antibiotic (Xifaxan 550 mg TID) with GI provider.  Has follow up on 06/14/2022-[facility]     Reports dysuria started about 3 days ago.      Reports taking nifedipine at bedtime.  Stopped olmesartan/HCTZ.     Had neurology visit last month but cancelled ([facility]).   Had to visit son in [place] who became ill.  Will refer again (internal)    Review of Systems   Constitutional: Negative for chills, fatigue and fever.   Respiratory: Negative for cough and shortness of breath.    Gastrointestinal: Positive for abdominal pain. Negative for diarrhea and nausea.   Genitourinary: Positive for dysuria and frequency.   Musculoskeletal: Positive for arthralgias.   Psychiatric/Behavioral: Negative for agitation and confusion.         Objective:      Physical Exam  Vitals reviewed.   Constitutional:       Appearance: Normal appearance.   HENT:      Head: Normocephalic.   Cardiovascular:      Rate and Rhythm: Normal rate and regular rhythm.   Pulmonary:      Effort: Pulmonary effort is normal.      Breath sounds: Normal breath sounds.   Musculoskeletal:         General: Normal range of motion.   Skin:     General: Skin is warm.   Neurological:      General: No focal deficit present.      Mental Status: She is alert.   Psychiatric:         Mood and Affect: Mood normal.         Behavior: Behavior normal.         Assessment:       Problem List Items Addressed This Visit     Hypertension    Frequent headaches    Relevant Orders    Ambulatory referral/consult to Neurology    H/O parathyroidectomy      Other Visit Diagnoses     Follow up    -  Primary    Dysuria        Relevant Orders    POCT URINE DIPSTICK WITH MICROSCOPE, AUTOMATED (Completed)    Urine culture  (Completed)    Urinary tract infection without hematuria, site unspecified        Relevant Medications    ciprofloxacin HCl (CIPRO) 250 MG tablet          Plan:           Follow up    Dysuria  -     POCT URINE DIPSTICK WITH MICROSCOPE, AUTOMATED  -     Urine culture    H/O parathyroidectomy    Primary hypertension    Frequent headaches  -     Ambulatory referral/consult to Neurology; Future; Expected date: 06/08/2022    Urinary tract infection without hematuria, site unspecified  -     ciprofloxacin HCl (CIPRO) 250 MG tablet; Take 1 tablet (250 mg total) by mouth 2 (two) times daily.  Dispense: 10 tablet; Refill: 0        Neurology appt. She can go to [facility] if needed.      Urine today.    3 month follow up

## 2022-06-02 LAB — BACTERIA UR CULT: NORMAL

## 2022-06-13 ENCOUNTER — TELEPHONE (OUTPATIENT)
Dept: INTERNAL MEDICINE | Facility: CLINIC | Age: 67
End: 2022-06-13
Payer: MEDICARE

## 2022-06-13 DIAGNOSIS — R68.2 MOUTH DRYNESS: Primary | ICD-10-CM

## 2022-06-13 NOTE — TELEPHONE ENCOUNTER
"S/w pt, states her mouth is "raw" and irritated. Denies any white patches, states it feels as if it swells up at night. Would like to know if she needs to be tested for anything or if something should be called in. Please advise.   ----- Message from Maria Elena Clifton sent at 6/13/2022 11:58 AM CDT -----  Regarding: Please call regarding her mouth  Please call patient at [phone] regarding a test to see if something is wrong with her mouth.      "

## 2022-06-16 NOTE — TELEPHONE ENCOUNTER
"Per Taian: "recommend that she have this evaluated.  Not sure what to treat her for.  I will put her in a referral for ENT.  Please schedule her an appointment. Inform her that I am out the rest of the week."      Attempted to contact pt on 6/16 @ 1126AM, left vm to return call.   "

## 2022-06-23 ENCOUNTER — OFFICE VISIT (OUTPATIENT)
Dept: OTOLARYNGOLOGY | Facility: CLINIC | Age: 67
End: 2022-06-23
Payer: MEDICARE

## 2022-06-23 VITALS
TEMPERATURE: 98 F | BODY MASS INDEX: 37.8 KG/M2 | DIASTOLIC BLOOD PRESSURE: 75 MMHG | SYSTOLIC BLOOD PRESSURE: 124 MMHG | HEART RATE: 70 BPM | WEIGHT: 220.25 LBS

## 2022-06-23 DIAGNOSIS — R68.2 MOUTH DRYNESS: ICD-10-CM

## 2022-06-23 PROCEDURE — 99999 PR PBB SHADOW E&M-EST. PATIENT-LVL III: CPT | Mod: PBBFAC,,, | Performed by: OTOLARYNGOLOGY

## 2022-06-23 PROCEDURE — 1126F AMNT PAIN NOTED NONE PRSNT: CPT | Mod: CPTII,S$GLB,, | Performed by: OTOLARYNGOLOGY

## 2022-06-23 PROCEDURE — 1159F MED LIST DOCD IN RCRD: CPT | Mod: CPTII,S$GLB,, | Performed by: OTOLARYNGOLOGY

## 2022-06-23 PROCEDURE — 3078F DIAST BP <80 MM HG: CPT | Mod: CPTII,S$GLB,, | Performed by: OTOLARYNGOLOGY

## 2022-06-23 PROCEDURE — 3008F BODY MASS INDEX DOCD: CPT | Mod: CPTII,S$GLB,, | Performed by: OTOLARYNGOLOGY

## 2022-06-23 PROCEDURE — 3074F PR MOST RECENT SYSTOLIC BLOOD PRESSURE < 130 MM HG: ICD-10-PCS | Mod: CPTII,S$GLB,, | Performed by: OTOLARYNGOLOGY

## 2022-06-23 PROCEDURE — 1101F PR PT FALLS ASSESS DOC 0-1 FALLS W/OUT INJ PAST YR: ICD-10-PCS | Mod: CPTII,S$GLB,, | Performed by: OTOLARYNGOLOGY

## 2022-06-23 PROCEDURE — 3008F PR BODY MASS INDEX (BMI) DOCUMENTED: ICD-10-PCS | Mod: CPTII,S$GLB,, | Performed by: OTOLARYNGOLOGY

## 2022-06-23 PROCEDURE — 1101F PT FALLS ASSESS-DOCD LE1/YR: CPT | Mod: CPTII,S$GLB,, | Performed by: OTOLARYNGOLOGY

## 2022-06-23 PROCEDURE — 3288F PR FALLS RISK ASSESSMENT DOCUMENTED: ICD-10-PCS | Mod: CPTII,S$GLB,, | Performed by: OTOLARYNGOLOGY

## 2022-06-23 PROCEDURE — 99214 OFFICE O/P EST MOD 30 MIN: CPT | Mod: S$GLB,,, | Performed by: OTOLARYNGOLOGY

## 2022-06-23 PROCEDURE — 99999 PR PBB SHADOW E&M-EST. PATIENT-LVL III: ICD-10-PCS | Mod: PBBFAC,,, | Performed by: OTOLARYNGOLOGY

## 2022-06-23 PROCEDURE — 3078F PR MOST RECENT DIASTOLIC BLOOD PRESSURE < 80 MM HG: ICD-10-PCS | Mod: CPTII,S$GLB,, | Performed by: OTOLARYNGOLOGY

## 2022-06-23 PROCEDURE — 99214 PR OFFICE/OUTPT VISIT, EST, LEVL IV, 30-39 MIN: ICD-10-PCS | Mod: S$GLB,,, | Performed by: OTOLARYNGOLOGY

## 2022-06-23 PROCEDURE — 1159F PR MEDICATION LIST DOCUMENTED IN MEDICAL RECORD: ICD-10-PCS | Mod: CPTII,S$GLB,, | Performed by: OTOLARYNGOLOGY

## 2022-06-23 PROCEDURE — 3288F FALL RISK ASSESSMENT DOCD: CPT | Mod: CPTII,S$GLB,, | Performed by: OTOLARYNGOLOGY

## 2022-06-23 PROCEDURE — 1126F PR PAIN SEVERITY QUANTIFIED, NO PAIN PRESENT: ICD-10-PCS | Mod: CPTII,S$GLB,, | Performed by: OTOLARYNGOLOGY

## 2022-06-23 PROCEDURE — 3074F SYST BP LT 130 MM HG: CPT | Mod: CPTII,S$GLB,, | Performed by: OTOLARYNGOLOGY

## 2022-06-23 RX ORDER — AZELASTINE 1 MG/ML
1 SPRAY, METERED NASAL 2 TIMES DAILY
Qty: 30 ML | Refills: 11 | Status: SHIPPED | OUTPATIENT
Start: 2022-06-23 | End: 2023-03-01

## 2022-06-23 NOTE — PROGRESS NOTES
Referring Provider:    Taina Bob, Michael  [address] [facility]  [place] [address]  Subjective:   Patient: Mariah Meza [id number], :1955   Visit date:2022 11:18 AM    Chief Complaint: see below  HPI:       Other (Mouth dryness, swollen tongue )    Prior notes reviewed  Clinical documentation obtained by nursing staff reviewed.     Chronic dry mouth  Worsening over the past several months  No dry nose/eyes  Feels like the tongue is swollen/thick in the pm  Med review- takes flexeril and hydroxyzine rarely.  Uses claritin daily.  Flonase daily. Lasix rarely.       Objective:     Physical Exam:  Vitals:  /75   Pulse 70   Temp 97.7 °F (36.5 °C) (Temporal)   Wt 99.9 kg (220 lb 3.8 oz)   LMP 02/10/1984 (Approximate)   BMI 37.80 kg/m²   General appearance:  Well developed, well nourished    Ears:  Otoscopy of external auditory canals and tympanic membranes was normal, clinical speech reception thresholds grossly intact, no mass/lesion of auricle.    Nose:  No masses/lesions of external nose, nasal mucosa, septum, and turbinates were within normal limits.    Mouth:  No mass/lesion of lips, teeth, gums, hard/soft palate, tongue, tonsils, or oropharynx.    Neck & Lymphatics:  No cervical lymphadenopathy, no neck mass/crepitus/ asymmetry, trachea is midline, no thyroid enlargement/tenderness/mass.              []  Data Reviewed:    Lab Results   Component Value Date    WBC 5.54 2022    HGB 11.6 (L) 2022    HCT 35.9 (L) 2022    MCV 90 2022    EOSINOPHIL 3.2 2022                 Assessment & Plan:   Mouth dryness  -     Ambulatory referral/consult to ENT    Other orders  -     azelastine (ASTELIN) 137 mcg (0.1 %) nasal spray; 1 spray (137 mcg total) by Nasal route 2 (two) times daily.  Dispense: 30 mL; Refill: 11      The most common cause of dry mouth is medication induced decrease in saliva production.  Mariah has multiple medications with a high incidence of  dry mouth (flexeril 27%, claritin, vistaril) directly.  Additionally, hydration status is an important factor in saliva production.  She is on diuretics which indirectly cause dry mouth due to decreased hydration.  I recommend sialogogues, reduction/elimination of systemic antihistamines (claritin, vistaril).  Consider changing muscle relaxant if her need for more frequent use increases. astelin for direct topical antihistamine for nasal symptoms and d/c claritin, vistaril.  Biotine gel and increased dental cleanings.  Unfortunately, she likely would have difficulty with Evoxac or other cholinergic meds as she needs linzess for ibs.         Thank you for allowing me to participate in the care of Mariah.       Deejay Olsen MD, FACS  [facility]   [facility]  [address]  [place] [address]  P: [phone]  F: [phone]

## 2022-06-27 ENCOUNTER — OFFICE VISIT (OUTPATIENT)
Dept: NEUROLOGY | Facility: CLINIC | Age: 67
End: 2022-06-27
Payer: MEDICARE

## 2022-06-27 VITALS
SYSTOLIC BLOOD PRESSURE: 176 MMHG | HEIGHT: 64 IN | RESPIRATION RATE: 17 BRPM | DIASTOLIC BLOOD PRESSURE: 79 MMHG | HEART RATE: 61 BPM | BODY MASS INDEX: 36.99 KG/M2 | WEIGHT: 216.69 LBS

## 2022-06-27 DIAGNOSIS — G43.009 MIGRAINE WITHOUT AURA AND WITHOUT STATUS MIGRAINOSUS, NOT INTRACTABLE: ICD-10-CM

## 2022-06-27 DIAGNOSIS — E66.9 CLASS 2 OBESITY WITH BODY MASS INDEX (BMI) OF 37.0 TO 37.9 IN ADULT, UNSPECIFIED OBESITY TYPE, UNSPECIFIED WHETHER SERIOUS COMORBIDITY PRESENT: ICD-10-CM

## 2022-06-27 DIAGNOSIS — M54.12 CERVICAL RADICULOPATHY: ICD-10-CM

## 2022-06-27 DIAGNOSIS — R51.9 FREQUENT HEADACHES: ICD-10-CM

## 2022-06-27 DIAGNOSIS — M79.18 CERVICAL MYOFASCIAL PAIN SYNDROME: ICD-10-CM

## 2022-06-27 DIAGNOSIS — G89.29 CHRONIC LEFT-SIDED HEADACHE: Primary | ICD-10-CM

## 2022-06-27 DIAGNOSIS — R20.8 DYSESTHESIA OF SCALP: ICD-10-CM

## 2022-06-27 DIAGNOSIS — R51.9 NEW ONSET OF HEADACHES AFTER AGE 50: ICD-10-CM

## 2022-06-27 DIAGNOSIS — I10 HYPERTENSION, UNSPECIFIED TYPE: ICD-10-CM

## 2022-06-27 DIAGNOSIS — R51.9 CHRONIC LEFT-SIDED HEADACHE: Primary | ICD-10-CM

## 2022-06-27 PROCEDURE — 1101F PR PT FALLS ASSESS DOC 0-1 FALLS W/OUT INJ PAST YR: ICD-10-PCS | Mod: CPTII,S$GLB,, | Performed by: PSYCHIATRY & NEUROLOGY

## 2022-06-27 PROCEDURE — 3008F PR BODY MASS INDEX (BMI) DOCUMENTED: ICD-10-PCS | Mod: CPTII,S$GLB,, | Performed by: PSYCHIATRY & NEUROLOGY

## 2022-06-27 PROCEDURE — 3077F SYST BP >= 140 MM HG: CPT | Mod: CPTII,S$GLB,, | Performed by: PSYCHIATRY & NEUROLOGY

## 2022-06-27 PROCEDURE — 1101F PT FALLS ASSESS-DOCD LE1/YR: CPT | Mod: CPTII,S$GLB,, | Performed by: PSYCHIATRY & NEUROLOGY

## 2022-06-27 PROCEDURE — 1125F AMNT PAIN NOTED PAIN PRSNT: CPT | Mod: CPTII,S$GLB,, | Performed by: PSYCHIATRY & NEUROLOGY

## 2022-06-27 PROCEDURE — 3077F PR MOST RECENT SYSTOLIC BLOOD PRESSURE >= 140 MM HG: ICD-10-PCS | Mod: CPTII,S$GLB,, | Performed by: PSYCHIATRY & NEUROLOGY

## 2022-06-27 PROCEDURE — 1160F PR REVIEW ALL MEDS BY PRESCRIBER/CLIN PHARMACIST DOCUMENTED: ICD-10-PCS | Mod: CPTII,S$GLB,, | Performed by: PSYCHIATRY & NEUROLOGY

## 2022-06-27 PROCEDURE — 1159F PR MEDICATION LIST DOCUMENTED IN MEDICAL RECORD: ICD-10-PCS | Mod: CPTII,S$GLB,, | Performed by: PSYCHIATRY & NEUROLOGY

## 2022-06-27 PROCEDURE — 3078F PR MOST RECENT DIASTOLIC BLOOD PRESSURE < 80 MM HG: ICD-10-PCS | Mod: CPTII,S$GLB,, | Performed by: PSYCHIATRY & NEUROLOGY

## 2022-06-27 PROCEDURE — 1160F RVW MEDS BY RX/DR IN RCRD: CPT | Mod: CPTII,S$GLB,, | Performed by: PSYCHIATRY & NEUROLOGY

## 2022-06-27 PROCEDURE — 3288F PR FALLS RISK ASSESSMENT DOCUMENTED: ICD-10-PCS | Mod: CPTII,S$GLB,, | Performed by: PSYCHIATRY & NEUROLOGY

## 2022-06-27 PROCEDURE — 3008F BODY MASS INDEX DOCD: CPT | Mod: CPTII,S$GLB,, | Performed by: PSYCHIATRY & NEUROLOGY

## 2022-06-27 PROCEDURE — 99205 PR OFFICE/OUTPT VISIT, NEW, LEVL V, 60-74 MIN: ICD-10-PCS | Mod: S$GLB,,, | Performed by: PSYCHIATRY & NEUROLOGY

## 2022-06-27 PROCEDURE — 99999 PR PBB SHADOW E&M-EST. PATIENT-LVL V: CPT | Mod: PBBFAC,,, | Performed by: PSYCHIATRY & NEUROLOGY

## 2022-06-27 PROCEDURE — 1125F PR PAIN SEVERITY QUANTIFIED, PAIN PRESENT: ICD-10-PCS | Mod: CPTII,S$GLB,, | Performed by: PSYCHIATRY & NEUROLOGY

## 2022-06-27 PROCEDURE — 3078F DIAST BP <80 MM HG: CPT | Mod: CPTII,S$GLB,, | Performed by: PSYCHIATRY & NEUROLOGY

## 2022-06-27 PROCEDURE — 1159F MED LIST DOCD IN RCRD: CPT | Mod: CPTII,S$GLB,, | Performed by: PSYCHIATRY & NEUROLOGY

## 2022-06-27 PROCEDURE — 99999 PR PBB SHADOW E&M-EST. PATIENT-LVL V: ICD-10-PCS | Mod: PBBFAC,,, | Performed by: PSYCHIATRY & NEUROLOGY

## 2022-06-27 PROCEDURE — 99205 OFFICE O/P NEW HI 60 MIN: CPT | Mod: S$GLB,,, | Performed by: PSYCHIATRY & NEUROLOGY

## 2022-06-27 PROCEDURE — 3288F FALL RISK ASSESSMENT DOCD: CPT | Mod: CPTII,S$GLB,, | Performed by: PSYCHIATRY & NEUROLOGY

## 2022-06-27 RX ORDER — UBROGEPANT 50 MG/1
50 TABLET ORAL ONCE AS NEEDED
Qty: 10 TABLET | Refills: 6 | Status: SHIPPED | OUTPATIENT
Start: 2022-06-27 | End: 2022-06-27

## 2022-06-27 RX ORDER — GABAPENTIN 300 MG/1
CAPSULE ORAL
Qty: 60 CAPSULE | Refills: 6 | Status: SHIPPED | OUTPATIENT
Start: 2022-06-27 | End: 2022-08-01

## 2022-06-27 NOTE — PATIENT INSTRUCTIONS
1- For preventive management: a. Start gabapentin 300mg nightly x 1 weeks, then 600mg nightly           B. Start magnesium 400mg to 500mg daily -> over the counter    2- Acute management: ubrelvy 50mg at onset of headache, can repeat after 2 hours. Max 2/day.     3- MRI brain w/wo contrast (MP rage series), MRI C spine     4- Physical therapy for cervical myofascial pain

## 2022-06-27 NOTE — PROGRESS NOTES
"[facility]    Chief complaint: headache   [place]erred [place]  [address] [facility]  [place] [address]     History of Present Illness:    66 Y RH F with HTN, HL, fatty liver, obesity, GERD, hiatal hernia,  papillary thyroid cancer  s/p parathyroidectomy, hypothyroidism that presents for intal evaluation of headache. Today's BP is 176/79 and HR is 61. She mentions that at home she has had been on 120s -130s systolic over 70s. She mentions that she feels that the medication she is on for HTN medication is the cause for her headaches and her body to hurt. She mentions that when she takes it every night she feels sick. She mentions that she had been taking nifedipine she has had a lot of numbness in the legs , swelling, and headache described like "something is going to her head".  She mentions that the headache began having headache around 2017 and over time become more frequent. She is currently on Nifedipine 60mg daily and Olmesartan/HCTZ 40mg/25mg.     Headache history  Age at onset and course over time: she has had history migraine in the past which were in the 30s, she was diagnosed with migraine then, they were infrequent, but she recalls the severe headache with phonophobia, phonophobia, osmophobia. She went on remission of the bad migraine. She notes that around 2017 or so she has been having headache on the left side now. She feels that at nighttimes she feels that there is movement/crawling around her head. She is currently having headache in the morning upon awakening she has a slight headache which is about 4 days/week. She is having this headache for about 2-3 hours or so. APAP helps it and eventually headache resolves within hours or the whole day. She mentions that when overnight she will have headache on left side with crawling sensation all over the head. She has the crawling sensation all day long   Location: left side for morning " headache  Character: stabbing, pressure, sharp   Left sided throbbing, cutaneous allodynia, she has sensation of burning in the frontal/left periorbital area, but no eye pain.   Intensity:  Moderate pain level she states 6/10, overnight she has crawling sensation which is annoying which wakes her up.   Frequency: 4 days a week or so.   Duration: >4 hours up to the whole day.   Aura: none   Associated symptoms: neck pain describes on right side like a vibration in the head, pressure on the neck, started about 3-4 weeks ago, intermittent lasting for about 15 minutes or so, throughout the day intermittently since beginning of June, some aggravation with neck movement, headache is not related all the time to neck movements,  nausea (constant regardless of having headache - for years now),   Other neurologic symptoms: dizziness, ringing in ears blurred vision, problems with memory   Precipitating factors: none   Alleviating factors: local pressure, medications  Aggravating factors: bending over (worsens pain already there)  No TVOs  Low pitched ringing   No jaw claudication  no fevers, chills   Family history of headache: none she is aware of  ER/UC visits: none   Caffeine: 1 cup  Sleep: trouble falling asleep, trouble staying asleep, snoring, unrefreshed, she had a sleep study many years ago 7-8 years ago.      Medication history:  Acute:  APAP 3 days/week - limited benefit     Preventive:  Nifedipine 60 mg   Olmesartan/HCTZ 40mg/25mg    MIDAS: 3    Neuroimaging and other studies:   Results for orders placed or performed during the hospital encounter of 08/31/21   CT Head Without Contrast    Narrative    EXAMINATION:  CT HEAD WITHOUT CONTRAST    CLINICAL HISTORY:  Headache, acute, normal neuro exam; Essential (primary) hypertension    TECHNIQUE:  Low dose axial images were obtained through the head.  Coronal and sagittal reformations were also performed. Contrast was not  administered.    COMPARISON:  None.    FINDINGS:  The cortical sulcal pattern is normal. No hydrocephalus, midline shift or abnormal mass effect present. No intra-or extra-axial mass or hemorrhage. No CT evidence of large territory acute stroke.    Orbits are unremarkable. Paranasal sinuses are clear. Mastoid air cells are clear. Calvarium is intact.      Impression    No acute intracranial abnormality.      Electronically signed by: Antoine Daly MD  Date:    08/31/2021  Time:    10:11     ROS: The fourteen point review of system was performed.   Constitutional:  + fatigue  Eyes:                        +blurry vision  ENT:   +ringing in the ears   Cardiovascular:  Denies stroke, CAD, arrhythmia, CHF or other disease excepting any listed above.  Pulmonary:  Denies dyspnea, COPD, RAD or infection or neoplasm excepting any listed above.  Gastrointestinal: Denies ulcer disease, liver or other disease excepting any listed above.  Skin:   Denies rash, skin cancer, or other cutaneous disorder excepting any listed above.  Neurological:  See HPI  Musculoskeletal: +muscle, joint pain, neck pain   Heme/Lymphatic: Denies any blood or lymph system neoplasm or disorder excepting any listed above.  Endocrine:  Denies any thyroid or other disorders, excepting any listed above.  Allergic/Immuno: Denies any autoimmune disease or allergy excepting any listed above.  Psychiatric:  Denies any disorder or treatment excepting any listed above.  Urologic:  Denies any difficulties with the urinary system or sexual function except noted above.    Past Medical History:   Diagnosis Date    Chronic rhinitis     DJD (degenerative joint disease), lumbar     GERD (gastroesophageal reflux disease)     Headache     Hiatal hernia     Hyperlipidemia     Hypertension     Liver disease     Fatty Liver    Low back pain     Obesity     PONV (postoperative nausea and vomiting)     Thyroid disease      Past Surgical History:   Procedure  Laterality Date    BILATERAL OOPHORECTOMY  2002    CARPAL TUNNEL RELEASE      Left wrist    CHOLECYSTECTOMY      DISSECTION OF NECK Bilateral 2021    Procedure: DISSECTION, NECK;  Surgeon: Deejay Olsen MD;  Location: [facility] OR;  Service: ENT;  Laterality: Bilateral;  Central neck dissection    HYSTERECTOMY  1984    NECK EXPLORATION Bilateral 2021    Procedure: EXPLORATION, NECK;  Surgeon: Deejay lOsen MD;  Location: [facility] OR;  Service: ENT;  Laterality: Bilateral;  Parathyroid exploration    ROTATOR CUFF REPAIR      Right shoulder    ROTATOR CUFF REPAIR Left 2010    THYROIDECTOMY, BILATERAL Bilateral 2021    Procedure: THYROIDECTOMY,BILATERAL;  Surgeon: Deejay Olsen MD;  Location: [facility] OR;  Service: ENT;  Laterality: Bilateral;    TOTAL VAGINAL HYSTERECTOMY       Family History   Problem Relation Age of Onset    Diabetes Mother     Hypertension Mother     Hyperlipidemia Mother     Heart disease Mother         CHF    Stroke Mother     Heart attack Mother     Hearing loss Mother     Diabetes Sister     Sickle cell anemia Other         nieces, nephews    Lupus Other         niece     Social History     Tobacco Use    Smoking status: Former Smoker     Packs/day: 0.50     Years: 8.00     Pack years: 4.00     Types: Cigarettes     Quit date: 1979     Years since quittin.5    Smokeless tobacco: Never Used   Substance Use Topics    Alcohol use: No    Drug use: Never     Review of patient's allergies indicates:   Allergen Reactions    Iodine and iodide containing products Itching    Shellfish containing products Swelling    Statins-hmg-coa reductase inhibitors Other (See Comments)     Myalgias       Current Outpatient Medications:     acetaminophen (TYLENOL) 650 MG TbSR, Take 650 mg by mouth daily as needed., Disp: , Rfl:     azelastine (ASTELIN) 137 mcg (0.1 %) nasal spray, 1 spray (137 mcg total) by Nasal route 2 (two) times daily., Disp: 30 mL, Rfl: 11    calcium  carbonate 470 mg calcium (1,177 mg) Chew, Take one tablet by mouth twice daily, Disp: 60 each, Rfl: 0    ciprofloxacin HCl (CIPRO) 250 MG tablet, Take 1 tablet (250 mg total) by mouth 2 (two) times daily., Disp: 10 tablet, Rfl: 0    cyclobenzaprine (FLEXERIL) 10 MG tablet, Take 1 tablet (10 mg total) by mouth 3 (three) times daily as needed for Muscle spasms., Disp: 30 tablet, Rfl: 0    fluticasone propionate (FLONASE) 50 mcg/actuation nasal spray, 2 sprays (100 mcg total) by Each Nostril route 2 (two) times daily., Disp: 9.9 mL, Rfl: 11    levothyroxine (SYNTHROID) 150 MCG tablet, Take 1 tablet (150 mcg total) by mouth before breakfast., Disp: 30 tablet, Rfl: 11    linaclotide (LINZESS ORAL), Take 72 mg by mouth once daily., Disp: , Rfl:     multivit,min/folic acid/udy714 (MENOPAUSE SUPPLEMENT ORAL), Take 400 mg by mouth once daily. , Disp: , Rfl:     NIFEdipine (ADALAT CC) 60 MG TbSR, , Disp: , Rfl:     ondansetron (ZOFRAN-ODT) 4 MG TbDL, Bring prescription to radioactive iodine treatment.  Sig 1 PO with or immediately before treatment with radioactive iodine.  Then sig 1 PO TID PRN nausea., Disp: 10 tablet, Rfl: 0    pantoprazole (PROTONIX) 40 MG tablet, Take by mouth once daily. , Disp: , Rfl:     psyllium husk/aspartame (METAMUCIL FIBER SINGLES ORAL), Take by mouth., Disp: , Rfl:     furosemide (LASIX) 40 MG tablet, Take 1 tablet (40 mg total) by mouth 2 (two) times daily as needed., Disp: 60 tablet, Rfl: 1    Current Facility-Administered Medications:     DOBUTamine 1000 mg in D5W 250 mL infusion (premix titrating), 10 mcg/kg/min, Intravenous, Continuous, Naina Roque MD, Last Rate: 15.8 mL/hr at 07/15/21 1108, 10 mcg/kg/min at 07/15/21 1108    PHYSICAL EXAMINATION  Vitals:    06/27/22 0839   BP: (!) 176/79   Pulse: 61   Resp: 17   General: The patient is a well-developed, well-nourished looking of stated age in no acute distress.  Head: Normocephalic, atraumatic  Eyes, ears, nose and throat:  normal.  Neck: Supple  Cardiovascular: No carotid bruits.  Regular rate and rhythm.   +ttp over left temporalis, supraorbital, supratrochlear   Good temporal artery pulses   No induration of the temporal arteries  No pain on palpation in occipital arteries   NEUROLOGIC EXAM:  MENTAL: The patient is awake, alert and oriented times to time, place, location and situation. Intact recent memory, attention, concentration. Language and speech are normal. No aphasia, no dysarthria  CRANIAL NERVES:   CN II: visual fields are intact to confrontation with no defects;  funduscopic examination is within normal limits without evidence of papilledema and signs of venous pulsations.  Pupils are equal, round and reactive to light and accommodation.    III, IV and VI: extraocular movements are intact to all directions of gaze with normal convergence.  V: facial sensation is intact to light touch in divisions V1 through V3.    VII: Facial muscle strength is intact to eyebrow raising, forceful eyelid closure, and cheek puffing.    VIII: Hearing acuity is intact to finger rub.  IX, X: palate rises symmetrically and uvula midline.    XI: Sternocleidomastoid and trapezius strength are 5/5 bilaterally.    XII: Tongue protrudes midline without atrophy or fasciculations.   MOTOR EXAM: No atrophy or fasciculations are present. No pronator drift,  shows normal strength ( 5/5 strength )in all 4 extremities. Tone is normal. SENSORY EXAM: intact pinprick, light touch, vibration, and position bilaterally.  CEREBELLAR EXAM: shows normal finger-to-nose and heel-to-shin.   DEEP TENDON REFLEXES:  +2 in brachioradialis, biceps, triceps, knee jerks, ankle jerks, downgoing toes b/l. No abnormal reflexes are present.  GAIT/STANCE: Romberg Negative.  Standard gait was normal with normal stride, arm swing and turning.  Normal heel and toe walking and tandem gait.       Impression:  Headache - since about 2017 or so and over time it became more frequent and  more disabling mainly left sided throbbing mainly in left temporal, parietal area with  cutaneous allodynia to the left temple. She has been having some years now of scalp dysesthesias as well. The scalp paresthesias are all the time constant and they can wake her up from sleep overnight. She has a background history of migraine in her 30s where she had severe headache with phonophobia, photophobia and osmophobia , but had not had that in many years. She mentions headache waking up around 4 days a week up to daily lasting about 2-3 hours or so, APAP helps some, but does not necessarily resolve it. Her neurological examination has no temporal artery induration or pain on palpation, good artery pulses. She has an antalgic slow gait, but otherwise no deficits. At this point discussed MRI brain w/wo contrast and MRI C spine as she has been having pain in the right occipital area that comes up from the neck and aggravated when she moves her neck. Will start low dose gabapentin and magnesium. For escalations will provide ubrelvy as APAP does not work well for her. She has contraindications to triptans/ergotamines. Discussed nifedipine is known to cause headache/aggravate migraine.     Scalp dysesthesias - for years now, it's constant. Will have to get neuroimaging as it does sound like a neuropathic phenomena on review of symptoms. Will check labs as well including A1c.     Migraine without aura - in her 30s, had been in remission     Comorbidities:  HTN - reports typically well controlled, but today elevated on nifedipine and olmesartan/HTCZ  HL - on statin   fatty liver  Obesity  GERD  Hiatal hernia   Papillary thyroid cancer  s/p parathyroidectomy  hypothyroidism - followed closely by endocrinology     Plan:   1- For preventive management: a. Start gabapentin 300mg nightly x 1 weeks, then 600mg nightly           B. Start magnesium 400mg to 500mg daily     2- Acute management: ubrelvy 50mg at onset of headache, can  repeat after 2 hours. Max 2/day.     3- MRI brain w/wo contrast (MP rage series), MRI C spine     4- Physical therapy for cervical myofascial pain    5- Labs: CRP, ESR, A1c     RTC in 2 months or sooner depending on results.         Smiley Sanchez MD   Board Certified Neurologist  [facility] Certified Headache Medicine

## 2022-06-30 ENCOUNTER — TELEPHONE (OUTPATIENT)
Dept: PHARMACY | Facility: CLINIC | Age: 67
End: 2022-06-30
Payer: MEDICARE

## 2022-06-30 RX ORDER — UBROGEPANT 50 MG/1
50 TABLET ORAL ONCE AS NEEDED
COMMUNITY
End: 2022-08-25 | Stop reason: SDUPTHER

## 2022-06-30 NOTE — TELEPHONE ENCOUNTER
Ubrelvy 50mg tablet prescription has been APPROVED FROM 1/1/22 TO 12/31/22 with copayment of $33.00.       [facility] will reach out to patient for further correspondence

## 2022-07-07 ENCOUNTER — TELEPHONE (OUTPATIENT)
Dept: NEUROLOGY | Facility: CLINIC | Age: 67
End: 2022-07-07
Payer: MEDICARE

## 2022-07-07 NOTE — TELEPHONE ENCOUNTER
----- Message from Paulino Roblero sent at 7/7/2022 10:01 AM CDT -----  Regarding: MRI Orders  Type: Needs Medical Advice    Who Called:  Mariah    Jm Call Back Number: [phone]    Additional Information: PT has MRI Scheduled at [facility] in August but does not want to wait that long. She found BR location that can do Friday 7/15/22. Please send MRI Orders to [facility], [phone]. Pt did not have fax. Please send today so pt can be seen next Friday.

## 2022-07-07 NOTE — TELEPHONE ENCOUNTER
Called patient and notified that the orders have been faxed to [facility] ([phone]) per her request. Verbalized understanding.

## 2022-07-18 ENCOUNTER — OFFICE VISIT (OUTPATIENT)
Dept: INTERNAL MEDICINE | Facility: CLINIC | Age: 67
End: 2022-07-18
Payer: MEDICARE

## 2022-07-18 ENCOUNTER — LAB VISIT (OUTPATIENT)
Dept: LAB | Facility: HOSPITAL | Age: 67
End: 2022-07-18
Attending: NURSE PRACTITIONER
Payer: MEDICARE

## 2022-07-18 VITALS
TEMPERATURE: 98 F | HEIGHT: 64 IN | DIASTOLIC BLOOD PRESSURE: 88 MMHG | WEIGHT: 222 LBS | RESPIRATION RATE: 18 BRPM | BODY MASS INDEX: 37.9 KG/M2 | SYSTOLIC BLOOD PRESSURE: 138 MMHG | OXYGEN SATURATION: 98 % | HEART RATE: 72 BPM

## 2022-07-18 DIAGNOSIS — R51.9 FREQUENT HEADACHES: ICD-10-CM

## 2022-07-18 DIAGNOSIS — I10 PRIMARY HYPERTENSION: ICD-10-CM

## 2022-07-18 DIAGNOSIS — J32.9 SINUSITIS, UNSPECIFIED CHRONICITY, UNSPECIFIED LOCATION: ICD-10-CM

## 2022-07-18 DIAGNOSIS — R42 LIGHT HEADEDNESS: ICD-10-CM

## 2022-07-18 DIAGNOSIS — Z00.00 ROUTINE MEDICAL EXAM: Primary | ICD-10-CM

## 2022-07-18 DIAGNOSIS — E89.0 POST-OPERATIVE HYPOTHYROIDISM: ICD-10-CM

## 2022-07-18 LAB
ANION GAP SERPL CALC-SCNC: 11 MMOL/L (ref 8–16)
BASOPHILS # BLD AUTO: 0.06 K/UL (ref 0–0.2)
BASOPHILS NFR BLD: 1.9 % (ref 0–1.9)
BUN SERPL-MCNC: 15 MG/DL (ref 8–23)
CALCIUM SERPL-MCNC: 9.1 MG/DL (ref 8.7–10.5)
CHLORIDE SERPL-SCNC: 104 MMOL/L (ref 95–110)
CO2 SERPL-SCNC: 27 MMOL/L (ref 23–29)
CREAT SERPL-MCNC: 0.9 MG/DL (ref 0.5–1.4)
DIFFERENTIAL METHOD: ABNORMAL
EOSINOPHIL # BLD AUTO: 0.2 K/UL (ref 0–0.5)
EOSINOPHIL NFR BLD: 5.4 % (ref 0–8)
ERYTHROCYTE [DISTWIDTH] IN BLOOD BY AUTOMATED COUNT: 13.3 % (ref 11.5–14.5)
EST. GFR  (AFRICAN AMERICAN): >60 ML/MIN/1.73 M^2
EST. GFR  (NON AFRICAN AMERICAN): >60 ML/MIN/1.73 M^2
GLUCOSE SERPL-MCNC: 89 MG/DL (ref 70–110)
HCT VFR BLD AUTO: 36 % (ref 37–48.5)
HGB BLD-MCNC: 11.8 G/DL (ref 12–16)
IMM GRANULOCYTES # BLD AUTO: 0.01 K/UL (ref 0–0.04)
IMM GRANULOCYTES NFR BLD AUTO: 0.3 % (ref 0–0.5)
LYMPHOCYTES # BLD AUTO: 1.4 K/UL (ref 1–4.8)
LYMPHOCYTES NFR BLD: 44.4 % (ref 18–48)
MCH RBC QN AUTO: 30.3 PG (ref 27–31)
MCHC RBC AUTO-ENTMCNC: 32.8 G/DL (ref 32–36)
MCV RBC AUTO: 92 FL (ref 82–98)
MONOCYTES # BLD AUTO: 0.5 K/UL (ref 0.3–1)
MONOCYTES NFR BLD: 16.2 % (ref 4–15)
NEUTROPHILS # BLD AUTO: 1 K/UL (ref 1.8–7.7)
NEUTROPHILS NFR BLD: 31.8 % (ref 38–73)
NRBC BLD-RTO: 0 /100 WBC
PLATELET # BLD AUTO: 297 K/UL (ref 150–450)
PMV BLD AUTO: 10.8 FL (ref 9.2–12.9)
POTASSIUM SERPL-SCNC: 3.7 MMOL/L (ref 3.5–5.1)
RBC # BLD AUTO: 3.9 M/UL (ref 4–5.4)
SODIUM SERPL-SCNC: 142 MMOL/L (ref 136–145)
T4 FREE SERPL-MCNC: 1.5 NG/DL (ref 0.71–1.51)
TSH SERPL DL<=0.005 MIU/L-ACNC: 0.01 UIU/ML (ref 0.4–4)
WBC # BLD AUTO: 3.15 K/UL (ref 3.9–12.7)

## 2022-07-18 PROCEDURE — 3075F PR MOST RECENT SYSTOLIC BLOOD PRESS GE 130-139MM HG: ICD-10-PCS | Mod: CPTII,S$GLB,, | Performed by: NURSE PRACTITIONER

## 2022-07-18 PROCEDURE — 1160F RVW MEDS BY RX/DR IN RCRD: CPT | Mod: CPTII,S$GLB,, | Performed by: NURSE PRACTITIONER

## 2022-07-18 PROCEDURE — 36415 COLL VENOUS BLD VENIPUNCTURE: CPT | Mod: PO | Performed by: NURSE PRACTITIONER

## 2022-07-18 PROCEDURE — 3075F SYST BP GE 130 - 139MM HG: CPT | Mod: CPTII,S$GLB,, | Performed by: NURSE PRACTITIONER

## 2022-07-18 PROCEDURE — 84443 ASSAY THYROID STIM HORMONE: CPT | Performed by: NURSE PRACTITIONER

## 2022-07-18 PROCEDURE — 3288F PR FALLS RISK ASSESSMENT DOCUMENTED: ICD-10-PCS | Mod: CPTII,S$GLB,, | Performed by: NURSE PRACTITIONER

## 2022-07-18 PROCEDURE — 1126F PR PAIN SEVERITY QUANTIFIED, NO PAIN PRESENT: ICD-10-PCS | Mod: CPTII,S$GLB,, | Performed by: NURSE PRACTITIONER

## 2022-07-18 PROCEDURE — 1126F AMNT PAIN NOTED NONE PRSNT: CPT | Mod: CPTII,S$GLB,, | Performed by: NURSE PRACTITIONER

## 2022-07-18 PROCEDURE — 84439 ASSAY OF FREE THYROXINE: CPT | Performed by: NURSE PRACTITIONER

## 2022-07-18 PROCEDURE — 1101F PR PT FALLS ASSESS DOC 0-1 FALLS W/OUT INJ PAST YR: ICD-10-PCS | Mod: CPTII,S$GLB,, | Performed by: NURSE PRACTITIONER

## 2022-07-18 PROCEDURE — 85025 COMPLETE CBC W/AUTO DIFF WBC: CPT | Performed by: NURSE PRACTITIONER

## 2022-07-18 PROCEDURE — 3288F FALL RISK ASSESSMENT DOCD: CPT | Mod: CPTII,S$GLB,, | Performed by: NURSE PRACTITIONER

## 2022-07-18 PROCEDURE — 1160F PR REVIEW ALL MEDS BY PRESCRIBER/CLIN PHARMACIST DOCUMENTED: ICD-10-PCS | Mod: CPTII,S$GLB,, | Performed by: NURSE PRACTITIONER

## 2022-07-18 PROCEDURE — 3079F PR MOST RECENT DIASTOLIC BLOOD PRESSURE 80-89 MM HG: ICD-10-PCS | Mod: CPTII,S$GLB,, | Performed by: NURSE PRACTITIONER

## 2022-07-18 PROCEDURE — 1159F PR MEDICATION LIST DOCUMENTED IN MEDICAL RECORD: ICD-10-PCS | Mod: CPTII,S$GLB,, | Performed by: NURSE PRACTITIONER

## 2022-07-18 PROCEDURE — 99999 PR PBB SHADOW E&M-EST. PATIENT-LVL V: CPT | Mod: PBBFAC,,, | Performed by: NURSE PRACTITIONER

## 2022-07-18 PROCEDURE — 99999 PR PBB SHADOW E&M-EST. PATIENT-LVL V: ICD-10-PCS | Mod: PBBFAC,,, | Performed by: NURSE PRACTITIONER

## 2022-07-18 PROCEDURE — 3008F BODY MASS INDEX DOCD: CPT | Mod: CPTII,S$GLB,, | Performed by: NURSE PRACTITIONER

## 2022-07-18 PROCEDURE — 3079F DIAST BP 80-89 MM HG: CPT | Mod: CPTII,S$GLB,, | Performed by: NURSE PRACTITIONER

## 2022-07-18 PROCEDURE — 99213 PR OFFICE/OUTPT VISIT, EST, LEVL III, 20-29 MIN: ICD-10-PCS | Mod: S$GLB,,, | Performed by: NURSE PRACTITIONER

## 2022-07-18 PROCEDURE — 1101F PT FALLS ASSESS-DOCD LE1/YR: CPT | Mod: CPTII,S$GLB,, | Performed by: NURSE PRACTITIONER

## 2022-07-18 PROCEDURE — 3008F PR BODY MASS INDEX (BMI) DOCUMENTED: ICD-10-PCS | Mod: CPTII,S$GLB,, | Performed by: NURSE PRACTITIONER

## 2022-07-18 PROCEDURE — 99213 OFFICE O/P EST LOW 20 MIN: CPT | Mod: S$GLB,,, | Performed by: NURSE PRACTITIONER

## 2022-07-18 PROCEDURE — 80048 BASIC METABOLIC PNL TOTAL CA: CPT | Performed by: NURSE PRACTITIONER

## 2022-07-18 PROCEDURE — 1159F MED LIST DOCD IN RCRD: CPT | Mod: CPTII,S$GLB,, | Performed by: NURSE PRACTITIONER

## 2022-07-18 RX ORDER — OLMESARTAN MEDOXOMIL AND HYDROCHLOROTHIAZIDE 40/12.5 40; 12.5 MG/1; MG/1
1 TABLET ORAL DAILY
COMMUNITY
End: 2022-11-01

## 2022-07-18 NOTE — PROGRESS NOTES
Subjective:       Patient ID: Mariah Meza is a 66 y.o. female.    Chief Complaint: Follow-up (3 mo)    Patient presents for 3 month follow up.  Reports right ear and jaw pain.   Started about 3 days ago.      Reports no abdominal pain.     Reports dizziness.  Recently started taking Ubrelvy.  Taking at night but very drowsy during the day.      Review of Systems   Constitutional: Negative for chills and fatigue.   HENT: Positive for nasal congestion, ear pain and rhinorrhea.    Respiratory: Negative for cough.    Cardiovascular: Negative for chest pain and leg swelling.   Gastrointestinal: Negative for abdominal pain, constipation and diarrhea.   Neurological: Positive for dizziness and light-headedness.   Psychiatric/Behavioral: Negative for agitation and confusion.         Objective:      Physical Exam  Vitals reviewed.   Constitutional:       Appearance: Normal appearance.   HENT:      Head: Normocephalic.      Ears:      Comments: Dull TM     Nose:      Right Sinus: Frontal sinus tenderness present.      Left Sinus: Frontal sinus tenderness present.   Cardiovascular:      Rate and Rhythm: Normal rate and regular rhythm.   Pulmonary:      Effort: Pulmonary effort is normal.      Breath sounds: Normal breath sounds.   Musculoskeletal:         General: Normal range of motion.   Skin:     General: Skin is warm.   Neurological:      General: No focal deficit present.      Mental Status: She is alert and oriented to person, place, and time.   Psychiatric:         Mood and Affect: Mood normal.         Behavior: Behavior normal. Behavior is cooperative.         Assessment:       Problem List Items Addressed This Visit     Hypertension    Relevant Orders    CBC Auto Differential    Post-operative hypothyroidism    Relevant Orders    TSH    Frequent headaches    Relevant Orders    CBC Auto Differential      Other Visit Diagnoses     Routine medical exam    -  Primary    Relevant Orders    Urinalysis    Sinusitis,  unspecified chronicity, unspecified location        Encoaurge to take Claritin consistently     Light headedness        Relevant Orders    TSH    CBC Auto Differential    Urinalysis    Basic Metabolic Panel          Plan:       Routine medical exam  -     Urinalysis; Future; Expected date: 07/18/2022    Sinusitis, unspecified chronicity, unspecified location  Comments:  Encoaurge to take Claritin consistently     Post-operative hypothyroidism  -     TSH; Future; Expected date: 07/18/2022    Frequent headaches  -     CBC Auto Differential; Future; Expected date: 07/18/2022    Primary hypertension  -     CBC Auto Differential; Future; Expected date: 07/18/2022    Light headedness  -     TSH; Future; Expected date: 07/18/2022  -     CBC Auto Differential; Future; Expected date: 07/18/2022  -     Urinalysis; Future; Expected date: 07/18/2022  -     Basic Metabolic Panel; Future; Expected date: 07/18/2022        Labs today     Claritin.     She will reach out to neurology team about Ubrelvy and drowsiness.       Please fax her MRI orders to [facility] ([phone]).  Also give her a copy       2 week f/u on dizziness/ear pain

## 2022-07-19 ENCOUNTER — TELEPHONE (OUTPATIENT)
Dept: NEUROLOGY | Facility: CLINIC | Age: 67
End: 2022-07-19
Payer: MEDICARE

## 2022-07-19 NOTE — TELEPHONE ENCOUNTER
----- Message from Shu Ramos sent at 7/19/2022  9:38 AM CDT -----  Contact: pt at [phone]  Type: Needs Medical Advice  Who Called:  pt  Best Call Back Number: [phone]  Additional Information: pt is calling the office to let the Dr know that her Rx for gabapentin (NEURONTIN) 300 MG capsule is making her discombobulated. The pt thinks the Rx may be too strong. Please call back and advise.

## 2022-07-19 NOTE — TELEPHONE ENCOUNTER
Spoke with Ms Lemus, informed of Dr Diaz recommendation to to go down to Gabapentin 300mg dose and continue with this does.  Advised to call with updates next week. Verbalized understanding.

## 2022-07-19 NOTE — TELEPHONE ENCOUNTER
----- Message from Madeline Rosas sent at 7/19/2022 11:38 AM CDT -----  Regarding: returning call  Contact: patient  Type:  Patient Returning Call    Who Called:  patient  Who Left Message for Patient:  Laila  Does the patient know what this is regarding?:  medication  Best Call Back Number: [phone]  Additional Information:  Please call patient.Thanks!

## 2022-07-19 NOTE — TELEPHONE ENCOUNTER
Spoke with patient to discuss  symptoms of feeling discombobulated with Gabapentin.  Pt mentioned she is on the 600mg dose.  She finds herself feeling foggy, brain numb, stepping over feet.  She seen her PCP yesterday and was told to stop medication and call neurologist to let her know.  She did mention she had a decrease in symptoms, while on the 300mg.

## 2022-08-01 ENCOUNTER — OFFICE VISIT (OUTPATIENT)
Dept: INTERNAL MEDICINE | Facility: CLINIC | Age: 67
End: 2022-08-01
Payer: MEDICARE

## 2022-08-01 VITALS
OXYGEN SATURATION: 95 % | TEMPERATURE: 98 F | RESPIRATION RATE: 15 BRPM | HEART RATE: 64 BPM | DIASTOLIC BLOOD PRESSURE: 84 MMHG | SYSTOLIC BLOOD PRESSURE: 130 MMHG | WEIGHT: 222.75 LBS | BODY MASS INDEX: 38.24 KG/M2

## 2022-08-01 DIAGNOSIS — Z09 FOLLOW UP: Primary | ICD-10-CM

## 2022-08-01 DIAGNOSIS — I10 PRIMARY HYPERTENSION: ICD-10-CM

## 2022-08-01 DIAGNOSIS — R42 DIZZINESS: ICD-10-CM

## 2022-08-01 PROCEDURE — 3008F PR BODY MASS INDEX (BMI) DOCUMENTED: ICD-10-PCS | Mod: CPTII,S$GLB,, | Performed by: NURSE PRACTITIONER

## 2022-08-01 PROCEDURE — 1160F PR REVIEW ALL MEDS BY PRESCRIBER/CLIN PHARMACIST DOCUMENTED: ICD-10-PCS | Mod: CPTII,S$GLB,, | Performed by: NURSE PRACTITIONER

## 2022-08-01 PROCEDURE — 3075F SYST BP GE 130 - 139MM HG: CPT | Mod: CPTII,S$GLB,, | Performed by: NURSE PRACTITIONER

## 2022-08-01 PROCEDURE — 3288F PR FALLS RISK ASSESSMENT DOCUMENTED: ICD-10-PCS | Mod: CPTII,S$GLB,, | Performed by: NURSE PRACTITIONER

## 2022-08-01 PROCEDURE — 99213 PR OFFICE/OUTPT VISIT, EST, LEVL III, 20-29 MIN: ICD-10-PCS | Mod: S$GLB,,, | Performed by: NURSE PRACTITIONER

## 2022-08-01 PROCEDURE — 1159F MED LIST DOCD IN RCRD: CPT | Mod: CPTII,S$GLB,, | Performed by: NURSE PRACTITIONER

## 2022-08-01 PROCEDURE — 1160F RVW MEDS BY RX/DR IN RCRD: CPT | Mod: CPTII,S$GLB,, | Performed by: NURSE PRACTITIONER

## 2022-08-01 PROCEDURE — 99999 PR PBB SHADOW E&M-EST. PATIENT-LVL IV: ICD-10-PCS | Mod: PBBFAC,,, | Performed by: NURSE PRACTITIONER

## 2022-08-01 PROCEDURE — 1101F PT FALLS ASSESS-DOCD LE1/YR: CPT | Mod: CPTII,S$GLB,, | Performed by: NURSE PRACTITIONER

## 2022-08-01 PROCEDURE — 1126F AMNT PAIN NOTED NONE PRSNT: CPT | Mod: CPTII,S$GLB,, | Performed by: NURSE PRACTITIONER

## 2022-08-01 PROCEDURE — 3075F PR MOST RECENT SYSTOLIC BLOOD PRESS GE 130-139MM HG: ICD-10-PCS | Mod: CPTII,S$GLB,, | Performed by: NURSE PRACTITIONER

## 2022-08-01 PROCEDURE — 3288F FALL RISK ASSESSMENT DOCD: CPT | Mod: CPTII,S$GLB,, | Performed by: NURSE PRACTITIONER

## 2022-08-01 PROCEDURE — 1101F PR PT FALLS ASSESS DOC 0-1 FALLS W/OUT INJ PAST YR: ICD-10-PCS | Mod: CPTII,S$GLB,, | Performed by: NURSE PRACTITIONER

## 2022-08-01 PROCEDURE — 3079F DIAST BP 80-89 MM HG: CPT | Mod: CPTII,S$GLB,, | Performed by: NURSE PRACTITIONER

## 2022-08-01 PROCEDURE — 1126F PR PAIN SEVERITY QUANTIFIED, NO PAIN PRESENT: ICD-10-PCS | Mod: CPTII,S$GLB,, | Performed by: NURSE PRACTITIONER

## 2022-08-01 PROCEDURE — 3008F BODY MASS INDEX DOCD: CPT | Mod: CPTII,S$GLB,, | Performed by: NURSE PRACTITIONER

## 2022-08-01 PROCEDURE — 99213 OFFICE O/P EST LOW 20 MIN: CPT | Mod: S$GLB,,, | Performed by: NURSE PRACTITIONER

## 2022-08-01 PROCEDURE — 3079F PR MOST RECENT DIASTOLIC BLOOD PRESSURE 80-89 MM HG: ICD-10-PCS | Mod: CPTII,S$GLB,, | Performed by: NURSE PRACTITIONER

## 2022-08-01 PROCEDURE — 99999 PR PBB SHADOW E&M-EST. PATIENT-LVL IV: CPT | Mod: PBBFAC,,, | Performed by: NURSE PRACTITIONER

## 2022-08-01 PROCEDURE — 1159F PR MEDICATION LIST DOCUMENTED IN MEDICAL RECORD: ICD-10-PCS | Mod: CPTII,S$GLB,, | Performed by: NURSE PRACTITIONER

## 2022-08-01 NOTE — PROGRESS NOTES
Subjective:       Patient ID: Mariah Meza is a 66 y.o. female.    Chief Complaint: Follow-up    Patient presents for 2 week follow up.  Dizziness and right ear is better.  Stopped the gabapentin.     Reports her home readings are elevated-blood pressure     190-200's systolic.  Took the last two of the nifedipine (had stopped taking).  They were discontinue by the cardiologist due to swelling and pain in legs.     Rechecked bp with home machine 183/92.   Manual blood pressure normal.  No symptoms.      Reports cuff is new.     Recheck 152/78    Review of Systems   Constitutional: Negative for activity change, appetite change, fatigue and fever.   HENT: Negative for nasal congestion, ear discharge, ear pain, mouth sores, nosebleeds, sore throat and tinnitus.    Eyes: Negative for discharge, redness and itching.   Respiratory: Negative for apnea, cough and shortness of breath.    Cardiovascular: Negative for chest pain and leg swelling.   Gastrointestinal: Negative for abdominal distention, abdominal pain, blood in stool and constipation.   Endocrine: Negative for polydipsia, polyphagia and polyuria.   Genitourinary: Negative for difficulty urinating, flank pain, frequency and hematuria.   Musculoskeletal: Negative for back pain, gait problem, neck pain and neck stiffness.   Integumentary:  Negative for rash and wound.   Allergic/Immunologic: Negative for environmental allergies, food allergies and immunocompromised state.   Neurological: Negative for dizziness, seizures, syncope, numbness and headaches.   Hematological: Negative for adenopathy. Does not bruise/bleed easily.   Psychiatric/Behavioral: Negative for agitation, confusion, hallucinations, self-injury and suicidal ideas.         Objective:      Physical Exam  Vitals reviewed.   Constitutional:       Appearance: Normal appearance.   Cardiovascular:      Rate and Rhythm: Normal rate and regular rhythm.   Pulmonary:      Effort: Pulmonary effort is  normal.      Breath sounds: Normal breath sounds.   Musculoskeletal:         General: Normal range of motion.   Skin:     General: Skin is warm.   Neurological:      General: No focal deficit present.      Mental Status: She is alert and oriented to person, place, and time.   Psychiatric:         Mood and Affect: Mood normal.         Behavior: Behavior normal. Behavior is cooperative.         Assessment:       Problem List Items Addressed This Visit     Hypertension      Other Visit Diagnoses     Follow up    -  Primary    Dizziness-resolved               Plan:         Follow up    Primary hypertension    Dizziness-resolved         Three month follow up

## 2022-08-22 ENCOUNTER — TELEPHONE (OUTPATIENT)
Dept: INTERNAL MEDICINE | Facility: CLINIC | Age: 67
End: 2022-08-22
Payer: MEDICARE

## 2022-08-22 NOTE — TELEPHONE ENCOUNTER
I returned a call back to the pt and she wants to know if the new medication that her Cardiologist put her on Carvedilol 6.25 mg has a contradication with any of th meds she was already taking . If so please advise and whether it has any with any foods? Please advise. //kah

## 2022-08-22 NOTE — TELEPHONE ENCOUNTER
----- Message from Padma Mora sent at 8/22/2022  2:05 PM CDT -----  Contact: Mariah Holland is needing a hank back to discuss her blood pressure. Please call her back at [phone]

## 2022-08-25 ENCOUNTER — TELEPHONE (OUTPATIENT)
Dept: NEUROLOGY | Facility: CLINIC | Age: 67
End: 2022-08-25

## 2022-08-25 ENCOUNTER — OFFICE VISIT (OUTPATIENT)
Dept: NEUROLOGY | Facility: CLINIC | Age: 67
End: 2022-08-25
Payer: MEDICARE

## 2022-08-25 ENCOUNTER — TELEPHONE (OUTPATIENT)
Dept: PAIN MEDICINE | Facility: CLINIC | Age: 67
End: 2022-08-25
Payer: MEDICARE

## 2022-08-25 ENCOUNTER — TELEPHONE (OUTPATIENT)
Dept: OTOLARYNGOLOGY | Facility: CLINIC | Age: 67
End: 2022-08-25
Payer: MEDICARE

## 2022-08-25 VITALS
WEIGHT: 226.75 LBS | SYSTOLIC BLOOD PRESSURE: 173 MMHG | BODY MASS INDEX: 38.71 KG/M2 | DIASTOLIC BLOOD PRESSURE: 86 MMHG | HEIGHT: 64 IN | RESPIRATION RATE: 17 BRPM | HEART RATE: 56 BPM | TEMPERATURE: 97 F

## 2022-08-25 DIAGNOSIS — G43.009 MIGRAINE WITHOUT AURA AND WITHOUT STATUS MIGRAINOSUS, NOT INTRACTABLE: Primary | ICD-10-CM

## 2022-08-25 DIAGNOSIS — K11.8 PAROTID MASS: Primary | ICD-10-CM

## 2022-08-25 DIAGNOSIS — R20.8 DYSESTHESIA OF SCALP: ICD-10-CM

## 2022-08-25 DIAGNOSIS — M47.812 SPONDYLOSIS OF CERVICAL REGION WITHOUT MYELOPATHY OR RADICULOPATHY: ICD-10-CM

## 2022-08-25 DIAGNOSIS — M79.18 CERVICAL MYOFASCIAL PAIN SYNDROME: ICD-10-CM

## 2022-08-25 DIAGNOSIS — I10 HYPERTENSION, UNSPECIFIED TYPE: ICD-10-CM

## 2022-08-25 PROCEDURE — 1126F AMNT PAIN NOTED NONE PRSNT: CPT | Mod: CPTII,S$GLB,, | Performed by: PSYCHIATRY & NEUROLOGY

## 2022-08-25 PROCEDURE — 99214 PR OFFICE/OUTPT VISIT, EST, LEVL IV, 30-39 MIN: ICD-10-PCS | Mod: S$GLB,,, | Performed by: PSYCHIATRY & NEUROLOGY

## 2022-08-25 PROCEDURE — 99999 PR PBB SHADOW E&M-EST. PATIENT-LVL V: CPT | Mod: PBBFAC,,, | Performed by: PSYCHIATRY & NEUROLOGY

## 2022-08-25 PROCEDURE — 1160F PR REVIEW ALL MEDS BY PRESCRIBER/CLIN PHARMACIST DOCUMENTED: ICD-10-PCS | Mod: CPTII,S$GLB,, | Performed by: PSYCHIATRY & NEUROLOGY

## 2022-08-25 PROCEDURE — 3079F DIAST BP 80-89 MM HG: CPT | Mod: CPTII,S$GLB,, | Performed by: PSYCHIATRY & NEUROLOGY

## 2022-08-25 PROCEDURE — 99999 PR PBB SHADOW E&M-EST. PATIENT-LVL V: ICD-10-PCS | Mod: PBBFAC,,, | Performed by: PSYCHIATRY & NEUROLOGY

## 2022-08-25 PROCEDURE — 1101F PR PT FALLS ASSESS DOC 0-1 FALLS W/OUT INJ PAST YR: ICD-10-PCS | Mod: CPTII,S$GLB,, | Performed by: PSYCHIATRY & NEUROLOGY

## 2022-08-25 PROCEDURE — 99214 OFFICE O/P EST MOD 30 MIN: CPT | Mod: S$GLB,,, | Performed by: PSYCHIATRY & NEUROLOGY

## 2022-08-25 PROCEDURE — 3077F SYST BP >= 140 MM HG: CPT | Mod: CPTII,S$GLB,, | Performed by: PSYCHIATRY & NEUROLOGY

## 2022-08-25 PROCEDURE — 1160F RVW MEDS BY RX/DR IN RCRD: CPT | Mod: CPTII,S$GLB,, | Performed by: PSYCHIATRY & NEUROLOGY

## 2022-08-25 PROCEDURE — 3288F FALL RISK ASSESSMENT DOCD: CPT | Mod: CPTII,S$GLB,, | Performed by: PSYCHIATRY & NEUROLOGY

## 2022-08-25 PROCEDURE — 1101F PT FALLS ASSESS-DOCD LE1/YR: CPT | Mod: CPTII,S$GLB,, | Performed by: PSYCHIATRY & NEUROLOGY

## 2022-08-25 PROCEDURE — 3008F BODY MASS INDEX DOCD: CPT | Mod: CPTII,S$GLB,, | Performed by: PSYCHIATRY & NEUROLOGY

## 2022-08-25 PROCEDURE — 3288F PR FALLS RISK ASSESSMENT DOCUMENTED: ICD-10-PCS | Mod: CPTII,S$GLB,, | Performed by: PSYCHIATRY & NEUROLOGY

## 2022-08-25 PROCEDURE — 1159F MED LIST DOCD IN RCRD: CPT | Mod: CPTII,S$GLB,, | Performed by: PSYCHIATRY & NEUROLOGY

## 2022-08-25 PROCEDURE — 3008F PR BODY MASS INDEX (BMI) DOCUMENTED: ICD-10-PCS | Mod: CPTII,S$GLB,, | Performed by: PSYCHIATRY & NEUROLOGY

## 2022-08-25 PROCEDURE — 3079F PR MOST RECENT DIASTOLIC BLOOD PRESSURE 80-89 MM HG: ICD-10-PCS | Mod: CPTII,S$GLB,, | Performed by: PSYCHIATRY & NEUROLOGY

## 2022-08-25 PROCEDURE — 1159F PR MEDICATION LIST DOCUMENTED IN MEDICAL RECORD: ICD-10-PCS | Mod: CPTII,S$GLB,, | Performed by: PSYCHIATRY & NEUROLOGY

## 2022-08-25 PROCEDURE — 3077F PR MOST RECENT SYSTOLIC BLOOD PRESSURE >= 140 MM HG: ICD-10-PCS | Mod: CPTII,S$GLB,, | Performed by: PSYCHIATRY & NEUROLOGY

## 2022-08-25 PROCEDURE — 1126F PR PAIN SEVERITY QUANTIFIED, NO PAIN PRESENT: ICD-10-PCS | Mod: CPTII,S$GLB,, | Performed by: PSYCHIATRY & NEUROLOGY

## 2022-08-25 RX ORDER — UBROGEPANT 50 MG/1
50 TABLET ORAL ONCE AS NEEDED
Qty: 10 TABLET | Refills: 6 | Status: SHIPPED | OUTPATIENT
Start: 2022-08-25 | End: 2022-08-25

## 2022-08-25 NOTE — PROGRESS NOTES
"[facility]    Chief complaint: headache     History of Present Illness:    66 Y RH F with HTN, HL, fatty liver, obesity, GERD, hiatal hernia,  papillary thyroid cancer  s/p parathyroidectomy, hypothyroidism that presents for  Further evaluation of migraine. She was last seen on 6/22 at which time she was having 4 days/week lasting from 4 hours up to the whole day. She was started on gabapentin and magnesium. Was given Ubrelvy 50mg PRN for care.  She was sent for MRI brain w/wo contrast and MRI C spine. MRI brain showed some scattered small vessel disease, MRI C spine revealed multilevel degenerative chagnes with mild spinal canal stenosis at levels C3-4, C4/5, C5/6, C6/7 along with multilevel foraminal stenosis. A cystic left superficial parotid lesion was seen on MRI, radiology had broad differencial and recommended ENT evaluation.      Today she reports that her headache and the scalp dysesthesias improved significantly and she dropped down to maybe 1 attack per week only. She mentions that she did start gabapentin up to 600mg nightly, but had significant side effects imbalance, foggy, "brain numb"  And was stopped from mediation. She never took the magnesium. Headache is much better. She mentiosn that the one time a week she gets headache she mentions that it's nto as bad. She mentions that now she is walking every morning and helps a lot. She mentions that the food she eats can trigger some things like making her feel bad and is unsure if it's her glucose increasing or BP increasing. She mentions that with some foods she gets an overwhelming head feeling. This will start short time after eating like a salad with dressing for about 20 minutes and then it resolves. She mentions that Ubrelvy 50mg is resolving the headache full and about 30 minutes it resolves. She had some very minimal crawling sensation in the occipital area.  She mentions that from the Rt shoulder blad and " into the neck. She has done PT before for neck pain, which has helped, but does not keep up with it.      Her BP here 186/91 , she mentions that her Bps at home have been in 160s/70s to 90s the last week. She has been taking her BP medications as prescribed.      Headache history:  Age at onset and course over time: she has had history migraine in the past which were in the 30s, she was diagnosed with migraine then, they were infrequent, but she recalls the severe headache with phonophobia, phonophobia, osmophobia. She went on remission of the bad migraine. She notes that around 2017 or so she has been having headache on the left side now. She feels that at nighttimes she feels that there is movement/crawling around her head. She is currently having headache in the morning upon awakening she has a slight headache which is about 4 days/week. She is having this headache for about 2-3 hours or so. APAP helps it and eventually headache resolves within hours or the whole day. She mentions that when overnight she will have headache on left side with crawling sensation all over the head. She has the crawling sensation all day long   Location: left side for morning headache  Character: stabbing, pressure, sharp   Left sided throbbing, cutaneous allodynia, she has sensation of burning in the frontal/left periorbital area, but no eye pain.   Intensity:  Moderate pain level she states 6/10, overnight she has crawling sensation which is annoying which wakes her up.   Frequency: 4 days a week or so.   Duration: >4 hours up to the whole day.   Aura: none   Associated symptoms: neck pain describes on right side like a vibration in the head, pressure on the neck, started about 3-4 weeks ago, intermittent lasting for about 15 minutes or so, throughout the day intermittently since beginning of June, some aggravation with neck movement, headache is not related all the time to neck movements,  nausea (constant regardless of having  headache - for years now),   Other neurologic symptoms: dizziness, ringing in ears blurred vision, problems with memory   Precipitating factors: none   Alleviating factors: local pressure, medications  Aggravating factors: bending over (worsens pain already there)  No TVOs  Low pitched ringing   No jaw claudication  no fevers, chills   Family history of headache: none she is aware of  ER/UC visits: none   Caffeine: 1 cup  Sleep: trouble falling asleep, trouble staying asleep, snoring, unrefreshed, she had a sleep study many years ago 7-8 years ago.      Medication history:  Acute:  APAP 3 days/week - limited benefit     Preventive:  Nifedipine 60 mg   Olmesartan/HCTZ 40mg/25mg    Neuroimaging and other studies:   Results for orders placed or performed during the hospital encounter of 08/31/21   CT Head Without Contrast    Narrative    EXAMINATION:  CT HEAD WITHOUT CONTRAST    CLINICAL HISTORY:  Headache, acute, normal neuro exam; Essential (primary) hypertension    TECHNIQUE:  Low dose axial images were obtained through the head.  Coronal and sagittal reformations were also performed. Contrast was not administered.    COMPARISON:  None.    FINDINGS:  The cortical sulcal pattern is normal. No hydrocephalus, midline shift or abnormal mass effect present. No intra-or extra-axial mass or hemorrhage. No CT evidence of large territory acute stroke.    Orbits are unremarkable. Paranasal sinuses are clear. Mastoid air cells are clear. Calvarium is intact.      Impression    No acute intracranial abnormality.      Electronically signed by: Antoine Daly MD  Date:    08/31/2021  Time:    10:11     MRI CERVICAL SPINE WO CONTRAST (08/09/2022)    INDICATION: R51.9 R51.9:Headache, unspecified.    COMPARISON: None.    TECHNIQUE: Multiplanar multisequence magnetic resonance imaging of cervical spine was performed without gadolinium contrast.    FINDINGS:    Straightening and slight reversal of the cervical lordosis. Vertebral  body heights are maintained. STIR hyperintense signal within the right posterior elements surrounding the C3-C4 facet joint. Mild surrounding paraspinal muscle STIR hyperintense signal. Right C1 lateral mass venous malformation (hemangioma). No abnormal cord signal.    C2-C3: No significant canal or foraminal stenosis.    C3-C4: Posterior disc osteophyte complex which effaces the ventral CSF space and creates an indentation upon the spinal cord. Mild canal stenosis. Uncovertebral and facet joint hypertrophy with mild right foraminal stenosis. No significant left foraminal stenosis.    C4-C5: Posterior disc osteophyte complex eccentric to the left which effaces the ventral CSF space, contacts and creates an indentation upon the spinal cord. Mild canal stenosis. Uncovertebral joint hypertrophy without significant foraminal stenosis.    C5-C6: Posterior disc osteophyte complex which partially effaces the ventral CSF space. Mild canal stenosis. Uncovertebral and facet joint hypertrophy with mild bilateral foraminal stenosis.    C6-C7: Posterior disc osteophyte complex which partially effaces the ventral CSF space. Mild canal stenosis. Uncovertebral joint hypertrophy with mild bilateral foraminal stenosis.    C7-T1: No significant canal or foraminal stenosis.    T2 hyperintense left superficial parotid lesion which measures approximately 1.2 x 0.9 cm.      IMPRESSION:    1. Multilevel degenerative changes most pronounced at C4-C5. Mild canal stenosis at multiple levels. No abnormal cord signal.  2. Multilevel foraminal stenosis which is at most mild.  3. Cystic left superficial parotid lesion which is incompletely characterized by spine MRI technique. This may reflect a large lymph node, parotid cyst, cystic primary parotid neoplasm. Recommend nonemergent ENT evaluation.       MRI Brain:    MRI BRAIN W WO CONTRAST (08/09/2022)    INDICATION: R51.9 R51.9:Headache, unspecified G89.29:Other chronic pain    COMPARISON:  None.    TECHNIQUE: Multiplanar multisequence imaging was obtained of the brain with and without the administration of intravenous contrast. Diffusion weighted imaging was acquired.    FINDINGS:    There is no restricted diffusion. No mass or mass effect. No abnormal enhancement. No acute intraparenchymal hemorrhage. Partially empty sella.    Scattered T2/FLAIR hyperintense lesions in the periventricular and subcortical white matter likely represent changes of chronic small vessel ischemic disease.    Normal ventricular size and configuration. No extra-axial fluid collection. The major intracranial arterial flow voids are patent.    IMPRESSION:    1. No acute intracranial abnormality.  2. Scattered lesions in the white matter likely represent changes of chronic small vessel ischemic disease.    NM THYROID METASTATIC CANCER WHOLE BODY SCAN (4/6/22):  CLINICAL HISTORY:  Malignant neoplasm of thyroid gland     TECHNIQUE:  7 days following the oral administration of 54.4 mCi of I-131, planar whole body images were acquired in the anterior and posterior views.  Pinhole images of the neck were also acquired in the anterior view.     COMPARISON:  None.     FINDINGS:  Physiologic activity is visualized in nasal mucosa, salivary glands, stomach, colon, and urinary bladder.  Physiologic diffuse liver uptake is present.     There are no abnormal foci of activity.     Impression:     Activity within the thyroid bed is consistent with post-surgical remnant.     No evidence of iodine-avid metastases.       ROS: The fourteen point review of system was performed.   Constitutional:  + fatigue  Eyes:                        +blurry vision  ENT:   +ringing in the ears   Cardiovascular:  Denies stroke, CAD, arrhythmia, CHF or other disease excepting any listed above.  Pulmonary:  Denies dyspnea, COPD, RAD or infection or neoplasm excepting any listed above.  Gastrointestinal: Denies ulcer disease, liver or other disease excepting any  listed above.  Skin:   Denies rash, skin cancer, or other cutaneous disorder excepting any listed above.  Neurological:  See HPI  Musculoskeletal: +muscle, joint pain, neck pain   Heme/Lymphatic: Denies any blood or lymph system neoplasm or disorder excepting any listed above.  Endocrine:  Denies any thyroid or other disorders, excepting any listed above.  Allergic/Immuno: Denies any autoimmune disease or allergy excepting any listed above.  Psychiatric:  Denies any disorder or treatment excepting any listed above.  Urologic:  Denies any difficulties with the urinary system or sexual function except noted above.    No changes to PMHx, surgical , family history since last appt       Social History     Tobacco Use    Smoking status: Former Smoker     Packs/day: 0.50     Years: 8.00     Pack years: 4.00     Types: Cigarettes     Quit date: 1979     Years since quittin.6    Smokeless tobacco: Never Used   Substance Use Topics    Alcohol use: No    Drug use: Never     Review of patient's allergies indicates:   Allergen Reactions    Iodine and iodide containing products Itching    Shellfish containing products Swelling    Statins-hmg-coa reductase inhibitors Other (See Comments)     Myalgias       Current Outpatient Medications:     acetaminophen (TYLENOL) 650 MG TbSR, Take 650 mg by mouth daily as needed., Disp: , Rfl:     azelastine (ASTELIN) 137 mcg (0.1 %) nasal spray, 1 spray (137 mcg total) by Nasal route 2 (two) times daily., Disp: 30 mL, Rfl: 11    calcium carbonate 470 mg calcium (1,177 mg) Chew, Take one tablet by mouth twice daily, Disp: 60 each, Rfl: 0    cyclobenzaprine (FLEXERIL) 10 MG tablet, Take 1 tablet (10 mg total) by mouth 3 (three) times daily as needed for Muscle spasms., Disp: 30 tablet, Rfl: 0    fluticasone propionate (FLONASE) 50 mcg/actuation nasal spray, 2 sprays (100 mcg total) by Each Nostril route 2 (two) times daily., Disp: 9.9 mL, Rfl: 11    furosemide (LASIX) 40 MG  tablet, Take 1 tablet (40 mg total) by mouth 2 (two) times daily as needed., Disp: 60 tablet, Rfl: 1    levothyroxine (SYNTHROID) 150 MCG tablet, Take 1 tablet (150 mcg total) by mouth before breakfast., Disp: 30 tablet, Rfl: 11    linaclotide (LINZESS ORAL), Take 72 mg by mouth once daily., Disp: , Rfl:     NIFEdipine (ADALAT CC) 60 MG TbSR, , Disp: , Rfl:     olmesartan-hydrochlorothiazide (BENICAR HCT) 40-12.5 mg Tab, Take 1 tablet by mouth once daily., Disp: , Rfl:     ondansetron (ZOFRAN-ODT) 4 MG TbDL, Bring prescription to radioactive iodine treatment.  Sig 1 PO with or immediately before treatment with radioactive iodine.  Then sig 1 PO TID PRN nausea., Disp: 10 tablet, Rfl: 0    pantoprazole (PROTONIX) 40 MG tablet, Take by mouth once daily. , Disp: , Rfl:     psyllium husk/aspartame (METAMUCIL FIBER SINGLES ORAL), Take by mouth., Disp: , Rfl:     ubrogepant (UBROGEPANT) 50 mg tablet, Take 50 mg by mouth once as needed for Migraine., Disp: , Rfl:     Current Facility-Administered Medications:     DOBUTamine 1000 mg in D5W 250 mL infusion (premix titrating), 10 mcg/kg/min, Intravenous, Continuous, Naina Roque MD, Last Rate: 15.8 mL/hr at 07/15/21 1108, 10 mcg/kg/min at 07/15/21 1108    PHYSICAL EXAMINATION  Vitals:    08/25/22 0917   BP: (!) 186/91   Pulse: 64   Resp: 17   Temp: 96.9 °F (36.1 °C)   General: The patient is a well-developed, well-nourished looking of stated age in no acute distress.  Head: Normocephalic, atraumatic  Eyes, ears, nose and throat: normal.  Neck: Supple  Cardiovascular: No carotid bruits.  Regular rate and rhythm.   +ttp over left temporalis, supraorbital, supratrochlear   Good temporal artery pulses   No induration of the temporal arteries  No pain on palpation in occipital arteries   NEUROLOGIC EXAM:  MENTAL: The patient is awake, alert and oriented times to time, place, location and situation. Intact recent memory, attention, concentration. Language and speech are  normal. No aphasia, no dysarthria  CRANIAL NERVES:   CN II: visual fields are intact to confrontation with no defects;  funduscopic examination is within normal limits without evidence of papilledema and signs of venous pulsations.  Pupils are equal, round and reactive to light and accommodation.    III, IV and VI: extraocular movements are intact to all directions of gaze with normal convergence.  V: facial sensation is intact to light touch in divisions V1 through V3.    VII: Facial muscle strength is intact to eyebrow raising, forceful eyelid closure, and cheek puffing.    VIII: Hearing acuity is intact to finger rub.  IX, X: palate rises symmetrically and uvula midline.    XI: Sternocleidomastoid and trapezius strength are 5/5 bilaterally.    XII: Tongue protrudes midline without atrophy or fasciculations.   MOTOR EXAM: No atrophy or fasciculations are present. No pronator drift,  shows normal strength ( 5/5 strength )in all 4 extremities. Tone is normal. SENSORY EXAM: intact pinprick, light touch, vibration, and position bilaterally.  CEREBELLAR EXAM: shows normal finger-to-nose and heel-to-shin.   DEEP TENDON REFLEXES:  +2 in brachioradialis, biceps, triceps, knee jerks, ankle jerks, downgoing toes b/l. No abnormal reflexes are present.  GAIT/STANCE: Romberg Negative.  Standard gait was normal with normal stride, arm swing and turning.  Normal heel and toe walking and tandem gait.       Impression:  Migraine - since about 2017 or so and over time it became more frequent and more disabling mainly left sided throbbing mainly in left temporal, parietal area with  cutaneous allodynia to the left temple. She has been having some years now of scalp dysesthesias as well. The scalp paresthesias are all the time constant and they can wake her up from sleep overnight. She has a background history of migraine in her 30s where she had severe headache with phonophobia, photophobia and osmophobia , but had not had that in  many years. She mentions headache waking up around 4 days a week up to daily lasting about 2-3 hours or so, APAP helps some, but does not necessarily resolve it. Her neurological examination had no temporal artery induration or pain on palpation, good artery pulses and otherwise limited ROM limitations.  She had an antalgic slow gait, but otherwise no deficits. MRI brain did not reveal any acute abnormalities other than small vessel dz. She was intolerant of gabapentin and did not start magnesium. These headache resolve with UBrelvy 50mg.     Chronic cervicalgia/cervical degenerative changes- she is having chronic neck pain, mainly right sided , worsening pain on moving her neck and will shoot up right occipital aera. She has pain on different positions, but much worse wi9th neck movements. Denies any pain down the arms, denies any bladder/bowel problems, denies any falls, denies any weakness in UEs. She had MRI neck - showed multilevel DJD with mild central canal stenosis and neuro foraminal stenosis. She has been PT in the past with improvement, so would like to go back. She is not interested in pain medication at this moment. She is open to discussion with pain management interventional procedures. She agrees with pain management referral in [facility]. Imaging reviewed with patient in clinic. She was intolerant of gabapentin.     Scalp dysesthesias - for years now, it's constant. After short time of gabapentin and d/c gabapentin the dysesthesias have improved significantly and having them infrequently. The only time she has this at bedtime only a few times, but otherwise no symptom noted.     Cystic left superficial parotid lesion - differential from radiology: may reflect large lymph node, parotid cyst, cystic primary parotid neoplasm . She sees Dr. Olsen (ENT) for dry mouth. She had on 4/22 a metastatic whole body scan NM study without any signal on the parotid gland. Will send message to Dr. Olsen.      Comorbidities:  HTN - reports typically well controlled, but today elevated on nifedipine and olmesartan/HTCZ  HL - on statin   Fatty liver  Obesity  GERD  Hiatal hernia   Papillary thyroid cancer  s/p parathyroidectomy  hypothyroidism - followed closely by endocrinology     Plan:   1- For preventive management: a. Start magnesium 400mg to 500mg daily -> over the counter medication     2- Acute management: ubrelvy 50mg at onset of headache, can repeat after 2 hours. Max 2/day.     3- Physical therapy for cervical myofascial pain    4- Follow up with Dr. Olsen for left parotid finding on MRI     5- Referral to pain management and neurology in [place].     Discussed she needs to follow up with neurology, she would like to establish with neurology in [place] for further care. I also discussed with patient I was transitioning out of the [facility] system and she opted to be seen closer to home. Placed referral to pain management and neurology in .         Smiley Sanchez MD   Board Certified Neurologist  [facility] Certified Headache Medicine

## 2022-08-25 NOTE — Clinical Note
"Loy Olsen,   I evaluate Mrs. Meza for neck pain and on MRI C spine a superficial left parotid lesion was noted. The differential was broad as per radiology "large lymph node, parotid cyst, cystic primary parotid neoplasm" and they recommended nonemergent ENT eval. I know you saw her recently and I wanted to let you know these results. I asked patient to also reach out to discuss if further evaluation was needed.   Sincerely,  Smiley Diaz, Headache Medicine"

## 2022-08-25 NOTE — TELEPHONE ENCOUNTER
"----- Message from Smiley Sandhu MD sent at 8/25/2022 12:21 PM CDT -----  Loy Olsen,     I evaluate Mrs. Meza for neck pain and on MRI C spine a superficial left parotid lesion was noted. The differential was broad as per radiology "large lymph node, parotid cyst, cystic primary parotid neoplasm" and they recommended nonemergent ENT eval. I know you saw her recently and I wanted to let you know these results. I asked patient to also reach out to discuss if further evaluation was needed.     Sincerely,    Smiley Diaz,  Headache Medicine  "

## 2022-08-25 NOTE — TELEPHONE ENCOUNTER
Per Dr. Diaz, Smiley    Discussed she needs to follow up with neurology, she would like to establish with neurology in [place] for further care. I also discussed with patient I was transitioning out of the [facility] system and she opted to be seen closer to home. Placed referral to pain management and neurology in . Referral transferred to [facility].

## 2022-08-25 NOTE — TELEPHONE ENCOUNTER
Called pt to advise of scheduled MRI on 10/4/2022 @ 9:00   Pt advised she needs an open MRI can you put another order in for an external MRI at [facility]

## 2022-08-25 NOTE — TELEPHONE ENCOUNTER
----- Message from Paulino Roblero sent at 8/25/2022  2:02 PM CDT -----  Regarding: med question  Type: Needs Medical Advice    Who Called:  Mariah Oneal Call Back Number: [phone]    Additional Information: pt states was told to get an OTC Med to help with headaches. Pt cannot remember what OTC Med was told to get. Please call to discuss.

## 2022-08-26 ENCOUNTER — TELEPHONE (OUTPATIENT)
Dept: PAIN MEDICINE | Facility: CLINIC | Age: 67
End: 2022-08-26
Payer: MEDICARE

## 2022-08-26 NOTE — TELEPHONE ENCOUNTER
----- Message from Areli Barillas sent at 8/26/2022  3:20 PM CDT -----  Contact: pt  Type:  Sooner Apoointment Request    Caller is requesting a sooner appointment.  Caller declined first available appointment listed below.  Caller will not accept being placed on the waitlist and is requesting a message be sent to doctor.  Name of Caller: pt   When is the first available appointment?10/26 - pt is scheduled  Symptoms:   Would the patient rather a call back or a response via [facility]? phone  Best Call Back Number: [phone]  Additional Information: Cervical myofascial pain syndrome [M79.18]  Spondylosis of cervical region without myelopathy or radiculopathy [M47.812

## 2022-08-26 NOTE — TELEPHONE ENCOUNTER
Notified I have placed her apt on the waitlist and unfortunately this is the vikram;iest available appointment at this time.

## 2022-08-26 NOTE — TELEPHONE ENCOUNTER
Called pt and verified she wants to go to [facility] on [facility], Faxed over referral and cancelled MRI scheduled at the [place].   Pt understood verbally

## 2022-08-29 ENCOUNTER — TELEPHONE (OUTPATIENT)
Dept: OTOLARYNGOLOGY | Facility: CLINIC | Age: 67
End: 2022-08-29
Payer: MEDICARE

## 2022-08-29 NOTE — TELEPHONE ENCOUNTER
----- Message from Isela Loredo sent at 8/29/2022  9:58 AM CDT -----  Contact: Mariah Lemus would like a call back at [phone] in regards to the MRI that was ordered for her appointment, patient would like to know does she need the MRI to make an appointment with  cause she hasn't heard from the outside facility to schedule MRI.  Thanks

## 2022-08-29 NOTE — TELEPHONE ENCOUNTER
Called pt, no answer, left vm advising paperwork has been faxed to [facility] on [address] as requested and she can contact them to schedule

## 2022-08-30 ENCOUNTER — TELEPHONE (OUTPATIENT)
Dept: OTOLARYNGOLOGY | Facility: CLINIC | Age: 67
End: 2022-08-30
Payer: MEDICARE

## 2022-08-30 NOTE — TELEPHONE ENCOUNTER
Faxed order clarification to [facility]. In their system MRI orbit face and neck are 3 separate procedures. Advised neck to include parotid gland per Janneth.

## 2022-09-06 ENCOUNTER — TELEPHONE (OUTPATIENT)
Dept: INTERNAL MEDICINE | Facility: CLINIC | Age: 67
End: 2022-09-06
Payer: MEDICARE

## 2022-09-06 NOTE — TELEPHONE ENCOUNTER
S/w pt, scheduled nurse visit as requested for 10AM on 9/7    ----- Message from Christopher Tapia sent at 9/6/2022  3:28 PM CDT -----  Contact: Mariah Holland is calling regarding requesting to schedule nurse visit for blood pressure check. Requests to be seen today or tomorrow. Please give patient a call back at [phone] today as requested.  Thanks,  RP

## 2022-09-07 ENCOUNTER — CLINICAL SUPPORT (OUTPATIENT)
Dept: INTERNAL MEDICINE | Facility: CLINIC | Age: 67
End: 2022-09-07
Payer: MEDICARE

## 2022-09-07 ENCOUNTER — TELEPHONE (OUTPATIENT)
Dept: INTERNAL MEDICINE | Facility: CLINIC | Age: 67
End: 2022-09-07

## 2022-09-07 VITALS — SYSTOLIC BLOOD PRESSURE: 120 MMHG | DIASTOLIC BLOOD PRESSURE: 72 MMHG

## 2022-09-07 NOTE — PROGRESS NOTES
Pt's blood pressure is within normal range and she was instructed to continue her regular meds. And continue to monitor and record reading . //kah

## 2022-09-21 NOTE — PROGRESS NOTES
"Referring Provider:    No referring provider defined for this encounter.  Subjective:   Patient: Mariah Meza [id number], :1955   Visit date:2022 9:33 AM    Chief Complaint: see below  HPI:       Follow-up (Review MRI )    Prior notes reviewed  Clinical documentation obtained by nursing staff reviewed.     Patient presents for evaluation of an incidentally noted parotid mass found on MRI while working up her chronic headaches.  She was unaware of the mass and has not symptoms in the area.  I ordered imaging in preparation for this visit but it has not yet been completed as she wished to reschedule to a different location.  She has significant headaches which have improved in the last several weeks.  C/o chronic dry mouth, dry nose, left eye dryness/irritation.     MRI neck w w/o: disc images reviewed.  Small mass in the deep parotid space.  Bright on T2, dark on T1, mild rim enhancement with keenan.      MRI brain at [facility].  No images available for review  T2 hyperintense left superficial parotid lesion which measures approximately 1.2 x 0.9 cm.     IMPRESSION:     1.  Multilevel degenerative changes most pronounced at C4-C5. Mild canal stenosis at multiple levels. No abnormal cord signal.   2.  Multilevel foraminal stenosis which is at most mild.   3.  Cystic left superficial parotid lesion which is incompletely characterized by spine MRI technique. This may reflect a large lymph node, parotid cyst, cystic primary parotid neoplasm. Recommend nonemergent ENT evaluation.  Exam End: 22 11:32     Objective:     Physical Exam:  Vitals:  BP (!) 160/79   Pulse (!) 58   Temp 97.5 °F (36.4 °C) (Temporal)   Ht 5' 4" (1.626 m)   Wt 101.3 kg (223 lb 5.2 oz)   LMP 02/10/1984 (Approximate)   BMI 38.33 kg/m²   General appearance:  Well developed, well nourished    Ears:  Otoscopy of external auditory canals and tympanic membranes was normal, clinical speech reception thresholds grossly intact, no " mass/lesion of auricle.    Nose:  No masses/lesions of external nose, nasal mucosa, septum, and turbinates were within normal limits.    Mouth:  No mass/lesion of lips, teeth, gums, hard/soft palate, tongue, tonsils, or oropharynx.    Neck & Lymphatics:  No cervical lymphadenopathy, no neck mass/crepitus/ asymmetry, trachea is midline, no thyroid enlargement/tenderness/mass.              []  Data Reviewed:    Lab Results   Component Value Date    WBC 3.15 (L) 07/18/2022    HGB 11.8 (L) 07/18/2022    HCT 36.0 (L) 07/18/2022    MCV 92 07/18/2022    EOSINOPHIL 5.4 07/18/2022                 Assessment & Plan:   Sicca syndrome  -     ANTI -SSA ANTIBODY; Future; Expected date: 09/22/2022  -     ANTI-SSB ANTIBODY; Future; Expected date: 09/22/2022    Cyst of left parotid gland  -     US Soft Tissue Head Neck Thyroid; Future; Expected date: 09/22/2022      Discussed FNA vs observation.  Likelihood of malignancy is low given cystic appearance in the parotid.  She would like to observe.  Will follow with US in 3 months.    Sicca syndrome- antibody testing; quite possibly secondary to medications. Unlikely to be related to the parotid cyst.    Thank you for allowing me to participate in the care of Mariah.       Deejay Olsen MD, FACS  [facility]   [facility]  [address]  [place] [address]  P: [phone]  F: [phone]

## 2022-09-22 ENCOUNTER — OFFICE VISIT (OUTPATIENT)
Dept: OTOLARYNGOLOGY | Facility: CLINIC | Age: 67
End: 2022-09-22
Payer: MEDICARE

## 2022-09-22 ENCOUNTER — LAB VISIT (OUTPATIENT)
Dept: LAB | Facility: HOSPITAL | Age: 67
End: 2022-09-22
Attending: OTOLARYNGOLOGY
Payer: MEDICARE

## 2022-09-22 VITALS
HEIGHT: 64 IN | SYSTOLIC BLOOD PRESSURE: 160 MMHG | BODY MASS INDEX: 38.12 KG/M2 | DIASTOLIC BLOOD PRESSURE: 79 MMHG | TEMPERATURE: 98 F | WEIGHT: 223.31 LBS | HEART RATE: 58 BPM

## 2022-09-22 DIAGNOSIS — M35.00 SICCA SYNDROME: Primary | ICD-10-CM

## 2022-09-22 DIAGNOSIS — M35.00 SICCA SYNDROME: ICD-10-CM

## 2022-09-22 DIAGNOSIS — K11.6 CYST OF LEFT PAROTID GLAND: ICD-10-CM

## 2022-09-22 PROCEDURE — 36415 COLL VENOUS BLD VENIPUNCTURE: CPT | Performed by: OTOLARYNGOLOGY

## 2022-09-22 PROCEDURE — 3078F DIAST BP <80 MM HG: CPT | Mod: CPTII,S$GLB,, | Performed by: OTOLARYNGOLOGY

## 2022-09-22 PROCEDURE — 3077F PR MOST RECENT SYSTOLIC BLOOD PRESSURE >= 140 MM HG: ICD-10-PCS | Mod: CPTII,S$GLB,, | Performed by: OTOLARYNGOLOGY

## 2022-09-22 PROCEDURE — 99214 OFFICE O/P EST MOD 30 MIN: CPT | Mod: S$GLB,,, | Performed by: OTOLARYNGOLOGY

## 2022-09-22 PROCEDURE — 1159F PR MEDICATION LIST DOCUMENTED IN MEDICAL RECORD: ICD-10-PCS | Mod: CPTII,S$GLB,, | Performed by: OTOLARYNGOLOGY

## 2022-09-22 PROCEDURE — 3288F FALL RISK ASSESSMENT DOCD: CPT | Mod: CPTII,S$GLB,, | Performed by: OTOLARYNGOLOGY

## 2022-09-22 PROCEDURE — 3008F PR BODY MASS INDEX (BMI) DOCUMENTED: ICD-10-PCS | Mod: CPTII,S$GLB,, | Performed by: OTOLARYNGOLOGY

## 2022-09-22 PROCEDURE — 3078F PR MOST RECENT DIASTOLIC BLOOD PRESSURE < 80 MM HG: ICD-10-PCS | Mod: CPTII,S$GLB,, | Performed by: OTOLARYNGOLOGY

## 2022-09-22 PROCEDURE — 86235 NUCLEAR ANTIGEN ANTIBODY: CPT | Performed by: OTOLARYNGOLOGY

## 2022-09-22 PROCEDURE — 99999 PR PBB SHADOW E&M-EST. PATIENT-LVL III: ICD-10-PCS | Mod: PBBFAC,,, | Performed by: OTOLARYNGOLOGY

## 2022-09-22 PROCEDURE — 3288F PR FALLS RISK ASSESSMENT DOCUMENTED: ICD-10-PCS | Mod: CPTII,S$GLB,, | Performed by: OTOLARYNGOLOGY

## 2022-09-22 PROCEDURE — 1101F PR PT FALLS ASSESS DOC 0-1 FALLS W/OUT INJ PAST YR: ICD-10-PCS | Mod: CPTII,S$GLB,, | Performed by: OTOLARYNGOLOGY

## 2022-09-22 PROCEDURE — 86235 NUCLEAR ANTIGEN ANTIBODY: CPT | Mod: 59 | Performed by: OTOLARYNGOLOGY

## 2022-09-22 PROCEDURE — 1159F MED LIST DOCD IN RCRD: CPT | Mod: CPTII,S$GLB,, | Performed by: OTOLARYNGOLOGY

## 2022-09-22 PROCEDURE — 3008F BODY MASS INDEX DOCD: CPT | Mod: CPTII,S$GLB,, | Performed by: OTOLARYNGOLOGY

## 2022-09-22 PROCEDURE — 1126F AMNT PAIN NOTED NONE PRSNT: CPT | Mod: CPTII,S$GLB,, | Performed by: OTOLARYNGOLOGY

## 2022-09-22 PROCEDURE — 99214 PR OFFICE/OUTPT VISIT, EST, LEVL IV, 30-39 MIN: ICD-10-PCS | Mod: S$GLB,,, | Performed by: OTOLARYNGOLOGY

## 2022-09-22 PROCEDURE — 1101F PT FALLS ASSESS-DOCD LE1/YR: CPT | Mod: CPTII,S$GLB,, | Performed by: OTOLARYNGOLOGY

## 2022-09-22 PROCEDURE — 99999 PR PBB SHADOW E&M-EST. PATIENT-LVL III: CPT | Mod: PBBFAC,,, | Performed by: OTOLARYNGOLOGY

## 2022-09-22 PROCEDURE — 3077F SYST BP >= 140 MM HG: CPT | Mod: CPTII,S$GLB,, | Performed by: OTOLARYNGOLOGY

## 2022-09-22 PROCEDURE — 1126F PR PAIN SEVERITY QUANTIFIED, NO PAIN PRESENT: ICD-10-PCS | Mod: CPTII,S$GLB,, | Performed by: OTOLARYNGOLOGY

## 2022-09-22 RX ORDER — CARVEDILOL 6.25 MG/1
6.25 TABLET ORAL 2 TIMES DAILY WITH MEALS
COMMUNITY
End: 2022-11-15 | Stop reason: DRUGHIGH

## 2022-09-27 LAB
ANTI-SSA ANTIBODY: 0.08 RATIO (ref 0–0.99)
ANTI-SSA INTERPRETATION: NEGATIVE
ANTI-SSB ANTIBODY: 0.09 RATIO (ref 0–0.99)
ANTI-SSB INTERPRETATION: NEGATIVE

## 2022-09-29 ENCOUNTER — PATIENT MESSAGE (OUTPATIENT)
Dept: OTOLARYNGOLOGY | Facility: CLINIC | Age: 67
End: 2022-09-29
Payer: MEDICARE

## 2022-09-29 RX ORDER — CEVIMELINE HYDROCHLORIDE 30 MG/1
30 CAPSULE ORAL 3 TIMES DAILY
Qty: 90 CAPSULE | Refills: 11 | Status: SHIPPED | OUTPATIENT
Start: 2022-09-29 | End: 2023-03-01

## 2022-10-03 ENCOUNTER — LAB VISIT (OUTPATIENT)
Dept: LAB | Facility: HOSPITAL | Age: 67
End: 2022-10-03
Attending: OTOLARYNGOLOGY
Payer: MEDICARE

## 2022-10-03 DIAGNOSIS — K11.8 PAROTID MASS: ICD-10-CM

## 2022-10-03 LAB
CREAT SERPL-MCNC: 0.9 MG/DL (ref 0.5–1.4)
EST. GFR  (NO RACE VARIABLE): >60 ML/MIN/1.73 M^2

## 2022-10-03 PROCEDURE — 82565 ASSAY OF CREATININE: CPT | Performed by: OTOLARYNGOLOGY

## 2022-10-03 PROCEDURE — 36415 COLL VENOUS BLD VENIPUNCTURE: CPT | Performed by: OTOLARYNGOLOGY

## 2022-10-06 ENCOUNTER — TELEPHONE (OUTPATIENT)
Dept: PAIN MEDICINE | Facility: CLINIC | Age: 67
End: 2022-10-06
Payer: MEDICARE

## 2022-10-06 NOTE — TELEPHONE ENCOUNTER
Reached out to patient to reschedule appointment because the provider will be in procedures.  Apt has been made . All questions answered.     Tom Romero  Medical

## 2022-10-11 ENCOUNTER — OFFICE VISIT (OUTPATIENT)
Dept: PODIATRY | Facility: CLINIC | Age: 67
End: 2022-10-11
Payer: MEDICARE

## 2022-10-11 VITALS
WEIGHT: 223.56 LBS | HEART RATE: 70 BPM | SYSTOLIC BLOOD PRESSURE: 150 MMHG | DIASTOLIC BLOOD PRESSURE: 76 MMHG | BODY MASS INDEX: 38.17 KG/M2 | HEIGHT: 64 IN

## 2022-10-11 DIAGNOSIS — M54.16 LUMBAR RADICULOPATHY: Primary | ICD-10-CM

## 2022-10-11 DIAGNOSIS — R60.0 EDEMA OF FOOT: ICD-10-CM

## 2022-10-11 PROCEDURE — 3288F FALL RISK ASSESSMENT DOCD: CPT | Mod: CPTII,S$GLB,, | Performed by: PODIATRIST

## 2022-10-11 PROCEDURE — 3078F DIAST BP <80 MM HG: CPT | Mod: CPTII,S$GLB,, | Performed by: PODIATRIST

## 2022-10-11 PROCEDURE — 1159F MED LIST DOCD IN RCRD: CPT | Mod: CPTII,S$GLB,, | Performed by: PODIATRIST

## 2022-10-11 PROCEDURE — 3288F PR FALLS RISK ASSESSMENT DOCUMENTED: ICD-10-PCS | Mod: CPTII,S$GLB,, | Performed by: PODIATRIST

## 2022-10-11 PROCEDURE — 1101F PT FALLS ASSESS-DOCD LE1/YR: CPT | Mod: CPTII,S$GLB,, | Performed by: PODIATRIST

## 2022-10-11 PROCEDURE — 3077F SYST BP >= 140 MM HG: CPT | Mod: CPTII,S$GLB,, | Performed by: PODIATRIST

## 2022-10-11 PROCEDURE — 1101F PR PT FALLS ASSESS DOC 0-1 FALLS W/OUT INJ PAST YR: ICD-10-PCS | Mod: CPTII,S$GLB,, | Performed by: PODIATRIST

## 2022-10-11 PROCEDURE — 99999 PR PBB SHADOW E&M-EST. PATIENT-LVL III: CPT | Mod: PBBFAC,,, | Performed by: PODIATRIST

## 2022-10-11 PROCEDURE — 1159F PR MEDICATION LIST DOCUMENTED IN MEDICAL RECORD: ICD-10-PCS | Mod: CPTII,S$GLB,, | Performed by: PODIATRIST

## 2022-10-11 PROCEDURE — 1160F RVW MEDS BY RX/DR IN RCRD: CPT | Mod: CPTII,S$GLB,, | Performed by: PODIATRIST

## 2022-10-11 PROCEDURE — 99203 OFFICE O/P NEW LOW 30 MIN: CPT | Mod: S$GLB,,, | Performed by: PODIATRIST

## 2022-10-11 PROCEDURE — 3077F PR MOST RECENT SYSTOLIC BLOOD PRESSURE >= 140 MM HG: ICD-10-PCS | Mod: CPTII,S$GLB,, | Performed by: PODIATRIST

## 2022-10-11 PROCEDURE — 99203 PR OFFICE/OUTPT VISIT, NEW, LEVL III, 30-44 MIN: ICD-10-PCS | Mod: S$GLB,,, | Performed by: PODIATRIST

## 2022-10-11 PROCEDURE — 1125F PR PAIN SEVERITY QUANTIFIED, PAIN PRESENT: ICD-10-PCS | Mod: CPTII,S$GLB,, | Performed by: PODIATRIST

## 2022-10-11 PROCEDURE — 1125F AMNT PAIN NOTED PAIN PRSNT: CPT | Mod: CPTII,S$GLB,, | Performed by: PODIATRIST

## 2022-10-11 PROCEDURE — 3078F PR MOST RECENT DIASTOLIC BLOOD PRESSURE < 80 MM HG: ICD-10-PCS | Mod: CPTII,S$GLB,, | Performed by: PODIATRIST

## 2022-10-11 PROCEDURE — 1160F PR REVIEW ALL MEDS BY PRESCRIBER/CLIN PHARMACIST DOCUMENTED: ICD-10-PCS | Mod: CPTII,S$GLB,, | Performed by: PODIATRIST

## 2022-10-11 PROCEDURE — 99999 PR PBB SHADOW E&M-EST. PATIENT-LVL III: ICD-10-PCS | Mod: PBBFAC,,, | Performed by: PODIATRIST

## 2022-10-17 ENCOUNTER — TELEPHONE (OUTPATIENT)
Dept: PAIN MEDICINE | Facility: CLINIC | Age: 67
End: 2022-10-17
Payer: MEDICARE

## 2022-10-26 ENCOUNTER — TELEPHONE (OUTPATIENT)
Dept: INTERNAL MEDICINE | Facility: CLINIC | Age: 67
End: 2022-10-26
Payer: MEDICARE

## 2022-10-26 ENCOUNTER — TELEPHONE (OUTPATIENT)
Dept: SPINE | Facility: CLINIC | Age: 67
End: 2022-10-26
Payer: MEDICARE

## 2022-10-26 NOTE — TELEPHONE ENCOUNTER
This message is for patient in regards to his/her appointment 10/27/22 at 10:00a   With Greg Boone.        [facility] Policy: For the safety of all patients and staff members.   During this visit we're informing all patients and visitors to wear face mask at all time here at [facility].  If you have any questions or concerns please contact [phone]       also inquired IPM within message.    [facility] providers and Mid-levels offer interventional, procedure--based options to treat chronic pain. The goal is to manage chronic pain by reducing pain frequency and intensity and address your functional goals for activities of daily living while simultaneously reducing or eliminating your reliance on medications. Please bring any records or images that you have from prior treatments for your pain. You will be presented with multi-modal treatment plan that may or may not include imaging, interventional procedures, physical/occupational/aqua therapy, pain creams, and non-narcotic pain medications used for the treatments of chronic pain.     Left voicemail

## 2022-10-26 NOTE — TELEPHONE ENCOUNTER
Attempted to contact, left vm to return call.     ----- Message from Isela Loredo sent at 10/26/2022  1:14 PM CDT -----  Contact: Mariah  Type:  Sooner Apoointment Request    Caller is requesting a sooner appointment.  Caller declined first available appointment listed below.  Caller will not accept being placed on the waitlist and is requesting a message be sent to doctor.  Name of Caller:Mariah  When is the first available appointment?11/1/22  Symptoms:ear, facial pain  Would the patient rather a call back or a response via [facility]? Call back  Best Call Back Number:[phone]  Additional Information:

## 2022-10-31 NOTE — PROGRESS NOTES
Subjective:     Patient ID: Mariah Meza is a 66 y.o. female.    Chief Complaint: Foot Pain (Bilateral foot pain, left more than right/Pain scale 6/10. Last visit to [facility] was August 2022/Not diabetic, wears tennis shoes mainly (Sketchers, [employer]ebok))    Mariah is a 66 y.o. female who presents to the podiatry clinic  with complaint of  bilateral foot pain. Onset of the symptoms was several years ago. Aggravating factors: inactivity and walking. Symptoms have been intermittent. Patients rates pain 6/10 on pain scale. Patient has no other pedal complaints at this time.     Patient Active Problem List   Diagnosis    Hypertension    Hyperlipidemia    Class 2 severe obesity due to excess calories with serious comorbidity and body mass index (BMI) of 38.0 to 38.9 in adult    Chronic rhinitis    GERD (gastroesophageal reflux disease)    Low back pain    DJD (degenerative joint disease), lumbar    Lumbar radiculopathy    Papillary thyroid carcinoma    Hard to intubate    Post-operative hypothyroidism    Frequent headaches    H/O parathyroidectomy    Other chest pain    Hot flashes    Cyst of left parotid gland    Sicca syndrome       Medication List with Changes/Refills   Current Medications    ACETAMINOPHEN (TYLENOL) 650 MG TBSR    Take 650 mg by mouth daily as needed.    AZELASTINE (ASTELIN) 137 MCG (0.1 %) NASAL SPRAY    1 spray (137 mcg total) by Nasal route 2 (two) times daily.    CALCIUM CARBONATE 470 MG CALCIUM (1,177 MG) CHEW    Take one tablet by mouth twice daily    CARVEDILOL (COREG) 6.25 MG TABLET    Take 6.25 mg by mouth 2 (two) times daily with meals.    CEVIMELINE (EVOXAC) 30 MG CAPSULE    Take 1 capsule (30 mg total) by mouth 3 (three) times daily.    CYCLOBENZAPRINE (FLEXERIL) 10 MG TABLET    Take 1 tablet (10 mg total) by mouth 3 (three) times daily as needed for Muscle spasms.    FLUTICASONE PROPIONATE (FLONASE) 50 MCG/ACTUATION NASAL SPRAY    2 sprays (100 mcg total) by Each Nostril route 2  (two) times daily.    FUROSEMIDE (LASIX) 40 MG TABLET    Take 1 tablet by mouth twice daily as needed    LEVOTHYROXINE (SYNTHROID) 150 MCG TABLET    Take 1 tablet (150 mcg total) by mouth before breakfast.    LINACLOTIDE (LINZESS ORAL)    Take 72 mg by mouth once daily.    NIFEDIPINE (ADALAT CC) 60 MG TBSR        OLMESARTAN-HYDROCHLOROTHIAZIDE (BENICAR HCT) 40-12.5 MG TAB    Take 1 tablet by mouth once daily.    ONDANSETRON (ZOFRAN-ODT) 4 MG TBDL    Bring prescription to radioactive iodine treatment.  Sig 1 PO with or immediately before treatment with radioactive iodine.  Then sig 1 PO TID PRN nausea.    PANTOPRAZOLE (PROTONIX) 40 MG TABLET    Take by mouth once daily.     PSYLLIUM HUSK/ASPARTAME (METAMUCIL FIBER SINGLES ORAL)    Take by mouth.       Review of patient's allergies indicates:   Allergen Reactions    Iodine and iodide containing products Itching    Shellfish containing products Swelling    Statins-hmg-coa reductase inhibitors Other (See Comments)     Myalgias       Past Surgical History:   Procedure Laterality Date    BILATERAL OOPHORECTOMY  2002    CARPAL TUNNEL RELEASE      Left wrist    CHOLECYSTECTOMY      DISSECTION OF NECK Bilateral 12/20/2021    Procedure: DISSECTION, NECK;  Surgeon: Deejay Olsen MD;  Location: [facility] OR;  Service: ENT;  Laterality: Bilateral;  Central neck dissection    HYSTERECTOMY  1984    NECK EXPLORATION Bilateral 12/20/2021    Procedure: EXPLORATION, NECK;  Surgeon: Deejay Olsen MD;  Location: [facility] OR;  Service: ENT;  Laterality: Bilateral;  Parathyroid exploration    ROTATOR CUFF REPAIR      Right shoulder    ROTATOR CUFF REPAIR Left 2010    THYROIDECTOMY, BILATERAL Bilateral 12/20/2021    Procedure: THYROIDECTOMY,BILATERAL;  Surgeon: Deejay Olsen MD;  Location: [facility] OR;  Service: ENT;  Laterality: Bilateral;    TOTAL VAGINAL HYSTERECTOMY  1985       Family History   Problem Relation Age of Onset    Diabetes Mother     Hypertension Mother     Hyperlipidemia Mother     Heart  "disease Mother         CHF    Stroke Mother     Heart attack Mother     Hearing loss Mother     Diabetes Sister     Sickle cell anemia Other         nieces, nephews    Lupus Other         niece       Social History     Socioeconomic History    Marital status:    Tobacco Use    Smoking status: Former     Packs/day: 0.50     Years: 8.00     Pack years: 4.00     Types: Cigarettes     Quit date: 1979     Years since quittin.8    Smokeless tobacco: Never   Substance and Sexual Activity    Alcohol use: No    Drug use: Never    Sexual activity: Never     Partners: Male     Birth control/protection: See Surgical Hx   Social History Narrative    The patient is  and has 2 adult children.  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       Vitals:    10/11/22 1355   BP: (!) 150/76   Pulse: 70   Weight: 101.4 kg (223 lb 8.7 oz)   Height: 5' 4" (1.626 m)   PainSc:   6       Hemoglobin A1C   Date Value Ref Range Status   2021 4.8 4.0 - 5.6 % Final     Comment:     ADA Screening Guidelines:  5.7-6.4%  Consistent with prediabetes  >or=6.5%  Consistent with diabetes    High levels of fetal hemoglobin interfere with the HbA1C  assay. Heterozygous hemoglobin variants (HbS, HgC, etc)do  not significantly interfere with this assay.   However, presence of multiple variants may affect accuracy.         Review of Systems   Constitutional:  Negative for chills and fever.   Respiratory:  Negative for shortness of breath.    Cardiovascular:  Negative for chest pain, palpitations, orthopnea, claudication and leg swelling.   Gastrointestinal:  Negative for diarrhea, nausea and vomiting.   Musculoskeletal:  Negative for joint pain.   Skin:  Negative for rash.   Neurological:  Positive for tingling and sensory change.   Psychiatric/Behavioral: Negative.             Objective:      PHYSICAL EXAM: Apperance: Alert and orient in no distress,well developed, and with good attention to grooming and body habits  Patient " presents ambulating in tennis shoes.   LOWER EXTREMITY EXAM:  VASCULAR: Dorsalis pedis pulses 2/4 bilateral and Posterior Tibial pulses 2/4 bilateral. Capillary fill time <4 seconds bilateral. Mild edema observed bilateral. Varicosities absent bilateral. Skin temperature of the lower extremities is warm to warm, proximal to distal. Hair growth dim bilateral.  DERMATOLOGICAL: No skin rashes, subcutaneous nodules, lesions, or ulcers observed bilateral.   NEUROLOGICAL: Light touch, sharp-dull, proprioception all present and equal bilaterally.  Vibratory sensation diminished at bilateral hallux. Protective sensation diminished sites as tested with a [place] 5.07 monofilament.   MUSCULOSKELETAL: Muscle strength is 5/5 for foot inverters, everters, plantarflexors, and dorsiflexors. Muscle tone is normal.           Assessment:       ICD-10-CM ICD-9-CM   1. Lumbar radiculopathy  M54.16 724.4   2. Edema of foot  R60.0 782.3       Plan:   Lumbar radiculopathy    Edema of foot    I counseled the patient on her conditions, regarding findings of my examination, my impressions, and usual treatment plan.   Discussed with patient treatments for neuropathy consisting of topical or oral medication.  Recommendations given for over-the-counter medicine such as Two Old Goats and/or Capsaicin.  Counseled patient on daily foot inspections and proper shoe gear.  Patient instructed to discuss pain in feet with [facility] doctor. Patient states she understands.   Patient to return as needed.            Vitaly Saldaña DPM  [facility]

## 2022-11-01 ENCOUNTER — LAB VISIT (OUTPATIENT)
Dept: LAB | Facility: HOSPITAL | Age: 67
End: 2022-11-01
Attending: NURSE PRACTITIONER
Payer: MEDICARE

## 2022-11-01 ENCOUNTER — OFFICE VISIT (OUTPATIENT)
Dept: INTERNAL MEDICINE | Facility: CLINIC | Age: 67
End: 2022-11-01
Payer: MEDICARE

## 2022-11-01 VITALS
BODY MASS INDEX: 37.26 KG/M2 | HEIGHT: 64 IN | OXYGEN SATURATION: 98 % | RESPIRATION RATE: 18 BRPM | DIASTOLIC BLOOD PRESSURE: 80 MMHG | WEIGHT: 218.25 LBS | TEMPERATURE: 98 F | HEART RATE: 60 BPM | SYSTOLIC BLOOD PRESSURE: 144 MMHG

## 2022-11-01 DIAGNOSIS — E78.2 MIXED HYPERLIPIDEMIA: ICD-10-CM

## 2022-11-01 DIAGNOSIS — Z00.00 ROUTINE MEDICAL EXAM: Primary | ICD-10-CM

## 2022-11-01 DIAGNOSIS — K21.9 GASTROESOPHAGEAL REFLUX DISEASE, UNSPECIFIED WHETHER ESOPHAGITIS PRESENT: ICD-10-CM

## 2022-11-01 DIAGNOSIS — I10 PRIMARY HYPERTENSION: ICD-10-CM

## 2022-11-01 DIAGNOSIS — E89.0 POST-OPERATIVE HYPOTHYROIDISM: ICD-10-CM

## 2022-11-01 PROCEDURE — 1101F PR PT FALLS ASSESS DOC 0-1 FALLS W/OUT INJ PAST YR: ICD-10-PCS | Mod: CPTII,S$GLB,, | Performed by: NURSE PRACTITIONER

## 2022-11-01 PROCEDURE — 3008F BODY MASS INDEX DOCD: CPT | Mod: CPTII,S$GLB,, | Performed by: NURSE PRACTITIONER

## 2022-11-01 PROCEDURE — 1101F PT FALLS ASSESS-DOCD LE1/YR: CPT | Mod: CPTII,S$GLB,, | Performed by: NURSE PRACTITIONER

## 2022-11-01 PROCEDURE — 84480 ASSAY TRIIODOTHYRONINE (T3): CPT | Performed by: NURSE PRACTITIONER

## 2022-11-01 PROCEDURE — 90694 FLU VACCINE - QUADRIVALENT - ADJUVANTED: ICD-10-PCS | Mod: S$GLB,,, | Performed by: NURSE PRACTITIONER

## 2022-11-01 PROCEDURE — 36415 COLL VENOUS BLD VENIPUNCTURE: CPT | Mod: PO | Performed by: NURSE PRACTITIONER

## 2022-11-01 PROCEDURE — 1125F PR PAIN SEVERITY QUANTIFIED, PAIN PRESENT: ICD-10-PCS | Mod: CPTII,S$GLB,, | Performed by: NURSE PRACTITIONER

## 2022-11-01 PROCEDURE — 99999 PR PBB SHADOW E&M-EST. PATIENT-LVL IV: ICD-10-PCS | Mod: PBBFAC,,, | Performed by: NURSE PRACTITIONER

## 2022-11-01 PROCEDURE — 3288F FALL RISK ASSESSMENT DOCD: CPT | Mod: CPTII,S$GLB,, | Performed by: NURSE PRACTITIONER

## 2022-11-01 PROCEDURE — G0008 ADMIN INFLUENZA VIRUS VAC: HCPCS | Mod: S$GLB,,, | Performed by: NURSE PRACTITIONER

## 2022-11-01 PROCEDURE — 1159F PR MEDICATION LIST DOCUMENTED IN MEDICAL RECORD: ICD-10-PCS | Mod: CPTII,S$GLB,, | Performed by: NURSE PRACTITIONER

## 2022-11-01 PROCEDURE — 1125F AMNT PAIN NOTED PAIN PRSNT: CPT | Mod: CPTII,S$GLB,, | Performed by: NURSE PRACTITIONER

## 2022-11-01 PROCEDURE — 1160F PR REVIEW ALL MEDS BY PRESCRIBER/CLIN PHARMACIST DOCUMENTED: ICD-10-PCS | Mod: CPTII,S$GLB,, | Performed by: NURSE PRACTITIONER

## 2022-11-01 PROCEDURE — 84443 ASSAY THYROID STIM HORMONE: CPT | Performed by: NURSE PRACTITIONER

## 2022-11-01 PROCEDURE — 3079F DIAST BP 80-89 MM HG: CPT | Mod: CPTII,S$GLB,, | Performed by: NURSE PRACTITIONER

## 2022-11-01 PROCEDURE — 1159F MED LIST DOCD IN RCRD: CPT | Mod: CPTII,S$GLB,, | Performed by: NURSE PRACTITIONER

## 2022-11-01 PROCEDURE — 99213 PR OFFICE/OUTPT VISIT, EST, LEVL III, 20-29 MIN: ICD-10-PCS | Mod: S$GLB,,, | Performed by: NURSE PRACTITIONER

## 2022-11-01 PROCEDURE — 84439 ASSAY OF FREE THYROXINE: CPT | Performed by: NURSE PRACTITIONER

## 2022-11-01 PROCEDURE — G0008 FLU VACCINE - QUADRIVALENT - ADJUVANTED: ICD-10-PCS | Mod: S$GLB,,, | Performed by: NURSE PRACTITIONER

## 2022-11-01 PROCEDURE — 99213 OFFICE O/P EST LOW 20 MIN: CPT | Mod: S$GLB,,, | Performed by: NURSE PRACTITIONER

## 2022-11-01 PROCEDURE — 3288F PR FALLS RISK ASSESSMENT DOCUMENTED: ICD-10-PCS | Mod: CPTII,S$GLB,, | Performed by: NURSE PRACTITIONER

## 2022-11-01 PROCEDURE — 90694 VACC AIIV4 NO PRSRV 0.5ML IM: CPT | Mod: S$GLB,,, | Performed by: NURSE PRACTITIONER

## 2022-11-01 PROCEDURE — 1160F RVW MEDS BY RX/DR IN RCRD: CPT | Mod: CPTII,S$GLB,, | Performed by: NURSE PRACTITIONER

## 2022-11-01 PROCEDURE — 3077F SYST BP >= 140 MM HG: CPT | Mod: CPTII,S$GLB,, | Performed by: NURSE PRACTITIONER

## 2022-11-01 PROCEDURE — 3079F PR MOST RECENT DIASTOLIC BLOOD PRESSURE 80-89 MM HG: ICD-10-PCS | Mod: CPTII,S$GLB,, | Performed by: NURSE PRACTITIONER

## 2022-11-01 PROCEDURE — 3008F PR BODY MASS INDEX (BMI) DOCUMENTED: ICD-10-PCS | Mod: CPTII,S$GLB,, | Performed by: NURSE PRACTITIONER

## 2022-11-01 PROCEDURE — 99999 PR PBB SHADOW E&M-EST. PATIENT-LVL IV: CPT | Mod: PBBFAC,,, | Performed by: NURSE PRACTITIONER

## 2022-11-01 PROCEDURE — 3077F PR MOST RECENT SYSTOLIC BLOOD PRESSURE >= 140 MM HG: ICD-10-PCS | Mod: CPTII,S$GLB,, | Performed by: NURSE PRACTITIONER

## 2022-11-01 RX ORDER — OLMESARTAN MEDOXOMIL AND HYDROCHLOROTHIAZIDE 40/25 40; 25 MG/1; MG/1
1 TABLET ORAL DAILY
COMMUNITY
Start: 2022-08-08 | End: 2022-11-09 | Stop reason: SDUPTHER

## 2022-11-01 RX ORDER — AMOXICILLIN 875 MG/1
875 TABLET, FILM COATED ORAL 2 TIMES DAILY
COMMUNITY
End: 2023-02-02

## 2022-11-01 NOTE — PROGRESS NOTES
Subjective:       Patient ID: Mariah Meza is a 67 y.o. female.    Chief Complaint: Follow-up (3 mo)    Patient presents for a follow up.  Medical History: HTN, HLP, GERD, DJD, Thyroid Cancer, Headaches, Chest pain, Hot Flashes, Sicca    At home blood pressure readings are elevated.  BP today in clinic is stable.     Walking 6 days a week.    Currently in PT for neck.  Very helpful.      Recently had imaging at [facility]: MRI Neck Soft Tissue, MRI Brain, MRI Cervical Spine     Continues to have left foot pain.  Has seen podiatry.  Lumbar Radiculopathy.      Follow-up  Associated symptoms include arthralgias, headaches, myalgias and neck pain. Pertinent negatives include no abdominal pain, chills, coughing, fatigue or fever.   Review of Systems   Constitutional:  Negative for chills, fatigue and fever.   Respiratory:  Negative for cough and shortness of breath.    Gastrointestinal:  Negative for abdominal pain.   Musculoskeletal:  Positive for arthralgias, back pain, myalgias and neck pain.   Neurological:  Positive for headaches.   Psychiatric/Behavioral:  Negative for agitation and confusion.        Objective:      Physical Exam  Vitals reviewed.   Constitutional:       Appearance: Normal appearance.   Cardiovascular:      Rate and Rhythm: Normal rate and regular rhythm.   Pulmonary:      Effort: Pulmonary effort is normal.      Breath sounds: Normal breath sounds.   Musculoskeletal:         General: Normal range of motion.   Skin:     General: Skin is warm.   Neurological:      General: No focal deficit present.      Mental Status: She is alert.   Psychiatric:         Mood and Affect: Mood normal.         Behavior: Behavior normal. Behavior is cooperative.       Assessment:       Problem List Items Addressed This Visit       Hypertension    Hyperlipidemia    GERD (gastroesophageal reflux disease)    Post-operative hypothyroidism    Relevant Orders    TSH (Completed)    T4, FREE (Completed)    T3  (Completed)     Other Visit Diagnoses       Routine medical exam    -  Primary            Plan:           Routine medical exam    Primary hypertension    Mixed hyperlipidemia    Post-operative hypothyroidism  -     TSH; Future; Expected date: 11/01/2022  -     T4, FREE; Future; Expected date: 11/01/2022  -     T3; Future; Expected date: 11/01/2022    Gastroesophageal reflux disease, unspecified whether esophagitis present    Other orders  -     Influenza (FLUAD) - Quadrivalent (Adjuvanted) *Preferred* (65+) (PF)     Labs today     3 month follow up

## 2022-11-02 LAB
T3 SERPL-MCNC: 112 NG/DL (ref 60–180)
T4 FREE SERPL-MCNC: 1.62 NG/DL (ref 0.71–1.51)
TSH SERPL DL<=0.005 MIU/L-ACNC: 0.01 UIU/ML (ref 0.4–4)

## 2022-11-07 DIAGNOSIS — E89.0 POST-OPERATIVE HYPOTHYROIDISM: Primary | ICD-10-CM

## 2022-11-07 RX ORDER — LEVOTHYROXINE SODIUM 137 UG/1
137 TABLET ORAL
Qty: 30 TABLET | Refills: 11 | Status: SHIPPED | OUTPATIENT
Start: 2022-11-07 | End: 2022-12-28 | Stop reason: DRUGHIGH

## 2022-11-09 ENCOUNTER — TELEPHONE (OUTPATIENT)
Dept: ENDOCRINOLOGY | Facility: CLINIC | Age: 67
End: 2022-11-09
Payer: MEDICARE

## 2022-11-09 NOTE — TELEPHONE ENCOUNTER
----- Message from Radha Mckeon sent at 11/9/2022 12:22 PM CST -----  Regarding: Appt  Contact: [phone]  Patient Mariah is calling. Patient would like to schedule an appt to see a doctor in Endocrinology. Stated her doctor is no longer in [place]. Please call patient at [phone]      Thank You

## 2022-11-09 NOTE — TELEPHONE ENCOUNTER
----- Message from Favian Ochoa sent at 11/9/2022  3:36 PM CST -----  Contact: jeff  .Type:  Patient Returning Call    Who Called:Mariah  Who Left Message for Patient:Natalee  Does the patient know what this is regarding?:yes repeat test  Would the patient rather a call back or a response via [facility]? Call back  Best Call Back Number:[phone]  Additional Information: n/a          Thanks  DD

## 2022-11-10 RX ORDER — OLMESARTAN MEDOXOMIL AND HYDROCHLOROTHIAZIDE 40/25 40; 25 MG/1; MG/1
1 TABLET ORAL DAILY
Qty: 90 TABLET | Refills: 3 | Status: SHIPPED | OUTPATIENT
Start: 2022-11-10 | End: 2023-02-07 | Stop reason: ALTCHOICE

## 2022-11-15 ENCOUNTER — APPOINTMENT (OUTPATIENT)
Dept: RADIOLOGY | Facility: HOSPITAL | Age: 67
End: 2022-11-15
Attending: NURSE PRACTITIONER
Payer: MEDICARE

## 2022-11-15 ENCOUNTER — OFFICE VISIT (OUTPATIENT)
Dept: INTERNAL MEDICINE | Facility: CLINIC | Age: 67
End: 2022-11-15
Payer: MEDICARE

## 2022-11-15 VITALS
BODY MASS INDEX: 37.41 KG/M2 | RESPIRATION RATE: 18 BRPM | WEIGHT: 219.13 LBS | HEART RATE: 63 BPM | OXYGEN SATURATION: 99 % | SYSTOLIC BLOOD PRESSURE: 156 MMHG | TEMPERATURE: 98 F | DIASTOLIC BLOOD PRESSURE: 86 MMHG | HEIGHT: 64 IN

## 2022-11-15 DIAGNOSIS — R22.42 LOCALIZED SWELLING OF LEFT FOOT: Primary | ICD-10-CM

## 2022-11-15 DIAGNOSIS — R22.42 LOCALIZED SWELLING OF LEFT FOOT: ICD-10-CM

## 2022-11-15 PROCEDURE — 3077F PR MOST RECENT SYSTOLIC BLOOD PRESSURE >= 140 MM HG: ICD-10-PCS | Mod: CPTII,S$GLB,, | Performed by: NURSE PRACTITIONER

## 2022-11-15 PROCEDURE — 3008F BODY MASS INDEX DOCD: CPT | Mod: CPTII,S$GLB,, | Performed by: NURSE PRACTITIONER

## 2022-11-15 PROCEDURE — 1125F PR PAIN SEVERITY QUANTIFIED, PAIN PRESENT: ICD-10-PCS | Mod: CPTII,S$GLB,, | Performed by: NURSE PRACTITIONER

## 2022-11-15 PROCEDURE — 73630 X-RAY EXAM OF FOOT: CPT | Mod: TC,PO,LT

## 2022-11-15 PROCEDURE — 3288F FALL RISK ASSESSMENT DOCD: CPT | Mod: CPTII,S$GLB,, | Performed by: NURSE PRACTITIONER

## 2022-11-15 PROCEDURE — 3008F PR BODY MASS INDEX (BMI) DOCUMENTED: ICD-10-PCS | Mod: CPTII,S$GLB,, | Performed by: NURSE PRACTITIONER

## 2022-11-15 PROCEDURE — 99213 OFFICE O/P EST LOW 20 MIN: CPT | Mod: S$GLB,,, | Performed by: NURSE PRACTITIONER

## 2022-11-15 PROCEDURE — 1101F PT FALLS ASSESS-DOCD LE1/YR: CPT | Mod: CPTII,S$GLB,, | Performed by: NURSE PRACTITIONER

## 2022-11-15 PROCEDURE — 99999 PR PBB SHADOW E&M-EST. PATIENT-LVL V: ICD-10-PCS | Mod: PBBFAC,,, | Performed by: NURSE PRACTITIONER

## 2022-11-15 PROCEDURE — 73630 XR FOOT COMPLETE 3 VIEW LEFT: ICD-10-PCS | Mod: 26,LT,, | Performed by: RADIOLOGY

## 2022-11-15 PROCEDURE — 3077F SYST BP >= 140 MM HG: CPT | Mod: CPTII,S$GLB,, | Performed by: NURSE PRACTITIONER

## 2022-11-15 PROCEDURE — 99213 PR OFFICE/OUTPT VISIT, EST, LEVL III, 20-29 MIN: ICD-10-PCS | Mod: S$GLB,,, | Performed by: NURSE PRACTITIONER

## 2022-11-15 PROCEDURE — 3288F PR FALLS RISK ASSESSMENT DOCUMENTED: ICD-10-PCS | Mod: CPTII,S$GLB,, | Performed by: NURSE PRACTITIONER

## 2022-11-15 PROCEDURE — 73630 X-RAY EXAM OF FOOT: CPT | Mod: 26,LT,, | Performed by: RADIOLOGY

## 2022-11-15 PROCEDURE — 99999 PR PBB SHADOW E&M-EST. PATIENT-LVL V: CPT | Mod: PBBFAC,,, | Performed by: NURSE PRACTITIONER

## 2022-11-15 PROCEDURE — 1101F PR PT FALLS ASSESS DOC 0-1 FALLS W/OUT INJ PAST YR: ICD-10-PCS | Mod: CPTII,S$GLB,, | Performed by: NURSE PRACTITIONER

## 2022-11-15 PROCEDURE — 1125F AMNT PAIN NOTED PAIN PRSNT: CPT | Mod: CPTII,S$GLB,, | Performed by: NURSE PRACTITIONER

## 2022-11-15 PROCEDURE — 3079F PR MOST RECENT DIASTOLIC BLOOD PRESSURE 80-89 MM HG: ICD-10-PCS | Mod: CPTII,S$GLB,, | Performed by: NURSE PRACTITIONER

## 2022-11-15 PROCEDURE — 3079F DIAST BP 80-89 MM HG: CPT | Mod: CPTII,S$GLB,, | Performed by: NURSE PRACTITIONER

## 2022-11-15 RX ORDER — CARVEDILOL 12.5 MG/1
12.5 TABLET ORAL 2 TIMES DAILY WITH MEALS
COMMUNITY

## 2022-11-15 RX ORDER — METHYLPREDNISOLONE 4 MG/1
TABLET ORAL
Qty: 1 EACH | Refills: 0 | Status: SHIPPED | OUTPATIENT
Start: 2022-11-15 | End: 2023-03-01

## 2022-11-15 NOTE — PROGRESS NOTES
Subjective:       Patient ID: Mariah Meza is a 67 y.o. female.    Chief Complaint: Leg Pain (Lt / foot)    Patient presents with left foot pain and swelling.  Started about 2 days ago.  Reports taking Tylenol and ice to area. Ice helped.       Pain is radiating up to the leg.     Leg Pain     Review of Systems   Constitutional:  Negative for chills, fatigue and fever.   Respiratory:  Negative for cough and shortness of breath.    Gastrointestinal:  Negative for abdominal pain, constipation and diarrhea.   Musculoskeletal:  Positive for arthralgias, joint swelling and leg pain.   Psychiatric/Behavioral:  Negative for agitation and confusion.        Objective:      Physical Exam  Vitals reviewed.   Constitutional:       Appearance: Normal appearance.   Cardiovascular:      Rate and Rhythm: Normal rate and regular rhythm.   Pulmonary:      Effort: Pulmonary effort is normal.      Breath sounds: Normal breath sounds.   Musculoskeletal:         General: Swelling and tenderness present.   Neurological:      General: No focal deficit present.      Mental Status: She is alert.   Psychiatric:         Mood and Affect: Mood normal.         Behavior: Behavior is cooperative.       Assessment:       Problem List Items Addressed This Visit    None    Plan:           Localized swelling of left foot  -     X-Ray Foot Complete 3 view Left; Future; Expected date: 11/15/2022  -     URIC ACID; Future; Expected date: 11/15/2022  -     methylPREDNISolone (MEDROL DOSEPACK) 4 mg tablet; use as directed  Dispense: 1 each; Refill: 0       Labs and x-ray today

## 2022-11-23 ENCOUNTER — PATIENT OUTREACH (OUTPATIENT)
Dept: ADMINISTRATIVE | Facility: HOSPITAL | Age: 67
End: 2022-11-23
Payer: MEDICARE

## 2022-11-29 ENCOUNTER — TELEPHONE (OUTPATIENT)
Dept: INTERNAL MEDICINE | Facility: CLINIC | Age: 67
End: 2022-11-29
Payer: MEDICARE

## 2022-11-29 VITALS — DIASTOLIC BLOOD PRESSURE: 63 MMHG | SYSTOLIC BLOOD PRESSURE: 120 MMHG

## 2022-11-30 ENCOUNTER — PATIENT MESSAGE (OUTPATIENT)
Dept: ADMINISTRATIVE | Facility: HOSPITAL | Age: 67
End: 2022-11-30
Payer: MEDICARE

## 2022-11-30 ENCOUNTER — PATIENT OUTREACH (OUTPATIENT)
Dept: ADMINISTRATIVE | Facility: HOSPITAL | Age: 67
End: 2022-11-30
Payer: MEDICARE

## 2022-12-12 ENCOUNTER — HOSPITAL ENCOUNTER (OUTPATIENT)
Dept: RADIOLOGY | Facility: HOSPITAL | Age: 67
Discharge: HOME | End: 2022-12-12
Attending: OTOLARYNGOLOGY
Payer: MEDICARE

## 2022-12-12 DIAGNOSIS — K11.6 CYST OF LEFT PAROTID GLAND: ICD-10-CM

## 2022-12-12 PROCEDURE — 76536 US SOFT TISSUE HEAD NECK THYROID: ICD-10-PCS | Mod: 26,,, | Performed by: STUDENT IN AN ORGANIZED HEALTH CARE EDUCATION/TRAINING PROGRAM

## 2022-12-12 PROCEDURE — 76536 US EXAM OF HEAD AND NECK: CPT | Mod: 26,,, | Performed by: STUDENT IN AN ORGANIZED HEALTH CARE EDUCATION/TRAINING PROGRAM

## 2022-12-12 PROCEDURE — 76536 US EXAM OF HEAD AND NECK: CPT | Mod: TC

## 2022-12-21 ENCOUNTER — LAB VISIT (OUTPATIENT)
Dept: LAB | Facility: HOSPITAL | Age: 67
End: 2022-12-21
Attending: NURSE PRACTITIONER
Payer: MEDICARE

## 2022-12-21 DIAGNOSIS — E89.0 POST-OPERATIVE HYPOTHYROIDISM: ICD-10-CM

## 2022-12-21 PROCEDURE — 84439 ASSAY OF FREE THYROXINE: CPT | Performed by: NURSE PRACTITIONER

## 2022-12-21 PROCEDURE — 84443 ASSAY THYROID STIM HORMONE: CPT | Performed by: NURSE PRACTITIONER

## 2022-12-21 PROCEDURE — 36415 COLL VENOUS BLD VENIPUNCTURE: CPT | Mod: PO | Performed by: NURSE PRACTITIONER

## 2022-12-22 LAB
T4 FREE SERPL-MCNC: 1.65 NG/DL (ref 0.71–1.51)
TSH SERPL DL<=0.005 MIU/L-ACNC: 0.01 UIU/ML (ref 0.4–4)

## 2022-12-27 ENCOUNTER — TELEPHONE (OUTPATIENT)
Dept: INTERNAL MEDICINE | Facility: CLINIC | Age: 67
End: 2022-12-27
Payer: MEDICARE

## 2022-12-27 ENCOUNTER — OFFICE VISIT (OUTPATIENT)
Dept: OPHTHALMOLOGY | Facility: CLINIC | Age: 67
End: 2022-12-27
Payer: MEDICARE

## 2022-12-27 DIAGNOSIS — E89.0 POST-OPERATIVE HYPOTHYROIDISM: Primary | ICD-10-CM

## 2022-12-27 DIAGNOSIS — H01.02A SQUAMOUS BLEPHARITIS OF UPPER AND LOWER EYELIDS OF BOTH EYES: ICD-10-CM

## 2022-12-27 DIAGNOSIS — H16.042 MARGINAL KERATITIS OF LEFT EYE DUE TO STAPHYLOCOCCUS TOXIN: Primary | ICD-10-CM

## 2022-12-27 DIAGNOSIS — H04.123 DRY EYES, BILATERAL: ICD-10-CM

## 2022-12-27 DIAGNOSIS — H01.02B SQUAMOUS BLEPHARITIS OF UPPER AND LOWER EYELIDS OF BOTH EYES: ICD-10-CM

## 2022-12-27 DIAGNOSIS — B95.8 MARGINAL KERATITIS OF LEFT EYE DUE TO STAPHYLOCOCCUS TOXIN: Primary | ICD-10-CM

## 2022-12-27 PROCEDURE — 99214 PR OFFICE/OUTPT VISIT, EST, LEVL IV, 30-39 MIN: ICD-10-PCS | Mod: S$GLB,,, | Performed by: STUDENT IN AN ORGANIZED HEALTH CARE EDUCATION/TRAINING PROGRAM

## 2022-12-27 PROCEDURE — 99214 OFFICE O/P EST MOD 30 MIN: CPT | Mod: S$GLB,,, | Performed by: STUDENT IN AN ORGANIZED HEALTH CARE EDUCATION/TRAINING PROGRAM

## 2022-12-27 PROCEDURE — 1159F PR MEDICATION LIST DOCUMENTED IN MEDICAL RECORD: ICD-10-PCS | Mod: CPTII,S$GLB,, | Performed by: STUDENT IN AN ORGANIZED HEALTH CARE EDUCATION/TRAINING PROGRAM

## 2022-12-27 PROCEDURE — 1160F PR REVIEW ALL MEDS BY PRESCRIBER/CLIN PHARMACIST DOCUMENTED: ICD-10-PCS | Mod: CPTII,S$GLB,, | Performed by: STUDENT IN AN ORGANIZED HEALTH CARE EDUCATION/TRAINING PROGRAM

## 2022-12-27 PROCEDURE — 99999 PR PBB SHADOW E&M-EST. PATIENT-LVL III: ICD-10-PCS | Mod: PBBFAC,,, | Performed by: STUDENT IN AN ORGANIZED HEALTH CARE EDUCATION/TRAINING PROGRAM

## 2022-12-27 PROCEDURE — 1159F MED LIST DOCD IN RCRD: CPT | Mod: CPTII,S$GLB,, | Performed by: STUDENT IN AN ORGANIZED HEALTH CARE EDUCATION/TRAINING PROGRAM

## 2022-12-27 PROCEDURE — 99999 PR PBB SHADOW E&M-EST. PATIENT-LVL III: CPT | Mod: PBBFAC,,, | Performed by: STUDENT IN AN ORGANIZED HEALTH CARE EDUCATION/TRAINING PROGRAM

## 2022-12-27 PROCEDURE — 1160F RVW MEDS BY RX/DR IN RCRD: CPT | Mod: CPTII,S$GLB,, | Performed by: STUDENT IN AN ORGANIZED HEALTH CARE EDUCATION/TRAINING PROGRAM

## 2022-12-27 RX ORDER — MOXIFLOXACIN 5 MG/ML
1 SOLUTION/ DROPS OPHTHALMIC 4 TIMES DAILY
Qty: 3 ML | Refills: 1 | Status: SHIPPED | OUTPATIENT
Start: 2022-12-27 | End: 2023-03-01

## 2022-12-27 RX ORDER — DOXYCYCLINE HYCLATE 100 MG
100 TABLET ORAL EVERY 12 HOURS
Qty: 28 TABLET | Refills: 0 | Status: SHIPPED | OUTPATIENT
Start: 2022-12-27 | End: 2023-01-10

## 2022-12-27 NOTE — TELEPHONE ENCOUNTER
----- Message from Rema Guardado sent at 12/27/2022  8:15 AM CST -----  States she would like Carol to give her a call regarding her thyroid results. States she doesn't understand them on ipvivehart and she is not feeling well. Please call pt [phone]. Thank you

## 2022-12-27 NOTE — PROGRESS NOTES
HPI     Eye Pain     Additional comments: Possible infection    Patient states have a throbbing pain  in the left eye for several months   that comes and goes but today the pain is constant and hurts more then it   usually does, having some severe light sensitivity, excessive tearing ,   and feels like pressure throughout the eye and very hazy on a portion of   her VA.           Comments          1.0Dry eyes, bilateral  2. Refractive disorder  3. Essential hypertension                Last edited by Yaima Medrano, Patient Care Assistant on 12/27/2022  3:35   PM.            Assessment /Plan     For exam results, see Encounter Report.    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

## 2022-12-28 RX ORDER — LEVOTHYROXINE SODIUM 125 UG/1
125 TABLET ORAL
Qty: 30 TABLET | Refills: 11 | Status: SHIPPED | OUTPATIENT
Start: 2022-12-28 | End: 2023-12-11 | Stop reason: DRUGHIGH

## 2023-01-03 ENCOUNTER — PATIENT MESSAGE (OUTPATIENT)
Dept: INTERNAL MEDICINE | Facility: CLINIC | Age: 68
End: 2023-01-03
Payer: MEDICARE

## 2023-01-03 ENCOUNTER — TELEPHONE (OUTPATIENT)
Dept: INTERNAL MEDICINE | Facility: CLINIC | Age: 68
End: 2023-01-03
Payer: MEDICARE

## 2023-01-03 DIAGNOSIS — Z12.31 ENCOUNTER FOR SCREENING MAMMOGRAM FOR MALIGNANT NEOPLASM OF BREAST: Primary | ICD-10-CM

## 2023-01-03 NOTE — TELEPHONE ENCOUNTER
----- Message from Cheryl Flores sent at 1/3/2023  9:26 AM CST -----  Contact: Self  Pt would like an return call in reference to having orders put in to have bone density and mammogram done, please call back at [phone] Thx CJ

## 2023-01-05 ENCOUNTER — OFFICE VISIT (OUTPATIENT)
Dept: OPHTHALMOLOGY | Facility: CLINIC | Age: 68
End: 2023-01-05
Payer: MEDICARE

## 2023-01-05 ENCOUNTER — HOSPITAL ENCOUNTER (OUTPATIENT)
Dept: RADIOLOGY | Facility: HOSPITAL | Age: 68
Discharge: HOME | End: 2023-01-05
Attending: STUDENT IN AN ORGANIZED HEALTH CARE EDUCATION/TRAINING PROGRAM
Payer: MEDICARE

## 2023-01-05 DIAGNOSIS — H16.042 MARGINAL KERATITIS OF LEFT EYE DUE TO STAPHYLOCOCCUS TOXIN: Primary | ICD-10-CM

## 2023-01-05 DIAGNOSIS — H01.02A SQUAMOUS BLEPHARITIS OF UPPER AND LOWER EYELIDS OF BOTH EYES: ICD-10-CM

## 2023-01-05 DIAGNOSIS — H01.02B SQUAMOUS BLEPHARITIS OF UPPER AND LOWER EYELIDS OF BOTH EYES: ICD-10-CM

## 2023-01-05 DIAGNOSIS — B95.8 MARGINAL KERATITIS OF LEFT EYE DUE TO STAPHYLOCOCCUS TOXIN: Primary | ICD-10-CM

## 2023-01-05 DIAGNOSIS — H04.123 DRY EYES, BILATERAL: ICD-10-CM

## 2023-01-05 PROCEDURE — 71046 X-RAY EXAM CHEST 2 VIEWS: CPT | Mod: TC

## 2023-01-05 PROCEDURE — 99214 PR OFFICE/OUTPT VISIT, EST, LEVL IV, 30-39 MIN: ICD-10-PCS | Mod: S$GLB,,, | Performed by: STUDENT IN AN ORGANIZED HEALTH CARE EDUCATION/TRAINING PROGRAM

## 2023-01-05 PROCEDURE — 1160F PR REVIEW ALL MEDS BY PRESCRIBER/CLIN PHARMACIST DOCUMENTED: ICD-10-PCS | Mod: CPTII,S$GLB,, | Performed by: STUDENT IN AN ORGANIZED HEALTH CARE EDUCATION/TRAINING PROGRAM

## 2023-01-05 PROCEDURE — 71046 XR CHEST PA AND LATERAL: ICD-10-PCS | Mod: 26,,, | Performed by: RADIOLOGY

## 2023-01-05 PROCEDURE — 99999 PR PBB SHADOW E&M-EST. PATIENT-LVL II: ICD-10-PCS | Mod: PBBFAC,,, | Performed by: STUDENT IN AN ORGANIZED HEALTH CARE EDUCATION/TRAINING PROGRAM

## 2023-01-05 PROCEDURE — 1159F PR MEDICATION LIST DOCUMENTED IN MEDICAL RECORD: ICD-10-PCS | Mod: CPTII,S$GLB,, | Performed by: STUDENT IN AN ORGANIZED HEALTH CARE EDUCATION/TRAINING PROGRAM

## 2023-01-05 PROCEDURE — 99214 OFFICE O/P EST MOD 30 MIN: CPT | Mod: S$GLB,,, | Performed by: STUDENT IN AN ORGANIZED HEALTH CARE EDUCATION/TRAINING PROGRAM

## 2023-01-05 PROCEDURE — 71046 X-RAY EXAM CHEST 2 VIEWS: CPT | Mod: 26,,, | Performed by: RADIOLOGY

## 2023-01-05 PROCEDURE — 99999 PR PBB SHADOW E&M-EST. PATIENT-LVL II: CPT | Mod: PBBFAC,,, | Performed by: STUDENT IN AN ORGANIZED HEALTH CARE EDUCATION/TRAINING PROGRAM

## 2023-01-05 PROCEDURE — 1160F RVW MEDS BY RX/DR IN RCRD: CPT | Mod: CPTII,S$GLB,, | Performed by: STUDENT IN AN ORGANIZED HEALTH CARE EDUCATION/TRAINING PROGRAM

## 2023-01-05 PROCEDURE — 1159F MED LIST DOCD IN RCRD: CPT | Mod: CPTII,S$GLB,, | Performed by: STUDENT IN AN ORGANIZED HEALTH CARE EDUCATION/TRAINING PROGRAM

## 2023-01-05 NOTE — PROGRESS NOTES
HPI     Keratitis     Additional comments: Patient is here for 1 week follow up. VA is doing   well. No pain or irritation. Compliant with gtt.           Comments    Vigamox QID OS  Doxy BID PO x14 days             Last edited by Martha Linn MD on 1/5/2023  1:25 PM.            Assessment /Plan     For exam results, see Encounter Report.    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

## 2023-01-12 ENCOUNTER — OFFICE VISIT (OUTPATIENT)
Dept: OTOLARYNGOLOGY | Facility: CLINIC | Age: 68
End: 2023-01-12
Payer: MEDICARE

## 2023-01-12 ENCOUNTER — PATIENT MESSAGE (OUTPATIENT)
Dept: OTOLARYNGOLOGY | Facility: CLINIC | Age: 68
End: 2023-01-12

## 2023-01-12 DIAGNOSIS — K11.6 CYST OF LEFT PAROTID GLAND: Primary | ICD-10-CM

## 2023-01-12 DIAGNOSIS — K11.7 XEROSTOMIA: ICD-10-CM

## 2023-01-12 PROCEDURE — 3288F PR FALLS RISK ASSESSMENT DOCUMENTED: ICD-10-PCS | Mod: CPTII,S$GLB,, | Performed by: STUDENT IN AN ORGANIZED HEALTH CARE EDUCATION/TRAINING PROGRAM

## 2023-01-12 PROCEDURE — 1159F MED LIST DOCD IN RCRD: CPT | Mod: CPTII,S$GLB,, | Performed by: STUDENT IN AN ORGANIZED HEALTH CARE EDUCATION/TRAINING PROGRAM

## 2023-01-12 PROCEDURE — 1159F PR MEDICATION LIST DOCUMENTED IN MEDICAL RECORD: ICD-10-PCS | Mod: CPTII,S$GLB,, | Performed by: STUDENT IN AN ORGANIZED HEALTH CARE EDUCATION/TRAINING PROGRAM

## 2023-01-12 PROCEDURE — 99214 PR OFFICE/OUTPT VISIT, EST, LEVL IV, 30-39 MIN: ICD-10-PCS | Mod: S$GLB,,, | Performed by: STUDENT IN AN ORGANIZED HEALTH CARE EDUCATION/TRAINING PROGRAM

## 2023-01-12 PROCEDURE — 1126F PR PAIN SEVERITY QUANTIFIED, NO PAIN PRESENT: ICD-10-PCS | Mod: CPTII,S$GLB,, | Performed by: STUDENT IN AN ORGANIZED HEALTH CARE EDUCATION/TRAINING PROGRAM

## 2023-01-12 PROCEDURE — 1126F AMNT PAIN NOTED NONE PRSNT: CPT | Mod: CPTII,S$GLB,, | Performed by: STUDENT IN AN ORGANIZED HEALTH CARE EDUCATION/TRAINING PROGRAM

## 2023-01-12 PROCEDURE — 3288F FALL RISK ASSESSMENT DOCD: CPT | Mod: CPTII,S$GLB,, | Performed by: STUDENT IN AN ORGANIZED HEALTH CARE EDUCATION/TRAINING PROGRAM

## 2023-01-12 PROCEDURE — 1101F PT FALLS ASSESS-DOCD LE1/YR: CPT | Mod: CPTII,S$GLB,, | Performed by: STUDENT IN AN ORGANIZED HEALTH CARE EDUCATION/TRAINING PROGRAM

## 2023-01-12 PROCEDURE — 99999 PR PBB SHADOW E&M-EST. PATIENT-LVL III: CPT | Mod: PBBFAC,,, | Performed by: STUDENT IN AN ORGANIZED HEALTH CARE EDUCATION/TRAINING PROGRAM

## 2023-01-12 PROCEDURE — 99214 OFFICE O/P EST MOD 30 MIN: CPT | Mod: S$GLB,,, | Performed by: STUDENT IN AN ORGANIZED HEALTH CARE EDUCATION/TRAINING PROGRAM

## 2023-01-12 PROCEDURE — 1101F PR PT FALLS ASSESS DOC 0-1 FALLS W/OUT INJ PAST YR: ICD-10-PCS | Mod: CPTII,S$GLB,, | Performed by: STUDENT IN AN ORGANIZED HEALTH CARE EDUCATION/TRAINING PROGRAM

## 2023-01-12 PROCEDURE — 99999 PR PBB SHADOW E&M-EST. PATIENT-LVL III: ICD-10-PCS | Mod: PBBFAC,,, | Performed by: STUDENT IN AN ORGANIZED HEALTH CARE EDUCATION/TRAINING PROGRAM

## 2023-01-12 RX ORDER — LANOLIN ALCOHOL/MO/W.PET/CERES
400 CREAM (GRAM) TOPICAL DAILY
COMMUNITY

## 2023-01-12 RX ORDER — DICLOFENAC SODIUM 25 MG/1
25 TABLET, DELAYED RELEASE ORAL 2 TIMES DAILY
COMMUNITY
End: 2023-03-01

## 2023-01-12 NOTE — PROGRESS NOTES
Referring Provider:    No referring provider defined for this encounter.  Subjective:   Patient: Mariah Meza [id number], :1955   Visit date:2023 9:33 AM    Chief Complaint: see below  HPI:       Follow-up (U/s results)      Prior notes reviewed  Clinical documentation obtained by nursing staff reviewed.     Patient presents for evaluation of an incidentally noted parotid mass found on MRI while working up her chronic headaches.  She was unaware of the mass and has not symptoms in the area.  I ordered imaging in preparation for this visit but it has not yet been completed as she wished to reschedule to a different location.  She has significant headaches which have improved in the last several weeks.  C/o chronic dry mouth, dry nose, left eye dryness/irritation.     MRI neck w w/o: disc images reviewed.  Small mass in the deep parotid space.  Bright on T2, dark on T1, mild rim enhancement with keenan.      MRI brain at [facility].  No images available for review  T2 hyperintense left superficial parotid lesion which measures approximately 1.2 x 0.9 cm.     IMPRESSION:     1.  Multilevel degenerative changes most pronounced at C4-C5. Mild canal stenosis at multiple levels. No abnormal cord signal.   2.  Multilevel foraminal stenosis which is at most mild.   3.  Cystic left superficial parotid lesion which is incompletely characterized by spine MRI technique. This may reflect a large lymph node, parotid cyst, cystic primary parotid neoplasm. Recommend nonemergent ENT evaluation.      Return clinic visit, 23  Doing well. No mass noted of parotid. No facial weakness. No pain.     Still having dry mouth, worse in morning.     Had some AI labs done.       Objective:     Physical Exam:  Vitals:  LMP 02/10/1984 (Approximate)     General appearance:  Well developed, well nourished    Face; no palpable masses of parotid    Ears:  Otoscopy of external auditory canals and tympanic membranes was normal, clinical  speech reception thresholds grossly intact, no mass/lesion of auricle.    Nose:  No masses/lesions of external nose, nasal mucosa, septum, and turbinates were within normal limits.    Mouth:  No mass/lesion of lips, teeth, gums, hard/soft palate, tongue, tonsils, or oropharynx.    Neck & Lymphatics:  No cervical lymphadenopathy, no neck mass/crepitus/ asymmetry, trachea is midline, no thyroid enlargement/tenderness/mass.              [x]  Data Reviewed:    Lab Results   Component Value Date    WBC 4.26 01/05/2023    HGB 12.0 01/05/2023    HCT 37.8 01/05/2023    MCV 91 01/05/2023    EOSINOPHIL 4.5 01/05/2023     ANCA negative  DIONTE negative  RF negative  Anit-SSA, anti-SSB negative    Images    I have independently reviewed the following imaging:    US neck    FINDINGS:  There is a well-circumscribed 14 x 8 x 11 mm hypoechoic nodule within the left parotid gland.  There is no internal hypervascularity.     Impression:     Well-circumscribed hypoechoic nodule in the left parotid gland.  Differential includes intraparotid lymph node, pleomorphic adenoma, Warthin's tumor.          Assessment & Plan:   Cyst of left parotid gland          Discussed FNA vs observation.  Likelihood of malignancy is low given cystic appearance in the parotid.  She would like to observe.  Will follow with US in 3 months.    Sicca syndrome- antibody testing; quite possibly secondary to medications. Unlikely to be related to the parotid cyst.      Update 1/12/23  Likely benign based on imaging appearance, but cannot be gauranteed  We discussed options including continue observation with repeat US in 1 year vs parotidectomy. Risks and benefits of each discussed and she elected to proceed with repeat US understanding the risks.      Xerostomia - hydration, biotene, f/u labs with ordering provider; consider rheum consult

## 2023-01-13 ENCOUNTER — OFFICE VISIT (OUTPATIENT)
Dept: OPHTHALMOLOGY | Facility: CLINIC | Age: 68
End: 2023-01-13
Payer: MEDICARE

## 2023-01-13 DIAGNOSIS — H16.042 MARGINAL KERATITIS OF LEFT EYE DUE TO STAPHYLOCOCCUS TOXIN: Primary | ICD-10-CM

## 2023-01-13 DIAGNOSIS — B95.8 MARGINAL KERATITIS OF LEFT EYE DUE TO STAPHYLOCOCCUS TOXIN: Primary | ICD-10-CM

## 2023-01-13 DIAGNOSIS — H01.02B SQUAMOUS BLEPHARITIS OF UPPER AND LOWER EYELIDS OF BOTH EYES: ICD-10-CM

## 2023-01-13 DIAGNOSIS — H01.02A SQUAMOUS BLEPHARITIS OF UPPER AND LOWER EYELIDS OF BOTH EYES: ICD-10-CM

## 2023-01-13 PROCEDURE — 99214 PR OFFICE/OUTPT VISIT, EST, LEVL IV, 30-39 MIN: ICD-10-PCS | Mod: S$GLB,,, | Performed by: STUDENT IN AN ORGANIZED HEALTH CARE EDUCATION/TRAINING PROGRAM

## 2023-01-13 PROCEDURE — 1160F PR REVIEW ALL MEDS BY PRESCRIBER/CLIN PHARMACIST DOCUMENTED: ICD-10-PCS | Mod: CPTII,S$GLB,, | Performed by: STUDENT IN AN ORGANIZED HEALTH CARE EDUCATION/TRAINING PROGRAM

## 2023-01-13 PROCEDURE — 99999 PR PBB SHADOW E&M-EST. PATIENT-LVL III: ICD-10-PCS | Mod: PBBFAC,,, | Performed by: STUDENT IN AN ORGANIZED HEALTH CARE EDUCATION/TRAINING PROGRAM

## 2023-01-13 PROCEDURE — 1159F PR MEDICATION LIST DOCUMENTED IN MEDICAL RECORD: ICD-10-PCS | Mod: CPTII,S$GLB,, | Performed by: STUDENT IN AN ORGANIZED HEALTH CARE EDUCATION/TRAINING PROGRAM

## 2023-01-13 PROCEDURE — 1160F RVW MEDS BY RX/DR IN RCRD: CPT | Mod: CPTII,S$GLB,, | Performed by: STUDENT IN AN ORGANIZED HEALTH CARE EDUCATION/TRAINING PROGRAM

## 2023-01-13 PROCEDURE — 99999 PR PBB SHADOW E&M-EST. PATIENT-LVL III: CPT | Mod: PBBFAC,,, | Performed by: STUDENT IN AN ORGANIZED HEALTH CARE EDUCATION/TRAINING PROGRAM

## 2023-01-13 PROCEDURE — 1159F MED LIST DOCD IN RCRD: CPT | Mod: CPTII,S$GLB,, | Performed by: STUDENT IN AN ORGANIZED HEALTH CARE EDUCATION/TRAINING PROGRAM

## 2023-01-13 PROCEDURE — 99214 OFFICE O/P EST MOD 30 MIN: CPT | Mod: S$GLB,,, | Performed by: STUDENT IN AN ORGANIZED HEALTH CARE EDUCATION/TRAINING PROGRAM

## 2023-01-13 NOTE — PROGRESS NOTES
HPI    Pt in today for a 1 week follow-up. Pt states her vision has been stable   since her last visit. Pt denies any ocular pain or discomfort. Complaint   with medications given.     1. Dry eyes, bilateral  2. Refractive disorder  3. Essential hypertension  4. Marginal keratitis OS  5. Squamous blepharitis Upper and Lower Eyelids OU    Thyroid removed (12/2021)    Doxy until out  OS: Vigamox QID    Last edited by Odalys Temple on 1/13/2023 10:01 AM.            Assessment /Plan     For exam results, see Encounter Report.    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

## 2023-02-01 ENCOUNTER — LAB VISIT (OUTPATIENT)
Dept: LAB | Facility: HOSPITAL | Age: 68
End: 2023-02-01
Attending: NURSE PRACTITIONER
Payer: MEDICARE

## 2023-02-01 ENCOUNTER — OFFICE VISIT (OUTPATIENT)
Dept: INTERNAL MEDICINE | Facility: CLINIC | Age: 68
End: 2023-02-01
Payer: MEDICARE

## 2023-02-01 VITALS
OXYGEN SATURATION: 100 % | RESPIRATION RATE: 14 BRPM | TEMPERATURE: 98 F | BODY MASS INDEX: 38.09 KG/M2 | HEART RATE: 60 BPM | WEIGHT: 221.88 LBS | SYSTOLIC BLOOD PRESSURE: 142 MMHG | DIASTOLIC BLOOD PRESSURE: 80 MMHG

## 2023-02-01 DIAGNOSIS — I10 PRIMARY HYPERTENSION: ICD-10-CM

## 2023-02-01 DIAGNOSIS — M54.16 LUMBAR RADICULOPATHY: ICD-10-CM

## 2023-02-01 DIAGNOSIS — E79.0 ELEVATED URIC ACID IN BLOOD: ICD-10-CM

## 2023-02-01 DIAGNOSIS — E89.0 POST-OPERATIVE HYPOTHYROIDISM: ICD-10-CM

## 2023-02-01 DIAGNOSIS — R39.198 DECREASED URINE STREAM: ICD-10-CM

## 2023-02-01 DIAGNOSIS — Z00.00 ROUTINE MEDICAL EXAM: Primary | ICD-10-CM

## 2023-02-01 PROCEDURE — 1101F PT FALLS ASSESS-DOCD LE1/YR: CPT | Mod: CPTII,S$GLB,, | Performed by: NURSE PRACTITIONER

## 2023-02-01 PROCEDURE — 1101F PR PT FALLS ASSESS DOC 0-1 FALLS W/OUT INJ PAST YR: ICD-10-PCS | Mod: CPTII,S$GLB,, | Performed by: NURSE PRACTITIONER

## 2023-02-01 PROCEDURE — 4010F ACE/ARB THERAPY RXD/TAKEN: CPT | Mod: CPTII,S$GLB,, | Performed by: NURSE PRACTITIONER

## 2023-02-01 PROCEDURE — 3008F BODY MASS INDEX DOCD: CPT | Mod: CPTII,S$GLB,, | Performed by: NURSE PRACTITIONER

## 2023-02-01 PROCEDURE — 3079F DIAST BP 80-89 MM HG: CPT | Mod: CPTII,S$GLB,, | Performed by: NURSE PRACTITIONER

## 2023-02-01 PROCEDURE — 3288F PR FALLS RISK ASSESSMENT DOCUMENTED: ICD-10-PCS | Mod: CPTII,S$GLB,, | Performed by: NURSE PRACTITIONER

## 2023-02-01 PROCEDURE — 3008F PR BODY MASS INDEX (BMI) DOCUMENTED: ICD-10-PCS | Mod: CPTII,S$GLB,, | Performed by: NURSE PRACTITIONER

## 2023-02-01 PROCEDURE — 4010F PR ACE/ARB THEARPY RXD/TAKEN: ICD-10-PCS | Mod: CPTII,S$GLB,, | Performed by: NURSE PRACTITIONER

## 2023-02-01 PROCEDURE — 3288F FALL RISK ASSESSMENT DOCD: CPT | Mod: CPTII,S$GLB,, | Performed by: NURSE PRACTITIONER

## 2023-02-01 PROCEDURE — 80048 BASIC METABOLIC PNL TOTAL CA: CPT | Performed by: NURSE PRACTITIONER

## 2023-02-01 PROCEDURE — 1125F AMNT PAIN NOTED PAIN PRSNT: CPT | Mod: CPTII,S$GLB,, | Performed by: NURSE PRACTITIONER

## 2023-02-01 PROCEDURE — 3079F PR MOST RECENT DIASTOLIC BLOOD PRESSURE 80-89 MM HG: ICD-10-PCS | Mod: CPTII,S$GLB,, | Performed by: NURSE PRACTITIONER

## 2023-02-01 PROCEDURE — 3077F SYST BP >= 140 MM HG: CPT | Mod: CPTII,S$GLB,, | Performed by: NURSE PRACTITIONER

## 2023-02-01 PROCEDURE — 99999 PR PBB SHADOW E&M-EST. PATIENT-LVL V: CPT | Mod: PBBFAC,,, | Performed by: NURSE PRACTITIONER

## 2023-02-01 PROCEDURE — 99213 PR OFFICE/OUTPT VISIT, EST, LEVL III, 20-29 MIN: ICD-10-PCS | Mod: S$GLB,,, | Performed by: NURSE PRACTITIONER

## 2023-02-01 PROCEDURE — 99999 PR PBB SHADOW E&M-EST. PATIENT-LVL V: ICD-10-PCS | Mod: PBBFAC,,, | Performed by: NURSE PRACTITIONER

## 2023-02-01 PROCEDURE — 36415 COLL VENOUS BLD VENIPUNCTURE: CPT | Mod: PO | Performed by: NURSE PRACTITIONER

## 2023-02-01 PROCEDURE — 3077F PR MOST RECENT SYSTOLIC BLOOD PRESSURE >= 140 MM HG: ICD-10-PCS | Mod: CPTII,S$GLB,, | Performed by: NURSE PRACTITIONER

## 2023-02-01 PROCEDURE — 1125F PR PAIN SEVERITY QUANTIFIED, PAIN PRESENT: ICD-10-PCS | Mod: CPTII,S$GLB,, | Performed by: NURSE PRACTITIONER

## 2023-02-01 PROCEDURE — 84550 ASSAY OF BLOOD/URIC ACID: CPT | Performed by: NURSE PRACTITIONER

## 2023-02-01 PROCEDURE — 99213 OFFICE O/P EST LOW 20 MIN: CPT | Mod: S$GLB,,, | Performed by: NURSE PRACTITIONER

## 2023-02-01 NOTE — PROGRESS NOTES
Subjective:       Patient ID: Mariah Meza is a 67 y.o. female.    Chief Complaint: Follow-up (C/o bilateral feet pain )    Patient presents for 3 month follow up.     Elevated blood pressure today:  taking Benicar HCT 40/25, and Coreg 12.5 mg BID    Continues to have intermittent left foot pain and swelling.  Uric acid was elevated.  Medrol Pack help.   Two weeks later, she had another flare after a tonic at home (whiskey).   May need to change the HCTZ (dc'd). Add Norvasc.  Has swelling with CCB but had pain with nifedipine.  Has cardiologist follow up on 02/21/2023.      Has left eye irritation.  Saw ophthalmologist.  Dx: Keratitis due to staph.  Completed treatment.  Feels like something is going on with it now.        Follow-up  Associated symptoms include arthralgias and joint swelling. Pertinent negatives include no abdominal pain, chills, fatigue or fever.   Review of Systems   Constitutional:  Negative for chills, fatigue and fever.   Gastrointestinal:  Negative for abdominal pain, constipation and diarrhea.   Musculoskeletal:  Positive for arthralgias and joint swelling. Negative for joint deformity.   Psychiatric/Behavioral:  Negative for agitation and confusion.        Objective:      Physical Exam  Vitals reviewed.   Constitutional:       Appearance: Normal appearance.   Cardiovascular:      Rate and Rhythm: Normal rate and regular rhythm.   Musculoskeletal:         General: Swelling present.   Skin:     General: Skin is warm and dry.   Neurological:      General: No focal deficit present.      Mental Status: She is alert and oriented to person, place, and time.   Psychiatric:         Mood and Affect: Mood normal.         Behavior: Behavior is cooperative.       Assessment:       Problem List Items Addressed This Visit       Post-operative hypothyroidism    Lumbar radiculopathy    Hypertension    Relevant Orders    Basic Metabolic Panel (Completed)    Elevated uric acid in blood    Relevant  Orders    URIC ACID (Completed)     Other Visit Diagnoses       Routine medical exam    -  Primary    Decreased urine stream        Relevant Orders    Basic Metabolic Panel (Completed)    Urinalysis (Completed)    Urine culture (Completed)            Plan:           Routine medical exam    Lumbar radiculopathy    Post-operative hypothyroidism    Elevated uric acid in blood  -     URIC ACID; Future; Expected date: 02/01/2023    Primary hypertension  -     Basic Metabolic Panel; Future; Expected date: 02/01/2023    Decreased urine stream  -     Basic Metabolic Panel; Future; Expected date: 02/01/2023  -     Urinalysis; Future; Expected date: 02/01/2023  -     Urine culture; Future; Expected date: 02/01/2023       Lab today     One month follow up

## 2023-02-02 LAB
ANION GAP SERPL CALC-SCNC: 11 MMOL/L (ref 8–16)
BUN SERPL-MCNC: 15 MG/DL (ref 8–23)
CALCIUM SERPL-MCNC: 9 MG/DL (ref 8.7–10.5)
CHLORIDE SERPL-SCNC: 105 MMOL/L (ref 95–110)
CO2 SERPL-SCNC: 25 MMOL/L (ref 23–29)
CREAT SERPL-MCNC: 1.1 MG/DL (ref 0.5–1.4)
EST. GFR  (NO RACE VARIABLE): 54.4 ML/MIN/1.73 M^2
GLUCOSE SERPL-MCNC: 92 MG/DL (ref 70–110)
POTASSIUM SERPL-SCNC: 3.6 MMOL/L (ref 3.5–5.1)
SODIUM SERPL-SCNC: 141 MMOL/L (ref 136–145)
URATE SERPL-MCNC: 9.3 MG/DL (ref 2.4–5.7)

## 2023-02-02 RX ORDER — AMOXICILLIN AND CLAVULANATE POTASSIUM 875; 125 MG/1; MG/1
1 TABLET, FILM COATED ORAL EVERY 12 HOURS
Qty: 20 TABLET | Refills: 0 | Status: SHIPPED | OUTPATIENT
Start: 2023-02-02 | End: 2023-03-01

## 2023-02-03 ENCOUNTER — TELEPHONE (OUTPATIENT)
Dept: INTERNAL MEDICINE | Facility: CLINIC | Age: 68
End: 2023-02-03
Payer: MEDICARE

## 2023-02-03 DIAGNOSIS — R53.83 FATIGUE, UNSPECIFIED TYPE: ICD-10-CM

## 2023-02-03 DIAGNOSIS — Z79.899 OTHER LONG TERM (CURRENT) DRUG THERAPY: ICD-10-CM

## 2023-02-03 DIAGNOSIS — I10 PRIMARY HYPERTENSION: Primary | ICD-10-CM

## 2023-02-03 NOTE — TELEPHONE ENCOUNTER
I returned a call back to the pt and she was calling regarding a discussion she and Taina FREEMAN had regarding increasing or changing her blood pressure medication to decrease her blood pressure readings. She stated she asked to wait until she had seen her Cardiologist and she has seen them . Would like to have the increase of blood pressure medication or change done to get numbers down. Please advise. //kah

## 2023-02-03 NOTE — TELEPHONE ENCOUNTER
----- Message from Kamar Barfield sent at 2/3/2023  8:33 AM CST -----  Contact: [phone]  Patient would like to consult with a nurse in regards to her blood pressure medication. Please call back at [phone]. Thanks KD

## 2023-02-06 ENCOUNTER — HOSPITAL ENCOUNTER (OUTPATIENT)
Dept: RADIOLOGY | Facility: HOSPITAL | Age: 68
Discharge: HOME | End: 2023-02-06
Attending: NURSE PRACTITIONER
Payer: MEDICARE

## 2023-02-06 VITALS — BODY MASS INDEX: 37.9 KG/M2 | WEIGHT: 222 LBS | HEIGHT: 64 IN

## 2023-02-06 DIAGNOSIS — Z12.31 ENCOUNTER FOR SCREENING MAMMOGRAM FOR MALIGNANT NEOPLASM OF BREAST: ICD-10-CM

## 2023-02-06 PROCEDURE — 77067 MAMMO DIGITAL SCREENING BILAT WITH TOMO: ICD-10-PCS | Mod: 26,,, | Performed by: RADIOLOGY

## 2023-02-06 PROCEDURE — 77063 MAMMO DIGITAL SCREENING BILAT WITH TOMO: ICD-10-PCS | Mod: 26,,, | Performed by: RADIOLOGY

## 2023-02-06 PROCEDURE — 77063 BREAST TOMOSYNTHESIS BI: CPT | Mod: 26,,, | Performed by: RADIOLOGY

## 2023-02-06 PROCEDURE — 77067 SCR MAMMO BI INCL CAD: CPT | Mod: 26,,, | Performed by: RADIOLOGY

## 2023-02-06 PROCEDURE — 77067 SCR MAMMO BI INCL CAD: CPT | Mod: TC

## 2023-02-07 RX ORDER — OLMESARTAN MEDOXOMIL 40 MG/1
40 TABLET ORAL DAILY
Qty: 30 TABLET | Refills: 11 | Status: SHIPPED | OUTPATIENT
Start: 2023-02-07 | End: 2023-12-11 | Stop reason: DRUGHIGH

## 2023-02-07 RX ORDER — AMLODIPINE BESYLATE 10 MG/1
10 TABLET ORAL DAILY
Qty: 30 TABLET | Refills: 11 | Status: SHIPPED | OUTPATIENT
Start: 2023-02-07 | End: 2023-05-11 | Stop reason: SDUPTHER

## 2023-02-07 NOTE — TELEPHONE ENCOUNTER
S/w pt, advised of med changes. Bp check scheduled, pt verbalized understanding. Pt had question about abx she was prescribed, stated the amoxicillin made her chest hurt, states she just completed doxycycline from eye dr for eye infection. Would like to d/c amoxicillin if possible. Please advise.

## 2023-02-07 NOTE — PROGRESS NOTES
New Patient Chronic Pain Note (Initial Visit)    Referring Physician: Smiley Weaver *    PCP: Taina Bob NP    Chief Complaint:   Chief Complaint   Patient presents with    Back Pain     Pain in back (whole) and ankles.  Pain scale 6/10        SUBJECTIVE:    Mariah Meza is a 67 y.o. female with past medical history significant for hyperlipidemia, hypertension, sicca syndrome, history of papillary thyroid cancer status post parathyroidectomy, obesity, GERD who presents to the clinic for the evaluation of mid back pain.  Patient reports pain has been present for the last 2 years without inciting accident injury or trauma.  Pain is constant and today is rated a 6/10.  Pain at its best is a 3/10 and at its worse is a 10/10.  Pain is described as sharp, stabbing and stiffness in nature.  Patient reports pain which begins in between the shoulder blades and radiates to the midthoracic spine.  Patient reports prior radiation down the left upper extremity to the thumb in C6 distribution.  Patient also reports pain in bilateral ankles.  Patient reports associated weakness in the lower extremities associated with this pain and with standing and ambulation.  This pain is described as burning in nature.  All of her pain is exacerbated when moving from sitting to standing, standing and with ambulation.  Patient reports in the past she was able to ambulate 10,000 steps per day but this has been reduced secondary to her pain.  Pain is improved with sitting and pain medications such as relief.  Patient reports she completed 12 months of physical therapy for the neck and lower back 2 months prior at [facility] physical therapy in [place].  Patient reports no significant improvement in her pain with completion of physical therapy.  Patient also takes Flexeril which is marginally helpful.    Patient reports significant lower extremity weakness.  Patient denies night fever/night sweats, urinary incontinence, bowel  incontinence, significant weight loss, and loss of sensations.      Pain Disability Index Review:     Last 3 PDI Scores 2/9/2023   Pain Disability Index (PDI) 50       Non-Pharmacologic Treatments:  Physical Therapy/Home Exercise: yes  Ice/Heat:yes  TENS: no  Acupuncture: no  Massage: no  Chiropractic: no    Other: no      Pain Medications:  - Adjuvant Medications: Cyclobenzaprine (Flexeril) and Tylenol (Acetaminophen) Diclofenac tablet    Pain Procedures:   None    Past Medical History:   Diagnosis Date    Chronic rhinitis     DJD (degenerative joint disease), lumbar     GERD (gastroesophageal reflux disease)     Headache     Hiatal hernia     Hyperlipidemia     Hypertension     Liver disease     Fatty Liver    Low back pain     Obesity     PONV (postoperative nausea and vomiting)     Thyroid disease      Past Surgical History:   Procedure Laterality Date    BILATERAL OOPHORECTOMY  2002    CARPAL TUNNEL RELEASE      Left wrist    CHOLECYSTECTOMY      DISSECTION OF NECK Bilateral 12/20/2021    Procedure: DISSECTION, NECK;  Surgeon: Deejay Olsen MD;  Location: [facility] OR;  Service: ENT;  Laterality: Bilateral;  Central neck dissection    HYSTERECTOMY  1984    NECK EXPLORATION Bilateral 12/20/2021    Procedure: EXPLORATION, NECK;  Surgeon: Deejay Olsen MD;  Location: [facility] OR;  Service: ENT;  Laterality: Bilateral;  Parathyroid exploration    ROTATOR CUFF REPAIR      Right shoulder    ROTATOR CUFF REPAIR Left 2010    THYROIDECTOMY, BILATERAL Bilateral 12/20/2021    Procedure: THYROIDECTOMY,BILATERAL;  Surgeon: Deejay Olsen MD;  Location: [facility] OR;  Service: ENT;  Laterality: Bilateral;    TOTAL VAGINAL HYSTERECTOMY  1985     Review of patient's allergies indicates:   Allergen Reactions    Iodine and iodide containing products Itching    Shellfish containing products Swelling    Statins-hmg-coa reductase inhibitors Other (See Comments)     Myalgias       Current Outpatient Medications   Medication Sig    acetaminophen (TYLENOL)  650 MG TbSR Take 650 mg by mouth daily as needed.    amLODIPine (NORVASC) 10 MG tablet Take 1 tablet (10 mg total) by mouth once daily.    azelastine (ASTELIN) 137 mcg (0.1 %) nasal spray 1 spray (137 mcg total) by Nasal route 2 (two) times daily.    calcium carbonate 470 mg calcium (1,177 mg) Chew Take one tablet by mouth twice daily    carvediloL (COREG) 12.5 MG tablet Take 12.5 mg by mouth 2 (two) times daily with meals.    cyclobenzaprine (FLEXERIL) 10 MG tablet Take 1 tablet (10 mg total) by mouth 3 (three) times daily as needed for Muscle spasms.    diclofenac (VOLTAREN) 25 MG TbEC Take 25 mg by mouth 2 (two) times daily.    furosemide (LASIX) 40 MG tablet Take 1 tablet by mouth twice daily as needed    levothyroxine (SYNTHROID) 125 MCG tablet Take 1 tablet (125 mcg total) by mouth before breakfast.    linaclotide (LINZESS ORAL) Take 72 mg by mouth once daily.    magnesium oxide (MAG-OX) 400 mg (241.3 mg magnesium) tablet Take 400 mg by mouth once daily.    olmesartan (BENICAR) 40 MG tablet Take 1 tablet (40 mg total) by mouth once daily.    pantoprazole (PROTONIX) 40 MG tablet Take by mouth once daily.     psyllium husk/aspartame (METAMUCIL FIBER SINGLES ORAL) Take by mouth.    amoxicillin-clavulanate 875-125mg (AUGMENTIN) 875-125 mg per tablet Take 1 tablet by mouth every 12 (twelve) hours. Take with food (Patient not taking: Reported on 2/9/2023)    cevimeline (EVOXAC) 30 mg capsule Take 1 capsule (30 mg total) by mouth 3 (three) times daily. (Patient not taking: Reported on 1/12/2023)    fluticasone propionate (FLONASE) 50 mcg/actuation nasal spray 2 sprays (100 mcg total) by Each Nostril route 2 (two) times daily. (Patient not taking: Reported on 1/12/2023)    gabapentin (NEURONTIN) 300 MG capsule Take 1 capsule (300 mg total) by mouth every evening for 7 days, THEN 2 capsules (600 mg total) every evening for 7 days, THEN 3 capsules (900 mg total) every evening for 16 days.    methylPREDNISolone (MEDROL  "DOSEPACK) 4 mg tablet use as directed (Patient not taking: Reported on 1/12/2023)    moxifloxacin (VIGAMOX) 0.5 % ophthalmic solution Place 1 drop into the left eye 4 (four) times daily. (Patient not taking: Reported on 2/9/2023)     Current Facility-Administered Medications   Medication    DOBUTamine 1000 mg in D5W 250 mL infusion (premix titrating)       Review of Systems     GENERAL:  No weight loss, malaise or fevers.  HEENT:   No recent changes in vision or hearing  NECK:  Negative for lumps, no difficulty with swallowing.  RESPIRATORY:  Negative for cough, wheezing or shortness of breath, patient denies any recent URI.  CARDIOVASCULAR:  Negative for chest pain or palpitations.  GI:  Negative for abdominal discomfort, blood in stools or black stools or change in bowel habits.  MUSCULOSKELETAL:  See HPI.  SKIN:  Negative for lesions, rash, and itching.  PSYCH:  No mood disorder or recent psychosocial stressors.   HEMATOLOGY/LYMPHOLOGY:  Negative for prolonged bleeding, bruising easily or swollen nodes.    NEURO:   No history of syncope, paralysis, seizures or tremors.  All other reviewed and negative other than HPI.    OBJECTIVE:    BP (!) 193/78   Pulse (!) 59   Resp 17   Ht 5' 4" (1.626 m)   Wt 98.1 kg (216 lb 6.1 oz)   LMP 02/10/1984 (Approximate)   BMI 37.14 kg/m²       Physical Exam    GENERAL: Well appearing, in no acute distress, alert and oriented x3.  PSYCH:  Mood and affect appropriate.  SKIN: Skin color, texture, turgor normal, no rashes or lesions.  HEAD/FACE:  Normocephalic, atraumatic. Cranial nerves grossly intact.    NECK: pain to palpation over the cervical paraspinous muscles. Spurling Negative. Mild pain with neck flexion, extension, or lateral flexion.   Normaltesting biceps, triceps and brachioradialis bilaterally.    NegativeHoffmann's bilaterally.    5/5 strength testing deltoid, biceps, triceps, wrist extensor, wrist flexor and ulnar intrinsics bilaterally.    Normal  strength " bilaterally    CV: RRR with palpation of the radial artery.  PULM: No evidence of respiratory difficulty, symmetric chest rise.  GI:  Soft and non-tender.    NEURO: Bilateral upper and lower extremity coordination and muscle stretch reflexes are physiologic and symmetric. No loss of sensation is noted.  GAIT: normal.    Imagin14    X-Ray Lumbar Spine Ap And Lateral    Narrative  Findings:    Five non-rib bearing lumbar type vertebrae are present without significant lateral curvature abnormality or subluxation.  Degenerative endplate changes as well as facet joint hypertrophy, particularly inferiorly, are noted without acute fracture or  suspicious focal bony lesion identified.  Disk spaces are fairly well-maintained.       21    X-Ray Cervical Spine Complete 5 view  FINDINGS:  Patient's hair limits the exam.    There is visualization to the C7-T1 level on the swimmer's view.  Multilevel marginal spurring and varying degrees of uniform loss of disc height throughout the C-spine.  No fracture or subluxation.    Pre dens space, prevertebral soft tissues, C1-2 articulation and odontoid normal in appearance allowing for positioning.  Neural foramina widely patent bilaterally at all levels.  Remaining visualized osseous structures intact.  Included upper lung fields clear.      EMG 10/2021  IMPRESSION  1. ABNORMAL study  2. There is electrodiagnostic evidence of an acute on chronic radiculopathy of the L5 and S1 nerve roots-slightly worse on the right        ASSESSMENT: 67 y.o. year old female with neck, mid back, bilateral ankles pain, consistent with     1. Lumbar radiculopathy        2. Cervical myofascial pain syndrome  Ambulatory referral/consult to Pain Clinic      3. Spondylosis of cervical region without myelopathy or radiculopathy  Ambulatory referral/consult to Pain Clinic            PLAN:   - Interventions:  We have discussed considering a C4-6 medial branch block to address axial neck pain  versus cervical epidural steroid injection to address cervical radiculopathy.  We will 1st start conservatively with pharmacologic management and review outside cervical MRI.. Explained the risks and benefits of the procedure in detail with the patient today in clinic along with alternative treatment options    - Anticoagulation use: no no anticoagulation     report:  Reviewed and consistent with medication use as prescribed.    - Medications:  --  We have discussed starting the patient on gabapentin.  Patient will increase their medication according to the following algorithm.  We have reviewed potential side effects of this medication including daytime somnolence, weight gain and peripheral edema    Gabapentin Titration:  Week 1: 300 mg QHS  Week 2: 600 mg QHS  Week 3: 900 mg QHS    -Patient can continue judicious use of Alleve. We have discussed potential deleterious side effects of NSAIDs on the cardiovascular, gastrointestinal and renal systems. We have discussed judicious use of this medication. Pt expresses understanding.     - Therapy:   We discussed continuing physical therapy to help manage the patient/s painful condition. The patient was counseled that muscle strengthening will improve the long term prognosis in regards to pain and may also help increase range of motion and mobility.     - Imaging: Reviewed available imaging with patient and answered any questions they had regarding study.    - Records: Obtain old records from outside physicians and imaging  -[facility]: cMRI    - Follow up visit: return to clinic in 6-8 weeks      The above plan and management options were discussed at length with patient. Patient is in agreement with the above and verbalized understanding.    - I discussed the goals of interventional chronic pain management with the patient on today's visit. We discussed a multimodal and systematic approach to pain.  This includes diagnostic and therapeutic injections, adjuvant  pharmacologic treatment, physical therapy, and at times psychiatry.  I emphasized the importance of regular exercise, core strengthening and stretching, diet and weight loss as a cornerstone of long-term pain management.    - This condition does not require this patient to take time off of work, and the primary goal of our Pain Management services is to improve the patient's functional capacity.  - Patient Questions: Answered all of the patient's questions regarding diagnoses, therapy, treatment and next steps        Franklin Sutton MD  Interventional Pain Management  [facility]    Disclaimer:  This note was prepared using voice recognition system and is likely to have sound alike errors that may have been overlooked even after proof reading.  Please call me with any questions

## 2023-02-09 ENCOUNTER — TELEPHONE (OUTPATIENT)
Dept: PAIN MEDICINE | Facility: CLINIC | Age: 68
End: 2023-02-09

## 2023-02-09 ENCOUNTER — OFFICE VISIT (OUTPATIENT)
Dept: PAIN MEDICINE | Facility: CLINIC | Age: 68
End: 2023-02-09
Payer: MEDICARE

## 2023-02-09 ENCOUNTER — PATIENT MESSAGE (OUTPATIENT)
Dept: PAIN MEDICINE | Facility: CLINIC | Age: 68
End: 2023-02-09

## 2023-02-09 VITALS
SYSTOLIC BLOOD PRESSURE: 193 MMHG | WEIGHT: 216.38 LBS | BODY MASS INDEX: 36.94 KG/M2 | HEIGHT: 64 IN | HEART RATE: 59 BPM | RESPIRATION RATE: 17 BRPM | DIASTOLIC BLOOD PRESSURE: 78 MMHG

## 2023-02-09 DIAGNOSIS — M79.18 CERVICAL MYOFASCIAL PAIN SYNDROME: ICD-10-CM

## 2023-02-09 DIAGNOSIS — M54.16 LUMBAR RADICULOPATHY: Primary | ICD-10-CM

## 2023-02-09 DIAGNOSIS — M47.812 SPONDYLOSIS OF CERVICAL REGION WITHOUT MYELOPATHY OR RADICULOPATHY: ICD-10-CM

## 2023-02-09 PROCEDURE — 4010F PR ACE/ARB THEARPY RXD/TAKEN: ICD-10-PCS | Mod: CPTII,S$GLB,, | Performed by: ANESTHESIOLOGY

## 2023-02-09 PROCEDURE — 1159F MED LIST DOCD IN RCRD: CPT | Mod: CPTII,S$GLB,, | Performed by: ANESTHESIOLOGY

## 2023-02-09 PROCEDURE — 3077F SYST BP >= 140 MM HG: CPT | Mod: CPTII,S$GLB,, | Performed by: ANESTHESIOLOGY

## 2023-02-09 PROCEDURE — 4010F ACE/ARB THERAPY RXD/TAKEN: CPT | Mod: CPTII,S$GLB,, | Performed by: ANESTHESIOLOGY

## 2023-02-09 PROCEDURE — 99204 OFFICE O/P NEW MOD 45 MIN: CPT | Mod: S$GLB,,, | Performed by: ANESTHESIOLOGY

## 2023-02-09 PROCEDURE — 99204 PR OFFICE/OUTPT VISIT, NEW, LEVL IV, 45-59 MIN: ICD-10-PCS | Mod: S$GLB,,, | Performed by: ANESTHESIOLOGY

## 2023-02-09 PROCEDURE — 1160F PR REVIEW ALL MEDS BY PRESCRIBER/CLIN PHARMACIST DOCUMENTED: ICD-10-PCS | Mod: CPTII,S$GLB,, | Performed by: ANESTHESIOLOGY

## 2023-02-09 PROCEDURE — 3008F PR BODY MASS INDEX (BMI) DOCUMENTED: ICD-10-PCS | Mod: CPTII,S$GLB,, | Performed by: ANESTHESIOLOGY

## 2023-02-09 PROCEDURE — 3288F FALL RISK ASSESSMENT DOCD: CPT | Mod: CPTII,S$GLB,, | Performed by: ANESTHESIOLOGY

## 2023-02-09 PROCEDURE — 3008F BODY MASS INDEX DOCD: CPT | Mod: CPTII,S$GLB,, | Performed by: ANESTHESIOLOGY

## 2023-02-09 PROCEDURE — 3288F PR FALLS RISK ASSESSMENT DOCUMENTED: ICD-10-PCS | Mod: CPTII,S$GLB,, | Performed by: ANESTHESIOLOGY

## 2023-02-09 PROCEDURE — 99999 PR PBB SHADOW E&M-EST. PATIENT-LVL V: ICD-10-PCS | Mod: PBBFAC,,, | Performed by: ANESTHESIOLOGY

## 2023-02-09 PROCEDURE — 99999 PR PBB SHADOW E&M-EST. PATIENT-LVL V: CPT | Mod: PBBFAC,,, | Performed by: ANESTHESIOLOGY

## 2023-02-09 PROCEDURE — 1125F AMNT PAIN NOTED PAIN PRSNT: CPT | Mod: CPTII,S$GLB,, | Performed by: ANESTHESIOLOGY

## 2023-02-09 PROCEDURE — 3078F DIAST BP <80 MM HG: CPT | Mod: CPTII,S$GLB,, | Performed by: ANESTHESIOLOGY

## 2023-02-09 PROCEDURE — 3078F PR MOST RECENT DIASTOLIC BLOOD PRESSURE < 80 MM HG: ICD-10-PCS | Mod: CPTII,S$GLB,, | Performed by: ANESTHESIOLOGY

## 2023-02-09 PROCEDURE — 1159F PR MEDICATION LIST DOCUMENTED IN MEDICAL RECORD: ICD-10-PCS | Mod: CPTII,S$GLB,, | Performed by: ANESTHESIOLOGY

## 2023-02-09 PROCEDURE — 1101F PR PT FALLS ASSESS DOC 0-1 FALLS W/OUT INJ PAST YR: ICD-10-PCS | Mod: CPTII,S$GLB,, | Performed by: ANESTHESIOLOGY

## 2023-02-09 PROCEDURE — 1125F PR PAIN SEVERITY QUANTIFIED, PAIN PRESENT: ICD-10-PCS | Mod: CPTII,S$GLB,, | Performed by: ANESTHESIOLOGY

## 2023-02-09 PROCEDURE — 1160F RVW MEDS BY RX/DR IN RCRD: CPT | Mod: CPTII,S$GLB,, | Performed by: ANESTHESIOLOGY

## 2023-02-09 PROCEDURE — 3077F PR MOST RECENT SYSTOLIC BLOOD PRESSURE >= 140 MM HG: ICD-10-PCS | Mod: CPTII,S$GLB,, | Performed by: ANESTHESIOLOGY

## 2023-02-09 PROCEDURE — 1101F PT FALLS ASSESS-DOCD LE1/YR: CPT | Mod: CPTII,S$GLB,, | Performed by: ANESTHESIOLOGY

## 2023-02-09 RX ORDER — GABAPENTIN 300 MG/1
CAPSULE ORAL
Qty: 69 CAPSULE | Refills: 0 | Status: SHIPPED | OUTPATIENT
Start: 2023-02-09 | End: 2023-03-01

## 2023-02-09 NOTE — TELEPHONE ENCOUNTER
Returned pt call, she states that the last time she took gabapentin she was sluggish and tired all say even taking it at night. Pt would like to know if there is an alternative or if she should still try to take it. Advised pt that I will consult with Dr. Sutton.

## 2023-02-09 NOTE — PATIENT INSTRUCTIONS
General Neck and Back Pain    Both neck and back pain are usually caused by injury to the muscles or ligaments of the spine. Sometimes the disks that separate each bone of the spine may cause pain by pressing on a nearby nerve. Back and neck pain may appear after a sudden twisting or bending force (such as in a car accident), or sometimes after a simple awkward movement. In either case, muscle spasm is often present and adds to the pain.  Acute neck and back pain usually gets better in 1 to 2 weeks. Pain related to disk disease, arthritis in the spinal joints or spinal stenosis (narrowing of the spinal canal) can become chronic and last for months or years.  Back and neck pain are common problems. Most people feel better in 1 or 2 weeks, and most of the rest in 1 to 2 months. Most people can remain active.  People experience and describe pain differently.  Pain can be sharp, stabbing, shooting, aching, cramping, or burning  Movement, standing, bending, lifting, sitting, or walking may worsen the pain  Pain can be localized to one spot or area, or it can be more generalized  Pain can spread or radiate upwards, downwards, to the front, or go down your arms  Muscle spasm may occur.  Most of the time mechanical problems with the muscles or spine cause the pain. it is usually caused by an injury, whether known or not, to the muscles or ligaments. While illnesses can cause back pain, it is usually not caused by a serious illness. Pain is usually related to physical activity, whether sports, exercise, work, or normal activity. Sometimes it can occur without an identifiable cause. This can happen simply by stretching or moving wrong, without noting pain at the time. Other causes include:  Overexertion, lifting, pushing, pulling incorrectly or too aggressively.  Sudden twisting, bending or stretching from an accident (car or fall), or accidental movement.  Poor posture  Poor conditioning, lack of regular exercise  Spinal  disc disease or arthritis  Stress  Pregnancy, or illness like appendicitis, bladder or kidney infection, pelvic infections   Home care  For neck pain: Use a comfortable pillow that supports the head and keeps the spine in a neutral position. The position of the head should not be tilted forward or backward.  When in bed, try to find a position of comfort. A firm mattress is best. Try lying flat on your back with pillows under your knees. You can also try lying on your side with your knees bent up towards your chest and a pillow between your knees.  At first, do not try to stretch out the sore spots. If there is a strain, it is not like the good soreness you get after exercising without an injury. In this case, stretching may make it worse.  Avoid prolonged sitting, long car rides or travel. This puts more stress on the lower back than standing or walking.  During the first 24 to 72 hours after an injury, apply an ice pack to the painful area for 20 minutes and then remove it for 20 minutes over a period of 60 to 90 minutes or several times a day.   You can alternate ice and heat therapies. Talk with your healthcare provider about the best treatment for your back or neck pain. As a safety precaution, do not use a heating pad at bedtime. Sleeping with a heating pad can lead to skin burns or tissue damage.  Therapeutic massage can help relax the back and neck muscles without stretching them.  Be aware of safe lifting methods and do not lift anything over 15 pounds until all the pain is gone.  Medications  Talk to your healthcare provider before using medicine, especially if you have other medical problems or are taking other medicines.  You may use over-the-counter medicine to control pain, unless another pain medicine was prescribed. If you have chronic conditions like diabetes, liver or kidney disease, stomach ulcers,  gastrointestinal bleeding, or are taking blood thinner medicines.  Be careful if you are given pain  medicines, narcotics, or medicine for muscle spasm. They can cause drowsiness, and can affect your coordination, reflexes, and judgment. Do not drive or operate heavy machinery.  Follow-up care  Follow up with your healthcare provider, or as advised. Physical therapy or further tests may be needed.  If X-rays were taken, you will be notified of any new findings that may affect your care.  Call 911  Seek emergency medical care if any of the following occur:  Trouble breathing  Confusion  Very drowsy or trouble awakening  Fainting or loss of consciousness  Rapid or very slow heart rate  Loss of bowel or bladder control  When to seek medical advice  Call your healthcare provider right away if any of these occur:  Pain becomes worse or spreads into your arms or legs  Weakness, numbness or pain in one or both arms or legs  Numbness in the groin area  Difficulty walking  Fever of 100.4ºF (38ºC) or higher, or as directed by your healthcare provider  Date Last Reviewed: 7/1/2016  © [id number] [employer]. [address] [place] [address]. All rights reserved. This information is not intended as a substitute for professional medical care. Always follow your healthcare professional's instructions.       Understanding Lumbar Radiculopathy    Lumbar radiculopathy is irritation or inflammation of a nerve root in the low back. It causes symptoms that spread out from the back down one or both legs. To understand this condition, it helps to understand the parts of the spine:  Vertebrae. These are bones that stack to form the spine. The lumbar spine contains the 5 bottom vertebrae.  Disks. These are soft pads of tissue between the vertebrae. They act as shock absorbers for the spine.  Spinal canal. This is a tunnel formed within the stacked vertebrae. In the lumbar spine, nerves run through this canal.  Nerves. These branch off and leave the spinal canal, traveling out to parts of the body. As they leave the  spinal canal, nerves pass through openings between the vertebrae. The nerve root is the part of the nerve that is closest to the spinal canal.  Sciatic nerve. This is a large nerve formed from several nerve roots in the low back. This nerve extends down the back of the leg to the foot.  With lumbar radiculopathy, nerve roots in the low back become irritated. This leads to pain and symptoms. The sciatic nerve is commonly involved, so the condition is often called sciatica.  What causes lumbar radiculopathy?  Aging, injury, poor posture, extra body weight, and other issues can lead to problems in the low back. These problems may then irritate nerve roots. They include:  Damage to a disk in the lumbar spine. The damaged disk may then press on nearby nerve roots.  Degeneration from wear and tear, and aging. This can lead to narrowing (stenosis) of the openings between the vertebrae. The narrowed openings press on nerve roots as they leave the spinal canal.  Unstable spine. This is when a vertebra slips forward. It can then press on a nerve root.  Other, less common things can put pressure on nerves in the low back. These include diabetes, infection, or a tumor.  Symptoms of lumbar radiculopathy  These include:  Pain in the low back  Pain, numbness, tingling, or weakness that travels into the buttocks, hip, groin, or leg  Muscle spasms  Treatment for lumbar radiculopathy  In most cases, your healthcare provider will first try treatments that help relieve symptoms. These may include:  Prescription and over-the-counter pain medicines. These help relieve pain, swelling, and irritation.  Limits on positions and activities that increase pain. But lying in bed or avoiding all movement is only recommended for a short period of time.  Physical therapy, including exercises and stretches. This helps decrease pain and increase movement and function.  Steroid shots into the lower back. This may help relieve symptoms for a  time.  Weight-loss program. If you are overweight, losing extra pounds may help relieve symptoms.  In some cases, you may need surgery to fix the underlying problem. This depends on the cause, the symptoms, and how long the pain has lasted.  Possible complications  Over time, an irritated and inflamed nerve may become damaged. This may lead to long-lasting (permanent) numbness or weakness in your legs and feet. If symptoms change suddenly or get worse, be sure to let your healthcare provider know.  When to call your healthcare provider  Call your healthcare provider right away if you have any of these:  New pain or pain that gets worse  New or increasing weakness, tingling, or numbness in your leg or foot  Problems controlling your bladder or bowel   Date Last Reviewed: 3/10/2016  © 4007-9968 [employer]. [address] [place] [address]. All rights reserved. This information is not intended as a substitute for professional medical care. Always follow your healthcare professional's instructions.       Understanding Cervical Radiculopathy    Cervical radiculopathy is irritation or inflammation of a nerve root in the neck. It causes neck pain and other symptoms that may spread into the chest or down the arm. To understand this condition, it helps to understand the parts of the spine:  Vertebrae. These are bones that stack to form the spine. The cervical spine contains the 7 vertebrae in the neck.  Disks. These are soft pads of tissue between the vertebrae. They act as shock absorbers for the spine.  The spinal canal. This is a tunnel formed within the stacked vertebrae. The spinal cord runs through this canal.  Nerves. These branch off the spinal cord. As they leave the spinal canal, nerves pass through openings between the vertebrae. The nerve root is the part of the nerve that is closest to the spinal cord.   With cervical radiculopathy, nerve roots in the neck become irritated. This leads to  pain and symptoms that can travel to the nerves that go from the spinal cord down the arms and into the torso.  What causes cervical radiculopathy?  Aging, injury, poor posture, and other issues can lead to problems in the neck. These problems may then irritate nerve roots. These include:  Damage to a disk in the cervical spine. The damaged disk may then press on nearby nerve roots.  Degeneration from wear and tear, and aging. This can lead to narrowing (stenosis) of the openings between the vertebrae. The narrowed openings press on nerve roots as they leave the spinal canal.  An unstable spine. This is when a vertebra slips forward. It can then press on a nerve root.  There are other, less common causes of pressure on nerves in the neck. These include infection, cysts, and tumors.  Symptoms of cervical radiculopathy  These include:  Neck pain  Pain, numbness, tingling, or weakness that travels down the arm  Loss of neck movement  Muscle spasms  Treatment for cervical radiculopathy  In most cases, your healthcare provider will first try treatments that help relieve symptoms. These may include:  Prescription or over-the-counter pain medicines. These help relieve pain and swelling.  Cold packs. These help reduce pain.  Resting. This involves avoiding positions and activities that increase pain.  Neck brace (cervical collar). This can help relieve inflammation and pain.  Physical therapy, including exercises and stretches. This can help decrease pain and increase movement and function.  Shots of medicinesaround the nerve roots. This is done to help relieve symptoms for a time.  In some cases, your healthcare provider may advise surgery to fix the underlying problem. This depends on the cause, the symptoms, and how long the pain has lasted.  Possible complications  Over time, an irritated and inflamed nerve may become damaged. This may lead to long-lasting (permanent) numbness or weakness. If symptoms change suddenly or  get worse, be sure to let your healthcare provider know.     When to call your healthcare provider  Call your healthcare provider right away if you have any of these:  New pain or pain that gets worse  New or increasing weakness, numbness, or tingling in your arm or hand  Bowel or bladder changes   Date Last Reviewed: 3/10/2016  © 1160-4112 [employer]. [address] [place] [address]. All rights reserved. This information is not intended as a substitute for professional medical care. Always follow your healthcare professional's instructions.       Exercises to Strengthen Your Lower Back  Strong lower back and abdominal muscles work together to support your spine. The exercises below will help strengthen the lower back. It is important that you begin exercising slowly and increase levels gradually.  Always begin any exercise program with stretching. If you feel pain while doing any of these exercises, stop and talk to your doctor about a more specific exercise program that better suits your condition.   Low back stretch  The point of stretching is to make you more flexible and increase your range of motion. Stretch only as much as you are able. Stretch slowly. Do not push your stretch to the limit. If at any point you feel pain while stretching, this is your (temporary) limit.  Lie on your back with your knees bent and both feet on the ground.  Slowly raise your left knee to your chest as you flatten your lower back against the floor. Hold for 5 seconds.  Relax and repeat the exercise with your right knee.  Do 10 of these exercises for each leg.  Repeat hugging both knees to your chest at the same time.  Building lower back strength  Start your exercise routine with 10 to 30 minutes a day, 1 to 3 times a day.  Initial exercises  Lying on your back:  Ankle pumps: Move your foot up and down, towards your head, and then away. Repeat 10 times with each foot.  Heel slides: Slowly bend your knee,  drawing the heel of your foot towards you. Then slide your heel/foot from you, straightening your knee. Do not lift your foot off the floor (this is not a leg lift).  Abdominal contraction: Bend your knees and put your hands on your stomach. Tighten your stomach muscles. Hold for 5 seconds, then relax. Repeat 10 times.  Straight leg raise: Bend one leg at the knee and keep the other leg straight. Tighten your stomach muscles. Slowly lift your straight leg 6 to 12 inches off the floor and hold for up to 5 seconds. Repeat 10 times on each side.  Standing:  Wall squats: Stand with your back against the wall. Move your feet about 12 inches away from the wall. Tighten your stomach muscles, and slowly bend your knees until they are at about a 45 degree angle. Do not go down too far. Hold about 5 seconds. Then slowly return to your starting position. Repeat 10 times.  Heel raises: Stand facing the wall. Slowly raise the heels of your feet up and down, while keeping your toes on the floor. If you have trouble balancing, you can touch the wall with your hands. Repeat 10 times.  More advanced exercises  When you feel comfortable enough, try these exercises.  Kneeling lumbar extension: Begin on your hands and knees. At the same time, raise and straighten your right arm and left leg until they are parallel to the ground. Hold for 2 seconds and come back slowly to a starting position. Repeat with left arm and right leg, alternating 10 times.  Prone lumbar extension: Lie face down, arms extended overhead, palms on the floor. At the same time, raise your right arm and left leg as high as comfortably possible. Hold for 10 seconds and slowly return to start. Repeat with left arm and right leg, alternating 10 times. Gradually build up to 20 times. (Advanced: Repeat this exercise raising both arms and both legs a few inches off the floor at the same time. Hold for 5 seconds and release.)  Pelvic tilt: Lie on the floor on your back  with your knees bent at 90 degrees. Your feet should be flat on the floor. Inhale, exhale, then slowly contract your abdominal muscles bringing your navel toward your spine. Let your pelvis rock back until your lower back is flat on the floor. Hold for 10 seconds while breathing smoothly.  Abdominal crunch: Perform a pelvic tilt (above) flattening your lower back against the floor. Holding the tension in your abdominal muscles, take another breath and raise your shoulder blades off the ground (this is not a full sit-up). Keep your head in line with your body (dont bend your neck forward). Hold for 2 seconds, then slowly lower.  Date Last Reviewed: 6/1/2016  © 6714-3838 [employer]. [address] [place] [address]. All rights reserved. This information is not intended as a substitute for professional medical care. Always follow your healthcare professional's instructions.       Exercises: Neck Isometrics  To start, sit in a chair with your feet flat on the floor. Your weight should be slightly forward so that youre balanced evenly on your buttocks. Relax your shoulders and keep your head level. Using a chair with arms may help you keep your balance.       Press your palm against your forehead. Resist with your neck muscles. Hold for 10 seconds. Relax. Repeat 5 times.  Do the exercise again, pressing on the side of your head. Repeat 5 times. Switch sides.  Do the exercise again, pressing on the back of your head. Repeat 5 times.  For your safety, check with your healthcare provider before starting an exercise program.   Date Last Reviewed: 8/16/2015  © 4498-3873 [employer]. [address] [place] [address]. All rights reserved. This information is not intended as a substitute for professional medical care. Always follow your healthcare professional's instructions.

## 2023-02-13 NOTE — TELEPHONE ENCOUNTER
Called pt, states she is feeling better and stopped the abx. States she has started the new medications (bp) but bp has not decreased as of yet, will come to bp check next week. Would like to know if we can add a b12 level to labs next week d/t fatigue. Please advise.

## 2023-02-20 ENCOUNTER — LAB VISIT (OUTPATIENT)
Dept: LAB | Facility: HOSPITAL | Age: 68
End: 2023-02-20
Attending: NURSE PRACTITIONER
Payer: MEDICARE

## 2023-02-20 DIAGNOSIS — Z79.899 OTHER LONG TERM (CURRENT) DRUG THERAPY: ICD-10-CM

## 2023-02-20 DIAGNOSIS — E89.0 POST-OPERATIVE HYPOTHYROIDISM: ICD-10-CM

## 2023-02-20 LAB — TSH SERPL DL<=0.005 MIU/L-ACNC: 0.01 UIU/ML (ref 0.4–4)

## 2023-02-20 PROCEDURE — 84439 ASSAY OF FREE THYROXINE: CPT | Performed by: NURSE PRACTITIONER

## 2023-02-20 PROCEDURE — 84443 ASSAY THYROID STIM HORMONE: CPT | Performed by: NURSE PRACTITIONER

## 2023-02-20 PROCEDURE — 36415 COLL VENOUS BLD VENIPUNCTURE: CPT | Mod: PO | Performed by: NURSE PRACTITIONER

## 2023-02-20 PROCEDURE — 82607 VITAMIN B-12: CPT | Performed by: NURSE PRACTITIONER

## 2023-02-21 ENCOUNTER — CLINICAL SUPPORT (OUTPATIENT)
Dept: INTERNAL MEDICINE | Facility: CLINIC | Age: 68
End: 2023-02-21
Payer: MEDICARE

## 2023-02-21 ENCOUNTER — TELEPHONE (OUTPATIENT)
Dept: INTERNAL MEDICINE | Facility: CLINIC | Age: 68
End: 2023-02-21

## 2023-02-21 LAB
T4 FREE SERPL-MCNC: 1.52 NG/DL (ref 0.71–1.51)
VIT B12 SERPL-MCNC: 383 PG/ML (ref 210–950)

## 2023-02-21 NOTE — TELEPHONE ENCOUNTER
Called pt to inform her provider notified of pt c/o dizziness, possible medication causing headaches, provider stated med could be changed or she could wait until follow up next week, pt reports she will wait until follow up.

## 2023-02-21 NOTE — PROGRESS NOTES
Pt in office for nurse visit bp check. Bp 134/84 after approximate 10min rest period. No s/s distress noted. Pt c/o slight dizziness, believes medication may be causing headaches. Provider notified. Pt follow up in 1 week.

## 2023-02-23 ENCOUNTER — OFFICE VISIT (OUTPATIENT)
Dept: OBSTETRICS AND GYNECOLOGY | Facility: CLINIC | Age: 68
End: 2023-02-23
Payer: MEDICARE

## 2023-02-23 VITALS
SYSTOLIC BLOOD PRESSURE: 128 MMHG | WEIGHT: 219.44 LBS | DIASTOLIC BLOOD PRESSURE: 64 MMHG | HEIGHT: 64 IN | BODY MASS INDEX: 37.46 KG/M2

## 2023-02-23 DIAGNOSIS — Z12.31 SCREENING MAMMOGRAM, ENCOUNTER FOR: ICD-10-CM

## 2023-02-23 DIAGNOSIS — Z12.4 SCREENING FOR CERVICAL CANCER: ICD-10-CM

## 2023-02-23 DIAGNOSIS — Z01.419 ENCOUNTER FOR GYNECOLOGICAL EXAMINATION (GENERAL) (ROUTINE) WITHOUT ABNORMAL FINDINGS: Primary | ICD-10-CM

## 2023-02-23 PROCEDURE — 1101F PT FALLS ASSESS-DOCD LE1/YR: CPT | Mod: CPTII,S$GLB,, | Performed by: OBSTETRICS & GYNECOLOGY

## 2023-02-23 PROCEDURE — 4010F PR ACE/ARB THEARPY RXD/TAKEN: ICD-10-PCS | Mod: CPTII,S$GLB,, | Performed by: OBSTETRICS & GYNECOLOGY

## 2023-02-23 PROCEDURE — 3074F SYST BP LT 130 MM HG: CPT | Mod: CPTII,S$GLB,, | Performed by: OBSTETRICS & GYNECOLOGY

## 2023-02-23 PROCEDURE — 3078F PR MOST RECENT DIASTOLIC BLOOD PRESSURE < 80 MM HG: ICD-10-PCS | Mod: CPTII,S$GLB,, | Performed by: OBSTETRICS & GYNECOLOGY

## 2023-02-23 PROCEDURE — 1159F MED LIST DOCD IN RCRD: CPT | Mod: CPTII,S$GLB,, | Performed by: OBSTETRICS & GYNECOLOGY

## 2023-02-23 PROCEDURE — 1126F PR PAIN SEVERITY QUANTIFIED, NO PAIN PRESENT: ICD-10-PCS | Mod: CPTII,S$GLB,, | Performed by: OBSTETRICS & GYNECOLOGY

## 2023-02-23 PROCEDURE — 1159F PR MEDICATION LIST DOCUMENTED IN MEDICAL RECORD: ICD-10-PCS | Mod: CPTII,S$GLB,, | Performed by: OBSTETRICS & GYNECOLOGY

## 2023-02-23 PROCEDURE — 1101F PR PT FALLS ASSESS DOC 0-1 FALLS W/OUT INJ PAST YR: ICD-10-PCS | Mod: CPTII,S$GLB,, | Performed by: OBSTETRICS & GYNECOLOGY

## 2023-02-23 PROCEDURE — 3288F PR FALLS RISK ASSESSMENT DOCUMENTED: ICD-10-PCS | Mod: CPTII,S$GLB,, | Performed by: OBSTETRICS & GYNECOLOGY

## 2023-02-23 PROCEDURE — 1126F AMNT PAIN NOTED NONE PRSNT: CPT | Mod: CPTII,S$GLB,, | Performed by: OBSTETRICS & GYNECOLOGY

## 2023-02-23 PROCEDURE — 3008F BODY MASS INDEX DOCD: CPT | Mod: CPTII,S$GLB,, | Performed by: OBSTETRICS & GYNECOLOGY

## 2023-02-23 PROCEDURE — 3078F DIAST BP <80 MM HG: CPT | Mod: CPTII,S$GLB,, | Performed by: OBSTETRICS & GYNECOLOGY

## 2023-02-23 PROCEDURE — G0101 PR CA SCREEN;PELVIC/BREAST EXAM: ICD-10-PCS | Mod: S$GLB,,, | Performed by: OBSTETRICS & GYNECOLOGY

## 2023-02-23 PROCEDURE — G0101 CA SCREEN;PELVIC/BREAST EXAM: HCPCS | Mod: S$GLB,,, | Performed by: OBSTETRICS & GYNECOLOGY

## 2023-02-23 PROCEDURE — 3074F PR MOST RECENT SYSTOLIC BLOOD PRESSURE < 130 MM HG: ICD-10-PCS | Mod: CPTII,S$GLB,, | Performed by: OBSTETRICS & GYNECOLOGY

## 2023-02-23 PROCEDURE — 3288F FALL RISK ASSESSMENT DOCD: CPT | Mod: CPTII,S$GLB,, | Performed by: OBSTETRICS & GYNECOLOGY

## 2023-02-23 PROCEDURE — 99999 PR PBB SHADOW E&M-EST. PATIENT-LVL III: ICD-10-PCS | Mod: PBBFAC,,, | Performed by: OBSTETRICS & GYNECOLOGY

## 2023-02-23 PROCEDURE — 3008F PR BODY MASS INDEX (BMI) DOCUMENTED: ICD-10-PCS | Mod: CPTII,S$GLB,, | Performed by: OBSTETRICS & GYNECOLOGY

## 2023-02-23 PROCEDURE — 99999 PR PBB SHADOW E&M-EST. PATIENT-LVL III: CPT | Mod: PBBFAC,,, | Performed by: OBSTETRICS & GYNECOLOGY

## 2023-02-23 PROCEDURE — 4010F ACE/ARB THERAPY RXD/TAKEN: CPT | Mod: CPTII,S$GLB,, | Performed by: OBSTETRICS & GYNECOLOGY

## 2023-02-23 NOTE — PROGRESS NOTES
Subjective:       Patient ID: Mariah Meza is a 67 y.o. female.    Chief Complaint:  Well Woman      History of Present Illness  HPI  Annual Exam-Postmenopausal  Patient presents for annual exam. The patient c/o sweating; hot flushes, problems sleeping--thyroid still showing overactive; . The patient is not currently sexually active. GYN screening history: last pap: was normal and patient does not recall when last pap was and last mammogram: approximate date 2023 and was normal. The patient is not currently taking hormone replacement therapy. Patient denies post-menopausal vaginal bleeding. The patient wears seatbelts: yes. The patient participates in regular exercise: no.--limited by ankle;  Has the patient ever been transfused or tattooed?: no. The patient reports that there is not domestic violence in her life.    GYN & OB History  Patient's last menstrual period was 02/10/1984 (approximate).   Date of Last Pap: No result found    OB History    Para Term  AB Living   2 2 2     2   SAB IAB Ectopic Multiple Live Births           2      # Outcome Date GA Lbr Gonsalo/2nd Weight Sex Delivery Anes PTL Lv   2 Term      Vag-Spont   HAROON   1 Term      Vag-Spont   HAROON       Review of Systems  Review of Systems   Genitourinary:  Positive for hot flashes.   Psychiatric/Behavioral:  Positive for sleep disturbance. The patient is nervous/anxious.    All other systems reviewed and are negative.        Objective:      Physical Exam:   Constitutional: She is oriented to person, place, and time. She appears well-developed and well-nourished.     Eyes: Pupils are equal, round, and reactive to light. Conjunctivae and EOM are normal.      Pulmonary/Chest: Effort normal. Right breast exhibits no mass, no nipple discharge, no skin change and no tenderness. Left breast exhibits no mass, no nipple discharge, no skin change and no tenderness. Breasts are symmetrical.        Abdominal: Soft.     Genitourinary:    Right  adnexa, left adnexa and rectum normal.      Pelvic exam was performed with patient supine.   The external female genitalia was normal.   Labial bartholins normal.Right adnexum displays no mass and no tenderness. Left adnexum displays no mass and no tenderness. Vaginal cuff normal.  No erythema,  no vaginal discharge, bleeding, rectocele, cystocele or unspecified prolapse of vaginal walls in the vagina. Vaginal atrophy noted. Cervix is absent.Uterus is absent. Normal urethral meatus.Urethra findings: no urethral massBladder findings: no bladder distention and no bladder tenderness          Musculoskeletal: Normal range of motion and moves all extremeties.       Neurological: She is alert and oriented to person, place, and time.    Skin: Skin is warm.    Psychiatric: She has a normal mood and affect. Her behavior is normal.         Assessment:        1. Encounter for gynecological examination (general) (routine) without abnormal findings    2. Screening for cervical cancer    3. Screening mammogram, encounter for               Plan:      Continue annual well woman exam.  Pap not indicated due to hx of nml pap and hx of lamar   mammo ordered, continue yearly until age 73   Suspect hot flush, anxiety, prob sleeping due to hyperthryoid status--pt reports meds are still being adjusted  Osteoporosis prevention; 1200mg calcium/d with source of vitamin d  Continue diet, exercise, weight loss     Colonoscopy current

## 2023-03-01 ENCOUNTER — PATIENT MESSAGE (OUTPATIENT)
Dept: INTERNAL MEDICINE | Facility: CLINIC | Age: 68
End: 2023-03-01

## 2023-03-01 ENCOUNTER — LAB VISIT (OUTPATIENT)
Dept: LAB | Facility: HOSPITAL | Age: 68
End: 2023-03-01
Attending: NURSE PRACTITIONER
Payer: MEDICARE

## 2023-03-01 ENCOUNTER — PATIENT MESSAGE (OUTPATIENT)
Dept: PAIN MEDICINE | Facility: CLINIC | Age: 68
End: 2023-03-01
Payer: MEDICARE

## 2023-03-01 ENCOUNTER — TELEPHONE (OUTPATIENT)
Dept: INTERNAL MEDICINE | Facility: CLINIC | Age: 68
End: 2023-03-01
Payer: MEDICARE

## 2023-03-01 ENCOUNTER — OFFICE VISIT (OUTPATIENT)
Dept: INTERNAL MEDICINE | Facility: CLINIC | Age: 68
End: 2023-03-01
Payer: MEDICARE

## 2023-03-01 VITALS
RESPIRATION RATE: 18 BRPM | HEIGHT: 64 IN | BODY MASS INDEX: 37.87 KG/M2 | HEART RATE: 62 BPM | SYSTOLIC BLOOD PRESSURE: 100 MMHG | TEMPERATURE: 98 F | OXYGEN SATURATION: 97 % | DIASTOLIC BLOOD PRESSURE: 72 MMHG | WEIGHT: 221.81 LBS

## 2023-03-01 DIAGNOSIS — E89.0 POST-OPERATIVE HYPOTHYROIDISM: ICD-10-CM

## 2023-03-01 DIAGNOSIS — M79.671 RIGHT FOOT PAIN: Primary | ICD-10-CM

## 2023-03-01 DIAGNOSIS — M25.571 ACUTE RIGHT ANKLE PAIN: ICD-10-CM

## 2023-03-01 DIAGNOSIS — R73.09 ELEVATED GLUCOSE LEVEL: ICD-10-CM

## 2023-03-01 DIAGNOSIS — I10 PRIMARY HYPERTENSION: ICD-10-CM

## 2023-03-01 DIAGNOSIS — M54.16 LUMBAR RADICULOPATHY: ICD-10-CM

## 2023-03-01 PROCEDURE — 1125F AMNT PAIN NOTED PAIN PRSNT: CPT | Mod: CPTII,S$GLB,, | Performed by: NURSE PRACTITIONER

## 2023-03-01 PROCEDURE — 99214 PR OFFICE/OUTPT VISIT, EST, LEVL IV, 30-39 MIN: ICD-10-PCS | Mod: S$GLB,,, | Performed by: NURSE PRACTITIONER

## 2023-03-01 PROCEDURE — 3078F DIAST BP <80 MM HG: CPT | Mod: CPTII,S$GLB,, | Performed by: NURSE PRACTITIONER

## 2023-03-01 PROCEDURE — 3078F PR MOST RECENT DIASTOLIC BLOOD PRESSURE < 80 MM HG: ICD-10-PCS | Mod: CPTII,S$GLB,, | Performed by: NURSE PRACTITIONER

## 2023-03-01 PROCEDURE — 4010F ACE/ARB THERAPY RXD/TAKEN: CPT | Mod: CPTII,S$GLB,, | Performed by: NURSE PRACTITIONER

## 2023-03-01 PROCEDURE — 3074F SYST BP LT 130 MM HG: CPT | Mod: CPTII,S$GLB,, | Performed by: NURSE PRACTITIONER

## 2023-03-01 PROCEDURE — 36415 COLL VENOUS BLD VENIPUNCTURE: CPT | Mod: PO | Performed by: NURSE PRACTITIONER

## 2023-03-01 PROCEDURE — 99999 PR PBB SHADOW E&M-EST. PATIENT-LVL V: CPT | Mod: PBBFAC,,, | Performed by: NURSE PRACTITIONER

## 2023-03-01 PROCEDURE — 1101F PT FALLS ASSESS-DOCD LE1/YR: CPT | Mod: CPTII,S$GLB,, | Performed by: NURSE PRACTITIONER

## 2023-03-01 PROCEDURE — 3044F PR MOST RECENT HEMOGLOBIN A1C LEVEL <7.0%: ICD-10-PCS | Mod: CPTII,S$GLB,, | Performed by: NURSE PRACTITIONER

## 2023-03-01 PROCEDURE — 1159F MED LIST DOCD IN RCRD: CPT | Mod: CPTII,S$GLB,, | Performed by: NURSE PRACTITIONER

## 2023-03-01 PROCEDURE — 1125F PR PAIN SEVERITY QUANTIFIED, PAIN PRESENT: ICD-10-PCS | Mod: CPTII,S$GLB,, | Performed by: NURSE PRACTITIONER

## 2023-03-01 PROCEDURE — 99214 OFFICE O/P EST MOD 30 MIN: CPT | Mod: S$GLB,,, | Performed by: NURSE PRACTITIONER

## 2023-03-01 PROCEDURE — 1160F RVW MEDS BY RX/DR IN RCRD: CPT | Mod: CPTII,S$GLB,, | Performed by: NURSE PRACTITIONER

## 2023-03-01 PROCEDURE — 3008F PR BODY MASS INDEX (BMI) DOCUMENTED: ICD-10-PCS | Mod: CPTII,S$GLB,, | Performed by: NURSE PRACTITIONER

## 2023-03-01 PROCEDURE — 3044F HG A1C LEVEL LT 7.0%: CPT | Mod: CPTII,S$GLB,, | Performed by: NURSE PRACTITIONER

## 2023-03-01 PROCEDURE — 3074F PR MOST RECENT SYSTOLIC BLOOD PRESSURE < 130 MM HG: ICD-10-PCS | Mod: CPTII,S$GLB,, | Performed by: NURSE PRACTITIONER

## 2023-03-01 PROCEDURE — 3008F BODY MASS INDEX DOCD: CPT | Mod: CPTII,S$GLB,, | Performed by: NURSE PRACTITIONER

## 2023-03-01 PROCEDURE — 4010F PR ACE/ARB THEARPY RXD/TAKEN: ICD-10-PCS | Mod: CPTII,S$GLB,, | Performed by: NURSE PRACTITIONER

## 2023-03-01 PROCEDURE — 3288F FALL RISK ASSESSMENT DOCD: CPT | Mod: CPTII,S$GLB,, | Performed by: NURSE PRACTITIONER

## 2023-03-01 PROCEDURE — 1159F PR MEDICATION LIST DOCUMENTED IN MEDICAL RECORD: ICD-10-PCS | Mod: CPTII,S$GLB,, | Performed by: NURSE PRACTITIONER

## 2023-03-01 PROCEDURE — 99999 PR PBB SHADOW E&M-EST. PATIENT-LVL V: ICD-10-PCS | Mod: PBBFAC,,, | Performed by: NURSE PRACTITIONER

## 2023-03-01 PROCEDURE — 1101F PR PT FALLS ASSESS DOC 0-1 FALLS W/OUT INJ PAST YR: ICD-10-PCS | Mod: CPTII,S$GLB,, | Performed by: NURSE PRACTITIONER

## 2023-03-01 PROCEDURE — 1160F PR REVIEW ALL MEDS BY PRESCRIBER/CLIN PHARMACIST DOCUMENTED: ICD-10-PCS | Mod: CPTII,S$GLB,, | Performed by: NURSE PRACTITIONER

## 2023-03-01 PROCEDURE — 82947 ASSAY GLUCOSE BLOOD QUANT: CPT | Performed by: NURSE PRACTITIONER

## 2023-03-01 PROCEDURE — 3288F PR FALLS RISK ASSESSMENT DOCUMENTED: ICD-10-PCS | Mod: CPTII,S$GLB,, | Performed by: NURSE PRACTITIONER

## 2023-03-01 PROCEDURE — 83036 HEMOGLOBIN GLYCOSYLATED A1C: CPT | Performed by: NURSE PRACTITIONER

## 2023-03-01 RX ORDER — VITAMIN E 268 MG
400 CAPSULE ORAL DAILY
COMMUNITY

## 2023-03-01 NOTE — PROGRESS NOTES
Subjective:       Patient ID: Mariah Meza is a 67 y.o. female.    Chief Complaint: Follow-up (1 mo)    Patient presents for one month follow up.  Reports concerns of her head feeling sluggish.  Checking glucose intermittently at home.  Reports some elevated readings: 138 mg/dl, 168 mg/dl    Continues to have pain to right foot and ankle pain.  Pain is constant and daily.  Also, has left foot pain but not as bad as the right.  Saw podiatry in Nov 2022--Lumbar Radiculopathy     TSH--still abnormal.  Will consult with Dr. Medrano.  Has endocrinology scheduled April 4, 2023  with Dr. Cortez at [facility].      Up to date with Dr. Major/Cardiology     Having ankle swelling.  Back on amlodipine.     Follow-up  Pertinent negatives include no abdominal pain, arthralgias, chest pain, chills, coughing, fatigue or fever.   Review of Systems   Constitutional:  Negative for chills, fatigue and fever.   Respiratory:  Negative for cough and shortness of breath.    Cardiovascular:  Negative for chest pain and leg swelling.   Gastrointestinal:  Negative for abdominal pain, constipation and diarrhea.   Musculoskeletal:  Negative for arthralgias and gait problem.   Psychiatric/Behavioral:  Negative for agitation and confusion.        Objective:      Physical Exam  Vitals reviewed.   Constitutional:       Appearance: Normal appearance.   HENT:      Head: Normocephalic.   Cardiovascular:      Rate and Rhythm: Normal rate and regular rhythm.   Pulmonary:      Effort: Pulmonary effort is normal.      Breath sounds: Normal breath sounds.   Musculoskeletal:         General: Swelling present. Normal range of motion.      Right ankle: Swelling present. Tenderness present.   Skin:     General: Skin is warm.   Neurological:      General: No focal deficit present.      Mental Status: She is alert and oriented to person, place, and time.   Psychiatric:         Mood and Affect: Mood normal.         Behavior: Behavior normal. Behavior is  cooperative.       Assessment:       Problem List Items Addressed This Visit       Hypertension    Lumbar radiculopathy    Post-operative hypothyroidism     Other Visit Diagnoses       Right foot pain    -  Primary    Relevant Orders    X-Ray Ankle Complete 3 View Right (Completed)    X-Ray Foot Complete 3 view Right (Completed)    Acute right ankle pain        Relevant Orders    X-Ray Ankle Complete 3 View Right (Completed)    X-Ray Foot Complete 3 view Right (Completed)    Elevated glucose level        Relevant Orders    HEMOGLOBIN A1C (Completed)    GLUCOSE, RANDOM (Completed)            Plan:           Right foot pain  -     Cancel: X-Ray Foot 2 View Right; Future; Expected date: 03/01/2023  -     X-Ray Ankle Complete 3 View Right; Future; Expected date: 03/01/2023  -     X-Ray Foot Complete 3 view Right; Future; Expected date: 03/01/2023    Acute right ankle pain  -     Cancel: X-Ray Foot 2 View Right; Future; Expected date: 03/01/2023  -     X-Ray Ankle Complete 3 View Right; Future; Expected date: 03/01/2023  -     X-Ray Foot Complete 3 view Right; Future; Expected date: 03/01/2023    Post-operative hypothyroidism    Lumbar radiculopathy    Primary hypertension    Elevated glucose level  -     HEMOGLOBIN A1C; Future; Expected date: 03/01/2023  -     GLUCOSE, RANDOM; Future; Expected date: 03/01/2023

## 2023-03-01 NOTE — TELEPHONE ENCOUNTER
I returned a call back to the pt and she was wondering should she go ahead and refill her levothyroxine or wait to see if  will be changing the medication as she only has 5 left in the bottle . She has refills at the pharmacy but did not want to refill if he will be changing it. Please advise if you've spoken to him.//kah

## 2023-03-01 NOTE — TELEPHONE ENCOUNTER
----- Message from Sarah Delcid sent at 3/1/2023  1:26 PM CST -----  Contact: Mariah Lemus is calling to speak to nurse Medina to report the medication form her appointment that was scheduled today. Please give her a call back at [phone]    Thanks  LJ

## 2023-03-02 LAB
ESTIMATED AVG GLUCOSE: 94 MG/DL (ref 68–131)
GLUCOSE SERPL-MCNC: 86 MG/DL (ref 70–110)
HBA1C MFR BLD: 4.9 % (ref 4–5.6)

## 2023-03-22 NOTE — PROGRESS NOTES
Patient, Mariah Meza (MRN #[id number]), presented with a recorded BMI of 38.09 kg/m^2 and a documented comorbidity(s):  - Hypertension  to which the severe obesity is a contributing factor. This is consistent with the definition of severe obesity (BMI 35.0-39.9) with comorbidity (ICD-10 E66.01, Z68.35). The patient's severe obesity was monitored, evaluated, addressed and/or treated. This addendum to the medical record is made on 03/21/2023.

## 2023-03-31 ENCOUNTER — PATIENT MESSAGE (OUTPATIENT)
Dept: INTERNAL MEDICINE | Facility: CLINIC | Age: 68
End: 2023-03-31
Payer: MEDICARE

## 2023-04-04 RX ORDER — PREDNISONE 10 MG/1
10 TABLET ORAL 2 TIMES DAILY
Qty: 10 TABLET | Refills: 0 | Status: SHIPPED | OUTPATIENT
Start: 2023-04-04 | End: 2023-06-18

## 2023-04-06 ENCOUNTER — OFFICE VISIT (OUTPATIENT)
Dept: PAIN MEDICINE | Facility: CLINIC | Age: 68
End: 2023-04-06
Payer: MEDICARE

## 2023-04-06 VITALS
RESPIRATION RATE: 16 BRPM | DIASTOLIC BLOOD PRESSURE: 77 MMHG | SYSTOLIC BLOOD PRESSURE: 154 MMHG | HEIGHT: 64 IN | WEIGHT: 220.69 LBS | BODY MASS INDEX: 37.68 KG/M2 | HEART RATE: 54 BPM

## 2023-04-06 DIAGNOSIS — M10.9 ACUTE GOUT OF RIGHT ANKLE, UNSPECIFIED CAUSE: Primary | ICD-10-CM

## 2023-04-06 PROCEDURE — 1160F PR REVIEW ALL MEDS BY PRESCRIBER/CLIN PHARMACIST DOCUMENTED: ICD-10-PCS | Mod: CPTII,S$GLB,, | Performed by: PHYSICIAN ASSISTANT

## 2023-04-06 PROCEDURE — 3044F PR MOST RECENT HEMOGLOBIN A1C LEVEL <7.0%: ICD-10-PCS | Mod: CPTII,S$GLB,, | Performed by: PHYSICIAN ASSISTANT

## 2023-04-06 PROCEDURE — 96372 THER/PROPH/DIAG INJ SC/IM: CPT | Mod: S$GLB,,, | Performed by: PHYSICIAN ASSISTANT

## 2023-04-06 PROCEDURE — 96372 PR INJECTION,THERAP/PROPH/DIAG2ST, IM OR SUBCUT: ICD-10-PCS | Mod: S$GLB,,, | Performed by: PHYSICIAN ASSISTANT

## 2023-04-06 PROCEDURE — 3288F FALL RISK ASSESSMENT DOCD: CPT | Mod: CPTII,S$GLB,, | Performed by: PHYSICIAN ASSISTANT

## 2023-04-06 PROCEDURE — 3008F PR BODY MASS INDEX (BMI) DOCUMENTED: ICD-10-PCS | Mod: CPTII,S$GLB,, | Performed by: PHYSICIAN ASSISTANT

## 2023-04-06 PROCEDURE — 3078F PR MOST RECENT DIASTOLIC BLOOD PRESSURE < 80 MM HG: ICD-10-PCS | Mod: CPTII,S$GLB,, | Performed by: PHYSICIAN ASSISTANT

## 2023-04-06 PROCEDURE — 4010F ACE/ARB THERAPY RXD/TAKEN: CPT | Mod: CPTII,S$GLB,, | Performed by: PHYSICIAN ASSISTANT

## 2023-04-06 PROCEDURE — 3077F PR MOST RECENT SYSTOLIC BLOOD PRESSURE >= 140 MM HG: ICD-10-PCS | Mod: CPTII,S$GLB,, | Performed by: PHYSICIAN ASSISTANT

## 2023-04-06 PROCEDURE — 99999 PR PBB SHADOW E&M-EST. PATIENT-LVL IV: ICD-10-PCS | Mod: PBBFAC,,, | Performed by: PHYSICIAN ASSISTANT

## 2023-04-06 PROCEDURE — 1159F PR MEDICATION LIST DOCUMENTED IN MEDICAL RECORD: ICD-10-PCS | Mod: CPTII,S$GLB,, | Performed by: PHYSICIAN ASSISTANT

## 2023-04-06 PROCEDURE — 1160F RVW MEDS BY RX/DR IN RCRD: CPT | Mod: CPTII,S$GLB,, | Performed by: PHYSICIAN ASSISTANT

## 2023-04-06 PROCEDURE — 3044F HG A1C LEVEL LT 7.0%: CPT | Mod: CPTII,S$GLB,, | Performed by: PHYSICIAN ASSISTANT

## 2023-04-06 PROCEDURE — 4010F PR ACE/ARB THEARPY RXD/TAKEN: ICD-10-PCS | Mod: CPTII,S$GLB,, | Performed by: PHYSICIAN ASSISTANT

## 2023-04-06 PROCEDURE — 1101F PT FALLS ASSESS-DOCD LE1/YR: CPT | Mod: CPTII,S$GLB,, | Performed by: PHYSICIAN ASSISTANT

## 2023-04-06 PROCEDURE — 99214 PR OFFICE/OUTPT VISIT, EST, LEVL IV, 30-39 MIN: ICD-10-PCS | Mod: 25,S$GLB,, | Performed by: PHYSICIAN ASSISTANT

## 2023-04-06 PROCEDURE — 1101F PR PT FALLS ASSESS DOC 0-1 FALLS W/OUT INJ PAST YR: ICD-10-PCS | Mod: CPTII,S$GLB,, | Performed by: PHYSICIAN ASSISTANT

## 2023-04-06 PROCEDURE — 1125F AMNT PAIN NOTED PAIN PRSNT: CPT | Mod: CPTII,S$GLB,, | Performed by: PHYSICIAN ASSISTANT

## 2023-04-06 PROCEDURE — 1159F MED LIST DOCD IN RCRD: CPT | Mod: CPTII,S$GLB,, | Performed by: PHYSICIAN ASSISTANT

## 2023-04-06 PROCEDURE — 3077F SYST BP >= 140 MM HG: CPT | Mod: CPTII,S$GLB,, | Performed by: PHYSICIAN ASSISTANT

## 2023-04-06 PROCEDURE — 99214 OFFICE O/P EST MOD 30 MIN: CPT | Mod: 25,S$GLB,, | Performed by: PHYSICIAN ASSISTANT

## 2023-04-06 PROCEDURE — 3078F DIAST BP <80 MM HG: CPT | Mod: CPTII,S$GLB,, | Performed by: PHYSICIAN ASSISTANT

## 2023-04-06 PROCEDURE — 3288F PR FALLS RISK ASSESSMENT DOCUMENTED: ICD-10-PCS | Mod: CPTII,S$GLB,, | Performed by: PHYSICIAN ASSISTANT

## 2023-04-06 PROCEDURE — 1125F PR PAIN SEVERITY QUANTIFIED, PAIN PRESENT: ICD-10-PCS | Mod: CPTII,S$GLB,, | Performed by: PHYSICIAN ASSISTANT

## 2023-04-06 PROCEDURE — 3008F BODY MASS INDEX DOCD: CPT | Mod: CPTII,S$GLB,, | Performed by: PHYSICIAN ASSISTANT

## 2023-04-06 PROCEDURE — 99999 PR PBB SHADOW E&M-EST. PATIENT-LVL IV: CPT | Mod: PBBFAC,,, | Performed by: PHYSICIAN ASSISTANT

## 2023-04-06 RX ORDER — METHYLPREDNISOLONE 4 MG/1
TABLET ORAL
Qty: 1 EACH | Refills: 0 | Status: SHIPPED | OUTPATIENT
Start: 2023-04-06 | End: 2023-06-18

## 2023-04-06 RX ORDER — KETOROLAC TROMETHAMINE 30 MG/ML
30 INJECTION, SOLUTION INTRAMUSCULAR; INTRAVENOUS ONCE
Status: COMPLETED | OUTPATIENT
Start: 2023-04-06 | End: 2023-04-06

## 2023-04-06 RX ADMIN — KETOROLAC TROMETHAMINE 30 MG: 30 INJECTION, SOLUTION INTRAMUSCULAR; INTRAVENOUS at 10:04

## 2023-04-06 NOTE — PROGRESS NOTES
Est Patient Chronic Pain Note (Initial Visit)    Referring Physician: No ref. provider found    PCP: Taina Bob NP    Chief Complaint:   No chief complaint on file.       SUBJECTIVE:  Interval History (4/6/2023):  Mariah Meza presents today for follow-up visit.  Patient was last seen on 2/9/2023. At that visit, the plan was to start Gabapentin.  She reports she discontinued this medication after getting up to the 600 mg dosage due to side effects.  She reports it made her feel crazy in the head.  She reports improvement in her neck pain since last visit as she is been performing physician directed exercises at home.  She reports pain is primarily located in her ankles and feet right worse than left.  She reports that she was previously diagnosed with gout in November and since then she has had recurrent gouty flares.  She has noticed that these flares occur when she eats a lot of certain types of food.  She is had flare secondary to eating excessive amounts of shrimp, raisins, pineapple, and serial.  She reports the current flare began approximately 1 week ago and has been persistent she has noticed redness warmth and swelling in both of her ankles and great toe.  She has not taken anything other than Tylenol which has not been beneficial.  She is scheduled to follow-up with podiatry per referral from her primary care doctor.  Patient reports pain as 7/10 today.  She reports she has also noticed mild to moderate swelling in her hands and feet.  She reports this began after she started taking blood pressure medications, she thinks this may be due to medications.    Initial HPI 02/09/2023  Mariah Meza is a 67 y.o. female with past medical history significant for hyperlipidemia, hypertension, sicca syndrome, history of papillary thyroid cancer status post parathyroidectomy, obesity, GERD who presents to the clinic for the evaluation of mid back pain.  Patient reports pain has been present for  the last 2 years without inciting accident injury or trauma.  Pain is constant and today is rated a 6/10.  Pain at its best is a 3/10 and at its worse is a 10/10.  Pain is described as sharp, stabbing and stiffness in nature.  Patient reports pain which begins in between the shoulder blades and radiates to the midthoracic spine.  Patient reports prior radiation down the left upper extremity to the thumb in C6 distribution.  Patient also reports pain in bilateral ankles.  Patient reports associated weakness in the lower extremities associated with this pain and with standing and ambulation.  This pain is described as burning in nature.  All of her pain is exacerbated when moving from sitting to standing, standing and with ambulation.  Patient reports in the past she was able to ambulate 10,000 steps per day but this has been reduced secondary to her pain.  Pain is improved with sitting and pain medications such as relief.  Patient reports she completed 12 months of physical therapy for the neck and lower back 2 months prior at [facility] physical therapy in [place].  Patient reports no significant improvement in her pain with completion of physical therapy.  Patient also takes Flexeril which is marginally helpful.    Patient reports significant lower extremity weakness.  Patient denies night fever/night sweats, urinary incontinence, bowel incontinence, significant weight loss, and loss of sensations.      Pain Disability Index Review:     Last 3 PDI Scores 2/9/2023   Pain Disability Index (PDI) 50       Non-Pharmacologic Treatments:  Physical Therapy/Home Exercise: yes  Ice/Heat:yes  TENS: no  Acupuncture: no  Massage: no  Chiropractic: no    Other: no      Pain Medications:  - Adjuvant Medications: Cyclobenzaprine (Flexeril) and Tylenol (Acetaminophen) Diclofenac tablet    Pain Procedures:   None    Past Medical History:   Diagnosis Date    Chronic rhinitis     DJD (degenerative joint disease), lumbar     GERD  (gastroesophageal reflux disease)     Headache     Hiatal hernia     Hyperlipidemia     Hypertension     Liver disease     Fatty Liver    Low back pain     Obesity     PONV (postoperative nausea and vomiting)     Thyroid disease      Past Surgical History:   Procedure Laterality Date    BILATERAL OOPHORECTOMY  2002    CARPAL TUNNEL RELEASE      Left wrist    CHOLECYSTECTOMY      DISSECTION OF NECK Bilateral 12/20/2021    Procedure: DISSECTION, NECK;  Surgeon: Deejay Olsen MD;  Location: [facility] OR;  Service: ENT;  Laterality: Bilateral;  Central neck dissection    HYSTERECTOMY  1984    NECK EXPLORATION Bilateral 12/20/2021    Procedure: EXPLORATION, NECK;  Surgeon: Deejay Olsen MD;  Location: [facility] OR;  Service: ENT;  Laterality: Bilateral;  Parathyroid exploration    ROTATOR CUFF REPAIR      Right shoulder    ROTATOR CUFF REPAIR Left 2010    THYROIDECTOMY, BILATERAL Bilateral 12/20/2021    Procedure: THYROIDECTOMY,BILATERAL;  Surgeon: Deejay Olsen MD;  Location: [facility] OR;  Service: ENT;  Laterality: Bilateral;    TOTAL VAGINAL HYSTERECTOMY  1985     Review of patient's allergies indicates:   Allergen Reactions    Iodine and iodide containing products Itching    Shellfish containing products Swelling    Statins-hmg-coa reductase inhibitors Other (See Comments)     Myalgias       Current Outpatient Medications   Medication Sig    acetaminophen (TYLENOL) 650 MG TbSR Take 650 mg by mouth daily as needed.    amLODIPine (NORVASC) 10 MG tablet Take 1 tablet (10 mg total) by mouth once daily.    calcium carbonate 470 mg calcium (1,177 mg) Chew Take one tablet by mouth twice daily    carvediloL (COREG) 12.5 MG tablet Take 12.5 mg by mouth 2 (two) times daily with meals.    cyclobenzaprine (FLEXERIL) 10 MG tablet Take 1 tablet (10 mg total) by mouth 3 (three) times daily as needed for Muscle spasms.    furosemide (LASIX) 40 MG tablet Take 1 tablet by mouth twice daily as needed    levothyroxine (SYNTHROID) 125 MCG tablet Take 1 tablet  (125 mcg total) by mouth before breakfast.    linaclotide (LINZESS ORAL) Take 72 mg by mouth once daily.    magnesium oxide (MAG-OX) 400 mg (241.3 mg magnesium) tablet Take 400 mg by mouth once daily.    olmesartan (BENICAR) 40 MG tablet Take 1 tablet (40 mg total) by mouth once daily.    pantoprazole (PROTONIX) 40 MG tablet Take by mouth once daily.     predniSONE (DELTASONE) 10 MG tablet Take 1 tablet (10 mg total) by mouth 2 (two) times daily.    psyllium husk/aspartame (METAMUCIL FIBER SINGLES ORAL) Take by mouth.    vitamin E 400 UNIT capsule Take 400 Units by mouth once daily.    wheat dextrin/L.acid/aspartame (FIBER WITH PROBIOTIC ORAL) Take by mouth Daily.     Current Facility-Administered Medications   Medication    DOBUTamine 1000 mg in D5W 250 mL infusion (premix titrating)       Review of Systems     GENERAL:  No weight loss, malaise or fevers.  HEENT:   No recent changes in vision or hearing  NECK:  Negative for lumps, no difficulty with swallowing.  RESPIRATORY:  Negative for cough, wheezing or shortness of breath, patient denies any recent URI.  CARDIOVASCULAR:  Negative for chest pain or palpitations.  GI:  Negative for abdominal discomfort, blood in stools or black stools or change in bowel habits.  MUSCULOSKELETAL:  See HPI.  SKIN:  Negative for lesions, rash, and itching.  PSYCH:  No mood disorder or recent psychosocial stressors.   HEMATOLOGY/LYMPHOLOGY:  Negative for prolonged bleeding, bruising easily or swollen nodes.    NEURO:   No history of syncope, paralysis, seizures or tremors.  All other reviewed and negative other than HPI.    OBJECTIVE:    [facility] 02/10/1984 (Approximate)       Physical Exam    GENERAL: Well appearing, in no acute distress, alert and oriented x3.  PSYCH:  Mood and affect appropriate.  SKIN: Skin color, texture, turgor normal, no rashes or lesions.  HEAD/FACE:  Normocephalic, atraumatic. Cranial nerves grossly intact.    NECK: no pain to palpation over the cervical  paraspinous muscles. Spurling Negative. Mild pain with neck flexion, extension, or lateral flexion.   Normaltesting biceps, triceps and brachioradialis bilaterally.    NegativeHoffmann's bilaterally.    5/5 strength testing deltoid, biceps, triceps, wrist extensor, wrist flexor and ulnar intrinsics bilaterally.    Normal  strength bilaterally  MUSCULOSKELETAL:  Mild swelling along left ankle, moderate swelling to right ankle redness and swelling along the base of her left great toe swelling along right great toe, bilateral bunions along great toes  CV: RRR with palpation of the radial artery.  PULM: No evidence of respiratory difficulty, symmetric chest rise.  GI:  Soft and non-tender.    NEURO: Bilateral upper and lower extremity coordination and muscle stretch reflexes are physiologic and symmetric. No loss of sensation is noted.  GAIT: normal.    Imagin14    X-Ray Lumbar Spine Ap And Lateral    Narrative  Findings:    Five non-rib bearing lumbar type vertebrae are present without significant lateral curvature abnormality or subluxation.  Degenerative endplate changes as well as facet joint hypertrophy, particularly inferiorly, are noted without acute fracture or  suspicious focal bony lesion identified.  Disk spaces are fairly well-maintained.       21    X-Ray Cervical Spine Complete 5 view  FINDINGS:  Patient's hair limits the exam.    There is visualization to the C7-T1 level on the swimmer's view.  Multilevel marginal spurring and varying degrees of uniform loss of disc height throughout the C-spine.  No fracture or subluxation.    Pre dens space, prevertebral soft tissues, C1-2 articulation and odontoid normal in appearance allowing for positioning.  Neural foramina widely patent bilaterally at all levels.  Remaining visualized osseous structures intact.  Included upper lung fields clear.      EMG 10/2021  IMPRESSION  1. ABNORMAL study  2. There is electrodiagnostic evidence of an acute  on chronic radiculopathy of the L5 and S1 nerve roots-slightly worse on the right        ASSESSMENT: 67 y.o. year old female with neck, mid back, bilateral ankles pain, consistent with     No diagnosis found.        PLAN:   - Interventions:  Patient has elevated uric acid levels consistent with gout. She reports most recent gouty flare began 8 days ago. She is outside the preferred treatment window for Colchicine, treating today with a Toradol injection and additional oral steroids. She is scheduled to follow up with podiatry for further evaluation. She would likely benefit from a daily gout medication as well as abortive therapy. Swelling in feet and ankles likely primarily due to gout. Possible overlying extremity swelling as a side effect of her BP meds. She can discuss gout treatments with podiatry and/or her PCP. Advised to discuss alternative BP meds with PCP due to side effects.    - Procedure note: An IM injection of (ketolorac 30mg/1mL) was administered during clinic visit.  This was well tolerated.      - Anticoagulation use: no no anticoagulation     report:  Reviewed and consistent with medication use as prescribed.    - Medications:  -Patient D/d'd Gabapentin due to adverse side effects (made her feel crazy) and no improvement in symptoms    -We discussed that while NSAIDs may be beneficial with reducing gouty inflammation, her recent lab work also demonstrates an acute decrease in kidney function. We have discussed potential deleterious side effects of NSAIDs on the cardiovascular, gastrointestinal and renal systems. We have discussed judicious use NSAIDs. Pt expresses understanding.    Consults/referral: Patient is scheduled to follow up with Podiatry and PCP    - Therapy:   We discussed continuing physical therapy to help manage the patient/s painful condition. The patient was counseled that muscle strengthening will improve the long term prognosis in regards to pain and may also help increase  range of motion and mobility.     - Imaging: Reviewed available imaging with patient and answered any questions they had regarding study.    - Records: Obtain old records from outside physicians and imaging  -[facility]: c[facility]    - Follow up visit: return to clinic in 6-8 weeks      The above plan and management options were discussed at length with patient. Patient is in agreement with the above and verbalized understanding.    - I discussed the goals of interventional chronic pain management with the patient on today's visit. We discussed a multimodal and systematic approach to pain.  This includes diagnostic and therapeutic injections, adjuvant pharmacologic treatment, physical therapy, and at times psychiatry.  I emphasized the importance of regular exercise, core strengthening and stretching, diet and weight loss as a cornerstone of long-term pain management.    - This condition does not require this patient to take time off of work, and the primary goal of our Pain Management services is to improve the patient's functional capacity.  - Patient Questions: Answered all of the patient's questions regarding diagnoses, therapy, treatment and next steps        Rosa Elena Simpson PA-C  Interventional Pain Management  [facility]    Disclaimer:  This note was prepared using voice recognition system and is likely to have sound alike errors that may have been overlooked even after proof reading.  Please call me with any questions

## 2023-04-17 ENCOUNTER — OFFICE VISIT (OUTPATIENT)
Dept: PODIATRY | Facility: CLINIC | Age: 68
End: 2023-04-17
Payer: MEDICARE

## 2023-04-17 ENCOUNTER — LAB VISIT (OUTPATIENT)
Dept: LAB | Facility: HOSPITAL | Age: 68
End: 2023-04-17
Attending: PODIATRIST
Payer: MEDICARE

## 2023-04-17 VITALS — WEIGHT: 220.69 LBS | HEIGHT: 64 IN | BODY MASS INDEX: 37.68 KG/M2

## 2023-04-17 DIAGNOSIS — M10.9 ACUTE GOUT OF RIGHT FOOT, UNSPECIFIED CAUSE: ICD-10-CM

## 2023-04-17 DIAGNOSIS — M54.16 LUMBAR RADICULOPATHY: Primary | ICD-10-CM

## 2023-04-17 LAB
CRP SERPL-MCNC: 1.7 MG/L (ref 0–8.2)
URATE SERPL-MCNC: 7.6 MG/DL (ref 2.4–5.7)

## 2023-04-17 PROCEDURE — 3008F BODY MASS INDEX DOCD: CPT | Mod: CPTII,S$GLB,, | Performed by: PODIATRIST

## 2023-04-17 PROCEDURE — 84550 ASSAY OF BLOOD/URIC ACID: CPT | Performed by: PODIATRIST

## 2023-04-17 PROCEDURE — 99213 PR OFFICE/OUTPT VISIT, EST, LEVL III, 20-29 MIN: ICD-10-PCS | Mod: S$GLB,,, | Performed by: PODIATRIST

## 2023-04-17 PROCEDURE — 3044F HG A1C LEVEL LT 7.0%: CPT | Mod: CPTII,S$GLB,, | Performed by: PODIATRIST

## 2023-04-17 PROCEDURE — 86140 C-REACTIVE PROTEIN: CPT | Performed by: PODIATRIST

## 2023-04-17 PROCEDURE — 4010F PR ACE/ARB THEARPY RXD/TAKEN: ICD-10-PCS | Mod: CPTII,S$GLB,, | Performed by: PODIATRIST

## 2023-04-17 PROCEDURE — 36415 COLL VENOUS BLD VENIPUNCTURE: CPT | Performed by: PODIATRIST

## 2023-04-17 PROCEDURE — 3044F PR MOST RECENT HEMOGLOBIN A1C LEVEL <7.0%: ICD-10-PCS | Mod: CPTII,S$GLB,, | Performed by: PODIATRIST

## 2023-04-17 PROCEDURE — 1159F MED LIST DOCD IN RCRD: CPT | Mod: CPTII,S$GLB,, | Performed by: PODIATRIST

## 2023-04-17 PROCEDURE — 1160F RVW MEDS BY RX/DR IN RCRD: CPT | Mod: CPTII,S$GLB,, | Performed by: PODIATRIST

## 2023-04-17 PROCEDURE — 99999 PR PBB SHADOW E&M-EST. PATIENT-LVL III: ICD-10-PCS | Mod: PBBFAC,,, | Performed by: PODIATRIST

## 2023-04-17 PROCEDURE — 1125F AMNT PAIN NOTED PAIN PRSNT: CPT | Mod: CPTII,S$GLB,, | Performed by: PODIATRIST

## 2023-04-17 PROCEDURE — 3008F PR BODY MASS INDEX (BMI) DOCUMENTED: ICD-10-PCS | Mod: CPTII,S$GLB,, | Performed by: PODIATRIST

## 2023-04-17 PROCEDURE — 1159F PR MEDICATION LIST DOCUMENTED IN MEDICAL RECORD: ICD-10-PCS | Mod: CPTII,S$GLB,, | Performed by: PODIATRIST

## 2023-04-17 PROCEDURE — 99213 OFFICE O/P EST LOW 20 MIN: CPT | Mod: S$GLB,,, | Performed by: PODIATRIST

## 2023-04-17 PROCEDURE — 4010F ACE/ARB THERAPY RXD/TAKEN: CPT | Mod: CPTII,S$GLB,, | Performed by: PODIATRIST

## 2023-04-17 PROCEDURE — 1125F PR PAIN SEVERITY QUANTIFIED, PAIN PRESENT: ICD-10-PCS | Mod: CPTII,S$GLB,, | Performed by: PODIATRIST

## 2023-04-17 PROCEDURE — 99999 PR PBB SHADOW E&M-EST. PATIENT-LVL III: CPT | Mod: PBBFAC,,, | Performed by: PODIATRIST

## 2023-04-17 PROCEDURE — 1160F PR REVIEW ALL MEDS BY PRESCRIBER/CLIN PHARMACIST DOCUMENTED: ICD-10-PCS | Mod: CPTII,S$GLB,, | Performed by: PODIATRIST

## 2023-04-17 PROCEDURE — 85652 RBC SED RATE AUTOMATED: CPT | Performed by: PODIATRIST

## 2023-04-17 RX ORDER — PREGABALIN 75 MG/1
75 CAPSULE ORAL 2 TIMES DAILY
Qty: 60 CAPSULE | Refills: 0 | Status: SHIPPED | OUTPATIENT
Start: 2023-04-17 | End: 2023-06-08

## 2023-04-17 NOTE — PROGRESS NOTES
Subjective:     Patient ID: Mariah Meza is a 67 y.o. female.    Chief Complaint: Gout (Pt c/o BL gout pain 4/10, non-diabetic pt wears tennis shoes, PCP Dr. Bob last seen 3-1-23)      Mariah is a 67 y.o. female who presents to the podiatry clinic  with complaint of  bilateral foot pain. Onset of the symptoms was several years ago. Aggravating factors: inactivity and walking. Symptoms have been intermittent. Patients rates pain 6/10 on pain scale. Patient sates pain management sent her back due to it being gout in her feet. Patient has no other pedal complaints at this time.     Patient Active Problem List   Diagnosis    Hypertension    Hyperlipidemia    Class 2 severe obesity due to excess calories with serious comorbidity and body mass index (BMI) of 38.0 to 38.9 in adult    Chronic rhinitis    GERD (gastroesophageal reflux disease)    Low back pain    DJD (degenerative joint disease), lumbar    Lumbar radiculopathy    Papillary thyroid carcinoma    Hard to intubate    Post-operative hypothyroidism    Frequent headaches    H/O parathyroidectomy    Other chest pain    Hot flashes    Cyst of left parotid gland    Sicca syndrome    Elevated uric acid in blood       Medication List with Changes/Refills   New Medications    PREGABALIN (LYRICA) 75 MG CAPSULE    Take 1 capsule (75 mg total) by mouth 2 (two) times daily.   Current Medications    ACETAMINOPHEN (TYLENOL) 650 MG TBSR    Take 650 mg by mouth daily as needed.    AMLODIPINE (NORVASC) 10 MG TABLET    Take 1 tablet (10 mg total) by mouth once daily.    CALCIUM CARBONATE 470 MG CALCIUM (1,177 MG) CHEW    Take one tablet by mouth twice daily    CARVEDILOL (COREG) 12.5 MG TABLET    Take 12.5 mg by mouth 2 (two) times daily with meals.    CYCLOBENZAPRINE (FLEXERIL) 10 MG TABLET    Take 1 tablet (10 mg total) by mouth 3 (three) times daily as needed for Muscle spasms.    FUROSEMIDE (LASIX) 40 MG TABLET    Take 1 tablet by mouth twice daily as needed     LEVOTHYROXINE (SYNTHROID) 125 MCG TABLET    Take 1 tablet (125 mcg total) by mouth before breakfast.    LINACLOTIDE (LINZESS ORAL)    Take 72 mg by mouth once daily.    MAGNESIUM OXIDE (MAG-OX) 400 MG (241.3 MG MAGNESIUM) TABLET    Take 400 mg by mouth once daily.    METHYLPREDNISOLONE (MEDROL DOSEPACK) 4 MG TABLET    use as directed    OLMESARTAN (BENICAR) 40 MG TABLET    Take 1 tablet (40 mg total) by mouth once daily.    PANTOPRAZOLE (PROTONIX) 40 MG TABLET    Take by mouth once daily.     PREDNISONE (DELTASONE) 10 MG TABLET    Take 1 tablet (10 mg total) by mouth 2 (two) times daily.    PSYLLIUM HUSK/ASPARTAME (METAMUCIL FIBER SINGLES ORAL)    Take by mouth.    VITAMIN E 400 UNIT CAPSULE    Take 400 Units by mouth once daily.    WHEAT DEXTRIN/L.ACID/ASPARTAME (FIBER WITH PROBIOTIC ORAL)    Take by mouth Daily.       Review of patient's allergies indicates:   Allergen Reactions    Iodine and iodide containing products Itching    Shellfish containing products Swelling    Statins-hmg-coa reductase inhibitors Other (See Comments)     Myalgias       Past Surgical History:   Procedure Laterality Date    BILATERAL OOPHORECTOMY  2002    CARPAL TUNNEL RELEASE      Left wrist    CHOLECYSTECTOMY      DISSECTION OF NECK Bilateral 12/20/2021    Procedure: DISSECTION, NECK;  Surgeon: Deejay Olsen MD;  Location: [facility] OR;  Service: ENT;  Laterality: Bilateral;  Central neck dissection    HYSTERECTOMY  1984    NECK EXPLORATION Bilateral 12/20/2021    Procedure: EXPLORATION, NECK;  Surgeon: Deejay Olsen MD;  Location: [facility] OR;  Service: ENT;  Laterality: Bilateral;  Parathyroid exploration    ROTATOR CUFF REPAIR      Right shoulder    ROTATOR CUFF REPAIR Left 2010    THYROIDECTOMY, BILATERAL Bilateral 12/20/2021    Procedure: THYROIDECTOMY,BILATERAL;  Surgeon: Deejay Olsen MD;  Location: [facility] OR;  Service: ENT;  Laterality: Bilateral;    TOTAL VAGINAL HYSTERECTOMY  1985       Family History   Problem Relation Age of Onset    Diabetes  "Mother     Hypertension Mother     Hyperlipidemia Mother     Heart disease Mother         CHF    Stroke Mother     Heart attack Mother     Hearing loss Mother     Diabetes Sister     Sickle cell anemia Other         nieces, nephews    Lupus Other         niece       Social History     Socioeconomic History    Marital status:    Tobacco Use    Smoking status: Former     Packs/day: 0.50     Years: 8.00     Pack years: 4.00     Types: Cigarettes     Quit date: 1979     Years since quittin.3     Passive exposure: Never    Smokeless tobacco: Never   Substance and Sexual Activity    Alcohol use: No    Drug use: Never    Sexual activity: Never     Partners: Male     Birth control/protection: See Surgical Hx   Social History Narrative    The patient is  and has 2 adult children.  Patient works in  for the [employer].       Vitals:    23 1331   Weight: 100.1 kg (220 lb 10.9 oz)   Height: 5' 4" (1.626 m)   PainSc:   4       Hemoglobin A1C   Date Value Ref Range Status   2023 4.9 4.0 - 5.6 % Final     Comment:     ADA Screening Guidelines:  5.7-6.4%  Consistent with prediabetes  >or=6.5%  Consistent with diabetes    High levels of fetal hemoglobin interfere with the HbA1C  assay. Heterozygous hemoglobin variants (HbS, HgC, etc)do  not significantly interfere with this assay.   However, presence of multiple variants may affect accuracy.     2021 4.8 4.0 - 5.6 % Final     Comment:     ADA Screening Guidelines:  5.7-6.4%  Consistent with prediabetes  >or=6.5%  Consistent with diabetes    High levels of fetal hemoglobin interfere with the HbA1C  assay. Heterozygous hemoglobin variants (HbS, HgC, etc)do  not significantly interfere with this assay.   However, presence of multiple variants may affect accuracy.         Review of Systems   Constitutional:  Negative for chills and fever.   Respiratory:  Negative for shortness of breath.    Cardiovascular:  Negative for chest pain, " palpitations, orthopnea, claudication and leg swelling.   Gastrointestinal:  Negative for diarrhea, nausea and vomiting.   Musculoskeletal:  Negative for joint pain.   Skin:  Negative for rash.   Neurological:  Positive for tingling and sensory change.   Psychiatric/Behavioral: Negative.             Objective:      PHYSICAL EXAM: Apperance: Alert and orient in no distress,well developed, and with good attention to grooming and body habits  Patient presents ambulating in tennis shoes.   LOWER EXTREMITY EXAM:  VASCULAR: Dorsalis pedis pulses 2/4 bilateral and Posterior Tibial pulses 2/4 bilateral. Capillary fill time <4 seconds bilateral. Mild edema observed bilateral. Varicosities absent bilateral. Skin temperature of the lower extremities is warm to warm, proximal to distal. Hair growth dim bilateral.  DERMATOLOGICAL: No skin rashes, subcutaneous nodules, lesions, or ulcers observed bilateral.   NEUROLOGICAL: Light touch, sharp-dull, proprioception all present and equal bilaterally.  Vibratory sensation diminished at bilateral hallux. Protective sensation diminished sites as tested with a Hiwasse-Elmo 5.07 monofilament.   MUSCULOSKELETAL: Muscle strength is 5/5 for foot inverters, everters, plantarflexors, and dorsiflexors. Muscle tone is normal.           Assessment:       ICD-10-CM ICD-9-CM   1. Lumbar radiculopathy  M54.16 724.4   2. Acute gout of right foot, unspecified cause  M10.9 274.01       Plan:   Lumbar radiculopathy  -     pregabalin (LYRICA) 75 MG capsule; Take 1 capsule (75 mg total) by mouth 2 (two) times daily.  Dispense: 60 capsule; Refill: 0    Acute gout of right foot, unspecified cause  -     Sedimentation rate; Future; Expected date: 04/17/2023  -     C-Reactive Protein; Future; Expected date: 04/17/2023  -     Uric Acid; Future; Expected date: 04/17/2023      I counseled the patient on her conditions, regarding findings of my examination, my impressions, and usual treatment plan.    Discussed with patient treatments for neuropathy consisting of topical or oral medication.  Recommendations given for over-the-counter medicine such as Two Old Goats and/or Capsaicin.  Counseled patient on daily foot inspections and proper shoe gear.  Prescription written for Lyrica 100mg to be taken once nightly. Informed patient of possible side effects including but not limited to disorientation and drowsiness. Patient instructed to discontinue use if there are any adverse effects. Patient states she understands.   I explained to the patient that etiologies and treatment options for gout including rest, elevation, diet control, NSAID's, long term gout medication, and/or injection therapy.    Ordered uric acid, crp, and esr testing to be completed today.    Patient to return in 4 weeks or sooner if needed.        Vitaly Saldaña DPM  [facility]

## 2023-04-18 LAB — ERYTHROCYTE [SEDIMENTATION RATE] IN BLOOD BY PHOTOMETRIC METHOD: 20 MM/HR (ref 0–36)

## 2023-05-10 ENCOUNTER — OFFICE VISIT (OUTPATIENT)
Dept: OPHTHALMOLOGY | Facility: CLINIC | Age: 68
End: 2023-05-10
Payer: MEDICARE

## 2023-05-10 DIAGNOSIS — H52.7 REFRACTIVE DISORDER: Primary | ICD-10-CM

## 2023-05-10 PROCEDURE — 99499 UNLISTED E&M SERVICE: CPT | Mod: S$GLB,,, | Performed by: STUDENT IN AN ORGANIZED HEALTH CARE EDUCATION/TRAINING PROGRAM

## 2023-05-10 PROCEDURE — 99499 NO LOS: ICD-10-PCS | Mod: S$GLB,,, | Performed by: STUDENT IN AN ORGANIZED HEALTH CARE EDUCATION/TRAINING PROGRAM

## 2023-05-11 ENCOUNTER — OFFICE VISIT (OUTPATIENT)
Dept: INTERNAL MEDICINE | Facility: CLINIC | Age: 68
End: 2023-05-11
Payer: MEDICARE

## 2023-05-11 ENCOUNTER — CLINICAL SUPPORT (OUTPATIENT)
Dept: INTERNAL MEDICINE | Facility: CLINIC | Age: 68
End: 2023-05-11
Payer: MEDICARE

## 2023-05-11 ENCOUNTER — LAB VISIT (OUTPATIENT)
Dept: LAB | Facility: HOSPITAL | Age: 68
End: 2023-05-11
Attending: NURSE PRACTITIONER
Payer: MEDICARE

## 2023-05-11 VITALS
SYSTOLIC BLOOD PRESSURE: 158 MMHG | HEART RATE: 62 BPM | TEMPERATURE: 98 F | BODY MASS INDEX: 37.05 KG/M2 | DIASTOLIC BLOOD PRESSURE: 84 MMHG | OXYGEN SATURATION: 100 % | RESPIRATION RATE: 15 BRPM | WEIGHT: 215.81 LBS

## 2023-05-11 DIAGNOSIS — R47.01 EXPRESSIVE APHASIA: ICD-10-CM

## 2023-05-11 DIAGNOSIS — R51.9 FREQUENT HEADACHES: ICD-10-CM

## 2023-05-11 DIAGNOSIS — M25.512 ACUTE PAIN OF LEFT SHOULDER: ICD-10-CM

## 2023-05-11 DIAGNOSIS — R07.9 CHEST PAIN, UNSPECIFIED TYPE: ICD-10-CM

## 2023-05-11 DIAGNOSIS — S42.002A CLOSED NONDISPLACED FRACTURE OF LEFT CLAVICLE, UNSPECIFIED PART OF CLAVICLE, INITIAL ENCOUNTER: Primary | ICD-10-CM

## 2023-05-11 DIAGNOSIS — M19.012 PRIMARY OSTEOARTHRITIS OF LEFT SHOULDER: ICD-10-CM

## 2023-05-11 DIAGNOSIS — M25.512 ACUTE PAIN OF LEFT SHOULDER: Primary | ICD-10-CM

## 2023-05-11 DIAGNOSIS — R10.9 RIGHT FLANK PAIN: ICD-10-CM

## 2023-05-11 DIAGNOSIS — E89.0 POST-OPERATIVE HYPOTHYROIDISM: ICD-10-CM

## 2023-05-11 DIAGNOSIS — I10 PRIMARY HYPERTENSION: ICD-10-CM

## 2023-05-11 LAB
ANION GAP SERPL CALC-SCNC: 10 MMOL/L (ref 8–16)
BASOPHILS # BLD AUTO: 0.05 K/UL (ref 0–0.2)
BASOPHILS NFR BLD: 1.2 % (ref 0–1.9)
BUN SERPL-MCNC: 14 MG/DL (ref 8–23)
CALCIUM SERPL-MCNC: 9 MG/DL (ref 8.7–10.5)
CHLORIDE SERPL-SCNC: 105 MMOL/L (ref 95–110)
CO2 SERPL-SCNC: 25 MMOL/L (ref 23–29)
CREAT SERPL-MCNC: 0.8 MG/DL (ref 0.5–1.4)
DIFFERENTIAL METHOD: ABNORMAL
EOSINOPHIL # BLD AUTO: 0.1 K/UL (ref 0–0.5)
EOSINOPHIL NFR BLD: 3.4 % (ref 0–8)
ERYTHROCYTE [DISTWIDTH] IN BLOOD BY AUTOMATED COUNT: 13.4 % (ref 11.5–14.5)
EST. GFR  (NO RACE VARIABLE): >60 ML/MIN/1.73 M^2
GLUCOSE SERPL-MCNC: 89 MG/DL (ref 70–110)
HCT VFR BLD AUTO: 37.6 % (ref 37–48.5)
HGB BLD-MCNC: 11.8 G/DL (ref 12–16)
IMM GRANULOCYTES # BLD AUTO: 0.01 K/UL (ref 0–0.04)
IMM GRANULOCYTES NFR BLD AUTO: 0.2 % (ref 0–0.5)
LYMPHOCYTES # BLD AUTO: 1.4 K/UL (ref 1–4.8)
LYMPHOCYTES NFR BLD: 34.5 % (ref 18–48)
MCH RBC QN AUTO: 29.1 PG (ref 27–31)
MCHC RBC AUTO-ENTMCNC: 31.4 G/DL (ref 32–36)
MCV RBC AUTO: 93 FL (ref 82–98)
MONOCYTES # BLD AUTO: 0.5 K/UL (ref 0.3–1)
MONOCYTES NFR BLD: 13.3 % (ref 4–15)
NEUTROPHILS # BLD AUTO: 1.9 K/UL (ref 1.8–7.7)
NEUTROPHILS NFR BLD: 47.4 % (ref 38–73)
NRBC BLD-RTO: 0 /100 WBC
PLATELET # BLD AUTO: 332 K/UL (ref 150–450)
PMV BLD AUTO: 11.3 FL (ref 9.2–12.9)
POTASSIUM SERPL-SCNC: 3.7 MMOL/L (ref 3.5–5.1)
RBC # BLD AUTO: 4.06 M/UL (ref 4–5.4)
SODIUM SERPL-SCNC: 140 MMOL/L (ref 136–145)
TROPONIN I SERPL DL<=0.01 NG/ML-MCNC: 0.01 NG/ML (ref 0–0.03)
WBC # BLD AUTO: 4.06 K/UL (ref 3.9–12.7)

## 2023-05-11 PROCEDURE — 3008F PR BODY MASS INDEX (BMI) DOCUMENTED: ICD-10-PCS | Mod: CPTII,,, | Performed by: NURSE PRACTITIONER

## 2023-05-11 PROCEDURE — 93010 ELECTROCARDIOGRAM REPORT: CPT | Mod: S$GLB,,, | Performed by: INTERNAL MEDICINE

## 2023-05-11 PROCEDURE — 1101F PT FALLS ASSESS-DOCD LE1/YR: CPT | Mod: CPTII,,, | Performed by: NURSE PRACTITIONER

## 2023-05-11 PROCEDURE — 93005 EKG 12-LEAD: ICD-10-PCS | Mod: S$GLB,,, | Performed by: NURSE PRACTITIONER

## 2023-05-11 PROCEDURE — 3079F PR MOST RECENT DIASTOLIC BLOOD PRESSURE 80-89 MM HG: ICD-10-PCS | Mod: CPTII,,, | Performed by: NURSE PRACTITIONER

## 2023-05-11 PROCEDURE — 3077F PR MOST RECENT SYSTOLIC BLOOD PRESSURE >= 140 MM HG: ICD-10-PCS | Mod: CPTII,,, | Performed by: NURSE PRACTITIONER

## 2023-05-11 PROCEDURE — 3288F FALL RISK ASSESSMENT DOCD: CPT | Mod: CPTII,,, | Performed by: NURSE PRACTITIONER

## 2023-05-11 PROCEDURE — 99215 OFFICE O/P EST HI 40 MIN: CPT | Mod: PN | Performed by: NURSE PRACTITIONER

## 2023-05-11 PROCEDURE — 1125F AMNT PAIN NOTED PAIN PRSNT: CPT | Mod: CPTII,,, | Performed by: NURSE PRACTITIONER

## 2023-05-11 PROCEDURE — 3008F BODY MASS INDEX DOCD: CPT | Mod: CPTII,,, | Performed by: NURSE PRACTITIONER

## 2023-05-11 PROCEDURE — 1125F PR PAIN SEVERITY QUANTIFIED, PAIN PRESENT: ICD-10-PCS | Mod: CPTII,,, | Performed by: NURSE PRACTITIONER

## 2023-05-11 PROCEDURE — 93005 ELECTROCARDIOGRAM TRACING: CPT | Mod: S$GLB,,, | Performed by: NURSE PRACTITIONER

## 2023-05-11 PROCEDURE — 1101F PR PT FALLS ASSESS DOC 0-1 FALLS W/OUT INJ PAST YR: ICD-10-PCS | Mod: CPTII,,, | Performed by: NURSE PRACTITIONER

## 2023-05-11 PROCEDURE — 80048 BASIC METABOLIC PNL TOTAL CA: CPT | Performed by: NURSE PRACTITIONER

## 2023-05-11 PROCEDURE — 3044F PR MOST RECENT HEMOGLOBIN A1C LEVEL <7.0%: ICD-10-PCS | Mod: CPTII,,, | Performed by: NURSE PRACTITIONER

## 2023-05-11 PROCEDURE — 85025 COMPLETE CBC W/AUTO DIFF WBC: CPT | Performed by: NURSE PRACTITIONER

## 2023-05-11 PROCEDURE — 4010F PR ACE/ARB THEARPY RXD/TAKEN: ICD-10-PCS | Mod: CPTII,,, | Performed by: NURSE PRACTITIONER

## 2023-05-11 PROCEDURE — 3044F HG A1C LEVEL LT 7.0%: CPT | Mod: CPTII,,, | Performed by: NURSE PRACTITIONER

## 2023-05-11 PROCEDURE — 99214 PR OFFICE/OUTPT VISIT, EST, LEVL IV, 30-39 MIN: ICD-10-PCS | Mod: ,,, | Performed by: NURSE PRACTITIONER

## 2023-05-11 PROCEDURE — 93010 EKG 12-LEAD: ICD-10-PCS | Mod: S$GLB,,, | Performed by: INTERNAL MEDICINE

## 2023-05-11 PROCEDURE — 36415 COLL VENOUS BLD VENIPUNCTURE: CPT | Mod: PO | Performed by: NURSE PRACTITIONER

## 2023-05-11 PROCEDURE — 99214 OFFICE O/P EST MOD 30 MIN: CPT | Mod: ,,, | Performed by: NURSE PRACTITIONER

## 2023-05-11 PROCEDURE — 3079F DIAST BP 80-89 MM HG: CPT | Mod: CPTII,,, | Performed by: NURSE PRACTITIONER

## 2023-05-11 PROCEDURE — 3288F PR FALLS RISK ASSESSMENT DOCUMENTED: ICD-10-PCS | Mod: CPTII,,, | Performed by: NURSE PRACTITIONER

## 2023-05-11 PROCEDURE — 3077F SYST BP >= 140 MM HG: CPT | Mod: CPTII,,, | Performed by: NURSE PRACTITIONER

## 2023-05-11 PROCEDURE — 84484 ASSAY OF TROPONIN QUANT: CPT | Performed by: NURSE PRACTITIONER

## 2023-05-11 PROCEDURE — 4010F ACE/ARB THERAPY RXD/TAKEN: CPT | Mod: CPTII,,, | Performed by: NURSE PRACTITIONER

## 2023-05-11 RX ORDER — AMLODIPINE BESYLATE 10 MG/1
10 TABLET ORAL DAILY
Qty: 30 TABLET | Refills: 11 | Status: SHIPPED | OUTPATIENT
Start: 2023-05-11 | End: 2023-10-26

## 2023-05-11 RX ORDER — CYCLOBENZAPRINE HCL 10 MG
10 TABLET ORAL 3 TIMES DAILY PRN
Qty: 30 TABLET | Refills: 0 | Status: SHIPPED | OUTPATIENT
Start: 2023-05-11 | End: 2024-02-05 | Stop reason: SDUPTHER

## 2023-05-11 NOTE — PROGRESS NOTES
"Subjective     Patient ID: Mariah Meza is a 67 y.o. female.    Chief Complaint: Pain (Left shoulder pain x 3 weeks, elevated bp, medication "not working" per patient. )    Patient presents left shoulder pain.  Started about 3 weeks ago.  Radiates to neck and chest.     Started back walking-denied SOB while walking.      Foot pain is better.  Uric acid decreased.  Was prescribed Lyrica but had some drowsiness.     Having elevated blood pressure readings at home.  Taking olmesartan 40 mg, amlodipine 10 mg (but stopped and restarted 5 mg due to swelling), Coreg 12.5 mg twice a day     Home readings: 147-214/.  Reports headaches and some expressive aphasia (intermittent)    Pain  Associated symptoms include arthralgias, chest pain and myalgias. Pertinent negatives include no abdominal pain, chills, coughing or fatigue.   Review of Systems   Constitutional:  Negative for chills and fatigue.   Respiratory:  Negative for cough and shortness of breath.    Cardiovascular:  Positive for chest pain.   Gastrointestinal:  Negative for abdominal pain.   Musculoskeletal:  Positive for arthralgias, leg pain and myalgias.   Psychiatric/Behavioral:  Negative for agitation and confusion.         Objective     Physical Exam  Vitals reviewed.   Constitutional:       Appearance: Normal appearance.   HENT:      Head: Normocephalic.      Nose: Nose normal.   Cardiovascular:      Rate and Rhythm: Normal rate and regular rhythm.   Pulmonary:      Effort: Pulmonary effort is normal.      Breath sounds: Normal breath sounds.   Musculoskeletal:         General: Tenderness present.      Left shoulder: No tenderness.   Neurological:      General: No focal deficit present.      Mental Status: She is alert and oriented to person, place, and time. Mental status is at baseline.   Psychiatric:         Mood and Affect: Mood normal.         Behavior: Behavior normal.          Assessment and Plan     Problem List Items Addressed This Visit  "      Post-operative hypothyroidism    Frequent headaches    Relevant Orders    CBC Auto Differential    Basic Metabolic Panel     Other Visit Diagnoses       Acute pain of left shoulder    -  Primary    Relevant Orders    X-Ray Shoulder 2 or More Views Left    CBC Auto Differential    Basic Metabolic Panel    Chest pain, unspecified type        Relevant Orders    IN OFFICE EKG 12-LEAD (to Muse)    Troponin I    CBC Auto Differential    Basic Metabolic Panel            Acute pain of left shoulder  -     X-Ray Shoulder 2 or More Views Left; Future; Expected date: 05/11/2023  -     CBC Auto Differential; Future; Expected date: 05/11/2023  -     Basic Metabolic Panel; Future; Expected date: 05/11/2023    Post-operative hypothyroidism    Frequent headaches  -     CBC Auto Differential; Future; Expected date: 05/11/2023  -     Basic Metabolic Panel; Future; Expected date: 05/11/2023  -     MRI Brain Without Contrast; Future; Expected date: 05/11/2023    Chest pain, unspecified type  -     IN OFFICE EKG 12-LEAD (to Muse)  -     Troponin I; Future; Expected date: 05/11/2023  -     CBC Auto Differential; Future; Expected date: 05/11/2023  -     Basic Metabolic Panel; Future; Expected date: 05/11/2023    Expressive aphasia  -     MRI Brain Without Contrast; Future; Expected date: 05/11/2023    Right flank pain  -     cyclobenzaprine (FLEXERIL) 10 MG tablet; Take 1 tablet (10 mg total) by mouth 3 (three) times daily as needed for Muscle spasms.  Dispense: 30 tablet; Refill: 0    Primary hypertension  -     amLODIPine (NORVASC) 10 MG tablet; Take 1 tablet (10 mg total) by mouth once daily.  Dispense: 30 tablet; Refill: 11       Fax MRI order to [facility] -needs open MRI    Labs, EKG, shoulder x-ray today

## 2023-05-11 NOTE — Clinical Note
Please ask if she was unable to have her MRI.  We should have fax an order to [facility].  She need an open MRI. Thanks

## 2023-05-11 NOTE — PROGRESS NOTES
Name  verified. Allergies/meds reviewed. Ekg performed as ordered. Pt tolerated well without any issues. Results given to provider for review. No s/s distress noted.

## 2023-05-15 ENCOUNTER — TELEPHONE (OUTPATIENT)
Dept: ORTHOPEDICS | Facility: CLINIC | Age: 68
End: 2023-05-15
Payer: MEDICARE

## 2023-05-15 NOTE — TELEPHONE ENCOUNTER
Spoke with patient and got her scheduled from her referral. Patient also requested for us to cancel her appointment with podiatry at this time and she will reach out to reschedule. -NS

## 2023-05-17 ENCOUNTER — OFFICE VISIT (OUTPATIENT)
Dept: SPORTS MEDICINE | Facility: CLINIC | Age: 68
End: 2023-05-17
Payer: MEDICARE

## 2023-05-17 VITALS — HEIGHT: 64 IN | BODY MASS INDEX: 36.7 KG/M2 | WEIGHT: 215 LBS

## 2023-05-17 DIAGNOSIS — M67.912 TENDINOPATHY OF ROTATOR CUFF, LEFT: Primary | ICD-10-CM

## 2023-05-17 DIAGNOSIS — M19.012 PRIMARY OSTEOARTHRITIS OF LEFT SHOULDER: ICD-10-CM

## 2023-05-17 PROCEDURE — 1101F PR PT FALLS ASSESS DOC 0-1 FALLS W/OUT INJ PAST YR: ICD-10-PCS | Mod: CPTII,,, | Performed by: STUDENT IN AN ORGANIZED HEALTH CARE EDUCATION/TRAINING PROGRAM

## 2023-05-17 PROCEDURE — 4010F ACE/ARB THERAPY RXD/TAKEN: CPT | Mod: CPTII,,, | Performed by: STUDENT IN AN ORGANIZED HEALTH CARE EDUCATION/TRAINING PROGRAM

## 2023-05-17 PROCEDURE — 1125F PR PAIN SEVERITY QUANTIFIED, PAIN PRESENT: ICD-10-PCS | Mod: CPTII,,, | Performed by: STUDENT IN AN ORGANIZED HEALTH CARE EDUCATION/TRAINING PROGRAM

## 2023-05-17 PROCEDURE — 3044F PR MOST RECENT HEMOGLOBIN A1C LEVEL <7.0%: ICD-10-PCS | Mod: CPTII,,, | Performed by: STUDENT IN AN ORGANIZED HEALTH CARE EDUCATION/TRAINING PROGRAM

## 2023-05-17 PROCEDURE — 99999 PR PBB SHADOW E&M-EST. PATIENT-LVL IV: CPT | Mod: PBBFAC,,, | Performed by: STUDENT IN AN ORGANIZED HEALTH CARE EDUCATION/TRAINING PROGRAM

## 2023-05-17 PROCEDURE — 99203 PR OFFICE/OUTPT VISIT, NEW, LEVL III, 30-44 MIN: ICD-10-PCS | Mod: 25,,, | Performed by: STUDENT IN AN ORGANIZED HEALTH CARE EDUCATION/TRAINING PROGRAM

## 2023-05-17 PROCEDURE — 1159F PR MEDICATION LIST DOCUMENTED IN MEDICAL RECORD: ICD-10-PCS | Mod: CPTII,,, | Performed by: STUDENT IN AN ORGANIZED HEALTH CARE EDUCATION/TRAINING PROGRAM

## 2023-05-17 PROCEDURE — 3008F BODY MASS INDEX DOCD: CPT | Mod: CPTII,,, | Performed by: STUDENT IN AN ORGANIZED HEALTH CARE EDUCATION/TRAINING PROGRAM

## 2023-05-17 PROCEDURE — 20610 LARGE JOINT ASPIRATION/INJECTION: L SUBACROMIAL BURSA: ICD-10-PCS | Mod: LT,,, | Performed by: STUDENT IN AN ORGANIZED HEALTH CARE EDUCATION/TRAINING PROGRAM

## 2023-05-17 PROCEDURE — 99999 PR PBB SHADOW E&M-EST. PATIENT-LVL IV: ICD-10-PCS | Mod: PBBFAC,,, | Performed by: STUDENT IN AN ORGANIZED HEALTH CARE EDUCATION/TRAINING PROGRAM

## 2023-05-17 PROCEDURE — 1160F PR REVIEW ALL MEDS BY PRESCRIBER/CLIN PHARMACIST DOCUMENTED: ICD-10-PCS | Mod: CPTII,,, | Performed by: STUDENT IN AN ORGANIZED HEALTH CARE EDUCATION/TRAINING PROGRAM

## 2023-05-17 PROCEDURE — 3288F FALL RISK ASSESSMENT DOCD: CPT | Mod: CPTII,,, | Performed by: STUDENT IN AN ORGANIZED HEALTH CARE EDUCATION/TRAINING PROGRAM

## 2023-05-17 PROCEDURE — 99214 OFFICE O/P EST MOD 30 MIN: CPT | Performed by: STUDENT IN AN ORGANIZED HEALTH CARE EDUCATION/TRAINING PROGRAM

## 2023-05-17 PROCEDURE — 1125F AMNT PAIN NOTED PAIN PRSNT: CPT | Mod: CPTII,,, | Performed by: STUDENT IN AN ORGANIZED HEALTH CARE EDUCATION/TRAINING PROGRAM

## 2023-05-17 PROCEDURE — 3008F PR BODY MASS INDEX (BMI) DOCUMENTED: ICD-10-PCS | Mod: CPTII,,, | Performed by: STUDENT IN AN ORGANIZED HEALTH CARE EDUCATION/TRAINING PROGRAM

## 2023-05-17 PROCEDURE — 20610 DRAIN/INJ JOINT/BURSA W/O US: CPT | Mod: LT,,, | Performed by: STUDENT IN AN ORGANIZED HEALTH CARE EDUCATION/TRAINING PROGRAM

## 2023-05-17 PROCEDURE — 3044F HG A1C LEVEL LT 7.0%: CPT | Mod: CPTII,,, | Performed by: STUDENT IN AN ORGANIZED HEALTH CARE EDUCATION/TRAINING PROGRAM

## 2023-05-17 PROCEDURE — 1160F RVW MEDS BY RX/DR IN RCRD: CPT | Mod: CPTII,,, | Performed by: STUDENT IN AN ORGANIZED HEALTH CARE EDUCATION/TRAINING PROGRAM

## 2023-05-17 PROCEDURE — 99203 OFFICE O/P NEW LOW 30 MIN: CPT | Mod: 25,,, | Performed by: STUDENT IN AN ORGANIZED HEALTH CARE EDUCATION/TRAINING PROGRAM

## 2023-05-17 PROCEDURE — 3288F PR FALLS RISK ASSESSMENT DOCUMENTED: ICD-10-PCS | Mod: CPTII,,, | Performed by: STUDENT IN AN ORGANIZED HEALTH CARE EDUCATION/TRAINING PROGRAM

## 2023-05-17 PROCEDURE — 1101F PT FALLS ASSESS-DOCD LE1/YR: CPT | Mod: CPTII,,, | Performed by: STUDENT IN AN ORGANIZED HEALTH CARE EDUCATION/TRAINING PROGRAM

## 2023-05-17 PROCEDURE — 1159F MED LIST DOCD IN RCRD: CPT | Mod: CPTII,,, | Performed by: STUDENT IN AN ORGANIZED HEALTH CARE EDUCATION/TRAINING PROGRAM

## 2023-05-17 PROCEDURE — 4010F PR ACE/ARB THEARPY RXD/TAKEN: ICD-10-PCS | Mod: CPTII,,, | Performed by: STUDENT IN AN ORGANIZED HEALTH CARE EDUCATION/TRAINING PROGRAM

## 2023-05-17 RX ADMIN — TRIAMCINOLONE ACETONIDE 40 MG: 40 INJECTION, SUSPENSION INTRA-ARTICULAR; INTRAMUSCULAR at 09:05

## 2023-05-17 NOTE — PROGRESS NOTES
Orthopaedics Sports Medicine     Shoulder Initial Visit         5/17/2023    Referring MD: Taina Bob NP    Chief Complaint   Patient presents with    Left Shoulder - Pain         History of Present Illness:   Mariah Meza is a 67 y.o. right-hand dominant female who presents with left shoulder pain and dysfunction.    Onset of the symptoms was approximately 4 weeks ago.      Inciting event: No specific injury or trauma, gradual worsening of symptoms.    Current symptoms include left shoulder pain, generally about the shoulder with pain quality of constant aching and sharp pain.      Pain is aggravated by overhead movement, reaching behind, and raising arm.       Evaluation to date: X-Ray     Treatment to date: Rest, activity modification, muscle relaxer, and Tylenol.   Of note she does have a history of right shoulder RCR in 2010. She recovered well from that surgery.     Past Medical History:   Past Medical History:   Diagnosis Date    Chronic rhinitis     DJD (degenerative joint disease), lumbar     GERD (gastroesophageal reflux disease)     Headache     Hiatal hernia     Hyperlipidemia     Hypertension     Liver disease     Fatty Liver    Low back pain     Obesity     PONV (postoperative nausea and vomiting)     Thyroid disease        Past Surgical History:   Past Surgical History:   Procedure Laterality Date    BILATERAL OOPHORECTOMY  2002    CARPAL TUNNEL RELEASE      Left wrist    CHOLECYSTECTOMY      DISSECTION OF NECK Bilateral 12/20/2021    Procedure: DISSECTION, NECK;  Surgeon: Deejay Olsen MD;  Location: [facility] OR;  Service: ENT;  Laterality: Bilateral;  Central neck dissection    HYSTERECTOMY  1984    NECK EXPLORATION Bilateral 12/20/2021    Procedure: EXPLORATION, NECK;  Surgeon: Deejay Olsen MD;  Location: [facility] OR;  Service: ENT;  Laterality: Bilateral;  Parathyroid exploration    ROTATOR CUFF REPAIR      Right shoulder    ROTATOR CUFF REPAIR Left 2010    THYROIDECTOMY, BILATERAL Bilateral  12/20/2021    Procedure: THYROIDECTOMY,BILATERAL;  Surgeon: Deejay Olsen MD;  Location: [facility];  Service: ENT;  Laterality: Bilateral;    TOTAL VAGINAL HYSTERECTOMY  1985       Medications:  Patient's Medications   New Prescriptions    No medications on file   Previous Medications    ACETAMINOPHEN (TYLENOL) 650 MG TBSR    Take 650 mg by mouth daily as needed.    AMLODIPINE (NORVASC) 10 MG TABLET    Take 1 tablet (10 mg total) by mouth once daily.    CALCIUM CARBONATE 470 MG CALCIUM (1,177 MG) CHEW    Take one tablet by mouth twice daily    CARVEDILOL (COREG) 12.5 MG TABLET    Take 12.5 mg by mouth 2 (two) times daily with meals.    CYCLOBENZAPRINE (FLEXERIL) 10 MG TABLET    Take 1 tablet (10 mg total) by mouth 3 (three) times daily as needed for Muscle spasms.    FUROSEMIDE (LASIX) 40 MG TABLET    Take 1 tablet by mouth twice daily as needed    LEVOTHYROXINE (SYNTHROID) 125 MCG TABLET    Take 1 tablet (125 mcg total) by mouth before breakfast.    LINACLOTIDE (LINZESS ORAL)    Take 72 mg by mouth once daily.    MAGNESIUM OXIDE (MAG-OX) 400 MG (241.3 MG MAGNESIUM) TABLET    Take 400 mg by mouth once daily.    METHYLPREDNISOLONE (MEDROL DOSEPACK) 4 MG TABLET    use as directed    OLMESARTAN (BENICAR) 40 MG TABLET    Take 1 tablet (40 mg total) by mouth once daily.    PANTOPRAZOLE (PROTONIX) 40 MG TABLET    Take by mouth once daily.     PREDNISONE (DELTASONE) 10 MG TABLET    Take 1 tablet (10 mg total) by mouth 2 (two) times daily.    PREGABALIN (LYRICA) 75 MG CAPSULE    Take 1 capsule (75 mg total) by mouth 2 (two) times daily.    PSYLLIUM HUSK/ASPARTAME (METAMUCIL FIBER SINGLES ORAL)    Take by mouth.    VITAMIN E 400 UNIT CAPSULE    Take 400 Units by mouth once daily.    WHEAT DEXTRIN/L.ACID/ASPARTAME (FIBER WITH PROBIOTIC ORAL)    Take by mouth Daily.   Modified Medications    No medications on file   Discontinued Medications    No medications on file       Allergies:   Review of patient's allergies indicates:  "  Allergen Reactions    Iodine and iodide containing products Itching    Shellfish containing products Swelling    Statins-hmg-coa reductase inhibitors Other (See Comments)     Myalgias       Social History:   Home town: [place]  Occupation: Retired  Alcohol use: She reports no history of alcohol use.  Tobacco use: She reports that she quit smoking about 44 years ago. Her smoking use included cigarettes. She has a 4.00 pack-year smoking history. She has never been exposed to tobacco smoke. She has never used smokeless tobacco.    Review of systems:  History of recent illness, fevers, shakes, or chills: no  History of cardiac problems or chest pain: no  History of pulmonary problems or asthma: no  History of diabetes: no  History of prior dvt or clotting problems: no  History of sleep apnea: no      Physical Examination:  Estimated body mass index is 36.9 kg/m² as calculated from the following:    Height as of this encounter: 5' 4" (1.626 m).    Weight as of this encounter: 97.5 kg (215 lb).    General  Healthy appearing female in no acute distress  Alert and oriented, normal mood, appropriate affect    Shoulder Examination:  Patient is alert and oriented, no distress. Skin is intact. Neuro is normal with no focal motor or sensory findings.    Cervical exam is unremarkable. Intact cervical ROM. Negative Spurling's test    Physical Exam:  RIGHT    LEFT    Scap Dyskinesis/Winging (-)    (-)    Tenderness:          Greater Tuberosity             (-)    +  Bicipital Groove  (-)    +  AC joint   (-)    +  Other:     ROM:  Forward Elevation 180    150  Abduction  120    100  ER (at side)  80    60  IR   L1    L5    Strength:   Supraspinatus  5/5    4+/5  Infraspinatus  5/5    4+/5  Subscap / IR  5/5    5/5     Special Tests:   Neer:    (-)    +   Gonsales:   (-)    +   SS Stress:   (-)    +   Bear Hug:   (-)    (-)   Denver's:   (-)    +   Resisted Thrower's:   (-)    +   Cross Arm Abduction:  (-)    (-)    Neurovascular " examination  - Motor grossly intact bilaterally to shoulder abduction, elbow flexion and extension, wrist flexion and extension, and intrinsic hand musculature  - Sensation intact to light touch bilaterally in axillary, median, radial, and ulnar distributions  - Symmetrical radial pulses      Imaging:  XR Results:  Results for orders placed in visit on 05/11/23    X-Ray Shoulder 2 or More Views Left    Narrative  EXAM: XR SHOULDER COMPLETE 2 OR MORE VIEWS LEFT    CLINICAL HISTORY: Left shoulder pain    TECHNIQUE: Left shoulder, 3 views    COMPARISON:  No studies are available for comparison.    FINDINGS: Remote distal clavicle and acromial fractures.  Moderate acromio clavicular and glenohumeral arthrosis.  Alignment is satisfactory.    Impression  Remote posttraumatic changes and degenerative joint disease.        Finalized on: 5/11/2023 10:32 AM By:  KURT Buck MD, MD  [facility]# [id number]      2023-05-11 10:34:[id number]    [facility]      MRI Results:  No results found for this or any previous visit.      CT Results:  No results found for this or any previous visit.      Physician Read: I agree with the above impression.      Impression:  67 y.o. female with left shoulder rotator cuff tendinopathy, glenohumeral osteoarthritis       Plan:  Discussed diagnosis and treatment options with patient today. Her symptoms and physical exam is consistent with rotator cuff tendinopathy. She also has evidence of glenohumeral arthritis.  Discussed non-operative treatment options in the form of rest, activity modifications, oral anti-inflammatories, corticosteroid injections, and physical therapy. She reports that she has been told by another physician to avoid taking anti-inflammatories.   I recommend proceeding with left shoulder corticosteroid injection into the subacromial space. The patient is in agreement with this plan.   Left shoulder corticosteroid injection into the subacromial space performed today. Patient tolerated the procedure  well with no immediate complications.   Follow up with me in 6-8 weeks. If her symptoms persist we will plan to move forward with MRI of the shoulder to further evaluate for intra-articular pathology.            Jean-Pierre Espinal MD    I, Alex Esparza, acted as a scribe for Jean-Pierre Espinal MD for the duration of this office visit.

## 2023-05-18 ENCOUNTER — TELEPHONE (OUTPATIENT)
Dept: INTERNAL MEDICINE | Facility: CLINIC | Age: 68
End: 2023-05-18
Payer: MEDICARE

## 2023-05-18 RX ORDER — TRIAMCINOLONE ACETONIDE 40 MG/ML
40 INJECTION, SUSPENSION INTRA-ARTICULAR; INTRAMUSCULAR
Status: DISCONTINUED | OUTPATIENT
Start: 2023-05-17 | End: 2023-05-18 | Stop reason: HOSPADM

## 2023-05-18 NOTE — TELEPHONE ENCOUNTER
----- Message from Radha Cadena sent at 5/18/2023 11:39 AM CDT -----  Contact: Felicia/ The terrell Duarte with [facility] is calling to speak with the  nurse regarding PA. [facility] states the appt is for 5/22/23.  Please give Felicia a call back at [phone].

## 2023-05-18 NOTE — TELEPHONE ENCOUNTER
I returned a call back to Felicia with [facility] regarding an MRI for the pt and she was requesting a PA however, I informed that [facility] has an dept that does them for outside orders and she replied she called but, there was no answer so, I stated I will call and have someone contact her. She verbalized understanding. I spoke to Lucía in Radiology pre cert and she will call the rep for [facility] . //kah

## 2023-05-18 NOTE — PROCEDURES
Large Joint Aspiration/Injection: L subacromial bursa    Date/Time: 5/17/2023 9:00 AM  Performed by: Jean-Pierre Espinal MD  Authorized by: Jean-Pierre Espinal MD     Consent Done?:  Yes (Verbal)  Indications:  Pain  Site marked: the procedure site was marked    Timeout: prior to procedure the correct patient, procedure, and site was verified    Prep: patient was prepped and draped in usual sterile fashion      Local anesthesia used?: Yes    Anesthesia:  Local infiltration  Local anesthetic:  Lidocaine 1% without epinephrine    Details:  Needle Size:  22 G  Ultrasonic Guidance for needle placement?: No    Approach:  Posterior  Location:  Shoulder  Site:  L subacromial bursa  Medications:  40 mg triamcinolone acetonide 40 mg/mL  Patient tolerance:  Patient tolerated the procedure well with no immediate complications

## 2023-05-18 NOTE — TELEPHONE ENCOUNTER
----- Message from Radha Cadena sent at 5/18/2023 11:39 AM CDT -----  Contact: Felicia/ The terrell Duarte with [facility] is calling to speak with the  nurse regarding PA. [facility] states the appt is for 5/22/23.  Please give Felicia a call back at [phone].

## 2023-05-18 NOTE — TELEPHONE ENCOUNTER
----- Message from Radha Cadena sent at 5/18/2023 11:39 AM CDT -----  Contact: Felicia/ The terrell Duarte with [facility] is calling to speak with the  nurse regarding PA. [facility] states the appt is for 5/22/23.  Please give Felicia a call back at [phone].

## 2023-05-29 ENCOUNTER — TELEPHONE (OUTPATIENT)
Dept: INTERNAL MEDICINE | Facility: CLINIC | Age: 68
End: 2023-05-29
Payer: MEDICARE

## 2023-05-29 NOTE — TELEPHONE ENCOUNTER
----- Message from Tara Nelson sent at 5/29/2023  9:00 AM CDT -----  Contact: [phone]  Type:  Patient Returning Call    Who Called:Mariah   Who Left Message for Patient:nurse   Does the patient know what this is regarding?:yes   Would the patient rather a call back or a response via [employer]? Call back   Best Call Back Number:[phone]  Additional Information: n/a      Thanks KB

## 2023-05-29 NOTE — TELEPHONE ENCOUNTER
I returned a call back to the pt and she was informed of Cj FREEMAN instructions to f/u with her Endocrinologist regarding decreasing of her medication for hot flashes. She verbalized understanding but, stated she was feeling bad when asked how she was doing stated that she thought it was a pressure but it was fine so, she checked her BG and it was 107 I informed that was okay. I then asked if she would like to be scheduled at one of our other clinics for eval and she replied no she will just wait as she will start to feel better. I replied  okay well keep us posted. She verbablized understanding. analisai//kah

## 2023-06-06 ENCOUNTER — TELEPHONE (OUTPATIENT)
Dept: INTERNAL MEDICINE | Facility: CLINIC | Age: 68
End: 2023-06-06
Payer: MEDICARE

## 2023-06-07 ENCOUNTER — TELEPHONE (OUTPATIENT)
Dept: INTERNAL MEDICINE | Facility: CLINIC | Age: 68
End: 2023-06-07
Payer: MEDICARE

## 2023-06-07 NOTE — TELEPHONE ENCOUNTER
I returned a callback to the pt who was calling back after praveen left her a vm regarding her MRI report documentation. She verbalized understanding of results. //kah

## 2023-06-07 NOTE — TELEPHONE ENCOUNTER
----- Message from Cheryl Flores sent at 6/7/2023  8:16 AM CDT -----  Contact: self  ..Type:  Patient Returning Call    Who Called:Ruben Meza  Who Left Message for Patient:  Does the patient know what this is regarding?:no  Would the patient rather a call back or a response via [facility]? Call back   Best Call Back Number: .[phone] (home)   Additional Information: pt states she missed a call

## 2023-06-08 ENCOUNTER — OFFICE VISIT (OUTPATIENT)
Dept: INTERNAL MEDICINE | Facility: CLINIC | Age: 68
End: 2023-06-08
Payer: MEDICARE

## 2023-06-08 VITALS
HEART RATE: 55 BPM | TEMPERATURE: 98 F | SYSTOLIC BLOOD PRESSURE: 134 MMHG | OXYGEN SATURATION: 99 % | BODY MASS INDEX: 37.1 KG/M2 | RESPIRATION RATE: 16 BRPM | WEIGHT: 216.19 LBS | DIASTOLIC BLOOD PRESSURE: 72 MMHG

## 2023-06-08 DIAGNOSIS — E78.2 MIXED HYPERLIPIDEMIA: ICD-10-CM

## 2023-06-08 DIAGNOSIS — Z00.00 ROUTINE MEDICAL EXAM: Primary | ICD-10-CM

## 2023-06-08 DIAGNOSIS — M54.16 LUMBAR RADICULOPATHY: ICD-10-CM

## 2023-06-08 DIAGNOSIS — K21.9 GASTROESOPHAGEAL REFLUX DISEASE, UNSPECIFIED WHETHER ESOPHAGITIS PRESENT: ICD-10-CM

## 2023-06-08 DIAGNOSIS — C73 PAPILLARY THYROID CARCINOMA: ICD-10-CM

## 2023-06-08 DIAGNOSIS — I10 PRIMARY HYPERTENSION: ICD-10-CM

## 2023-06-08 PROCEDURE — 1159F MED LIST DOCD IN RCRD: CPT | Mod: CPTII,S$GLB,, | Performed by: NURSE PRACTITIONER

## 2023-06-08 PROCEDURE — 1159F PR MEDICATION LIST DOCUMENTED IN MEDICAL RECORD: ICD-10-PCS | Mod: CPTII,S$GLB,, | Performed by: NURSE PRACTITIONER

## 2023-06-08 PROCEDURE — 4010F PR ACE/ARB THEARPY RXD/TAKEN: ICD-10-PCS | Mod: CPTII,S$GLB,, | Performed by: NURSE PRACTITIONER

## 2023-06-08 PROCEDURE — 3044F HG A1C LEVEL LT 7.0%: CPT | Mod: CPTII,S$GLB,, | Performed by: NURSE PRACTITIONER

## 2023-06-08 PROCEDURE — 3078F DIAST BP <80 MM HG: CPT | Mod: CPTII,S$GLB,, | Performed by: NURSE PRACTITIONER

## 2023-06-08 PROCEDURE — 99999 PR PBB SHADOW E&M-EST. PATIENT-LVL IV: CPT | Mod: PBBFAC,,, | Performed by: NURSE PRACTITIONER

## 2023-06-08 PROCEDURE — 1160F RVW MEDS BY RX/DR IN RCRD: CPT | Mod: CPTII,S$GLB,, | Performed by: NURSE PRACTITIONER

## 2023-06-08 PROCEDURE — 1101F PR PT FALLS ASSESS DOC 0-1 FALLS W/OUT INJ PAST YR: ICD-10-PCS | Mod: CPTII,S$GLB,, | Performed by: NURSE PRACTITIONER

## 2023-06-08 PROCEDURE — 99213 OFFICE O/P EST LOW 20 MIN: CPT | Mod: S$GLB,,, | Performed by: NURSE PRACTITIONER

## 2023-06-08 PROCEDURE — 3288F FALL RISK ASSESSMENT DOCD: CPT | Mod: CPTII,S$GLB,, | Performed by: NURSE PRACTITIONER

## 2023-06-08 PROCEDURE — 1160F PR REVIEW ALL MEDS BY PRESCRIBER/CLIN PHARMACIST DOCUMENTED: ICD-10-PCS | Mod: CPTII,S$GLB,, | Performed by: NURSE PRACTITIONER

## 2023-06-08 PROCEDURE — 99213 PR OFFICE/OUTPT VISIT, EST, LEVL III, 20-29 MIN: ICD-10-PCS | Mod: S$GLB,,, | Performed by: NURSE PRACTITIONER

## 2023-06-08 PROCEDURE — 3008F BODY MASS INDEX DOCD: CPT | Mod: CPTII,S$GLB,, | Performed by: NURSE PRACTITIONER

## 2023-06-08 PROCEDURE — 3075F PR MOST RECENT SYSTOLIC BLOOD PRESS GE 130-139MM HG: ICD-10-PCS | Mod: CPTII,S$GLB,, | Performed by: NURSE PRACTITIONER

## 2023-06-08 PROCEDURE — 99999 PR PBB SHADOW E&M-EST. PATIENT-LVL IV: ICD-10-PCS | Mod: PBBFAC,,, | Performed by: NURSE PRACTITIONER

## 2023-06-08 PROCEDURE — 3008F PR BODY MASS INDEX (BMI) DOCUMENTED: ICD-10-PCS | Mod: CPTII,S$GLB,, | Performed by: NURSE PRACTITIONER

## 2023-06-08 PROCEDURE — 4010F ACE/ARB THERAPY RXD/TAKEN: CPT | Mod: CPTII,S$GLB,, | Performed by: NURSE PRACTITIONER

## 2023-06-08 PROCEDURE — 1125F PR PAIN SEVERITY QUANTIFIED, PAIN PRESENT: ICD-10-PCS | Mod: CPTII,S$GLB,, | Performed by: NURSE PRACTITIONER

## 2023-06-08 PROCEDURE — 1101F PT FALLS ASSESS-DOCD LE1/YR: CPT | Mod: CPTII,S$GLB,, | Performed by: NURSE PRACTITIONER

## 2023-06-08 PROCEDURE — 3075F SYST BP GE 130 - 139MM HG: CPT | Mod: CPTII,S$GLB,, | Performed by: NURSE PRACTITIONER

## 2023-06-08 PROCEDURE — 3044F PR MOST RECENT HEMOGLOBIN A1C LEVEL <7.0%: ICD-10-PCS | Mod: CPTII,S$GLB,, | Performed by: NURSE PRACTITIONER

## 2023-06-08 PROCEDURE — 3288F PR FALLS RISK ASSESSMENT DOCUMENTED: ICD-10-PCS | Mod: CPTII,S$GLB,, | Performed by: NURSE PRACTITIONER

## 2023-06-08 PROCEDURE — 3078F PR MOST RECENT DIASTOLIC BLOOD PRESSURE < 80 MM HG: ICD-10-PCS | Mod: CPTII,S$GLB,, | Performed by: NURSE PRACTITIONER

## 2023-06-08 PROCEDURE — 1125F AMNT PAIN NOTED PAIN PRSNT: CPT | Mod: CPTII,S$GLB,, | Performed by: NURSE PRACTITIONER

## 2023-06-08 NOTE — PROGRESS NOTES
"Subjective     Patient ID: Mariah Meza is a 67 y.o. female.    Chief Complaint: Follow-up    Patient presents for 3 month follow up.  Blood pressure is good today.    Reports recent concerns of night sweats, fatigue/weak.  Reports "feeling flushed" in the face.      Burning to underarms and shoulders.  Numbness and tingling to left arm.      Medical history: HTN, HLP, Obesity, Chronic rhinitis, GERD, DJD lumbar, Papillary thyroid carcinoma, P/O hypothyroidsim, Parathyroidectomy, Chest pain, Hot flashes,  Cyst of left parotid gland, SICCA syndrome, Elevated uric acid in blood.     Up to date with Dr. Cortez ([facility])    Has upcoming appointment with GI on 06/20/2023    Head discomfort improved.   MRI 06/01/2023-negative.     Reports burning sensation to axillary and shoulders bilateral.      Follow-up  Associated symptoms include arthralgias and myalgias. Pertinent negatives include no chest pain, chills, coughing or fatigue.   Review of Systems   Constitutional:  Negative for chills and fatigue.   Respiratory:  Negative for cough and shortness of breath.    Cardiovascular:  Negative for chest pain and leg swelling.   Gastrointestinal:  Negative for constipation and diarrhea.   Musculoskeletal:  Positive for arthralgias and myalgias.   Psychiatric/Behavioral:  Negative for agitation and confusion.         Objective     Physical Exam  Vitals reviewed.   Constitutional:       Appearance: Normal appearance.   HENT:      Head: Normocephalic.   Cardiovascular:      Rate and Rhythm: Normal rate and regular rhythm.   Pulmonary:      Effort: Pulmonary effort is normal.      Breath sounds: Normal breath sounds.   Abdominal:      General: Bowel sounds are normal. There is no distension.      Tenderness: There is abdominal tenderness (epigastric discomfort).   Musculoskeletal:         General: Tenderness (right shoulder with ROM) present. Normal range of motion.   Skin:     General: Skin is warm.   Neurological:      " General: No focal deficit present.      Mental Status: She is alert.   Psychiatric:         Mood and Affect: Mood normal.         Behavior: Behavior normal. Behavior is cooperative.          Assessment and Plan     1. Routine medical exam    2. Lumbar radiculopathy  Comments:  Stable. Contiue current treatment plan.     3. Mixed hyperlipidemia  Comments:  Stable. Contiue current treatment plan.     4. Primary hypertension  Comments:  Stable. Contiue current treatment plan.     5. Papillary thyroid carcinoma  Comments:  Stable. Contiue current treatment plan.     6. Gastroesophageal reflux disease, unspecified whether esophagitis present  Comments:  Stable. Contiue current treatment plan.       See specialist as scheduled.           Follow up in about 3 months (around 9/8/2023).

## 2023-06-27 NOTE — PROGRESS NOTES
Orthopaedic Follow-Up Visit    Last Appointment: 5/17/23  Diagnosis: Left shoulder rotator cuff tendinopathy, glenohumeral osteoarthritis   Prior Procedure: Left SAS CSI    Mariah Meza is a 67 y.o. female who is here for f/u evaluation of her left shoulder. The patient was last seen here by me on 5/17/23 at which point her symptoms and physical exam were consistent with rotator cuff tendinopathy. She also had evidence of glenohumeral arthritis. We elected to proceed with left shoulder corticosteroid injection into the subacromial space prior to considering further treatment options. The patient returns today reporting that she got a few weeks of relief form the injection but her symptoms have returned . She also states her right shoulder has also begun to bother her as well. She is interested in discussing further treatment options.     To review her history, Mariah Meza is a 67 y.o. right-hand dominant female who presented on 5/17/23 with left shoulder pain and dysfunction that had started approximately 4 weeks prior with no specific injury or trauma and gradual worsening of symptoms. Her symptoms included left shoulder pain, generally about the shoulder with pain quality of constant aching and sharp pain. Her pain was aggravated by overhead movement, reaching behind, and raising arm.  She has tried rest, activity modification, muscle relaxer, and Tylenol. She noted that she had been told by a physician a some point to not take anti-inflammatories.  Of note she has a history RCR on both shoulders.    Patient's medications, allergies, past medical, surgical, social and family histories were reviewed and updated as appropriate.    Review of Systems   All systems reviewed were negative.  Specifically, the patient denies fever, chills, weight loss, chest pain, shortness of breath, or dyspnea on exertion.      Past Medical History:   Diagnosis Date    Chronic rhinitis     DJD (degenerative joint  disease), lumbar     GERD (gastroesophageal reflux disease)     Headache     Hiatal hernia     Hyperlipidemia     Hypertension     Liver disease     Fatty Liver    Low back pain     Obesity     PONV (postoperative nausea and vomiting)     Thyroid disease        Past Surgical History:   Procedure Laterality Date    BILATERAL OOPHORECTOMY  2002    CARPAL TUNNEL RELEASE      Left wrist    CHOLECYSTECTOMY      DISSECTION OF NECK Bilateral 12/20/2021    Procedure: DISSECTION, NECK;  Surgeon: Deejay Olsen MD;  Location: [facility] OR;  Service: ENT;  Laterality: Bilateral;  Central neck dissection    HYSTERECTOMY  1984    NECK EXPLORATION Bilateral 12/20/2021    Procedure: EXPLORATION, NECK;  Surgeon: Deejay Olsen MD;  Location: [facility] OR;  Service: ENT;  Laterality: Bilateral;  Parathyroid exploration    ROTATOR CUFF REPAIR      Right shoulder    ROTATOR CUFF REPAIR Left 2010    THYROIDECTOMY, BILATERAL Bilateral 12/20/2021    Procedure: THYROIDECTOMY,BILATERAL;  Surgeon: Deejay Olsen MD;  Location: [facility] OR;  Service: ENT;  Laterality: Bilateral;    TOTAL VAGINAL HYSTERECTOMY  1985       Patient's Medications   New Prescriptions    No medications on file   Previous Medications    ACETAMINOPHEN (TYLENOL) 650 MG TBSR    Take 650 mg by mouth daily as needed.    AMLODIPINE (NORVASC) 10 MG TABLET    Take 1 tablet (10 mg total) by mouth once daily.    CALCIUM CARBONATE 470 MG CALCIUM (1,177 MG) CHEW    Take one tablet by mouth twice daily    CARVEDILOL (COREG) 12.5 MG TABLET    Take 12.5 mg by mouth 2 (two) times daily with meals.    CYCLOBENZAPRINE (FLEXERIL) 10 MG TABLET    Take 1 tablet (10 mg total) by mouth 3 (three) times daily as needed for Muscle spasms.    FUROSEMIDE (LASIX) 40 MG TABLET    Take 1 tablet by mouth twice daily as needed    LEVOTHYROXINE (SYNTHROID) 125 MCG TABLET    Take 1 tablet (125 mcg total) by mouth before breakfast.    LINACLOTIDE (LINZESS ORAL)    Take 72 mg by mouth once daily.    MAGNESIUM OXIDE (MAG-OX) 400  MG (241.3 MG MAGNESIUM) TABLET    Take 400 mg by mouth once daily.    OLMESARTAN (BENICAR) 40 MG TABLET    Take 1 tablet (40 mg total) by mouth once daily.    PANTOPRAZOLE (PROTONIX) 40 MG TABLET    Take by mouth once daily.     PSYLLIUM HUSK/ASPARTAME (METAMUCIL FIBER SINGLES ORAL)    Take by mouth.    VITAMIN E 400 UNIT CAPSULE    Take 400 Units by mouth once daily.    WHEAT DEXTRIN/L.ACID/ASPARTAME (FIBER WITH PROBIOTIC ORAL)    Take by mouth Daily.   Modified Medications    No medications on file   Discontinued Medications    No medications on file       Family History   Problem Relation Age of Onset    Diabetes Mother     Hypertension Mother     Hyperlipidemia Mother     Heart disease Mother         CHF    Stroke Mother     Heart attack Mother     Hearing loss Mother     Diabetes Sister     Sickle cell anemia Other         nieces, nephews    Lupus Other         niece       Review of patient's allergies indicates:   Allergen Reactions    Iodine and iodide containing products Itching    Shellfish containing products Swelling    Statins-hmg-coa reductase inhibitors Other (See Comments)     Myalgias         Objective:      Physical Exam  Patient is alert and oriented, no distress. Skin is intact. Neuro is normal with no focal motor or sensory findings.    Cervical exam is unremarkable. Intact cervical ROM. Negative Spurling's test    Physical Exam:                       RIGHT                                     LEFT     Scap Dyskinesis/Winging       (-)                                             (-)     Tenderness:                                                                              Greater Tuberosity                  (-)                                            +  Bicipital Groove                       (-)                                             +  AC joint                                   (-)                                             +  Other:      ROM:  Forward Elevation       180                                           150  Abduction                    120                                          100  ER (at side)                 80                                            60  IR                                 L1                                            L5     Strength:   Supraspinatus             5/5                                           4+/5  Infraspinatus               5/5                                           4+/5  Subscap / IR               5/5                                           5/5      Special Tests:              Neer:                                       (-)                                             +              Gonsales:                                 (-)                                             +              SS Stress:                               (-)                                             +              Bear Hug:                                (-)                                             (-)              Patterson's:                                 (-)                                             +              Resisted Thrower's:                (-)                                             +              Cross Arm Abduction:             (-)                                             (-)    Neurovascular examination  - Motor grossly intact bilaterally to shoulder abduction, elbow flexion and extension, wrist flexion and extension, and intrinsic hand musculature  - Sensation intact to light touch bilaterally in axillary, median, radial, and ulnar distributions  - Symmetrical radial pulses    Imaging:    XR Results:  Results for orders placed in visit on 05/11/23    X-Ray Shoulder 2 or More Views Left    Narrative  EXAM: XR SHOULDER COMPLETE 2 OR MORE VIEWS LEFT    CLINICAL HISTORY: Left shoulder pain    TECHNIQUE: Left shoulder, 3 views    COMPARISON:  No studies are available for comparison.    FINDINGS: Remote distal clavicle and acromial fractures.   Moderate acromio clavicular and glenohumeral arthrosis.  Alignment is satisfactory.    Impression  Remote posttraumatic changes and degenerative joint disease.        Finalized on: 5/11/2023 10:32 AM By:  KURT Buck MD, MD  [facility]# [id number]      2023-05-11 10:34:[id number]    [facility]      MRI Results:  No results found for this or any previous visit.      CT Results:  No results found for this or any previous visit.      Physician read: I agree with the above impression.    Assessment/Plan:   Mariah Meza is a 67 y.o. female with chronic left shoulder pain, rotator cuff tendinopathy, glenohumeral osteoarthritis, history of rotator cuff repair    Plan:    Her history and physical exam are consistent with rotator cuff tendinopathy after previous repair. She had an injection 6 weeks ago so it is too soon for another.  I recommend proceeding with home exercise program at this time. The patient is in agreement with this plan.   At least 15 minutes were spent developing, teaching, and performing a home exercise program.  A written summary was provided and all questions were answered.  This service was performed under the direction of Jean-Pierre Espinal MD.  CPT 19073-IW.   Follow up with me in 2 months.            Jean-Pierre Espinal MD    I, Alex Esparza, acted as a scribe for Jean-Pierre Espinal MD for the duration of this office visit.

## 2023-06-28 ENCOUNTER — OFFICE VISIT (OUTPATIENT)
Dept: SPORTS MEDICINE | Facility: CLINIC | Age: 68
End: 2023-06-28
Payer: MEDICARE

## 2023-06-28 VITALS — HEIGHT: 64 IN | WEIGHT: 216 LBS | BODY MASS INDEX: 36.88 KG/M2

## 2023-06-28 DIAGNOSIS — Z98.890 HISTORY OF REPAIR OF LEFT ROTATOR CUFF: ICD-10-CM

## 2023-06-28 DIAGNOSIS — M19.012 PRIMARY OSTEOARTHRITIS OF LEFT SHOULDER: ICD-10-CM

## 2023-06-28 DIAGNOSIS — M67.912 TENDINOPATHY OF ROTATOR CUFF, LEFT: Primary | ICD-10-CM

## 2023-06-28 PROCEDURE — 1125F AMNT PAIN NOTED PAIN PRSNT: CPT | Mod: CPTII,S$GLB,, | Performed by: STUDENT IN AN ORGANIZED HEALTH CARE EDUCATION/TRAINING PROGRAM

## 2023-06-28 PROCEDURE — 99999 PR PBB SHADOW E&M-EST. PATIENT-LVL III: ICD-10-PCS | Mod: PBBFAC,,, | Performed by: STUDENT IN AN ORGANIZED HEALTH CARE EDUCATION/TRAINING PROGRAM

## 2023-06-28 PROCEDURE — 99214 PR OFFICE/OUTPT VISIT, EST, LEVL IV, 30-39 MIN: ICD-10-PCS | Mod: S$GLB,,, | Performed by: STUDENT IN AN ORGANIZED HEALTH CARE EDUCATION/TRAINING PROGRAM

## 2023-06-28 PROCEDURE — 1101F PT FALLS ASSESS-DOCD LE1/YR: CPT | Mod: CPTII,S$GLB,, | Performed by: STUDENT IN AN ORGANIZED HEALTH CARE EDUCATION/TRAINING PROGRAM

## 2023-06-28 PROCEDURE — 3044F PR MOST RECENT HEMOGLOBIN A1C LEVEL <7.0%: ICD-10-PCS | Mod: CPTII,S$GLB,, | Performed by: STUDENT IN AN ORGANIZED HEALTH CARE EDUCATION/TRAINING PROGRAM

## 2023-06-28 PROCEDURE — 97110 THERAPEUTIC EXERCISES: CPT | Mod: GP,S$GLB,97, | Performed by: STUDENT IN AN ORGANIZED HEALTH CARE EDUCATION/TRAINING PROGRAM

## 2023-06-28 PROCEDURE — 3008F BODY MASS INDEX DOCD: CPT | Mod: CPTII,S$GLB,, | Performed by: STUDENT IN AN ORGANIZED HEALTH CARE EDUCATION/TRAINING PROGRAM

## 2023-06-28 PROCEDURE — 1159F PR MEDICATION LIST DOCUMENTED IN MEDICAL RECORD: ICD-10-PCS | Mod: CPTII,S$GLB,, | Performed by: STUDENT IN AN ORGANIZED HEALTH CARE EDUCATION/TRAINING PROGRAM

## 2023-06-28 PROCEDURE — 3288F PR FALLS RISK ASSESSMENT DOCUMENTED: ICD-10-PCS | Mod: CPTII,S$GLB,, | Performed by: STUDENT IN AN ORGANIZED HEALTH CARE EDUCATION/TRAINING PROGRAM

## 2023-06-28 PROCEDURE — 99999 PR PBB SHADOW E&M-EST. PATIENT-LVL III: CPT | Mod: PBBFAC,,, | Performed by: STUDENT IN AN ORGANIZED HEALTH CARE EDUCATION/TRAINING PROGRAM

## 2023-06-28 PROCEDURE — 1125F PR PAIN SEVERITY QUANTIFIED, PAIN PRESENT: ICD-10-PCS | Mod: CPTII,S$GLB,, | Performed by: STUDENT IN AN ORGANIZED HEALTH CARE EDUCATION/TRAINING PROGRAM

## 2023-06-28 PROCEDURE — 3044F HG A1C LEVEL LT 7.0%: CPT | Mod: CPTII,S$GLB,, | Performed by: STUDENT IN AN ORGANIZED HEALTH CARE EDUCATION/TRAINING PROGRAM

## 2023-06-28 PROCEDURE — 4010F PR ACE/ARB THEARPY RXD/TAKEN: ICD-10-PCS | Mod: CPTII,S$GLB,, | Performed by: STUDENT IN AN ORGANIZED HEALTH CARE EDUCATION/TRAINING PROGRAM

## 2023-06-28 PROCEDURE — 3288F FALL RISK ASSESSMENT DOCD: CPT | Mod: CPTII,S$GLB,, | Performed by: STUDENT IN AN ORGANIZED HEALTH CARE EDUCATION/TRAINING PROGRAM

## 2023-06-28 PROCEDURE — 4010F ACE/ARB THERAPY RXD/TAKEN: CPT | Mod: CPTII,S$GLB,, | Performed by: STUDENT IN AN ORGANIZED HEALTH CARE EDUCATION/TRAINING PROGRAM

## 2023-06-28 PROCEDURE — 1160F RVW MEDS BY RX/DR IN RCRD: CPT | Mod: CPTII,S$GLB,, | Performed by: STUDENT IN AN ORGANIZED HEALTH CARE EDUCATION/TRAINING PROGRAM

## 2023-06-28 PROCEDURE — 99214 OFFICE O/P EST MOD 30 MIN: CPT | Mod: S$GLB,,, | Performed by: STUDENT IN AN ORGANIZED HEALTH CARE EDUCATION/TRAINING PROGRAM

## 2023-06-28 PROCEDURE — 3008F PR BODY MASS INDEX (BMI) DOCUMENTED: ICD-10-PCS | Mod: CPTII,S$GLB,, | Performed by: STUDENT IN AN ORGANIZED HEALTH CARE EDUCATION/TRAINING PROGRAM

## 2023-06-28 PROCEDURE — 1159F MED LIST DOCD IN RCRD: CPT | Mod: CPTII,S$GLB,, | Performed by: STUDENT IN AN ORGANIZED HEALTH CARE EDUCATION/TRAINING PROGRAM

## 2023-06-28 PROCEDURE — 1160F PR REVIEW ALL MEDS BY PRESCRIBER/CLIN PHARMACIST DOCUMENTED: ICD-10-PCS | Mod: CPTII,S$GLB,, | Performed by: STUDENT IN AN ORGANIZED HEALTH CARE EDUCATION/TRAINING PROGRAM

## 2023-06-28 PROCEDURE — 1101F PR PT FALLS ASSESS DOC 0-1 FALLS W/OUT INJ PAST YR: ICD-10-PCS | Mod: CPTII,S$GLB,, | Performed by: STUDENT IN AN ORGANIZED HEALTH CARE EDUCATION/TRAINING PROGRAM

## 2023-06-28 PROCEDURE — 97110 PR THERAPEUTIC EXERCISES: ICD-10-PCS | Mod: GP,S$GLB,97, | Performed by: STUDENT IN AN ORGANIZED HEALTH CARE EDUCATION/TRAINING PROGRAM

## 2023-07-05 ENCOUNTER — TELEPHONE (OUTPATIENT)
Dept: INTERNAL MEDICINE | Facility: CLINIC | Age: 68
End: 2023-07-05
Payer: MEDICARE

## 2023-07-05 NOTE — TELEPHONE ENCOUNTER
"Called pt, requesting urine orders, states she is burning "in her bottom". Advised pt that visit may be needed, scheduled for next available on 7/7 @ 240.     ----- Message from Yaima Melton sent at 7/5/2023 11:32 AM CDT -----  Regarding: Order  Contact: Mariah Lemus is requesting a urine specimen lab be ordered for her so she can visit it.    Mariah can be reached at [phone] (home)      Thanks     "

## 2023-07-07 ENCOUNTER — OFFICE VISIT (OUTPATIENT)
Dept: INTERNAL MEDICINE | Facility: CLINIC | Age: 68
End: 2023-07-07
Payer: MEDICARE

## 2023-07-07 VITALS
DIASTOLIC BLOOD PRESSURE: 64 MMHG | WEIGHT: 222 LBS | TEMPERATURE: 98 F | BODY MASS INDEX: 37.9 KG/M2 | HEART RATE: 61 BPM | SYSTOLIC BLOOD PRESSURE: 126 MMHG | RESPIRATION RATE: 18 BRPM | OXYGEN SATURATION: 99 % | HEIGHT: 64 IN

## 2023-07-07 DIAGNOSIS — N94.9 VAGINAL DISCOMFORT: Primary | ICD-10-CM

## 2023-07-07 DIAGNOSIS — R30.0 DYSURIA: ICD-10-CM

## 2023-07-07 LAB
BILIRUB SERPL-MCNC: ABNORMAL MG/DL
BLOOD URINE, POC: ABNORMAL
CLARITY, UA: CLEAR
COLOR, POC UA: YELLOW
GLUCOSE UR QL STRIP: ABNORMAL
KETONES UR QL STRIP: ABNORMAL
LEUKOCYTE ESTERASE URINE, POC: ABNORMAL
NITRITE, POC UA: ABNORMAL
PH, POC UA: 6
PROTEIN, POC: ABNORMAL
SPECIFIC GRAVITY, POC UA: 1.02
UROBILINOGEN, POC UA: 0.2

## 2023-07-07 PROCEDURE — 3008F BODY MASS INDEX DOCD: CPT | Mod: CPTII,S$GLB,, | Performed by: NURSE PRACTITIONER

## 2023-07-07 PROCEDURE — 99999 PR PBB SHADOW E&M-EST. PATIENT-LVL IV: ICD-10-PCS | Mod: PBBFAC,,, | Performed by: NURSE PRACTITIONER

## 2023-07-07 PROCEDURE — 99999 PR PBB SHADOW E&M-EST. PATIENT-LVL IV: CPT | Mod: PBBFAC,,, | Performed by: NURSE PRACTITIONER

## 2023-07-07 PROCEDURE — 99213 PR OFFICE/OUTPT VISIT, EST, LEVL III, 20-29 MIN: ICD-10-PCS | Mod: S$GLB,,, | Performed by: NURSE PRACTITIONER

## 2023-07-07 PROCEDURE — 1159F PR MEDICATION LIST DOCUMENTED IN MEDICAL RECORD: ICD-10-PCS | Mod: CPTII,S$GLB,, | Performed by: NURSE PRACTITIONER

## 2023-07-07 PROCEDURE — 3078F DIAST BP <80 MM HG: CPT | Mod: CPTII,S$GLB,, | Performed by: NURSE PRACTITIONER

## 2023-07-07 PROCEDURE — 4010F ACE/ARB THERAPY RXD/TAKEN: CPT | Mod: CPTII,S$GLB,, | Performed by: NURSE PRACTITIONER

## 2023-07-07 PROCEDURE — 1160F RVW MEDS BY RX/DR IN RCRD: CPT | Mod: CPTII,S$GLB,, | Performed by: NURSE PRACTITIONER

## 2023-07-07 PROCEDURE — 81001 POCT URINALYSIS, DIPSTICK OR TABLET REAGENT, AUTOMATED, WITH MICROSCOP: ICD-10-PCS | Mod: S$GLB,,, | Performed by: NURSE PRACTITIONER

## 2023-07-07 PROCEDURE — 3044F HG A1C LEVEL LT 7.0%: CPT | Mod: CPTII,S$GLB,, | Performed by: NURSE PRACTITIONER

## 2023-07-07 PROCEDURE — 99213 OFFICE O/P EST LOW 20 MIN: CPT | Mod: S$GLB,,, | Performed by: NURSE PRACTITIONER

## 2023-07-07 PROCEDURE — 1101F PT FALLS ASSESS-DOCD LE1/YR: CPT | Mod: CPTII,S$GLB,, | Performed by: NURSE PRACTITIONER

## 2023-07-07 PROCEDURE — 81001 URINALYSIS AUTO W/SCOPE: CPT | Mod: S$GLB,,, | Performed by: NURSE PRACTITIONER

## 2023-07-07 PROCEDURE — 3044F PR MOST RECENT HEMOGLOBIN A1C LEVEL <7.0%: ICD-10-PCS | Mod: CPTII,S$GLB,, | Performed by: NURSE PRACTITIONER

## 2023-07-07 PROCEDURE — 3074F PR MOST RECENT SYSTOLIC BLOOD PRESSURE < 130 MM HG: ICD-10-PCS | Mod: CPTII,S$GLB,, | Performed by: NURSE PRACTITIONER

## 2023-07-07 PROCEDURE — 1101F PR PT FALLS ASSESS DOC 0-1 FALLS W/OUT INJ PAST YR: ICD-10-PCS | Mod: CPTII,S$GLB,, | Performed by: NURSE PRACTITIONER

## 2023-07-07 PROCEDURE — 1125F AMNT PAIN NOTED PAIN PRSNT: CPT | Mod: CPTII,S$GLB,, | Performed by: NURSE PRACTITIONER

## 2023-07-07 PROCEDURE — 87086 URINE CULTURE/COLONY COUNT: CPT | Performed by: NURSE PRACTITIONER

## 2023-07-07 PROCEDURE — 4010F PR ACE/ARB THEARPY RXD/TAKEN: ICD-10-PCS | Mod: CPTII,S$GLB,, | Performed by: NURSE PRACTITIONER

## 2023-07-07 PROCEDURE — 3288F PR FALLS RISK ASSESSMENT DOCUMENTED: ICD-10-PCS | Mod: CPTII,S$GLB,, | Performed by: NURSE PRACTITIONER

## 2023-07-07 PROCEDURE — 81514 NFCT DS BV&VAGINITIS DNA ALG: CPT | Performed by: NURSE PRACTITIONER

## 2023-07-07 PROCEDURE — 1160F PR REVIEW ALL MEDS BY PRESCRIBER/CLIN PHARMACIST DOCUMENTED: ICD-10-PCS | Mod: CPTII,S$GLB,, | Performed by: NURSE PRACTITIONER

## 2023-07-07 PROCEDURE — 3008F PR BODY MASS INDEX (BMI) DOCUMENTED: ICD-10-PCS | Mod: CPTII,S$GLB,, | Performed by: NURSE PRACTITIONER

## 2023-07-07 PROCEDURE — 3078F PR MOST RECENT DIASTOLIC BLOOD PRESSURE < 80 MM HG: ICD-10-PCS | Mod: CPTII,S$GLB,, | Performed by: NURSE PRACTITIONER

## 2023-07-07 PROCEDURE — 3288F FALL RISK ASSESSMENT DOCD: CPT | Mod: CPTII,S$GLB,, | Performed by: NURSE PRACTITIONER

## 2023-07-07 PROCEDURE — 1159F MED LIST DOCD IN RCRD: CPT | Mod: CPTII,S$GLB,, | Performed by: NURSE PRACTITIONER

## 2023-07-07 PROCEDURE — 3074F SYST BP LT 130 MM HG: CPT | Mod: CPTII,S$GLB,, | Performed by: NURSE PRACTITIONER

## 2023-07-07 PROCEDURE — 1125F PR PAIN SEVERITY QUANTIFIED, PAIN PRESENT: ICD-10-PCS | Mod: CPTII,S$GLB,, | Performed by: NURSE PRACTITIONER

## 2023-07-09 LAB — BACTERIA UR CULT: NORMAL

## 2023-07-10 NOTE — PROGRESS NOTES
Subjective     Patient ID: Mariah Meza is a 67 y.o. female.    Chief Complaint: Urinary Tract Infection    Patient presents with vaginal discomfort.  Describes as burning.  Denies vaginal discharge.     Urinary Tract Infection   Associated symptoms include frequency. Pertinent negatives include no chills or constipation.   Review of Systems   Constitutional:  Negative for chills, fatigue and fever.   Cardiovascular:  Negative for chest pain and leg swelling.   Gastrointestinal:  Negative for abdominal pain, constipation and diarrhea.   Genitourinary:  Positive for frequency and vaginal pain. Negative for vaginal bleeding and vaginal discharge.   Psychiatric/Behavioral:  Negative for agitation and confusion.         Objective     Physical Exam  Vitals reviewed.   Constitutional:       Appearance: Normal appearance.   Cardiovascular:      Rate and Rhythm: Normal rate and regular rhythm.   Pulmonary:      Effort: Pulmonary effort is normal.      Breath sounds: Normal breath sounds.   Musculoskeletal:         General: Normal range of motion.   Skin:     General: Skin is warm.   Neurological:      General: No focal deficit present.      Mental Status: She is alert and oriented to person, place, and time.   Psychiatric:         Mood and Affect: Mood normal.         Behavior: Behavior normal. Behavior is cooperative.          Assessment and Plan     1. Vaginal discomfort  -     POCT URINE DIPSTICK WITH MICROSCOPE, AUTOMATED  -     Urine culture  -     VAGINOSIS SCREEN BY DNA PROBE    2. Dysuria  -     POCT URINE DIPSTICK WITH MICROSCOPE, AUTOMATED  -     Urine culture  -     VAGINOSIS SCREEN BY DNA PROBE      Labs today.  Treatment pending reports.          No follow-ups on file.

## 2023-07-11 LAB
BACTERIAL VAGINOSIS DNA: NEGATIVE
CANDIDA GLABRATA DNA: NEGATIVE
CANDIDA KRUSEI DNA: NEGATIVE
CANDIDA RRNA VAG QL PROBE: NEGATIVE
T VAGINALIS RRNA GENITAL QL PROBE: NEGATIVE

## 2023-07-13 DIAGNOSIS — R22.41 LOCALIZED SWELLING OF RIGHT LOWER EXTREMITY: ICD-10-CM

## 2023-07-13 DIAGNOSIS — M79.89 SWELLING OF LEFT LOWER EXTREMITY: ICD-10-CM

## 2023-07-14 RX ORDER — FUROSEMIDE 40 MG/1
TABLET ORAL
Qty: 60 TABLET | Refills: 0 | Status: SHIPPED | OUTPATIENT
Start: 2023-07-14 | End: 2023-09-19 | Stop reason: SDUPTHER

## 2023-09-08 ENCOUNTER — OFFICE VISIT (OUTPATIENT)
Dept: INTERNAL MEDICINE | Facility: CLINIC | Age: 68
End: 2023-09-08
Payer: MEDICARE

## 2023-09-08 VITALS
BODY MASS INDEX: 38.01 KG/M2 | DIASTOLIC BLOOD PRESSURE: 78 MMHG | OXYGEN SATURATION: 100 % | HEART RATE: 78 BPM | SYSTOLIC BLOOD PRESSURE: 126 MMHG | WEIGHT: 221.44 LBS | TEMPERATURE: 98 F | RESPIRATION RATE: 18 BRPM

## 2023-09-08 DIAGNOSIS — M35.00 SICCA SYNDROME: ICD-10-CM

## 2023-09-08 DIAGNOSIS — E78.2 MIXED HYPERLIPIDEMIA: ICD-10-CM

## 2023-09-08 DIAGNOSIS — I10 PRIMARY HYPERTENSION: ICD-10-CM

## 2023-09-08 DIAGNOSIS — Z98.890 H/O PARATHYROIDECTOMY: ICD-10-CM

## 2023-09-08 DIAGNOSIS — Z90.89 H/O PARATHYROIDECTOMY: ICD-10-CM

## 2023-09-08 DIAGNOSIS — Z00.00 ROUTINE MEDICAL EXAM: Primary | ICD-10-CM

## 2023-09-08 DIAGNOSIS — E66.01 CLASS 2 SEVERE OBESITY DUE TO EXCESS CALORIES WITH SERIOUS COMORBIDITY AND BODY MASS INDEX (BMI) OF 38.0 TO 38.9 IN ADULT: ICD-10-CM

## 2023-09-08 DIAGNOSIS — Z85.850 HISTORY OF PAPILLARY ADENOCARCINOMA OF THYROID: ICD-10-CM

## 2023-09-08 DIAGNOSIS — M54.16 LUMBAR RADICULOPATHY: ICD-10-CM

## 2023-09-08 PROCEDURE — 1160F RVW MEDS BY RX/DR IN RCRD: CPT | Mod: CPTII,S$GLB,, | Performed by: NURSE PRACTITIONER

## 2023-09-08 PROCEDURE — 3078F PR MOST RECENT DIASTOLIC BLOOD PRESSURE < 80 MM HG: ICD-10-PCS | Mod: CPTII,S$GLB,, | Performed by: NURSE PRACTITIONER

## 2023-09-08 PROCEDURE — 4010F PR ACE/ARB THEARPY RXD/TAKEN: ICD-10-PCS | Mod: CPTII,S$GLB,, | Performed by: NURSE PRACTITIONER

## 2023-09-08 PROCEDURE — 99999 PR PBB SHADOW E&M-EST. PATIENT-LVL IV: ICD-10-PCS | Mod: PBBFAC,,, | Performed by: NURSE PRACTITIONER

## 2023-09-08 PROCEDURE — 3288F PR FALLS RISK ASSESSMENT DOCUMENTED: ICD-10-PCS | Mod: CPTII,S$GLB,, | Performed by: NURSE PRACTITIONER

## 2023-09-08 PROCEDURE — 99213 PR OFFICE/OUTPT VISIT, EST, LEVL III, 20-29 MIN: ICD-10-PCS | Mod: S$GLB,,, | Performed by: NURSE PRACTITIONER

## 2023-09-08 PROCEDURE — 1101F PR PT FALLS ASSESS DOC 0-1 FALLS W/OUT INJ PAST YR: ICD-10-PCS | Mod: CPTII,S$GLB,, | Performed by: NURSE PRACTITIONER

## 2023-09-08 PROCEDURE — 99213 OFFICE O/P EST LOW 20 MIN: CPT | Mod: S$GLB,,, | Performed by: NURSE PRACTITIONER

## 2023-09-08 PROCEDURE — 1126F PR PAIN SEVERITY QUANTIFIED, NO PAIN PRESENT: ICD-10-PCS | Mod: CPTII,S$GLB,, | Performed by: NURSE PRACTITIONER

## 2023-09-08 PROCEDURE — 3288F FALL RISK ASSESSMENT DOCD: CPT | Mod: CPTII,S$GLB,, | Performed by: NURSE PRACTITIONER

## 2023-09-08 PROCEDURE — 99999 PR PBB SHADOW E&M-EST. PATIENT-LVL IV: CPT | Mod: PBBFAC,,, | Performed by: NURSE PRACTITIONER

## 2023-09-08 PROCEDURE — 3078F DIAST BP <80 MM HG: CPT | Mod: CPTII,S$GLB,, | Performed by: NURSE PRACTITIONER

## 2023-09-08 PROCEDURE — 3044F HG A1C LEVEL LT 7.0%: CPT | Mod: CPTII,S$GLB,, | Performed by: NURSE PRACTITIONER

## 2023-09-08 PROCEDURE — 3008F BODY MASS INDEX DOCD: CPT | Mod: CPTII,S$GLB,, | Performed by: NURSE PRACTITIONER

## 2023-09-08 PROCEDURE — 1160F PR REVIEW ALL MEDS BY PRESCRIBER/CLIN PHARMACIST DOCUMENTED: ICD-10-PCS | Mod: CPTII,S$GLB,, | Performed by: NURSE PRACTITIONER

## 2023-09-08 PROCEDURE — 1159F PR MEDICATION LIST DOCUMENTED IN MEDICAL RECORD: ICD-10-PCS | Mod: CPTII,S$GLB,, | Performed by: NURSE PRACTITIONER

## 2023-09-08 PROCEDURE — 3074F PR MOST RECENT SYSTOLIC BLOOD PRESSURE < 130 MM HG: ICD-10-PCS | Mod: CPTII,S$GLB,, | Performed by: NURSE PRACTITIONER

## 2023-09-08 PROCEDURE — 3008F PR BODY MASS INDEX (BMI) DOCUMENTED: ICD-10-PCS | Mod: CPTII,S$GLB,, | Performed by: NURSE PRACTITIONER

## 2023-09-08 PROCEDURE — 1126F AMNT PAIN NOTED NONE PRSNT: CPT | Mod: CPTII,S$GLB,, | Performed by: NURSE PRACTITIONER

## 2023-09-08 PROCEDURE — 1159F MED LIST DOCD IN RCRD: CPT | Mod: CPTII,S$GLB,, | Performed by: NURSE PRACTITIONER

## 2023-09-08 PROCEDURE — 3044F PR MOST RECENT HEMOGLOBIN A1C LEVEL <7.0%: ICD-10-PCS | Mod: CPTII,S$GLB,, | Performed by: NURSE PRACTITIONER

## 2023-09-08 PROCEDURE — 3074F SYST BP LT 130 MM HG: CPT | Mod: CPTII,S$GLB,, | Performed by: NURSE PRACTITIONER

## 2023-09-08 PROCEDURE — 4010F ACE/ARB THERAPY RXD/TAKEN: CPT | Mod: CPTII,S$GLB,, | Performed by: NURSE PRACTITIONER

## 2023-09-08 PROCEDURE — 1101F PT FALLS ASSESS-DOCD LE1/YR: CPT | Mod: CPTII,S$GLB,, | Performed by: NURSE PRACTITIONER

## 2023-09-08 RX ORDER — LEVOTHYROXINE SODIUM 112 UG/1
112 TABLET ORAL EVERY MORNING
COMMUNITY
Start: 2023-08-19 | End: 2023-12-11 | Stop reason: DRUGHIGH

## 2023-09-08 NOTE — PROGRESS NOTES
"Subjective     Patient ID: Mariah Meza is a 67 y.o. female.    Chief Complaint: Follow-up    Patient presents for 3 month follow up.      Medical history:  HTN, HLP, Obesity, Chronic Rhinitis, GERD, Back pain, DJD, lumbar, Lumbar radiculopathy, Papillary thyroid carcinoma, Post-op hypothyroidism, Headaches, H/o parathyroidectomy, Hot flashes, Sicca Syndrome, Elevated uric acid in blood     Concern of increase dry mouth.  Has history of this but reports symptoms are worse.  She's eating lemon drops and gums.   Reports mild improvement for a short period.  "My mouth feels raw."  Saw ENT for dry mouth.  Has been taking the Flexeril 10 mg every night lately.  Advised to take 1/2 dose.      Cardiology-Dr. Major. Has stress test schedule 10/2023.    Father in law passed away 08/26/2023.  She was his caregiver.     Fatigue during day/no energy.  Able to go walking in the evening.    says she's snoring.  She does not believe she snore.  Denies choking/coughing in her sleep.      Follow-up  Associated symptoms include arthralgias and headaches (with Coreg/mild and short). Pertinent negatives include no abdominal pain, chest pain, chills, coughing, fatigue or fever.     Review of Systems   Constitutional:  Negative for chills, fatigue and fever.   HENT:  Positive for mouth dryness. Negative for ear pain and mouth sores.    Respiratory:  Negative for cough and shortness of breath.    Cardiovascular:  Negative for chest pain.   Gastrointestinal:  Negative for abdominal pain, constipation and diarrhea.   Musculoskeletal:  Positive for arthralgias and back pain. Negative for gait problem.   Neurological:  Positive for headaches (with Coreg/mild and short).   Psychiatric/Behavioral:  Negative for agitation and confusion.           Objective     Physical Exam  Vitals reviewed.   Constitutional:       Appearance: Normal appearance.   HENT:      Head: Normocephalic and atraumatic.      Right Ear: Tympanic membrane " normal.   Cardiovascular:      Rate and Rhythm: Normal rate and regular rhythm.   Pulmonary:      Effort: Pulmonary effort is normal.      Breath sounds: Normal breath sounds.   Abdominal:      General: Bowel sounds are normal. There is no distension.      Tenderness: There is no abdominal tenderness.   Musculoskeletal:         General: Normal range of motion.   Neurological:      General: No focal deficit present.      Mental Status: She is alert.   Psychiatric:         Mood and Affect: Mood normal.         Behavior: Behavior is cooperative.          Assessment and Plan     1. Routine medical exam    2. Lumbar radiculopathy  Comments:  Stable.  Continue current treatment plan.     3. Mixed hyperlipidemia  Comments:  Stable.  Continue current treatment plan.   Orders:  -     LIPID PANEL; Future; Expected date: 09/08/2023  -     Lipids Digital Medicine (LDMP) Enrollment Order  -     Lipids Digital Medicine (LDMP): Assign Onboarding Questionnaires    4. Primary hypertension  Comments:  Stable.  Continue current treatment plan.   Orders:  -     Hypertension Digital Medicine (HDMP) Enrollment Order  -     Hypertension Digital Medicine (HDMP): Assign Onboarding Questionnaires    5. History of papillary adenocarcinoma of thyroid  Comments:  Stable.  Continue current treatment plan per ENT and endocrinology   Orders:  -     TSH; Future; Expected date: 09/08/2023  -     T4, FREE; Future; Expected date: 09/08/2023  -     T3; Future; Expected date: 09/08/2023    6. H/O parathyroidectomy  Comments:  Stable.  Continue current treatment plan.   Overview:  12/20/2021  Findings:  The bilateral recurrent laryngeal nerve(s) was identified and preserved.   The NIMS system was used at 0.5 mA with confirmed stimulation of the nerve(s) immediately prior to closure.  bilateral parathyroid tissue was identified and preserved with a viable blood supply.  The left upper parathyroid was enlarged.  Frozen section from a portion of this  returned as hypercellular parathyroid tissue.  The bilateral lobe mobile from surrounding structures.  There was not significant substernal extension of the bilateral thyroid lobe (s).         7. Class 2 severe obesity due to excess calories with serious comorbidity and body mass index (BMI) of 38.0 to 38.9 in adult  Comments:  Stable.  Continue current treatment plan.     8. Sicca syndrome  Comments:  Stable.  Continue current treatment plan.     Labs in one month--fasting-Ry     Three month follow up           Follow up in about 3 months (around 12/8/2023).

## 2023-09-19 DIAGNOSIS — R22.41 LOCALIZED SWELLING OF RIGHT LOWER EXTREMITY: ICD-10-CM

## 2023-09-19 DIAGNOSIS — M79.89 SWELLING OF LEFT LOWER EXTREMITY: ICD-10-CM

## 2023-09-19 RX ORDER — FUROSEMIDE 40 MG/1
TABLET ORAL
Qty: 60 TABLET | Refills: 0 | Status: SHIPPED | OUTPATIENT
Start: 2023-09-19 | End: 2023-12-26 | Stop reason: ALTCHOICE

## 2023-09-20 ENCOUNTER — TELEPHONE (OUTPATIENT)
Dept: INTERNAL MEDICINE | Facility: CLINIC | Age: 68
End: 2023-09-20
Payer: MEDICARE

## 2023-09-20 NOTE — TELEPHONE ENCOUNTER
----- Message from Geovanna Hernandez sent at 9/20/2023  4:12 PM CDT -----  Can you please call patient back in regards to Neurology appointment.[phone],Thanks

## 2023-09-20 NOTE — TELEPHONE ENCOUNTER
Called # listed, no answer. LMOM requesting a return call. Last neurology referral was put in system 09/2022 so it is no longer valid.

## 2023-09-27 ENCOUNTER — OFFICE VISIT (OUTPATIENT)
Dept: INTERNAL MEDICINE | Facility: CLINIC | Age: 68
End: 2023-09-27
Payer: MEDICARE

## 2023-09-27 VITALS
TEMPERATURE: 98 F | DIASTOLIC BLOOD PRESSURE: 86 MMHG | BODY MASS INDEX: 37.8 KG/M2 | OXYGEN SATURATION: 96 % | WEIGHT: 221.44 LBS | SYSTOLIC BLOOD PRESSURE: 140 MMHG | HEIGHT: 64 IN | RESPIRATION RATE: 18 BRPM | HEART RATE: 108 BPM

## 2023-09-27 DIAGNOSIS — M50.30 DDD (DEGENERATIVE DISC DISEASE), CERVICAL: ICD-10-CM

## 2023-09-27 DIAGNOSIS — I10 PRIMARY HYPERTENSION: ICD-10-CM

## 2023-09-27 DIAGNOSIS — R29.898 BILATERAL ARM WEAKNESS: ICD-10-CM

## 2023-09-27 DIAGNOSIS — M54.16 LUMBAR RADICULOPATHY: Primary | ICD-10-CM

## 2023-09-27 PROCEDURE — 1101F PR PT FALLS ASSESS DOC 0-1 FALLS W/OUT INJ PAST YR: ICD-10-PCS | Mod: CPTII,S$GLB,, | Performed by: NURSE PRACTITIONER

## 2023-09-27 PROCEDURE — 3044F PR MOST RECENT HEMOGLOBIN A1C LEVEL <7.0%: ICD-10-PCS | Mod: CPTII,S$GLB,, | Performed by: NURSE PRACTITIONER

## 2023-09-27 PROCEDURE — 3079F DIAST BP 80-89 MM HG: CPT | Mod: CPTII,S$GLB,, | Performed by: NURSE PRACTITIONER

## 2023-09-27 PROCEDURE — 3044F HG A1C LEVEL LT 7.0%: CPT | Mod: CPTII,S$GLB,, | Performed by: NURSE PRACTITIONER

## 2023-09-27 PROCEDURE — 3077F SYST BP >= 140 MM HG: CPT | Mod: CPTII,S$GLB,, | Performed by: NURSE PRACTITIONER

## 2023-09-27 PROCEDURE — 1159F PR MEDICATION LIST DOCUMENTED IN MEDICAL RECORD: ICD-10-PCS | Mod: CPTII,S$GLB,, | Performed by: NURSE PRACTITIONER

## 2023-09-27 PROCEDURE — 99213 OFFICE O/P EST LOW 20 MIN: CPT | Mod: 25,S$GLB,, | Performed by: NURSE PRACTITIONER

## 2023-09-27 PROCEDURE — 3288F PR FALLS RISK ASSESSMENT DOCUMENTED: ICD-10-PCS | Mod: CPTII,S$GLB,, | Performed by: NURSE PRACTITIONER

## 2023-09-27 PROCEDURE — 4010F ACE/ARB THERAPY RXD/TAKEN: CPT | Mod: CPTII,S$GLB,, | Performed by: NURSE PRACTITIONER

## 2023-09-27 PROCEDURE — 3008F PR BODY MASS INDEX (BMI) DOCUMENTED: ICD-10-PCS | Mod: CPTII,S$GLB,, | Performed by: NURSE PRACTITIONER

## 2023-09-27 PROCEDURE — 99999 PR PBB SHADOW E&M-EST. PATIENT-LVL V: CPT | Mod: PBBFAC,,, | Performed by: NURSE PRACTITIONER

## 2023-09-27 PROCEDURE — 3008F BODY MASS INDEX DOCD: CPT | Mod: CPTII,S$GLB,, | Performed by: NURSE PRACTITIONER

## 2023-09-27 PROCEDURE — 3288F FALL RISK ASSESSMENT DOCD: CPT | Mod: CPTII,S$GLB,, | Performed by: NURSE PRACTITIONER

## 2023-09-27 PROCEDURE — 3079F PR MOST RECENT DIASTOLIC BLOOD PRESSURE 80-89 MM HG: ICD-10-PCS | Mod: CPTII,S$GLB,, | Performed by: NURSE PRACTITIONER

## 2023-09-27 PROCEDURE — 96372 PR INJECTION,THERAP/PROPH/DIAG2ST, IM OR SUBCUT: ICD-10-PCS | Mod: S$GLB,,, | Performed by: NURSE PRACTITIONER

## 2023-09-27 PROCEDURE — 99999 PR PBB SHADOW E&M-EST. PATIENT-LVL V: ICD-10-PCS | Mod: PBBFAC,,, | Performed by: NURSE PRACTITIONER

## 2023-09-27 PROCEDURE — 1101F PT FALLS ASSESS-DOCD LE1/YR: CPT | Mod: CPTII,S$GLB,, | Performed by: NURSE PRACTITIONER

## 2023-09-27 PROCEDURE — 1159F MED LIST DOCD IN RCRD: CPT | Mod: CPTII,S$GLB,, | Performed by: NURSE PRACTITIONER

## 2023-09-27 PROCEDURE — 96372 THER/PROPH/DIAG INJ SC/IM: CPT | Mod: S$GLB,,, | Performed by: NURSE PRACTITIONER

## 2023-09-27 PROCEDURE — 99213 PR OFFICE/OUTPT VISIT, EST, LEVL III, 20-29 MIN: ICD-10-PCS | Mod: 25,S$GLB,, | Performed by: NURSE PRACTITIONER

## 2023-09-27 PROCEDURE — 1125F AMNT PAIN NOTED PAIN PRSNT: CPT | Mod: CPTII,S$GLB,, | Performed by: NURSE PRACTITIONER

## 2023-09-27 PROCEDURE — 4010F PR ACE/ARB THEARPY RXD/TAKEN: ICD-10-PCS | Mod: CPTII,S$GLB,, | Performed by: NURSE PRACTITIONER

## 2023-09-27 PROCEDURE — 3077F PR MOST RECENT SYSTOLIC BLOOD PRESSURE >= 140 MM HG: ICD-10-PCS | Mod: CPTII,S$GLB,, | Performed by: NURSE PRACTITIONER

## 2023-09-27 PROCEDURE — 1125F PR PAIN SEVERITY QUANTIFIED, PAIN PRESENT: ICD-10-PCS | Mod: CPTII,S$GLB,, | Performed by: NURSE PRACTITIONER

## 2023-09-27 RX ORDER — DICLOFENAC SODIUM 25 MG/1
25 TABLET, DELAYED RELEASE ORAL 2 TIMES DAILY PRN
Qty: 20 TABLET | Refills: 1 | Status: SHIPPED | OUTPATIENT
Start: 2023-09-27 | End: 2023-10-26

## 2023-09-27 RX ORDER — KETOROLAC TROMETHAMINE 30 MG/ML
30 INJECTION, SOLUTION INTRAMUSCULAR; INTRAVENOUS
Status: COMPLETED | OUTPATIENT
Start: 2023-09-27 | End: 2023-09-27

## 2023-09-27 RX ORDER — DICLOFENAC SODIUM 25 MG/1
25 TABLET, DELAYED RELEASE ORAL 2 TIMES DAILY
COMMUNITY
End: 2023-09-27 | Stop reason: SDUPTHER

## 2023-09-27 RX ORDER — CHOLECALCIFEROL (VITAMIN D3) 25 MCG
1000 TABLET ORAL DAILY
COMMUNITY

## 2023-09-27 RX ADMIN — KETOROLAC TROMETHAMINE 30 MG: 30 INJECTION, SOLUTION INTRAMUSCULAR; INTRAVENOUS at 04:09

## 2023-09-27 NOTE — PROGRESS NOTES
Subjective     Patient ID: Mariah Meza is a 67 y.o. female.    Chief Complaint: Back Pain (Lt lower x 4 days)    Patient presents with lower back pain.  Started about 4 days ago.   Constant pain.   No changes in activity--sitting and standing more.   Not able to do her routinely walking.  Numbness/tingling-left side/leg.  Tried Tylenol and muscle relaxer-no improvement.    Has tried PT in the pass/aqua therapy as well.  Reports spinal injection in [facility].       Reports weakness with bilateral upper arms.  Has referral to neurology     Has upcoming appointment Dr. Major-Stress test     Back Pain  Associated symptoms include numbness (LLE) and weakness (BLUE). Pertinent negatives include no abdominal pain, chest pain or fever.     Review of Systems   Constitutional:  Negative for chills, fatigue and fever.   Respiratory:  Negative for cough and shortness of breath.    Cardiovascular:  Negative for chest pain.   Gastrointestinal:  Negative for abdominal pain and constipation.   Musculoskeletal:  Positive for arthralgias and back pain. Negative for gait problem.   Neurological:  Positive for weakness (BLUE) and numbness (LLE).          Objective     Physical Exam  Vitals reviewed.   Constitutional:       Appearance: She is obese.   HENT:      Head: Normocephalic.   Cardiovascular:      Rate and Rhythm: Normal rate and regular rhythm.   Pulmonary:      Effort: Pulmonary effort is normal.      Breath sounds: Normal breath sounds.   Musculoskeletal:         General: Tenderness present.      Lumbar back: Tenderness present. Decreased range of motion.   Skin:     General: Skin is warm.   Neurological:      General: No focal deficit present.      Mental Status: She is alert.   Psychiatric:         Mood and Affect: Mood normal.         Behavior: Behavior normal. Behavior is cooperative.            Assessment and Plan     1. Lumbar radiculopathy  -     diclofenac (VOLTAREN) 25 MG TbEC; Take 1 tablet (25 mg  total) by mouth 2 (two) times daily as needed (pain). Take with food  Dispense: 20 tablet; Refill: 1  -     ketorolac injection 30 mg  -     Ambulatory referral/consult to [facility]; Future; Expected date: 10/04/2023  -     Ambulatory referral/consult to Physical/Occupational Therapy; Future; Expected date: 10/04/2023    2. Primary hypertension    3. Bilateral arm weakness  -     Ambulatory referral/consult to Physical/Occupational Therapy; Future; Expected date: 10/04/2023  -     Ambulatory referral/consult to Neurology; Future; Expected date: 10/04/2023    4. DDD (degenerative disc disease), cervical  -     Ambulatory referral/consult to Neurology; Future; Expected date: 10/04/2023      Schedule neurology--referral was placed by another provider.      Schedule ENT visit with Dr. Medrano--has seen before-Parotid    Toradol today    Fax referral for PT       No follow-ups on file.

## 2023-09-28 ENCOUNTER — TELEPHONE (OUTPATIENT)
Dept: PAIN MEDICINE | Facility: CLINIC | Age: 68
End: 2023-09-28
Payer: MEDICARE

## 2023-10-03 ENCOUNTER — OFFICE VISIT (OUTPATIENT)
Dept: OTOLARYNGOLOGY | Facility: CLINIC | Age: 68
End: 2023-10-03
Payer: MEDICARE

## 2023-10-03 DIAGNOSIS — K11.6 PAROTID CYST: Primary | ICD-10-CM

## 2023-10-03 PROCEDURE — 3288F FALL RISK ASSESSMENT DOCD: CPT | Mod: CPTII,S$GLB,, | Performed by: ORTHOPAEDIC SURGERY

## 2023-10-03 PROCEDURE — 1101F PT FALLS ASSESS-DOCD LE1/YR: CPT | Mod: CPTII,S$GLB,, | Performed by: ORTHOPAEDIC SURGERY

## 2023-10-03 PROCEDURE — 1101F PR PT FALLS ASSESS DOC 0-1 FALLS W/OUT INJ PAST YR: ICD-10-PCS | Mod: CPTII,S$GLB,, | Performed by: ORTHOPAEDIC SURGERY

## 2023-10-03 PROCEDURE — 4010F PR ACE/ARB THEARPY RXD/TAKEN: ICD-10-PCS | Mod: CPTII,S$GLB,, | Performed by: ORTHOPAEDIC SURGERY

## 2023-10-03 PROCEDURE — 99214 PR OFFICE/OUTPT VISIT, EST, LEVL IV, 30-39 MIN: ICD-10-PCS | Mod: S$GLB,,, | Performed by: ORTHOPAEDIC SURGERY

## 2023-10-03 PROCEDURE — 3288F PR FALLS RISK ASSESSMENT DOCUMENTED: ICD-10-PCS | Mod: CPTII,S$GLB,, | Performed by: ORTHOPAEDIC SURGERY

## 2023-10-03 PROCEDURE — 3044F HG A1C LEVEL LT 7.0%: CPT | Mod: CPTII,S$GLB,, | Performed by: ORTHOPAEDIC SURGERY

## 2023-10-03 PROCEDURE — 99999 PR PBB SHADOW E&M-EST. PATIENT-LVL III: ICD-10-PCS | Mod: PBBFAC,,, | Performed by: ORTHOPAEDIC SURGERY

## 2023-10-03 PROCEDURE — 99999 PR PBB SHADOW E&M-EST. PATIENT-LVL III: CPT | Mod: PBBFAC,,, | Performed by: ORTHOPAEDIC SURGERY

## 2023-10-03 PROCEDURE — 4010F ACE/ARB THERAPY RXD/TAKEN: CPT | Mod: CPTII,S$GLB,, | Performed by: ORTHOPAEDIC SURGERY

## 2023-10-03 PROCEDURE — 99214 OFFICE O/P EST MOD 30 MIN: CPT | Mod: S$GLB,,, | Performed by: ORTHOPAEDIC SURGERY

## 2023-10-03 PROCEDURE — 1159F PR MEDICATION LIST DOCUMENTED IN MEDICAL RECORD: ICD-10-PCS | Mod: CPTII,S$GLB,, | Performed by: ORTHOPAEDIC SURGERY

## 2023-10-03 PROCEDURE — 3044F PR MOST RECENT HEMOGLOBIN A1C LEVEL <7.0%: ICD-10-PCS | Mod: CPTII,S$GLB,, | Performed by: ORTHOPAEDIC SURGERY

## 2023-10-03 PROCEDURE — 1159F MED LIST DOCD IN RCRD: CPT | Mod: CPTII,S$GLB,, | Performed by: ORTHOPAEDIC SURGERY

## 2023-10-03 NOTE — PROGRESS NOTES
Subjective:      Patient ID: Mariah Meza is a 67 y.o. female.    Chief Complaint: Follow-up (Pt is coming in today for a f/u with her parotid pt is concerned about her sicca syndrome and does she need another ultrasound)    Patient is a 67 year old female seen today for evaluation of a parotid mass.  She was last here with Dr. Garrett 1/2023, prior with Dr. Olsen 9/2022.  At that time, she had an incidental finding of a parotid mass as seen on MRI brain.  She has since seen Dr. Cortez, who did order an US of the area 4/2023.  She has not noted any significant swelling or change in her exam, but does have some intermittent discomfort in the area.  She does have dry mouth.  She has never had an FNA of this area.    Second, she has previously had a total thyroidectomy with Dr. Olsen 12/21 for a pT1b pN0 papillary thyroid carcinoma.  She follows with endocrine at [facility] for synthroid management and thyroid f/up.          Review of Systems   HENT:  Negative for mouth sores and trouble swallowing.        Objective:       Physical Exam  Constitutional:       General: She is not in acute distress.     Appearance: She is well-developed.   HENT:      Head: Normocephalic and atraumatic.      Right Ear: Tympanic membrane, ear canal and external ear normal.      Left Ear: Tympanic membrane, ear canal and external ear normal.      Nose: Nose normal. No nasal deformity, septal deviation, mucosal edema or rhinorrhea.      Right Sinus: No maxillary sinus tenderness or frontal sinus tenderness.      Left Sinus: No maxillary sinus tenderness or frontal sinus tenderness.      Mouth/Throat:      Mouth: Mucous membranes are not pale and not dry.      Dentition: No dental caries.      Pharynx: Uvula midline. No oropharyngeal exudate or posterior oropharyngeal erythema.   Eyes:      General: Lids are normal. No scleral icterus.     Extraocular Movements:      Right eye: Normal extraocular motion and no nystagmus.      Left eye: Normal  extraocular motion and no nystagmus.      Conjunctiva/sclera: Conjunctivae normal.      Right eye: Right conjunctiva is not injected. No chemosis.     Left eye: Left conjunctiva is not injected. No chemosis.     Pupils: Pupils are equal, round, and reactive to light.   Neck:      Trachea: Trachea and phonation normal. No tracheal tenderness or tracheal deviation.      Comments: Status post previous thyroidectomy, no neck masses palpated  Pulmonary:      Effort: Pulmonary effort is normal. No respiratory distress.      Breath sounds: No stridor.   Abdominal:      General: There is no distension.   Lymphadenopathy:      Head:      Right side of head: No submental, submandibular, preauricular, posterior auricular or occipital adenopathy.      Left side of head: No submental, submandibular, preauricular, posterior auricular or occipital adenopathy.      Cervical: No cervical adenopathy.   Skin:     General: Skin is warm and dry.      Findings: No erythema or rash.   Neurological:      Mental Status: She is alert and oriented to person, place, and time.      Cranial Nerves: No cranial nerve deficit.   Psychiatric:         Behavior: Behavior normal.     US PAROTID:  ([facility], Dr. Cortez, 4/2023) Impression    1.5 x 0.8 cm hypoechoic nodule in the left parotid gland, possibly a parotid   gland neoplasm or abnormal appearing lymph node.  Further evaluation with CT   with and without IV contrast may be of benefit.  No other suspicious findings.    Assessment:       1. Parotid cyst        Plan:     Parotid cyst  -     US Soft Tissue Head Neck Thyroid; Future; Expected date: 10/03/2023    Will order updated US neck, and will compare parotid nodule lesion.  From initial MRI it was noted to be more cystic, US was consistent with a nodule.  Will call patient with US results, if there has been any interval growth would recommend US FNA.

## 2023-10-09 ENCOUNTER — LAB VISIT (OUTPATIENT)
Dept: LAB | Facility: HOSPITAL | Age: 68
End: 2023-10-09
Attending: NURSE PRACTITIONER
Payer: MEDICARE

## 2023-10-09 DIAGNOSIS — E78.2 MIXED HYPERLIPIDEMIA: ICD-10-CM

## 2023-10-09 DIAGNOSIS — Z85.850 HISTORY OF PAPILLARY ADENOCARCINOMA OF THYROID: ICD-10-CM

## 2023-10-09 LAB
CHOLEST SERPL-MCNC: 233 MG/DL (ref 120–199)
CHOLEST/HDLC SERPL: 4.3 {RATIO} (ref 2–5)
HDLC SERPL-MCNC: 54 MG/DL (ref 40–75)
HDLC SERPL: 23.2 % (ref 20–50)
LDLC SERPL CALC-MCNC: 155.6 MG/DL (ref 63–159)
NONHDLC SERPL-MCNC: 179 MG/DL
TRIGL SERPL-MCNC: 117 MG/DL (ref 30–150)

## 2023-10-09 PROCEDURE — 80061 LIPID PANEL: CPT | Performed by: NURSE PRACTITIONER

## 2023-10-09 PROCEDURE — 84480 ASSAY TRIIODOTHYRONINE (T3): CPT | Performed by: NURSE PRACTITIONER

## 2023-10-09 PROCEDURE — 36415 COLL VENOUS BLD VENIPUNCTURE: CPT | Mod: PO | Performed by: NURSE PRACTITIONER

## 2023-10-09 PROCEDURE — 84443 ASSAY THYROID STIM HORMONE: CPT | Performed by: NURSE PRACTITIONER

## 2023-10-09 PROCEDURE — 84439 ASSAY OF FREE THYROXINE: CPT | Performed by: NURSE PRACTITIONER

## 2023-10-10 ENCOUNTER — HOSPITAL ENCOUNTER (OUTPATIENT)
Dept: RADIOLOGY | Facility: HOSPITAL | Age: 68
Discharge: HOME | End: 2023-10-10
Attending: ORTHOPAEDIC SURGERY
Payer: MEDICARE

## 2023-10-10 DIAGNOSIS — K11.6 PAROTID CYST: ICD-10-CM

## 2023-10-10 LAB
T3 SERPL-MCNC: 83 NG/DL (ref 60–180)
T4 FREE SERPL-MCNC: 1.26 NG/DL (ref 0.71–1.51)
TSH SERPL DL<=0.005 MIU/L-ACNC: 0.16 UIU/ML (ref 0.4–4)

## 2023-10-10 PROCEDURE — 76536 US EXAM OF HEAD AND NECK: CPT | Mod: 26,,, | Performed by: RADIOLOGY

## 2023-10-10 PROCEDURE — 76536 US SOFT TISSUE HEAD NECK THYROID: ICD-10-PCS | Mod: 26,,, | Performed by: RADIOLOGY

## 2023-10-10 PROCEDURE — 76536 US EXAM OF HEAD AND NECK: CPT | Mod: TC

## 2023-10-12 ENCOUNTER — TELEPHONE (OUTPATIENT)
Dept: OTOLARYNGOLOGY | Facility: CLINIC | Age: 68
End: 2023-10-12
Payer: MEDICARE

## 2023-10-12 DIAGNOSIS — K11.6 PAROTID CYST: Primary | ICD-10-CM

## 2023-10-12 NOTE — TELEPHONE ENCOUNTER
----- Message from Sherita Duran MD sent at 10/12/2023  9:22 AM CDT -----  Please let the patient know that her US showed a stable parotid cyst, no change.  I would like to see her back in one year with a repeat US.  Thanks.

## 2023-10-19 ENCOUNTER — PATIENT MESSAGE (OUTPATIENT)
Dept: INTERNAL MEDICINE | Facility: CLINIC | Age: 68
End: 2023-10-19
Payer: MEDICARE

## 2023-10-19 DIAGNOSIS — E78.2 MIXED HYPERLIPIDEMIA: Primary | ICD-10-CM

## 2023-10-20 RX ORDER — EZETIMIBE 10 MG/1
10 TABLET ORAL DAILY
Qty: 90 TABLET | Refills: 3 | Status: SHIPPED | OUTPATIENT
Start: 2023-10-20 | End: 2024-10-19

## 2023-10-25 NOTE — PROGRESS NOTES
"[facility] - NEUROLOGY 7TH FL [facility], [place]    Date: 10/26/23  Patient Name: Mariah Meza   MRN: [id number]   PCP: Taina Bob  Referring Provider: Taina Bob NP    Chief Complaint: Back Pain   Subjective:       HPI:   Ms. Mariah Meza is a 67 y.o. female presenting for evaluation of back pain. She noted that she has had some baseline back pain for quite some time (years) however in September she began to have increased pain. She required an injection of ketorolac at that time which helped for a day or two but then her pain returned. She did complete EMG/NCS previously unable to locate in [facility] or care everywhere. She describes her pain as sharp and some "electric shock" sensation that occasionally occurs. She does get some stiffness and weakness also which she reports is present in her entire body. She notes that her pain is worse on the L and in the lumbar region. She does endorse neck pain. She did have a cervical spine MRI completed one year ago which showed multilevel degenerative changes at C4-C5 and mild canal stenosis. She notes that her pain is constant all day but worsened with movement or prolonged sitting. She previously completed physical therapy in Jan/Feb of this year which benefited her greatly and she found that she was able to be much more active. She then had a gout flare and seemed to regress in her ability to move. She denies any falls. She does endorse that she is having some constipation. She does have some baseline bladder problems but attributes this to her medications. She notes that she cannot lift a lot of things she use to be able to and she drops things more frequently. She reports numbness/tingling in bilateral hands and feet but worse on L hand.     She has tried and failed:   Diclofenac- did not help  Gabapentin- did not tolerate  Flexiril- helped for a little but then her pain returned    Mobic- did not provide " adequate relief  Lyrica-   Tizanidine-     PAST MEDICAL HISTORY:  Past Medical History:   Diagnosis Date    Chronic rhinitis     DJD (degenerative joint disease), lumbar     GERD (gastroesophageal reflux disease)     Headache     Hiatal hernia     Hyperlipidemia     Hypertension     Liver disease     Fatty Liver    Low back pain     Obesity     PONV (postoperative nausea and vomiting)     Thyroid disease        PAST SURGICAL HISTORY:  Past Surgical History:   Procedure Laterality Date    BILATERAL OOPHORECTOMY  2002    CARPAL TUNNEL RELEASE      Left wrist    CHOLECYSTECTOMY      DISSECTION OF NECK Bilateral 12/20/2021    Procedure: DISSECTION, NECK;  Surgeon: Deejay Olsen MD;  Location: [facility] OR;  Service: ENT;  Laterality: Bilateral;  Central neck dissection    HYSTERECTOMY  1984    NECK EXPLORATION Bilateral 12/20/2021    Procedure: EXPLORATION, NECK;  Surgeon: Deejay Olsen MD;  Location: [facility] OR;  Service: ENT;  Laterality: Bilateral;  Parathyroid exploration    ROTATOR CUFF REPAIR      Right shoulder    ROTATOR CUFF REPAIR Left 2010    THYROIDECTOMY, BILATERAL Bilateral 12/20/2021    Procedure: THYROIDECTOMY,BILATERAL;  Surgeon: Deejay Olsen MD;  Location: [facility] OR;  Service: ENT;  Laterality: Bilateral;    TOTAL VAGINAL HYSTERECTOMY  1985       CURRENT MEDS:  Current Outpatient Medications   Medication Sig Dispense Refill    acetaminophen (TYLENOL) 650 MG TbSR Take 650 mg by mouth daily as needed.      ALPRAZolam (XANAX) 0.5 MG tablet Take 1 tablet (0.5 mg total) by mouth On call Procedure for Anxiety. 2 tablet 0    calcium carbonate 470 mg calcium (1,177 mg) Chew Take one tablet by mouth twice daily 60 each 0    carvediloL (COREG) 12.5 MG tablet Take 12.5 mg by mouth 2 (two) times daily with meals.      cyclobenzaprine (FLEXERIL) 10 MG tablet Take 1 tablet (10 mg total) by mouth 3 (three) times daily as needed for Muscle spasms. 30 tablet 0    EUTHYROX 112 mcg tablet Take 112 mcg by mouth every morning.       ezetimibe (ZETIA) 10 mg tablet Take 1 tablet (10 mg total) by mouth once daily. 90 tablet 3    furosemide (LASIX) 40 MG tablet Take 1 tablet by mouth twice daily as needed 60 tablet 0    levothyroxine (SYNTHROID) 125 MCG tablet Take 1 tablet (125 mcg total) by mouth before breakfast. 30 tablet 11    linaclotide (LINZESS ORAL) Take 72 mg by mouth once daily.      magnesium oxide (MAG-OX) 400 mg (241.3 mg magnesium) tablet Take 400 mg by mouth once daily.      olmesartan (BENICAR) 40 MG tablet Take 1 tablet (40 mg total) by mouth once daily. 30 tablet 11    pantoprazole (PROTONIX) 40 MG tablet Take by mouth once daily.       psyllium husk/aspartame (METAMUCIL FIBER SINGLES ORAL) Take by mouth.      vitamin D (VITAMIN D3) 1000 units Tab Take 1,000 Units by mouth once daily.      vitamin E 400 UNIT capsule Take 400 Units by mouth once daily.      wheat dextrin/L.acid/aspartame (FIBER WITH PROBIOTIC ORAL) Take by mouth Daily.       Current Facility-Administered Medications   Medication Dose Route Frequency Provider Last Rate Last Admin    DOBUTamine 1000 mg in D5W 250 mL infusion (premix titrating)  10 mcg/kg/min Intravenous Continuous Naina Roque MD 15.8 mL/hr at 07/15/21 1108 10 mcg/kg/min at 07/15/21 1108       ALLERGIES:  Review of patient's allergies indicates:   Allergen Reactions    Iodine and iodide containing products Itching    Shellfish containing products Swelling    Statins-hmg-coa reductase inhibitors Other (See Comments)     Myalgias    Adhesive Dermatitis    Iodine Other (See Comments)    Ivp dye [iodinated contrast media] Other (See Comments)       FAMILY HISTORY:  Family History   Problem Relation Age of Onset    Diabetes Mother     Hypertension Mother     Hyperlipidemia Mother     Heart disease Mother         CHF    Stroke Mother     Heart attack Mother     Hearing loss Mother     Diabetes Sister     Sickle cell anemia Other         nieces, nephews    Lupus Other         niece       SOCIAL  "HISTORY:  Social History     Tobacco Use    Smoking status: Former     Current packs/day: 0.00     Average packs/day: 0.5 packs/day for 8.0 years (4.0 ttl pk-yrs)     Types: Cigarettes     Start date: 1971     Quit date: 1979     Years since quittin.8     Passive exposure: Never    Smokeless tobacco: Never   Substance Use Topics    Alcohol use: No    Drug use: Never       Review of Systems:  Review of Systems   Constitutional:  Positive for malaise/fatigue. Negative for chills, fever and weight loss.   HENT:  Negative for ear discharge, ear pain, hearing loss, sinus pain, sore throat and tinnitus (very seldom gets this).    Eyes:  Positive for blurred vision (Occasional). Negative for double vision and photophobia.   Respiratory:  Negative for cough, shortness of breath and wheezing.    Cardiovascular:  Negative for chest pain, palpitations and orthopnea.   Gastrointestinal:  Positive for constipation. Negative for diarrhea, nausea and vomiting.   Genitourinary:  Positive for frequency. Negative for dysuria and urgency.   Musculoskeletal:  Positive for back pain and neck pain. Negative for falls and myalgias.   Neurological:  Positive for tingling and weakness. Negative for seizures, loss of consciousness and headaches.            Objective:     Vitals:    10/26/23 1150   BP: (!) 170/84   Pulse: (!) 57   Weight: 104.1 kg (229 lb 6.2 oz)   Height: 5' 4" (1.626 m)         Lab Results   Component Value Date    WBC 4.06 2023    HGB 11.8 (L) 2023    HCT 37.6 2023     2023    CHOL 233 (H) 10/09/2023    TRIG 117 10/09/2023    HDL 54 10/09/2023    ALT 15 2022    AST 17 2022     2023    K 3.7 2023     2023    CREATININE 0.8 2023    BUN 14 2023    CO2 25 2023    TSH 0.157 (L) 10/09/2023    HGBA1C 4.9 2023    [id number] 383 2023       NEUROLOGICAL EXAMINATION:     MENTAL STATUS   Oriented to person, place, and " time.   Level of consciousness: alert    CRANIAL NERVES     CN III, IV, VI   Pupils are equal, round, and reactive to light.  Extraocular motions are normal.     CN V   Facial sensation intact.     CN VII   Facial expression full, symmetric.     CN VIII   CN VIII normal.     CN IX, X   CN IX normal.   CN X normal.     CN XI   CN XI normal.     CN XII   CN XII normal.     MOTOR EXAM        Tension noted in bilateral shoulders very TTP      SENSORY EXAM   Right arm light touch: normal  Left arm light touch: normal  Right leg light touch: normal  Left leg light touch: decreased from ankle  Right arm vibration: normal  Left arm vibration: normal  Right leg vibration: normal  Left leg vibration: decreased from ankle    GAIT AND COORDINATION        Antalgic gait     ? ?    MUSCLE STRENGTH:     Fascics Atrophy RIGHT    LEFT Atrophy Fascics     5 Neck Ext. 5       5 Neck Flex 5       5 Deltoids 5       5 Sh.Ext.Rot. 5       5 Sh.Int.Rot. 5       5 Biceps 4       5 Triceps 4       5 Forearm.Pr. 5       5 Wrist Ext. 5       5 Wrist Flex 5       5 Finger Ext. 5       5 Finger Flex 5       5 FPL 5       5 Inteross. 5                         5 Iliopsoas 4       5 Hip Abduct 5       5 Hip Adduct 5       5 Quads 4-       5 Hams 5       5 Dorsiflex 3       5 Plantar Flex 5       5 Ankle Gee 5       5 Ankle Invert 5       5 Toe Ext. 5       5 Toe Flex 5                         REFLEXES:    RIGHT Reflex   LEFT   2+ Biceps 2+   2+ Brachiorad. 2+   2+ Triceps 2+    Pectoralis     Jaw Jerk     Pierce's         2+ Patellar 2+   2+ Ankle 2+    Suprapatellar              Down PLANTAR Down   Diagnostic Data:     08/09/2022 MRI CERVICAL SPINE WO CONTRAST     Impression      1.  Multilevel degenerative changes most pronounced at C4-C5. Mild canal stenosis at multiple levels. No abnormal cord signal.   2.  Multilevel foraminal stenosis which is at most mild.   3.  Cystic left superficial parotid lesion which is incompletely characterized by  spine MRI technique. This may reflect a large lymph node, parotid cyst, cystic primary parotid neoplasm. Recommend nonemergent ENT evaluation.      INDICATION: R51.9 R51.9:Headache, unspecified.     COMPARISON: None.     TECHNIQUE: Multiplanar multisequence magnetic resonance imaging of cervical spine was performed without gadolinium contrast.     FINDINGS:     Straightening and slight reversal of the cervical lordosis. Vertebral body heights are maintained. STIR hyperintense signal within the right posterior elements surrounding the C3-C4 facet joint. Mild surrounding paraspinal muscle STIR hyperintense signal. Right C1 lateral mass venous malformation (hemangioma). No abnormal cord signal.     C2-C3: No significant canal or foraminal stenosis.     C3-C4: Posterior disc osteophyte complex which effaces the ventral CSF space and creates an indentation upon the spinal cord. Mild canal stenosis. Uncovertebral and facet joint hypertrophy with mild right foraminal stenosis. No significant left foraminal stenosis.     C4-C5: Posterior disc osteophyte complex eccentric to the left which effaces the ventral CSF space, contacts and creates an indentation upon the spinal cord. Mild canal stenosis. Uncovertebral joint hypertrophy without significant foraminal stenosis.     C5-C6: Posterior disc osteophyte complex which partially effaces the ventral CSF space. Mild canal stenosis. Uncovertebral and facet joint hypertrophy with mild bilateral foraminal stenosis.     C6-C7: Posterior disc osteophyte complex which partially effaces the ventral CSF space. Mild canal stenosis. Uncovertebral joint hypertrophy with mild bilateral foraminal stenosis.     C7-T1: No significant canal or foraminal stenosis.     T2 hyperintense left superficial parotid lesion which measures approximately 1.2 x 0.9 cm.    Assessment:   Mariah Meza is a 67 y.o. female presenting for evaluation of back pain and weakness.     Plan:   Will obtain  imaging of the cervical and lumbar spine    Will do lab work to see if there is a metabolic problem contributing to the patients symptoms        Problem List Items Addressed This Visit    None  Visit Diagnoses       Neuropathy    -  Primary    Relevant Orders    RPR    Hepatitis C RNA, Quantitative, PCR    Heavy Metals Screen, Blood (Quantitative)    Vitamin B6    Vitamin B2    Vitamin B12 Deficiency Panel    Vitamin B1    TSH    T4    Bilateral arm weakness        DDD (degenerative disc disease), cervical        Stable.  Continue current treatment plan    Dorsalgia, unspecified        Relevant Orders    MRI Lumbar Spine Without Contrast    Cervical radiculopathy        Relevant Orders    MRI Cervical Spine Without Contrast    Claustrophobia        Relevant Medications    ALPRAZolam (XANAX) 0.5 MG tablet    Neuropathy, peripheral, idiopathic        Relevant Orders    Vitamin B6    Vitamin B12 Deficiency Panel    TSH    T4              I spent a total of 45 minutes on the day of the visit. This includes face to face time and non-face to face time preparing to see the patient (eg, review of tests), obtaining and/or reviewing separately obtained history, documenting clinical information in the electronic or other health record, independently interpreting results and communicating results to the patient/family/caregiver, or care coordinator.    Beverly Mcpherson PA-C  Supervising physician Nuno Chaney MD was available for all questions during this exam.  [facility]

## 2023-10-26 ENCOUNTER — OFFICE VISIT (OUTPATIENT)
Dept: NEUROLOGY | Facility: CLINIC | Age: 68
End: 2023-10-26
Payer: MEDICARE

## 2023-10-26 ENCOUNTER — PATIENT MESSAGE (OUTPATIENT)
Dept: INTERNAL MEDICINE | Facility: CLINIC | Age: 68
End: 2023-10-26
Payer: MEDICARE

## 2023-10-26 ENCOUNTER — LAB VISIT (OUTPATIENT)
Dept: LAB | Facility: HOSPITAL | Age: 68
End: 2023-10-26
Payer: MEDICARE

## 2023-10-26 VITALS
BODY MASS INDEX: 39.16 KG/M2 | SYSTOLIC BLOOD PRESSURE: 170 MMHG | WEIGHT: 229.38 LBS | HEART RATE: 57 BPM | DIASTOLIC BLOOD PRESSURE: 84 MMHG | HEIGHT: 64 IN

## 2023-10-26 DIAGNOSIS — G62.9 NEUROPATHY: ICD-10-CM

## 2023-10-26 DIAGNOSIS — M54.9 DORSALGIA, UNSPECIFIED: ICD-10-CM

## 2023-10-26 DIAGNOSIS — R29.898 BILATERAL ARM WEAKNESS: ICD-10-CM

## 2023-10-26 DIAGNOSIS — M50.30 DDD (DEGENERATIVE DISC DISEASE), CERVICAL: ICD-10-CM

## 2023-10-26 DIAGNOSIS — F40.240 CLAUSTROPHOBIA: ICD-10-CM

## 2023-10-26 DIAGNOSIS — G60.9 NEUROPATHY, PERIPHERAL, IDIOPATHIC: ICD-10-CM

## 2023-10-26 DIAGNOSIS — G62.9 NEUROPATHY: Primary | ICD-10-CM

## 2023-10-26 DIAGNOSIS — M54.12 CERVICAL RADICULOPATHY: ICD-10-CM

## 2023-10-26 LAB
T4 FREE SERPL-MCNC: 1.36 NG/DL (ref 0.71–1.51)
T4 SERPL-MCNC: 12.5 UG/DL (ref 4.5–11.5)
TSH SERPL DL<=0.005 MIU/L-ACNC: 0.07 UIU/ML (ref 0.4–4)

## 2023-10-26 PROCEDURE — 1159F MED LIST DOCD IN RCRD: CPT | Mod: CPTII,S$GLB,, | Performed by: PHYSICIAN ASSISTANT

## 2023-10-26 PROCEDURE — 84443 ASSAY THYROID STIM HORMONE: CPT | Performed by: PHYSICIAN ASSISTANT

## 2023-10-26 PROCEDURE — 84436 ASSAY OF TOTAL THYROXINE: CPT | Performed by: PHYSICIAN ASSISTANT

## 2023-10-26 PROCEDURE — 3079F DIAST BP 80-89 MM HG: CPT | Mod: CPTII,S$GLB,, | Performed by: PHYSICIAN ASSISTANT

## 2023-10-26 PROCEDURE — 36415 COLL VENOUS BLD VENIPUNCTURE: CPT | Performed by: PHYSICIAN ASSISTANT

## 2023-10-26 PROCEDURE — 84252 ASSAY OF VITAMIN B-2: CPT | Performed by: PHYSICIAN ASSISTANT

## 2023-10-26 PROCEDURE — 1160F PR REVIEW ALL MEDS BY PRESCRIBER/CLIN PHARMACIST DOCUMENTED: ICD-10-PCS | Mod: CPTII,S$GLB,, | Performed by: PHYSICIAN ASSISTANT

## 2023-10-26 PROCEDURE — 1101F PR PT FALLS ASSESS DOC 0-1 FALLS W/OUT INJ PAST YR: ICD-10-PCS | Mod: CPTII,S$GLB,, | Performed by: PHYSICIAN ASSISTANT

## 2023-10-26 PROCEDURE — 3044F HG A1C LEVEL LT 7.0%: CPT | Mod: CPTII,S$GLB,, | Performed by: PHYSICIAN ASSISTANT

## 2023-10-26 PROCEDURE — 1101F PT FALLS ASSESS-DOCD LE1/YR: CPT | Mod: CPTII,S$GLB,, | Performed by: PHYSICIAN ASSISTANT

## 2023-10-26 PROCEDURE — 87522 HEPATITIS C REVRS TRNSCRPJ: CPT | Performed by: PHYSICIAN ASSISTANT

## 2023-10-26 PROCEDURE — 83921 ORGANIC ACID SINGLE QUANT: CPT | Performed by: PHYSICIAN ASSISTANT

## 2023-10-26 PROCEDURE — 86592 SYPHILIS TEST NON-TREP QUAL: CPT | Performed by: PHYSICIAN ASSISTANT

## 2023-10-26 PROCEDURE — 1125F PR PAIN SEVERITY QUANTIFIED, PAIN PRESENT: ICD-10-PCS | Mod: CPTII,S$GLB,, | Performed by: PHYSICIAN ASSISTANT

## 2023-10-26 PROCEDURE — 84207 ASSAY OF VITAMIN B-6: CPT | Performed by: PHYSICIAN ASSISTANT

## 2023-10-26 PROCEDURE — 84425 ASSAY OF VITAMIN B-1: CPT | Performed by: PHYSICIAN ASSISTANT

## 2023-10-26 PROCEDURE — 84439 ASSAY OF FREE THYROXINE: CPT | Performed by: PHYSICIAN ASSISTANT

## 2023-10-26 PROCEDURE — 82607 VITAMIN B-12: CPT | Performed by: PHYSICIAN ASSISTANT

## 2023-10-26 PROCEDURE — 4010F ACE/ARB THERAPY RXD/TAKEN: CPT | Mod: CPTII,S$GLB,, | Performed by: PHYSICIAN ASSISTANT

## 2023-10-26 PROCEDURE — 99215 OFFICE O/P EST HI 40 MIN: CPT | Mod: S$GLB,,, | Performed by: PHYSICIAN ASSISTANT

## 2023-10-26 PROCEDURE — 3077F SYST BP >= 140 MM HG: CPT | Mod: CPTII,S$GLB,, | Performed by: PHYSICIAN ASSISTANT

## 2023-10-26 PROCEDURE — 99999 PR PBB SHADOW E&M-EST. PATIENT-LVL V: CPT | Mod: PBBFAC,,, | Performed by: PHYSICIAN ASSISTANT

## 2023-10-26 PROCEDURE — 4010F PR ACE/ARB THEARPY RXD/TAKEN: ICD-10-PCS | Mod: CPTII,S$GLB,, | Performed by: PHYSICIAN ASSISTANT

## 2023-10-26 PROCEDURE — 99215 PR OFFICE/OUTPT VISIT, EST, LEVL V, 40-54 MIN: ICD-10-PCS | Mod: S$GLB,,, | Performed by: PHYSICIAN ASSISTANT

## 2023-10-26 PROCEDURE — 3008F PR BODY MASS INDEX (BMI) DOCUMENTED: ICD-10-PCS | Mod: CPTII,S$GLB,, | Performed by: PHYSICIAN ASSISTANT

## 2023-10-26 PROCEDURE — 3288F FALL RISK ASSESSMENT DOCD: CPT | Mod: CPTII,S$GLB,, | Performed by: PHYSICIAN ASSISTANT

## 2023-10-26 PROCEDURE — 3044F PR MOST RECENT HEMOGLOBIN A1C LEVEL <7.0%: ICD-10-PCS | Mod: CPTII,S$GLB,, | Performed by: PHYSICIAN ASSISTANT

## 2023-10-26 PROCEDURE — 3077F PR MOST RECENT SYSTOLIC BLOOD PRESSURE >= 140 MM HG: ICD-10-PCS | Mod: CPTII,S$GLB,, | Performed by: PHYSICIAN ASSISTANT

## 2023-10-26 PROCEDURE — 1160F RVW MEDS BY RX/DR IN RCRD: CPT | Mod: CPTII,S$GLB,, | Performed by: PHYSICIAN ASSISTANT

## 2023-10-26 PROCEDURE — 3008F BODY MASS INDEX DOCD: CPT | Mod: CPTII,S$GLB,, | Performed by: PHYSICIAN ASSISTANT

## 2023-10-26 PROCEDURE — 1125F AMNT PAIN NOTED PAIN PRSNT: CPT | Mod: CPTII,S$GLB,, | Performed by: PHYSICIAN ASSISTANT

## 2023-10-26 PROCEDURE — 3288F PR FALLS RISK ASSESSMENT DOCUMENTED: ICD-10-PCS | Mod: CPTII,S$GLB,, | Performed by: PHYSICIAN ASSISTANT

## 2023-10-26 PROCEDURE — 3079F PR MOST RECENT DIASTOLIC BLOOD PRESSURE 80-89 MM HG: ICD-10-PCS | Mod: CPTII,S$GLB,, | Performed by: PHYSICIAN ASSISTANT

## 2023-10-26 PROCEDURE — 99999 PR PBB SHADOW E&M-EST. PATIENT-LVL V: ICD-10-PCS | Mod: PBBFAC,,, | Performed by: PHYSICIAN ASSISTANT

## 2023-10-26 PROCEDURE — 1159F PR MEDICATION LIST DOCUMENTED IN MEDICAL RECORD: ICD-10-PCS | Mod: CPTII,S$GLB,, | Performed by: PHYSICIAN ASSISTANT

## 2023-10-26 PROCEDURE — 82300 ASSAY OF CADMIUM: CPT | Performed by: PHYSICIAN ASSISTANT

## 2023-10-26 RX ORDER — ALPRAZOLAM 0.5 MG/1
0.5 TABLET ORAL
Qty: 2 TABLET | Refills: 0 | Status: SHIPPED | OUTPATIENT
Start: 2023-10-26 | End: 2024-03-11

## 2023-10-26 NOTE — PATIENT INSTRUCTIONS
Will get repeat imaging completed    Take xanax 1 hour prior to getting your imaging completed make sure you have someone else to drive you to the imaging center

## 2023-10-27 ENCOUNTER — TELEPHONE (OUTPATIENT)
Dept: INTERNAL MEDICINE | Facility: CLINIC | Age: 68
End: 2023-10-27
Payer: MEDICARE

## 2023-10-27 LAB
ARSENIC BLD-MCNC: 1 NG/ML
CADMIUM BLD-MCNC: 0.2 NG/ML
CITY: NORMAL
COUNTY: NORMAL
GUARDIAN FIRST NAME: NORMAL
GUARDIAN LAST NAME: NORMAL
HCV RNA SERPL QL NAA+PROBE: NOT DETECTED
HCV RNA SPEC NAA+PROBE-ACNC: NOT DETECTED IU/ML
HOME PHONE: NORMAL
LEAD BLD-MCNC: <1 MCG/DL
MERCURY BLD-MCNC: <1 NG/ML
RACE: NORMAL
RPR SER QL: NORMAL
STATE: NORMAL
STREET ADDRESS: NORMAL
VENOUS/CAPILLARY: NORMAL
VIT B12 SERPL-MCNC: 279 NG/L (ref 180–914)
ZIP: NORMAL

## 2023-10-27 NOTE — TELEPHONE ENCOUNTER
Called pt regarding last elevated bp, pt reports she will call with bp reading and submit through [employer] tomorrow, did not take medication today.

## 2023-10-28 LAB — VIT B2 SERPL-MCNC: 6 MCG/L (ref 1–19)

## 2023-10-31 LAB
METHYLMALONATE SERPL-SCNC: 0.2 NMOL/ML
PYRIDOXAL SERPL-MCNC: 15 UG/L (ref 5–50)
VIT B1 BLD-MCNC: 52 UG/L (ref 38–122)

## 2023-11-04 ENCOUNTER — HOSPITAL ENCOUNTER (OUTPATIENT)
Dept: RADIOLOGY | Facility: HOSPITAL | Age: 68
Discharge: HOME | End: 2023-11-04
Attending: PHYSICIAN ASSISTANT
Payer: MEDICARE

## 2023-11-04 DIAGNOSIS — M54.12 CERVICAL RADICULOPATHY: ICD-10-CM

## 2023-11-04 DIAGNOSIS — M54.9 DORSALGIA, UNSPECIFIED: ICD-10-CM

## 2023-11-04 PROCEDURE — 72141 MRI NECK SPINE W/O DYE: CPT | Mod: TC

## 2023-11-04 PROCEDURE — 72148 MRI LUMBAR SPINE W/O DYE: CPT | Mod: 26,,, | Performed by: RADIOLOGY

## 2023-11-04 PROCEDURE — 72141 MRI CERVICAL SPINE WITHOUT CONTRAST: ICD-10-PCS | Mod: 26,,, | Performed by: RADIOLOGY

## 2023-11-04 PROCEDURE — 72141 MRI NECK SPINE W/O DYE: CPT | Mod: 26,,, | Performed by: RADIOLOGY

## 2023-11-04 PROCEDURE — 72148 MRI LUMBAR SPINE WITHOUT CONTRAST: ICD-10-PCS | Mod: 26,,, | Performed by: RADIOLOGY

## 2023-11-04 PROCEDURE — 72148 MRI LUMBAR SPINE W/O DYE: CPT | Mod: TC

## 2023-11-06 DIAGNOSIS — E53.8 B12 DEFICIENCY: Primary | ICD-10-CM

## 2023-11-06 RX ORDER — CYANOCOBALAMIN 1000 UG/ML
1000 INJECTION, SOLUTION INTRAMUSCULAR; SUBCUTANEOUS
Status: SHIPPED | OUTPATIENT
Start: 2023-11-06 | End: 2024-04-04

## 2023-11-07 ENCOUNTER — TELEPHONE (OUTPATIENT)
Dept: INTERNAL MEDICINE | Facility: CLINIC | Age: 68
End: 2023-11-07
Payer: MEDICARE

## 2023-11-07 DIAGNOSIS — M54.12 CERVICAL RADICULOPATHY: Primary | ICD-10-CM

## 2023-11-07 DIAGNOSIS — N28.1 RENAL CYST, RIGHT: Primary | ICD-10-CM

## 2023-11-07 NOTE — TELEPHONE ENCOUNTER
Attempted to return call, left vm.     ----- Message from Cristiana Caldwell sent at 11/7/2023  3:17 PM CST -----  Contact: [phone]    Patient: Mark Lemus-    Reason: The patient is requesting a call back from the nurse to inquire about some concerns.     Comments: Please call the patient back to advise.

## 2023-11-09 ENCOUNTER — TELEPHONE (OUTPATIENT)
Dept: INTERNAL MEDICINE | Facility: CLINIC | Age: 68
End: 2023-11-09
Payer: MEDICARE

## 2023-11-09 NOTE — TELEPHONE ENCOUNTER
Hi, the pt does not feel comfortable administering B12 injections to herself. If  you place her orders and inform when she should get them we can administer in office. Please advise . Thanks //kah

## 2023-11-14 ENCOUNTER — CLINICAL SUPPORT (OUTPATIENT)
Dept: INTERNAL MEDICINE | Facility: CLINIC | Age: 68
End: 2023-11-14
Payer: MEDICARE

## 2023-11-14 DIAGNOSIS — R53.83 FATIGUE, UNSPECIFIED TYPE: Primary | ICD-10-CM

## 2023-11-14 PROCEDURE — 96372 THER/PROPH/DIAG INJ SC/IM: CPT | Mod: S$GLB,,, | Performed by: FAMILY MEDICINE

## 2023-11-14 PROCEDURE — 99999 PR PBB SHADOW E&M-EST. PATIENT-LVL II: ICD-10-PCS | Mod: PBBFAC,,,

## 2023-11-14 PROCEDURE — 96372 PR INJECTION,THERAP/PROPH/DIAG2ST, IM OR SUBCUT: ICD-10-PCS | Mod: S$GLB,,, | Performed by: FAMILY MEDICINE

## 2023-11-14 PROCEDURE — 99999 PR PBB SHADOW E&M-EST. PATIENT-LVL II: CPT | Mod: PBBFAC,,,

## 2023-11-14 RX ADMIN — CYANOCOBALAMIN 1000 MCG: 1000 INJECTION, SOLUTION INTRAMUSCULAR; SUBCUTANEOUS at 09:11

## 2023-11-14 NOTE — PROGRESS NOTES
Pt in office for nurse visit b12 injection. Name/ verified. Allergies/meds reviewed. B12 administered per order. Pt tolerated well without any issues. No s/s distress noted.

## 2023-11-17 ENCOUNTER — TELEPHONE (OUTPATIENT)
Dept: INTERNAL MEDICINE | Facility: CLINIC | Age: 68
End: 2023-11-17
Payer: MEDICARE

## 2023-11-21 ENCOUNTER — TELEPHONE (OUTPATIENT)
Dept: PAIN MEDICINE | Facility: CLINIC | Age: 68
End: 2023-11-21
Payer: MEDICARE

## 2023-11-22 ENCOUNTER — OFFICE VISIT (OUTPATIENT)
Dept: PAIN MEDICINE | Facility: CLINIC | Age: 68
End: 2023-11-22
Payer: MEDICARE

## 2023-11-22 VITALS
BODY MASS INDEX: 38.39 KG/M2 | DIASTOLIC BLOOD PRESSURE: 81 MMHG | WEIGHT: 224.88 LBS | HEART RATE: 57 BPM | HEIGHT: 64 IN | SYSTOLIC BLOOD PRESSURE: 166 MMHG | RESPIRATION RATE: 17 BRPM

## 2023-11-22 DIAGNOSIS — M54.50 LUMBAR PAIN: ICD-10-CM

## 2023-11-22 DIAGNOSIS — M54.12 CERVICAL RADICULOPATHY: ICD-10-CM

## 2023-11-22 DIAGNOSIS — M47.816 LUMBAR SPONDYLOSIS: Primary | ICD-10-CM

## 2023-11-22 DIAGNOSIS — R29.898 BILATERAL ARM WEAKNESS: ICD-10-CM

## 2023-11-22 DIAGNOSIS — R52 DIFFUSE PAIN: ICD-10-CM

## 2023-11-22 DIAGNOSIS — M54.12 CERVICAL RADICULAR PAIN: ICD-10-CM

## 2023-11-22 PROCEDURE — 99999 PR PBB SHADOW E&M-EST. PATIENT-LVL V: ICD-10-PCS | Mod: PBBFAC,,, | Performed by: PHYSICIAN ASSISTANT

## 2023-11-22 PROCEDURE — 1125F PR PAIN SEVERITY QUANTIFIED, PAIN PRESENT: ICD-10-PCS | Mod: CPTII,S$GLB,, | Performed by: PHYSICIAN ASSISTANT

## 2023-11-22 PROCEDURE — 1160F PR REVIEW ALL MEDS BY PRESCRIBER/CLIN PHARMACIST DOCUMENTED: ICD-10-PCS | Mod: CPTII,S$GLB,, | Performed by: PHYSICIAN ASSISTANT

## 2023-11-22 PROCEDURE — 99999 PR PBB SHADOW E&M-EST. PATIENT-LVL V: CPT | Mod: PBBFAC,,, | Performed by: PHYSICIAN ASSISTANT

## 2023-11-22 PROCEDURE — 1160F RVW MEDS BY RX/DR IN RCRD: CPT | Mod: CPTII,S$GLB,, | Performed by: PHYSICIAN ASSISTANT

## 2023-11-22 PROCEDURE — 1159F MED LIST DOCD IN RCRD: CPT | Mod: CPTII,S$GLB,, | Performed by: PHYSICIAN ASSISTANT

## 2023-11-22 PROCEDURE — 3079F PR MOST RECENT DIASTOLIC BLOOD PRESSURE 80-89 MM HG: ICD-10-PCS | Mod: CPTII,S$GLB,, | Performed by: PHYSICIAN ASSISTANT

## 2023-11-22 PROCEDURE — 99214 PR OFFICE/OUTPT VISIT, EST, LEVL IV, 30-39 MIN: ICD-10-PCS | Mod: S$GLB,,, | Performed by: PHYSICIAN ASSISTANT

## 2023-11-22 PROCEDURE — 3077F SYST BP >= 140 MM HG: CPT | Mod: CPTII,S$GLB,, | Performed by: PHYSICIAN ASSISTANT

## 2023-11-22 PROCEDURE — 3077F PR MOST RECENT SYSTOLIC BLOOD PRESSURE >= 140 MM HG: ICD-10-PCS | Mod: CPTII,S$GLB,, | Performed by: PHYSICIAN ASSISTANT

## 2023-11-22 PROCEDURE — 3044F PR MOST RECENT HEMOGLOBIN A1C LEVEL <7.0%: ICD-10-PCS | Mod: CPTII,S$GLB,, | Performed by: PHYSICIAN ASSISTANT

## 2023-11-22 PROCEDURE — 1159F PR MEDICATION LIST DOCUMENTED IN MEDICAL RECORD: ICD-10-PCS | Mod: CPTII,S$GLB,, | Performed by: PHYSICIAN ASSISTANT

## 2023-11-22 PROCEDURE — 1125F AMNT PAIN NOTED PAIN PRSNT: CPT | Mod: CPTII,S$GLB,, | Performed by: PHYSICIAN ASSISTANT

## 2023-11-22 PROCEDURE — 4010F PR ACE/ARB THEARPY RXD/TAKEN: ICD-10-PCS | Mod: CPTII,S$GLB,, | Performed by: PHYSICIAN ASSISTANT

## 2023-11-22 PROCEDURE — 3008F BODY MASS INDEX DOCD: CPT | Mod: CPTII,S$GLB,, | Performed by: PHYSICIAN ASSISTANT

## 2023-11-22 PROCEDURE — 4010F ACE/ARB THERAPY RXD/TAKEN: CPT | Mod: CPTII,S$GLB,, | Performed by: PHYSICIAN ASSISTANT

## 2023-11-22 PROCEDURE — 3079F DIAST BP 80-89 MM HG: CPT | Mod: CPTII,S$GLB,, | Performed by: PHYSICIAN ASSISTANT

## 2023-11-22 PROCEDURE — 3008F PR BODY MASS INDEX (BMI) DOCUMENTED: ICD-10-PCS | Mod: CPTII,S$GLB,, | Performed by: PHYSICIAN ASSISTANT

## 2023-11-22 PROCEDURE — 3044F HG A1C LEVEL LT 7.0%: CPT | Mod: CPTII,S$GLB,, | Performed by: PHYSICIAN ASSISTANT

## 2023-11-22 PROCEDURE — 99214 OFFICE O/P EST MOD 30 MIN: CPT | Mod: S$GLB,,, | Performed by: PHYSICIAN ASSISTANT

## 2023-11-22 RX ORDER — OLMESARTAN MEDOXOMIL AND HYDROCHLOROTHIAZIDE 40/25 40; 25 MG/1; MG/1
1 TABLET ORAL
COMMUNITY
Start: 2023-11-07

## 2023-11-22 NOTE — PROGRESS NOTES
"Chronic Pain -- Established Patient (Follow-up visit)    Referring Physician: Beverly Mcpherson PA-C  - Neurology (2nd referral); Smiley Weaver (initial referral)    PCP: Taina Bob NP    Chief Complaint:   Chief Complaint   Patient presents with    Low-back Pain   Axial low back pain   Occasional leg pain      SUBJECTIVE:    Interval History (11/22/2023):  Patient presents today for follow-up visit.  Patient was referred back to our clinic by Beverly Mcpherson PA-C (Neurology).  She complains mostly of low back pain.  She does have some leg pain on the left side occasionally.  She reports she stretches at home.  She takes Flexeril as needed, but she tries to avoid due to dry mouth.  She is taking gabapentin in the past with no relief.  She wants to hold off on adding additional medications at this time.  Patient reports pain as 6/10 today.    10/26/23 Neurology:  Ms. Mariah Meza is a 67 y.o. female presenting for evaluation of back pain. She noted that she has had some baseline back pain for quite some time (years) however in September she began to have increased pain. She required an injection of ketorolac at that time which helped for a day or two but then her pain returned. She did complete EMG/NCS previously unable to locate in [facility] or care everywhere. She describes her pain as sharp and some "electric shock" sensation that occasionally occurs. She does get some stiffness and weakness also which she reports is present in her entire body. She notes that her pain is worse on the L and in the lumbar region. She does endorse neck pain. She did have a cervical spine MRI completed one year ago which showed multilevel degenerative changes at C4-C5 and mild canal stenosis. She notes that her pain is constant all day but worsened with movement or prolonged sitting. She previously completed physical therapy in Jan/Feb of this year which benefited her greatly and she found that she was " able to be much more active. She then had a gout flare and seemed to regress in her ability to move. She denies any falls. She does endorse that she is having some constipation. She does have some baseline bladder problems but attributes this to her medications. She notes that she cannot lift a lot of things she use to be able to and she drops things more frequently. She reports numbness/tingling in bilateral hands and feet but worse on L hand.     Interval History (4/6/2023):  Mariah Meza presents today for follow-up visit.  Patient was last seen on 2/9/2023. At that visit, the plan was to start Gabapentin.  She reports she discontinued this medication after getting up to the 600 mg dosage due to side effects.  She reports it made her feel crazy in the head.  She reports improvement in her neck pain since last visit as she is been performing physician directed exercises at home.  She reports pain is primarily located in her ankles and feet right worse than left.  She reports that she was previously diagnosed with gout in November and since then she has had recurrent gouty flares.  She has noticed that these flares occur when she eats a lot of certain types of food.  She is had flare secondary to eating excessive amounts of shrimp, raisins, pineapple, and serial.  She reports the current flare began approximately 1 week ago and has been persistent she has noticed redness warmth and swelling in both of her ankles and great toe.  She has not taken anything other than Tylenol which has not been beneficial.  She is scheduled to follow-up with podiatry per referral from her primary care doctor.  Patient reports pain as 7/10 today.  She reports she has also noticed mild to moderate swelling in her hands and feet.  She reports this began after she started taking blood pressure medications, she thinks this may be due to medications.    Initial HPI 02/09/2023  Mariah Meza is a 67y.o. female with past  medical history significant for hyperlipidemia, hypertension, sicca syndrome, history of papillary thyroid cancer status post parathyroidectomy, obesity, GERD who presents to the clinic for the evaluation of mid back pain.  Patient reports pain has been present for the last 2 years without inciting accident injury or trauma.  Pain is constant and today is rated a 6/10.  Pain at its best is a 3/10 and at its worse is a 10/10.  Pain is described as sharp, stabbing and stiffness in nature.  Patient reports pain which begins in between the shoulder blades and radiates to the midthoracic spine.  Patient reports prior radiation down the left upper extremity to the thumb in C6 distribution.  Patient also reports pain in bilateral ankles.  Patient reports associated weakness in the lower extremities associated with this pain and with standing and ambulation.  This pain is described as burning in nature.  All of her pain is exacerbated when moving from sitting to standing, standing and with ambulation.  Patient reports in the past she was able to ambulate 10,000 steps per day but this has been reduced secondary to her pain.  Pain is improved with sitting and pain medications such as relief.  Patient reports she completed 12 months of physical therapy for the neck and lower back 2 months prior at [facility] physical therapy in [place].  Patient reports no significant improvement in her pain with completion of physical therapy.  Patient also takes Flexeril which is marginally helpful.  Patient reports significant lower extremity weakness.  Patient denies night fever/night sweats, urinary incontinence, bowel incontinence, significant weight loss, and loss of sensations.      Pain Disability Index Review:      11/22/2023     1:55 PM 2/9/2023     7:17 AM   Last 3 PDI Scores   Pain Disability Index (PDI) 36 50       Non-Pharmacologic Treatments:  Physical Therapy/Home Exercise: yes  Ice/Heat:yes  TENS: no  Acupuncture: no  Massage:  "no  Chiropractic: no    Other: no      Pain Medications:  - Adjuvant Medications: Cyclobenzaprine (Flexeril) and Tylenol (Acetaminophen) Diclofenac tablet    Pain Procedures:   None             Review of Systems:  GENERAL:  No weight loss, malaise or fevers.  HEENT:   No recent changes in vision or hearing  NECK:  Negative for lumps, no difficulty with swallowing.  RESPIRATORY:  Negative for cough, wheezing or shortness of breath, patient denies any recent URI.  CARDIOVASCULAR:  Negative for chest pain or palpitations.  GI:  Negative for abdominal discomfort, blood in stools or black stools or change in bowel habits.  MUSCULOSKELETAL:  See HPI.  SKIN:  Negative for lesions, rash, and itching.  PSYCH:  No mood disorder or recent psychosocial stressors.   HEMATOLOGY/LYMPHOLOGY:  Negative for prolonged bleeding, bruising easily or swollen nodes.    NEURO:   No history of syncope, paralysis, seizures or tremors.  All other reviewed and negative other than HPI.      OBJECTIVE:    Physical Exam:  Vitals:    11/22/23 1353   BP: (!) 166/81   Pulse: (!) 57   Resp: 17   Weight: 102 kg (224 lb 13.9 oz)   Height: 5' 4" (1.626 m)   PainSc:   6    Body mass index is 38.6 kg/m².   (reviewed on 11/22/2023)    GENERAL: Well appearing, in no acute distress, alert and oriented x3.  PSYCH:  Mood and affect appropriate.  SKIN: Skin color, texture, turgor normal, no rashes or lesions.  HEAD/FACE:  Normocephalic, atraumatic. Cranial nerves grossly intact.    NECK: no pain to palpation over the cervical paraspinous muscles. Spurling Negative. Mild pain with neck flexion, extension, or lateral flexion.   Normaltesting biceps, triceps and brachioradialis bilaterally.    5/5 strength testing deltoid, biceps, triceps, wrist extensor, wrist flexor and ulnar intrinsics bilaterally.    Normal  strength bilaterally  MUSCULOSKELETAL:  Mild swelling along left ankle, moderate swelling to right ankle redness and swelling along the base of her " left great toe swelling along right great toe, bilateral bunions along great toes  PULM: No evidence of respiratory difficulty, symmetric chest rise.  GI:  Soft and non-tender.    NEURO: Bilateral upper and lower extremity coordination and muscle stretch reflexes are physiologic and symmetric. No loss of sensation is noted.  GAIT: normal.        Imaging (Reviewed on 11/22/2023):    11/06/2023 MRI Lumbar Spine Without Contrast  COMPARISON:  Plain films of the lumbar spine from 03/06/2014.    FINDINGS:  There is equivocal 1-2 mm of anterolisthesis of L4 on L5. The vertebral body heights are well maintained, with no fracture.  No marrow signal abnormality suspicious for an infiltrative process.    The conus medullaris terminates at approximately the L1-L2 disk space.  There is a probable small cyst seen within the upper pole to interpolar right kidney that measures approximately 6 mm in size.  Consider further evaluation with ultrasound for confirmation.  The discs are relatively well hydrated with only some minimal disc desiccation seen along the periphery of the L5-S1 disc and slightly more centrally at the L2-3 disc.    L1-L2: No significant central canal or neural foraminal narrowing.Mild bilateral facet arthropathy noted.    L2-L3: No significant central canal or neural foraminal narrowing.Mild-to-moderate bilateral facet arthropathy noted.    L3-L4: No significant central canal or neural foraminal narrowing.Moderate to severe bilateral facet arthropathy noted.    L4-L5:  Minimal diffuse disc bulge along with equivocal grade 1 spondylolisthesis and severe bilateral facet arthropathy resulting in mild effacement of the ventral thecal sac.  No significant neural foraminal canal narrowing.    L5-S1:  Mild diffuse disc bulge slightly more prominent the left paracentral region along with moderate bilateral facet arthropathy resulting in no significant central or neural foraminal canal narrowing.  Impression:   1.  Multilevel degenerative changes of the lumbar spine as detailed above.  No high-grade central or neural foraminal canal narrowing noted.  Multilevel bilateral facet arthropathy.  2. Incidental note made of a probable 6 mm right renal cyst.  Consider ultrasound for confirmation.        11/06/2023 MRI Cervical Spine Without Contrast  COMPARISON:  Plain films from 04/13/2021.  Report from outside MRI dated 08/09/2022.  Films unavailable for comparison.    FINDINGS:  There is a couple mm of anterolisthesis of C2 on C3 and C3 on C4. No fracture. No marrow signal abnormality suspicious for an infiltrative process.  There is disc desiccation noted throughout the cervical spine with mild disc space narrowing seen at C3-4, C4-5 and C7-C6 with more mild-to-moderate disc space narrowing seen at C5-6.    The cervical cord is normal in caliber and signal characteristics.  The craniocervical junction and visualized intracranial contents are unremarkable.  The adjacent soft tissue structures show no significant abnormalities.    C2-C3:  No significant central canal or neural foraminal narrowing.    C3-C4:  There is a diffuse disc bulge along with some posterior spondylotic ridging that results in effacement of ventral thecal sac and moderate effacement of the ventral cord.  The AP dimension of the central canal this level measures approximately 9 mm.  No significant neural foraminal canal narrowing suggested.    C4-C5:  Diffuse disc bulge and posterior spondylotic ridging more prominent in the left paracentral region resulting in significant effacement of the thecal sac and moderate face mint of the left side of the cord.  The AP dimension of the central canal at this level also measures approximately 9 mm.  No significant neural foraminal canal narrowing.    C5-C6:  Diffuse disc bulge resulting in effacement of ventral thecal sac but not quite abutting the cord.  The AP dimension of the central canal measures approximately 10 mm  at this level.  The right C5-6 neural foraminal canal is mildly narrowed secondary to uncovertebral joint hypertrophy.  No significant left neural foraminal canal narrowing.    C6-C7:  Mild diffuse disc bulge resulting in mild effacement of ventral thecal sac.  No abutment of the anterior cord.  The AP dimension of the central canal at this level measures approximately 10.5 mm.  The right C6-7 neural foraminal canal appears to be minimally narrowed secondary to uncovertebral joint hypertrophy.    C7-T1:   Mild diffuse disc bulge resulting in no significant central canal narrowing.  The right neural foraminal canal appears to be at least mildly narrowed.  Impression:   1. Multilevel degenerative changes of the cervical spine as detailed above.          8/9/22 MRI CERVICAL SPINE WO CONTRAST - from [facility] Everywhere  FINDINGS:  Straightening and slight reversal of the cervical lordosis. Vertebral body heights are maintained. STIR hyperintense signal within the right posterior elements surrounding the C3-C4 facet joint. Mild surrounding paraspinal muscle STIR hyperintense signal. Right C1 lateral mass venous malformation (hemangioma). No abnormal cord signal.    C2-C3: No significant canal or foraminal stenosis.    C3-C4: Posterior disc osteophyte complex which effaces the ventral CSF space and creates an indentation upon the spinal cord. Mild canal stenosis. Uncovertebral and facet joint hypertrophy with mild right foraminal stenosis. No significant left foraminal stenosis.    C4-C5: Posterior disc osteophyte complex eccentric to the left which effaces the ventral CSF space, contacts and creates an indentation upon the spinal cord. Mild canal stenosis. Uncovertebral joint hypertrophy without significant foraminal stenosis.    C5-C6: Posterior disc osteophyte complex which partially effaces the ventral CSF space. Mild canal stenosis. Uncovertebral and facet joint hypertrophy with mild bilateral foraminal  stenosis.    C6-C7: Posterior disc osteophyte complex which partially effaces the ventral CSF space. Mild canal stenosis. Uncovertebral joint hypertrophy with mild bilateral foraminal stenosis.    C7-T1: No significant canal or foraminal stenosis.    T2 hyperintense left superficial parotid lesion which measures approximately 1.2 x 0.9 cm.    IMPRESSION:    1.  Multilevel degenerative changes most pronounced at C4-C5. Mild canal stenosis at multiple levels. No abnormal cord signal.  2.  Multilevel foraminal stenosis which is at most mild.  3.  Cystic left superficial parotid lesion which is incompletely characterized by spine MRI technique. This may reflect a large lymph node, parotid cyst, cystic primary parotid neoplasm. Recommend nonemergent ENT evaluation.      03/04/14 X-Ray Lumbar Spine Ap And Lateral  Findings:  Five non-rib bearing lumbar type vertebrae are present without significant lateral curvature abnormality or subluxation.  Degenerative endplate changes as well as facet joint hypertrophy, particularly inferiorly, are noted without acute fracture or  suspicious focal bony lesion identified.  Disk spaces are fairly well-maintained.     04/13/21 X-Ray Cervical Spine Complete 5 view  FINDINGS:  Patient's hair limits the exam.  There is visualization to the C7-T1 level on the swimmer's view.  Multilevel marginal spurring and varying degrees of uniform loss of disc height throughout the C-spine.  No fracture or subluxation.  Pre dens space, prevertebral soft tissues, C1-2 articulation and odontoid normal in appearance allowing for positioning.  Neural foramina widely patent bilaterally at all levels.  Remaining visualized osseous structures intact.  Included upper lung fields clear.    EMG 10/2021  IMPRESSION  1. ABNORMAL study  2. There is electrodiagnostic evidence of an acute on chronic radiculopathy of the L5 and S1 nerve roots-slightly worse on the right        ASSESSMENT: 68 y.o. year old female with  neck, mid back, bilateral ankles pain, consistent with     1. Lumbar spondylosis  Ambulatory referral/consult to [facility]    Case Request-RAD/Other Procedure Area: Bilateral L3-5 * MBB #1 with RN IV sedation      2. Bilateral arm weakness        3. Cervical radicular pain        4. Diffuse pain        5. Lumbar pain              PLAN:   - Interventions:    - Schedule bilateral L3-5 MBB. Patient is not taking prescription blood thinners or ASA.    - Consider SUSY after if warranted.  - Previously noted: Patient has elevated uric acid levels consistent with gout. She reports most recent gouty flare began 8 days ago. She is outside the preferred treatment window for Colchicine, treating today with a Toradol injection and additional oral steroids. She is scheduled to follow up with podiatry for further evaluation. She would likely benefit from a daily gout medication as well as abortive therapy. Swelling in feet and ankles likely primarily due to gout. Possible overlying extremity swelling as a side effect of her BP meds. She can discuss gout treatments with podiatry and/or her PCP. Advised to discuss alternative BP meds with PCP due to side effects.    - Anticoagulation use: no no anticoagulation    - Medications:  - Continue Flexeril 10mg PRN.  Does report SE of dry mouth.  - Consider Lyrica.  She wants to hold off on it adding additional medications at this time.  Previously stopped Gabapentin due to adverse side effects (made her feel crazy) and no improvement in symptoms  -We previously discussed that while NSAIDs may be beneficial with reducing pain, they have potential deleterious side effects of NSAIDs on the cardiovascular, gastrointestinal and renal systems. We have discussed judicious use NSAIDs.      report:  Reviewed and consistent with medication use as prescribed.    - Consults/referral: Continue follow-up with PCP and Podiatry regarding gout treatment and HTN.    - Therapy: We discussed  continuing physical therapy to help manage the patient/s painful condition. The patient was counseled that muscle strengthening will improve the long term prognosis in regards to pain and may also help increase range of motion and mobility.     - Diagnostic/ Imaging:    - Cervical MRI from [facility] reviewed, 2022.   - Updated Cervical and lumbar MRI reviewed, 2023.    - Follow up visit: 4 weeks post-procedure     - Patient Questions: Answered all of the patient's questions regarding diagnosis, therapy, and treatment.    - This condition does not require this patient to take time off of work, and the primary goal of our Pain Management services is to improve the patient's functional capacity.   - I discussed the risks, benefits, and alternatives to potential treatment options. All questions and concerns were fully addressed today in clinic.         Brenna Johnson PA-C  Interventional Pain Management - [facility]    Disclaimer:  This note was prepared using voice recognition system and is likely to have sound alike errors that may have been overlooked even after proof reading.  Please call me with any questions.

## 2023-11-22 NOTE — H&P (VIEW-ONLY)
"Chronic Pain -- Established Patient (Follow-up visit)    Referring Physician: Beverly Mcpherson PA-C  - Neurology (2nd referral); Smiley Weaver (initial referral)    PCP: Taina Bob NP    Chief Complaint:   Chief Complaint   Patient presents with    Low-back Pain   Axial low back pain   Occasional leg pain      SUBJECTIVE:    Interval History (11/22/2023):  Patient presents today for follow-up visit.  Patient was referred back to our clinic by Beverly Mcpherson PA-C (Neurology).  She complains mostly of low back pain.  She does have some leg pain on the left side occasionally.  She reports she stretches at home.  She takes Flexeril as needed, but she tries to avoid due to dry mouth.  She is taking gabapentin in the past with no relief.  She wants to hold off on adding additional medications at this time.  Patient reports pain as 6/10 today.    10/26/23 Neurology:  Ms. Mariah Meza is a 67 y.o. female presenting for evaluation of back pain. She noted that she has had some baseline back pain for quite some time (years) however in September she began to have increased pain. She required an injection of ketorolac at that time which helped for a day or two but then her pain returned. She did complete EMG/NCS previously unable to locate in [facility] or care everywhere. She describes her pain as sharp and some "electric shock" sensation that occasionally occurs. She does get some stiffness and weakness also which she reports is present in her entire body. She notes that her pain is worse on the L and in the lumbar region. She does endorse neck pain. She did have a cervical spine MRI completed one year ago which showed multilevel degenerative changes at C4-C5 and mild canal stenosis. She notes that her pain is constant all day but worsened with movement or prolonged sitting. She previously completed physical therapy in Jan/Feb of this year which benefited her greatly and she found that she was " able to be much more active. She then had a gout flare and seemed to regress in her ability to move. She denies any falls. She does endorse that she is having some constipation. She does have some baseline bladder problems but attributes this to her medications. She notes that she cannot lift a lot of things she use to be able to and she drops things more frequently. She reports numbness/tingling in bilateral hands and feet but worse on L hand.     Interval History (4/6/2023):  Mariah Meza presents today for follow-up visit.  Patient was last seen on 2/9/2023. At that visit, the plan was to start Gabapentin.  She reports she discontinued this medication after getting up to the 600 mg dosage due to side effects.  She reports it made her feel crazy in the head.  She reports improvement in her neck pain since last visit as she is been performing physician directed exercises at home.  She reports pain is primarily located in her ankles and feet right worse than left.  She reports that she was previously diagnosed with gout in November and since then she has had recurrent gouty flares.  She has noticed that these flares occur when she eats a lot of certain types of food.  She is had flare secondary to eating excessive amounts of shrimp, raisins, pineapple, and serial.  She reports the current flare began approximately 1 week ago and has been persistent she has noticed redness warmth and swelling in both of her ankles and great toe.  She has not taken anything other than Tylenol which has not been beneficial.  She is scheduled to follow-up with podiatry per referral from her primary care doctor.  Patient reports pain as 7/10 today.  She reports she has also noticed mild to moderate swelling in her hands and feet.  She reports this began after she started taking blood pressure medications, she thinks this may be due to medications.    Initial HPI 02/09/2023  Mariah Meza is a 67y.o. female with past  medical history significant for hyperlipidemia, hypertension, sicca syndrome, history of papillary thyroid cancer status post parathyroidectomy, obesity, GERD who presents to the clinic for the evaluation of mid back pain.  Patient reports pain has been present for the last 2 years without inciting accident injury or trauma.  Pain is constant and today is rated a 6/10.  Pain at its best is a 3/10 and at its worse is a 10/10.  Pain is described as sharp, stabbing and stiffness in nature.  Patient reports pain which begins in between the shoulder blades and radiates to the midthoracic spine.  Patient reports prior radiation down the left upper extremity to the thumb in C6 distribution.  Patient also reports pain in bilateral ankles.  Patient reports associated weakness in the lower extremities associated with this pain and with standing and ambulation.  This pain is described as burning in nature.  All of her pain is exacerbated when moving from sitting to standing, standing and with ambulation.  Patient reports in the past she was able to ambulate 10,000 steps per day but this has been reduced secondary to her pain.  Pain is improved with sitting and pain medications such as relief.  Patient reports she completed 12 months of physical therapy for the neck and lower back 2 months prior at [facility] in [place].  Patient reports no significant improvement in her pain with completion of physical therapy.  Patient also takes Flexeril which is marginally helpful.  Patient reports significant lower extremity weakness.  Patient denies night fever/night sweats, urinary incontinence, bowel incontinence, significant weight loss, and loss of sensations.      Pain Disability Index Review:      11/22/2023     1:55 PM 2/9/2023     7:17 AM   Last 3 PDI Scores   Pain Disability Index (PDI) 36 50       Non-Pharmacologic Treatments:  Physical Therapy/Home Exercise: yes  Ice/Heat:yes  TENS: no  Acupuncture: no  Massage:  "no  Chiropractic: no    Other: no      Pain Medications:  - Adjuvant Medications: Cyclobenzaprine (Flexeril) and Tylenol (Acetaminophen) Diclofenac tablet    Pain Procedures:   None             Review of Systems:  GENERAL:  No weight loss, malaise or fevers.  HEENT:   No recent changes in vision or hearing  NECK:  Negative for lumps, no difficulty with swallowing.  RESPIRATORY:  Negative for cough, wheezing or shortness of breath, patient denies any recent URI.  CARDIOVASCULAR:  Negative for chest pain or palpitations.  GI:  Negative for abdominal discomfort, blood in stools or black stools or change in bowel habits.  MUSCULOSKELETAL:  See HPI.  SKIN:  Negative for lesions, rash, and itching.  PSYCH:  No mood disorder or recent psychosocial stressors.   HEMATOLOGY/LYMPHOLOGY:  Negative for prolonged bleeding, bruising easily or swollen nodes.    NEURO:   No history of syncope, paralysis, seizures or tremors.  All other reviewed and negative other than HPI.      OBJECTIVE:    Physical Exam:  Vitals:    11/22/23 1353   BP: (!) 166/81   Pulse: (!) 57   Resp: 17   Weight: 102 kg (224 lb 13.9 oz)   Height: 5' 4" (1.626 m)   PainSc:   6    Body mass index is 38.6 kg/m².   (reviewed on 11/22/2023)    GENERAL: Well appearing, in no acute distress, alert and oriented x3.  PSYCH:  Mood and affect appropriate.  SKIN: Skin color, texture, turgor normal, no rashes or lesions.  HEAD/FACE:  Normocephalic, atraumatic. Cranial nerves grossly intact.    NECK: no pain to palpation over the cervical paraspinous muscles. Spurling Negative. Mild pain with neck flexion, extension, or lateral flexion.   Normaltesting biceps, triceps and brachioradialis bilaterally.    5/5 strength testing deltoid, biceps, triceps, wrist extensor, wrist flexor and ulnar intrinsics bilaterally.    Normal  strength bilaterally  MUSCULOSKELETAL:  Mild swelling along left ankle, moderate swelling to right ankle redness and swelling along the base of her " left great toe swelling along right great toe, bilateral bunions along great toes  PULM: No evidence of respiratory difficulty, symmetric chest rise.  GI:  Soft and non-tender.    NEURO: Bilateral upper and lower extremity coordination and muscle stretch reflexes are physiologic and symmetric. No loss of sensation is noted.  GAIT: normal.        Imaging (Reviewed on 11/22/2023):    11/06/2023 MRI Lumbar Spine Without Contrast  COMPARISON:  Plain films of the lumbar spine from 03/06/2014.    FINDINGS:  There is equivocal 1-2 mm of anterolisthesis of L4 on L5. The vertebral body heights are well maintained, with no fracture.  No marrow signal abnormality suspicious for an infiltrative process.    The conus medullaris terminates at approximately the L1-L2 disk space.  There is a probable small cyst seen within the upper pole to interpolar right kidney that measures approximately 6 mm in size.  Consider further evaluation with ultrasound for confirmation.  The discs are relatively well hydrated with only some minimal disc desiccation seen along the periphery of the L5-S1 disc and slightly more centrally at the L2-3 disc.    L1-L2: No significant central canal or neural foraminal narrowing.Mild bilateral facet arthropathy noted.    L2-L3: No significant central canal or neural foraminal narrowing.Mild-to-moderate bilateral facet arthropathy noted.    L3-L4: No significant central canal or neural foraminal narrowing.Moderate to severe bilateral facet arthropathy noted.    L4-L5:  Minimal diffuse disc bulge along with equivocal grade 1 spondylolisthesis and severe bilateral facet arthropathy resulting in mild effacement of the ventral thecal sac.  No significant neural foraminal canal narrowing.    L5-S1:  Mild diffuse disc bulge slightly more prominent the left paracentral region along with moderate bilateral facet arthropathy resulting in no significant central or neural foraminal canal narrowing.  Impression:   1.  Multilevel degenerative changes of the lumbar spine as detailed above.  No high-grade central or neural foraminal canal narrowing noted.  Multilevel bilateral facet arthropathy.  2. Incidental note made of a probable 6 mm right renal cyst.  Consider ultrasound for confirmation.        11/06/2023 MRI Cervical Spine Without Contrast  COMPARISON:  Plain films from 04/13/2021.  Report from outside MRI dated 08/09/2022.  Films unavailable for comparison.    FINDINGS:  There is a couple mm of anterolisthesis of C2 on C3 and C3 on C4. No fracture. No marrow signal abnormality suspicious for an infiltrative process.  There is disc desiccation noted throughout the cervical spine with mild disc space narrowing seen at C3-4, C4-5 and C7-C6 with more mild-to-moderate disc space narrowing seen at C5-6.    The cervical cord is normal in caliber and signal characteristics.  The craniocervical junction and visualized intracranial contents are unremarkable.  The adjacent soft tissue structures show no significant abnormalities.    C2-C3:  No significant central canal or neural foraminal narrowing.    C3-C4:  There is a diffuse disc bulge along with some posterior spondylotic ridging that results in effacement of ventral thecal sac and moderate effacement of the ventral cord.  The AP dimension of the central canal this level measures approximately 9 mm.  No significant neural foraminal canal narrowing suggested.    C4-C5:  Diffuse disc bulge and posterior spondylotic ridging more prominent in the left paracentral region resulting in significant effacement of the thecal sac and moderate face mint of the left side of the cord.  The AP dimension of the central canal at this level also measures approximately 9 mm.  No significant neural foraminal canal narrowing.    C5-C6:  Diffuse disc bulge resulting in effacement of ventral thecal sac but not quite abutting the cord.  The AP dimension of the central canal measures approximately 10 mm  at this level.  The right C5-6 neural foraminal canal is mildly narrowed secondary to uncovertebral joint hypertrophy.  No significant left neural foraminal canal narrowing.    C6-C7:  Mild diffuse disc bulge resulting in mild effacement of ventral thecal sac.  No abutment of the anterior cord.  The AP dimension of the central canal at this level measures approximately 10.5 mm.  The right C6-7 neural foraminal canal appears to be minimally narrowed secondary to uncovertebral joint hypertrophy.    C7-T1:   Mild diffuse disc bulge resulting in no significant central canal narrowing.  The right neural foraminal canal appears to be at least mildly narrowed.  Impression:   1. Multilevel degenerative changes of the cervical spine as detailed above.          8/9/22 MRI CERVICAL SPINE WO CONTRAST - from [facility] Everywhere  FINDINGS:  Straightening and slight reversal of the cervical lordosis. Vertebral body heights are maintained. STIR hyperintense signal within the right posterior elements surrounding the C3-C4 facet joint. Mild surrounding paraspinal muscle STIR hyperintense signal. Right C1 lateral mass venous malformation (hemangioma). No abnormal cord signal.    C2-C3: No significant canal or foraminal stenosis.    C3-C4: Posterior disc osteophyte complex which effaces the ventral CSF space and creates an indentation upon the spinal cord. Mild canal stenosis. Uncovertebral and facet joint hypertrophy with mild right foraminal stenosis. No significant left foraminal stenosis.    C4-C5: Posterior disc osteophyte complex eccentric to the left which effaces the ventral CSF space, contacts and creates an indentation upon the spinal cord. Mild canal stenosis. Uncovertebral joint hypertrophy without significant foraminal stenosis.    C5-C6: Posterior disc osteophyte complex which partially effaces the ventral CSF space. Mild canal stenosis. Uncovertebral and facet joint hypertrophy with mild bilateral foraminal  stenosis.    C6-C7: Posterior disc osteophyte complex which partially effaces the ventral CSF space. Mild canal stenosis. Uncovertebral joint hypertrophy with mild bilateral foraminal stenosis.    C7-T1: No significant canal or foraminal stenosis.    T2 hyperintense left superficial parotid lesion which measures approximately 1.2 x 0.9 cm.    IMPRESSION:    1.  Multilevel degenerative changes most pronounced at C4-C5. Mild canal stenosis at multiple levels. No abnormal cord signal.  2.  Multilevel foraminal stenosis which is at most mild.  3.  Cystic left superficial parotid lesion which is incompletely characterized by spine MRI technique. This may reflect a large lymph node, parotid cyst, cystic primary parotid neoplasm. Recommend nonemergent ENT evaluation.      03/04/14 X-Ray Lumbar Spine Ap And Lateral  Findings:  Five non-rib bearing lumbar type vertebrae are present without significant lateral curvature abnormality or subluxation.  Degenerative endplate changes as well as facet joint hypertrophy, particularly inferiorly, are noted without acute fracture or  suspicious focal bony lesion identified.  Disk spaces are fairly well-maintained.     04/13/21 X-Ray Cervical Spine Complete 5 view  FINDINGS:  Patient's hair limits the exam.  There is visualization to the C7-T1 level on the swimmer's view.  Multilevel marginal spurring and varying degrees of uniform loss of disc height throughout the C-spine.  No fracture or subluxation.  Pre dens space, prevertebral soft tissues, C1-2 articulation and odontoid normal in appearance allowing for positioning.  Neural foramina widely patent bilaterally at all levels.  Remaining visualized osseous structures intact.  Included upper lung fields clear.    EMG 10/2021  IMPRESSION  1. ABNORMAL study  2. There is electrodiagnostic evidence of an acute on chronic radiculopathy of the L5 and S1 nerve roots-slightly worse on the right        ASSESSMENT: 68 y.o. year old female with  neck, mid back, bilateral ankles pain, consistent with     1. Lumbar spondylosis  Ambulatory referral/consult to [facility]    Case Request-RAD/Other Procedure Area: Bilateral L3-5 * MBB #1 with RN IV sedation      2. Bilateral arm weakness        3. Cervical radicular pain        4. Diffuse pain        5. Lumbar pain              PLAN:   - Interventions:    - Schedule bilateral L3-5 MBB. Patient is not taking prescription blood thinners or ASA.    - Consider SUSY after if warranted.  - Previously noted: Patient has elevated uric acid levels consistent with gout. She reports most recent gouty flare began 8 days ago. She is outside the preferred treatment window for Colchicine, treating today with a Toradol injection and additional oral steroids. She is scheduled to follow up with podiatry for further evaluation. She would likely benefit from a daily gout medication as well as abortive therapy. Swelling in feet and ankles likely primarily due to gout. Possible overlying extremity swelling as a side effect of her BP meds. She can discuss gout treatments with podiatry and/or her PCP. Advised to discuss alternative BP meds with PCP due to side effects.    - Anticoagulation use: no no anticoagulation    - Medications:  - Continue Flexeril 10mg PRN.  Does report SE of dry mouth.  - Consider Lyrica.  She wants to hold off on it adding additional medications at this time.  Previously stopped Gabapentin due to adverse side effects (made her feel crazy) and no improvement in symptoms  -We previously discussed that while NSAIDs may be beneficial with reducing pain, they have potential deleterious side effects of NSAIDs on the cardiovascular, gastrointestinal and renal systems. We have discussed judicious use NSAIDs.      report:  Reviewed and consistent with medication use as prescribed.    - Consults/referral: Continue follow-up with PCP and Podiatry regarding gout treatment and HTN.    - Therapy: We discussed  continuing physical therapy to help manage the patient/s painful condition. The patient was counseled that muscle strengthening will improve the long term prognosis in regards to pain and may also help increase range of motion and mobility.     - Diagnostic/ Imaging:    - Cervical MRI from [facility] reviewed, 2022.   - Updated Cervical and lumbar MRI reviewed, 2023.    - Follow up visit: 4 weeks post-procedure     - Patient Questions: Answered all of the patient's questions regarding diagnosis, therapy, and treatment.    - This condition does not require this patient to take time off of work, and the primary goal of our Pain Management services is to improve the patient's functional capacity.   - I discussed the risks, benefits, and alternatives to potential treatment options. All questions and concerns were fully addressed today in clinic.         Brenna Johnson PA-C  Interventional Pain Management - [facility]    Disclaimer:  This note was prepared using voice recognition system and is likely to have sound alike errors that may have been overlooked even after proof reading.  Please call me with any questions.

## 2023-11-24 ENCOUNTER — HOSPITAL ENCOUNTER (OUTPATIENT)
Dept: RADIOLOGY | Facility: HOSPITAL | Age: 68
Discharge: HOME | End: 2023-11-24
Attending: NURSE PRACTITIONER
Payer: MEDICARE

## 2023-11-24 DIAGNOSIS — N28.1 RENAL CYST, RIGHT: ICD-10-CM

## 2023-11-24 PROCEDURE — 76770 US EXAM ABDO BACK WALL COMP: CPT | Mod: TC

## 2023-11-24 PROCEDURE — 76770 US EXAM ABDO BACK WALL COMP: CPT | Mod: 26,,, | Performed by: RADIOLOGY

## 2023-11-24 PROCEDURE — 76770 US RETROPERITONEAL COMPLETE: ICD-10-PCS | Mod: 26,,, | Performed by: RADIOLOGY

## 2023-12-04 NOTE — PRE-PROCEDURE INSTRUCTIONS
Spoke with patient regarding procedure scheduled on 12.12     Arrival time 0615     Has patient been sick with fever or on antibiotics within the last 7 days? No     Does the patient have any open wounds, sores or rashes? No     Does the patient have any recent fractures? no     Has patient received a vaccination within the last 7 days? No     Received the COVID vaccination? yes     Has the patient stopped all medications as directed? na     Does patient have a pacemaker, defibrillator, or implantable stimulator? No     Does the patient have a ride to and from procedure and someone reliable to remain with patient?       Is the patient diabetic? no     Does the patient have sleep apnea? Or use O2 at home? No and no     Is the patient receiving sedation? yes     Is the patient instructed to remain NPO beginning at midnight the night before their procedure? yes     Procedure location confirmed with patient? Yes     Covid- Denies signs/symptoms. Instructed to notify PAT/MD if any changes.

## 2023-12-11 ENCOUNTER — OFFICE VISIT (OUTPATIENT)
Dept: INTERNAL MEDICINE | Facility: CLINIC | Age: 68
End: 2023-12-11
Payer: MEDICARE

## 2023-12-11 VITALS
DIASTOLIC BLOOD PRESSURE: 84 MMHG | HEART RATE: 62 BPM | WEIGHT: 229.81 LBS | TEMPERATURE: 98 F | RESPIRATION RATE: 15 BRPM | SYSTOLIC BLOOD PRESSURE: 156 MMHG | OXYGEN SATURATION: 99 % | BODY MASS INDEX: 39.45 KG/M2

## 2023-12-11 DIAGNOSIS — I10 PRIMARY HYPERTENSION: ICD-10-CM

## 2023-12-11 DIAGNOSIS — Z00.00 ROUTINE MEDICAL EXAM: Primary | ICD-10-CM

## 2023-12-11 DIAGNOSIS — R51.9 ACUTE NONINTRACTABLE HEADACHE, UNSPECIFIED HEADACHE TYPE: ICD-10-CM

## 2023-12-11 PROCEDURE — 96372 PR INJECTION,THERAP/PROPH/DIAG2ST, IM OR SUBCUT: ICD-10-PCS | Mod: S$GLB,,, | Performed by: NURSE PRACTITIONER

## 2023-12-11 PROCEDURE — 3079F DIAST BP 80-89 MM HG: CPT | Mod: CPTII,S$GLB,, | Performed by: NURSE PRACTITIONER

## 2023-12-11 PROCEDURE — 99213 PR OFFICE/OUTPT VISIT, EST, LEVL III, 20-29 MIN: ICD-10-PCS | Mod: 25,S$GLB,, | Performed by: NURSE PRACTITIONER

## 2023-12-11 PROCEDURE — 3044F HG A1C LEVEL LT 7.0%: CPT | Mod: CPTII,S$GLB,, | Performed by: NURSE PRACTITIONER

## 2023-12-11 PROCEDURE — 99999 PR PBB SHADOW E&M-EST. PATIENT-LVL V: ICD-10-PCS | Mod: PBBFAC,,, | Performed by: NURSE PRACTITIONER

## 2023-12-11 PROCEDURE — 3077F SYST BP >= 140 MM HG: CPT | Mod: CPTII,S$GLB,, | Performed by: NURSE PRACTITIONER

## 2023-12-11 PROCEDURE — 1101F PT FALLS ASSESS-DOCD LE1/YR: CPT | Mod: CPTII,S$GLB,, | Performed by: NURSE PRACTITIONER

## 2023-12-11 PROCEDURE — 3044F PR MOST RECENT HEMOGLOBIN A1C LEVEL <7.0%: ICD-10-PCS | Mod: CPTII,S$GLB,, | Performed by: NURSE PRACTITIONER

## 2023-12-11 PROCEDURE — 96372 THER/PROPH/DIAG INJ SC/IM: CPT | Mod: S$GLB,,, | Performed by: NURSE PRACTITIONER

## 2023-12-11 PROCEDURE — 3288F FALL RISK ASSESSMENT DOCD: CPT | Mod: CPTII,S$GLB,, | Performed by: NURSE PRACTITIONER

## 2023-12-11 PROCEDURE — 3288F PR FALLS RISK ASSESSMENT DOCUMENTED: ICD-10-PCS | Mod: CPTII,S$GLB,, | Performed by: NURSE PRACTITIONER

## 2023-12-11 PROCEDURE — 4010F PR ACE/ARB THEARPY RXD/TAKEN: ICD-10-PCS | Mod: CPTII,S$GLB,, | Performed by: NURSE PRACTITIONER

## 2023-12-11 PROCEDURE — 3079F PR MOST RECENT DIASTOLIC BLOOD PRESSURE 80-89 MM HG: ICD-10-PCS | Mod: CPTII,S$GLB,, | Performed by: NURSE PRACTITIONER

## 2023-12-11 PROCEDURE — 4010F ACE/ARB THERAPY RXD/TAKEN: CPT | Mod: CPTII,S$GLB,, | Performed by: NURSE PRACTITIONER

## 2023-12-11 PROCEDURE — 1125F PR PAIN SEVERITY QUANTIFIED, PAIN PRESENT: ICD-10-PCS | Mod: CPTII,S$GLB,, | Performed by: NURSE PRACTITIONER

## 2023-12-11 PROCEDURE — 99213 OFFICE O/P EST LOW 20 MIN: CPT | Mod: 25,S$GLB,, | Performed by: NURSE PRACTITIONER

## 2023-12-11 PROCEDURE — 1101F PR PT FALLS ASSESS DOC 0-1 FALLS W/OUT INJ PAST YR: ICD-10-PCS | Mod: CPTII,S$GLB,, | Performed by: NURSE PRACTITIONER

## 2023-12-11 PROCEDURE — 3077F PR MOST RECENT SYSTOLIC BLOOD PRESSURE >= 140 MM HG: ICD-10-PCS | Mod: CPTII,S$GLB,, | Performed by: NURSE PRACTITIONER

## 2023-12-11 PROCEDURE — 3008F PR BODY MASS INDEX (BMI) DOCUMENTED: ICD-10-PCS | Mod: CPTII,S$GLB,, | Performed by: NURSE PRACTITIONER

## 2023-12-11 PROCEDURE — 3008F BODY MASS INDEX DOCD: CPT | Mod: CPTII,S$GLB,, | Performed by: NURSE PRACTITIONER

## 2023-12-11 PROCEDURE — 99999 PR PBB SHADOW E&M-EST. PATIENT-LVL V: CPT | Mod: PBBFAC,,, | Performed by: NURSE PRACTITIONER

## 2023-12-11 PROCEDURE — 1125F AMNT PAIN NOTED PAIN PRSNT: CPT | Mod: CPTII,S$GLB,, | Performed by: NURSE PRACTITIONER

## 2023-12-11 RX ORDER — ACETAMINOPHEN 500 MG
500 TABLET ORAL
Status: DISCONTINUED | OUTPATIENT
Start: 2023-12-11 | End: 2023-12-11 | Stop reason: HOSPADM

## 2023-12-11 RX ORDER — LEVOTHYROXINE SODIUM 100 UG/1
100 TABLET ORAL
COMMUNITY
End: 2024-01-17 | Stop reason: SDUPTHER

## 2023-12-11 RX ORDER — PANTOPRAZOLE SODIUM 40 MG/1
40 TABLET, DELAYED RELEASE ORAL DAILY
Qty: 30 TABLET | Refills: 5 | Status: SHIPPED | OUTPATIENT
Start: 2023-12-11

## 2023-12-11 RX ADMIN — CYANOCOBALAMIN 1000 MCG: 1000 INJECTION, SOLUTION INTRAMUSCULAR; SUBCUTANEOUS at 04:12

## 2023-12-11 RX ADMIN — Medication 500 MG: at 10:12

## 2023-12-11 NOTE — PROGRESS NOTES
Subjective     Patient ID: Mariah Meza is a 68 y.o. female.    Chief Complaint: Follow-up    Patient presents for routine exam.  Has headache today.  Started this morning.  Blood pressure has been fluctuating/increasing.  Home readings: 121-159/67-97.  Saw cardiology LYNSEY in Nov. Was add HCTZ 25 mg (olmesartan 40/25 mg).    She plans to call cardiology for medication adjustment-as advised at last visit.   Denies tingling/numbness.      Reports her endocrinologist will not be on her new insurance coverage.  Was recommended Beto Decker     Medical history:  HTN, HLP, Obesity, Chronic Rhinitis, GERD, Back pain, DJD, lumbar, Lumbar radiculopathy, Papillary thyroid carcinoma, Post-op hypothyroidism, Headaches, H/o parathyroidectomy, Hot flashes, Sicca Syndrome, Elevated uric acid in blood         Follow-up  Associated symptoms include arthralgias and headaches. Pertinent negatives include no abdominal pain, chills, coughing or fatigue.     Review of Systems   Constitutional:  Negative for chills and fatigue.   Respiratory:  Negative for cough and shortness of breath.    Gastrointestinal:  Negative for abdominal pain, constipation and diarrhea.   Musculoskeletal:  Positive for arthralgias and back pain.        Left shoulder    Neurological:  Positive for headaches.   Psychiatric/Behavioral:  Negative for agitation and confusion.           Objective     Physical Exam  Vitals reviewed.   HENT:      Right Ear: Tympanic membrane normal.      Left Ear: Tympanic membrane normal.   Cardiovascular:      Rate and Rhythm: Normal rate and regular rhythm.   Pulmonary:      Effort: Pulmonary effort is normal.      Breath sounds: Normal breath sounds.   Abdominal:      General: Bowel sounds are normal. There is no distension.      Tenderness: There is no abdominal tenderness.   Musculoskeletal:         General: Normal range of motion.   Skin:     General: Skin is warm.   Neurological:      General: No focal deficit  present.      Mental Status: She is alert.   Psychiatric:         Mood and Affect: Mood normal.            Assessment and Plan     1. Routine medical exam    2. Acute nonintractable headache, unspecified headache type  -     acetaminophen tablet 500 mg    3. Primary hypertension    Other orders  -     pantoprazole (PROTONIX) 40 MG tablet; Take 1 tablet (40 mg total) by mouth once daily.  Dispense: 30 tablet; Refill: 5        Needs monthly B12 injection ordered by another provider     2 week BP check.     Three month follow up         No follow-ups on file.

## 2023-12-12 ENCOUNTER — DOCUMENTATION ONLY (OUTPATIENT)
Dept: PAIN MEDICINE | Facility: HOSPITAL | Age: 68
End: 2023-12-12
Payer: MEDICARE

## 2023-12-12 ENCOUNTER — HOSPITAL ENCOUNTER (OUTPATIENT)
Facility: HOSPITAL | Age: 68
Discharge: HOME | End: 2023-12-12
Attending: ANESTHESIOLOGY | Admitting: ANESTHESIOLOGY
Payer: MEDICARE

## 2023-12-12 VITALS
OXYGEN SATURATION: 98 % | RESPIRATION RATE: 18 BRPM | HEART RATE: 62 BPM | SYSTOLIC BLOOD PRESSURE: 226 MMHG | WEIGHT: 229.63 LBS | DIASTOLIC BLOOD PRESSURE: 100 MMHG | HEIGHT: 64 IN | BODY MASS INDEX: 39.2 KG/M2 | TEMPERATURE: 97 F

## 2023-12-12 DIAGNOSIS — M47.816 LUMBAR SPONDYLOSIS: ICD-10-CM

## 2023-12-12 PROCEDURE — 25000003 PHARM REV CODE 250: Performed by: ANESTHESIOLOGY

## 2023-12-12 PROCEDURE — 64493 INJ PARAVERT F JNT L/S 1 LEV: CPT | Mod: 50,,, | Performed by: ANESTHESIOLOGY

## 2023-12-12 PROCEDURE — 64494 PR INJ DX/THER AGNT PARAVERT FACET JOINT,IMG GUIDE,LUMBAR/SAC, 2ND LEVEL: ICD-10-PCS | Mod: 50,,, | Performed by: ANESTHESIOLOGY

## 2023-12-12 PROCEDURE — 64493 INJ PARAVERT F JNT L/S 1 LEV: CPT | Mod: 50 | Performed by: ANESTHESIOLOGY

## 2023-12-12 PROCEDURE — 64494 INJ PARAVERT F JNT L/S 2 LEV: CPT | Mod: 50 | Performed by: ANESTHESIOLOGY

## 2023-12-12 PROCEDURE — 63600175 PHARM REV CODE 636 W HCPCS: Performed by: ANESTHESIOLOGY

## 2023-12-12 PROCEDURE — 64494 INJ PARAVERT F JNT L/S 2 LEV: CPT | Mod: 50,,, | Performed by: ANESTHESIOLOGY

## 2023-12-12 PROCEDURE — 64493 PR INJ DX/THER AGNT PARAVERT FACET JOINT,IMG GUIDE,LUMBAR/SAC,1ST LVL: ICD-10-PCS | Mod: 50,,, | Performed by: ANESTHESIOLOGY

## 2023-12-12 RX ORDER — METHYLPREDNISOLONE ACETATE 40 MG/ML
INJECTION, SUSPENSION INTRA-ARTICULAR; INTRALESIONAL; INTRAMUSCULAR; SOFT TISSUE
Status: DISCONTINUED | OUTPATIENT
Start: 2023-12-12 | End: 2023-12-12 | Stop reason: HOSPADM

## 2023-12-12 RX ORDER — BUPIVACAINE HYDROCHLORIDE 5 MG/ML
INJECTION, SOLUTION EPIDURAL; INTRACAUDAL
Status: DISCONTINUED | OUTPATIENT
Start: 2023-12-12 | End: 2023-12-12 | Stop reason: HOSPADM

## 2023-12-12 RX ORDER — HYDRALAZINE HYDROCHLORIDE 20 MG/ML
10 INJECTION INTRAMUSCULAR; INTRAVENOUS ONCE
Status: COMPLETED | OUTPATIENT
Start: 2023-12-12 | End: 2023-12-12

## 2023-12-12 RX ORDER — MIDAZOLAM HYDROCHLORIDE 1 MG/ML
INJECTION, SOLUTION INTRAMUSCULAR; INTRAVENOUS
Status: DISCONTINUED | OUTPATIENT
Start: 2023-12-12 | End: 2023-12-12 | Stop reason: HOSPADM

## 2023-12-12 RX ORDER — INDOMETHACIN 25 MG/1
CAPSULE ORAL
Status: DISCONTINUED | OUTPATIENT
Start: 2023-12-12 | End: 2023-12-12 | Stop reason: HOSPADM

## 2023-12-12 RX ORDER — FENTANYL CITRATE 50 UG/ML
INJECTION, SOLUTION INTRAMUSCULAR; INTRAVENOUS
Status: DISCONTINUED | OUTPATIENT
Start: 2023-12-12 | End: 2023-12-12 | Stop reason: HOSPADM

## 2023-12-12 RX ADMIN — HYDRALAZINE HYDROCHLORIDE 10 MG: 20 INJECTION INTRAMUSCULAR; INTRAVENOUS at 08:12

## 2023-12-12 NOTE — PLAN OF CARE
Addended by: Leticia Vargas on: 10/13/2022 09:53 AM     Modules accepted: Orders BP elevation continued. Continued c/o nausea. Transferred to EMS stretcher x 1 assist. Report given to EMS. OK per MD to leave IV in place for discharge to ER. Band-Aids x 4 to bilateral lower back, CDI. Spouse to follow pt in personal vehicle [facility].

## 2023-12-12 NOTE — PROGRESS NOTES
Assist x 2 to restroom. WC utilized. Pt was unable to use bedpan. Pt experienced N/V and increase weakness and dizziness with activity. MD notified and assessed pt. MD ordered pt to go to the ER via ambulance. Will continue to monitor until discharge to EMS.

## 2023-12-12 NOTE — PROGRESS NOTES
Patient presents prior to her procedure with blood pressure 229/100.  Patient reports that she did not take Benicar medication this morning.  Prior to the procedure, patient is asymptomatic reporting no chest pain, shortness of breath, diaphoresis, palpitations, headache.  Decision was made to proceed with her procedure.  Procedure was uneventful.  Following her procedure, patient's blood pressure remained 231/98.  Patient did reported mild headache, eye pain and nausea.  Patient had small-bowel of emesis.  10 mg hydralazine injected without lowering of BP.  Decision to transport patient via ambulance to [facility] for evaluation and treatment recommendations.  Patient's oxygen saturation and pulse remained within normal limits throughout the entirety of her visit.  Patient remained conversational and AAOX3 without chest pain or shortness of breath or EKG changes. Vitals stable upon discharge.      Dr. Franklin Sutton

## 2023-12-12 NOTE — OP NOTE
Mariah Meza  68 y.o. female      Vitals:    12/12/23 0737   BP: (!) 198/94   Pulse: (!) 53   Resp: 16   Temp:        Procedure Date: 12/12/2023        INFORMED CONSENT: The procedure, risks, benefits and options were discussed with patient. There are no contraindications to the procedure. The patient expressed understanding and agreed to proceed. The personnel performing the procedure was discussed. I verify that I personally obtained consent prior to the start of the procedure and the signed consent can be found on the patient's chart.       Anesthesia:   Conscious sedation provided by M.D    The patient was monitored with continuous pulse oximetry, EKG, and intermittent blood pressure monitors.  The patient was hemodynamically stable throughout the entire process was responsive to voice, and breathing spontaneously.  Supplemental O2 was provided at 2L/min via nasal cannula.  Patient was comfortable for the duration of the procedure. (See nurse documentation and case log for sedation time)    There was a total of 1mg IV Midazolam and 50mcg Fentanyl titrated for the procedure     Pre Procedure diagnosis: Lumbar spondylosis [M47.816]  Post-Procedure diagnosis: SAME     PROCEDURE: bilateral L3,4,5 LUMBAR FACET MEDIAL BRANCH NERVE BLOCK        DESCRIPTION OF PROCEDURE:The patient was brought to the procedure room. After performing time out. IV access was obtained prior to the procedure. The patient was positioned prone on the fluoroscopy table. Continuous hemodynamic monitoring was initiated including blood pressure, EKG, and pulse oximetry. The area of the lumbar spine was prepped chlorhexidine and draped into a sterile field. Fluoroscopy was used to identify the location of the bilateral side L3, L4, and L5 medial branch nerves at the junctions of the superior articular process and the transverse processes of L4, L5, and the sacral ala respectively. Skin anesthesia was achieved using 5 cc of Lidocaine 1% over  "the injection sites. A 22 gauge, 3 1/2" spinal needle was slowly inserted at each level using AP, lateral and oblique fluoroscopic imaging. Negative aspiration for blood or CSF was confirmed.  8 ml bupivacaine 0.25% with 1 mL Decadron was injected at all sites in divided doses. The needles were removed and bleeding was nil. A sterile dressing was applied. No specimens collected. Patient was taken back to the PACU for observation .       Blood Loss: Nill  Specimen: None    Franklin Sutton    "

## 2023-12-12 NOTE — PROGRESS NOTES
MD in room assessing pt and bp at this time. MD states if bp remains elevated post IV hydralazine injection the patient should go to an urgent care for elevated bp.

## 2023-12-12 NOTE — PLAN OF CARE
Charge RN notified of continued elevated by post procedure. Pt co eye pain. HR 49. Charge RN states she will notify MD for orders.

## 2023-12-12 NOTE — DISCHARGE INSTRUCTIONS

## 2023-12-12 NOTE — DISCHARGE SUMMARY
Discharge Note  Short Stay      SUMMARY     Admit Date: 12/12/2023    Attending Physician: Franklin Sutton MD        Discharge Physician: Franklin Sutton MD        Discharge Date: 12/12/2023 7:40 AM    Procedure(s) (LRB):  Bilateral L3-5 * MBB #1 with RN IV sedation (Bilateral)    Final Diagnosis: Lumbar spondylosis [M47.816]    Disposition: Home or self care    Patient Instructions:   Current Discharge Medication List        CONTINUE these medications which have NOT CHANGED    Details   carvediloL (COREG) 12.5 MG tablet Take 12.5 mg by mouth 2 (two) times daily with meals.      olmesartan-hydrochlorothiazide (BENICAR HCT) 40-25 mg per tablet Take 1 tablet by mouth.      acetaminophen (TYLENOL) 650 MG TbSR Take 650 mg by mouth daily as needed.      ALPRAZolam (XANAX) 0.5 MG tablet Take 1 tablet (0.5 mg total) by mouth On call Procedure for Anxiety.  Qty: 2 tablet, Refills: 0    Associated Diagnoses: Claustrophobia      calcium carbonate 470 mg calcium (1,177 mg) Chew Take one tablet by mouth twice daily  Qty: 60 each, Refills: 0      cyclobenzaprine (FLEXERIL) 10 MG tablet Take 1 tablet (10 mg total) by mouth 3 (three) times daily as needed for Muscle spasms.  Qty: 30 tablet, Refills: 0    Associated Diagnoses: Right flank pain      ezetimibe (ZETIA) 10 mg tablet Take 1 tablet (10 mg total) by mouth once daily.  Qty: 90 tablet, Refills: 3    Associated Diagnoses: Mixed hyperlipidemia      furosemide (LASIX) 40 MG tablet Take 1 tablet by mouth twice daily as needed  Qty: 60 tablet, Refills: 0    Associated Diagnoses: Localized swelling of right lower extremity; Swelling of left lower extremity      levothyroxine (SYNTHROID) 100 MCG tablet Take 100 mcg by mouth before breakfast.      linaclotide (LINZESS ORAL) Take 72 mg by mouth once daily.      magnesium oxide (MAG-OX) 400 mg (241.3 mg magnesium) tablet Take 400 mg by mouth once daily.      pantoprazole (PROTONIX) 40 MG tablet Take 1 tablet (40 mg total) by mouth once  daily.  Qty: 30 tablet, Refills: 5      psyllium husk/aspartame (METAMUCIL FIBER SINGLES ORAL) Take by mouth.      vitamin D (VITAMIN D3) 1000 units Tab Take 1,000 Units by mouth once daily.      vitamin E 400 UNIT capsule Take 400 Units by mouth once daily.      wheat dextrin/L.acid/aspartame (FIBER WITH PROBIOTIC ORAL) Take by mouth Daily.                 Discharge Diagnosis: Lumbar spondylosis [M47.816]  Condition on Discharge: Stable with no complications to procedure   Diet on Discharge: Same as before.  Activity: as per instruction sheet.  Discharge to: Home with a responsible adult.  Follow up: 2-4 weeks       Please call the office at [phone] if you experience any weakness or loss of sensation, fever > 101.5, pain uncontrolled with oral medications, persistent nausea/vomiting/or diarrhea, redness or drainage from the incisions, or any other worrisome concerns. If physician on call was not reached or could not communicate with our office for any reason please go to the nearest emergency department

## 2023-12-16 NOTE — PROGRESS NOTES
Orthopaedic Follow-Up Visit    Last Appointment: 6/28/23  Diagnosis: Chronic left shoulder pain, rotator cuff tendinopathy, glenohumeral osteoarthritis, history of rotator cuff repair  Prior Procedure: EVELIA    Mariah Meza is a 68 y.o. female who is here for f/u evaluation of her left shoulder. The patient was last seen here by me on 6/28/23 at which point she had reported good relief from previous injection but symptoms had begun to return. It had only been 6 weeks since her previous injection so proceeded with home exercise program. The patient returns today reporting that she was doing well for a while since her last visit, but reports over the last 2 weeks her symptoms have begun to worsen. She reports use of a sling for 2 days recently due to increase in pain. She reports her pain is feeling slightly better today and rates her pain a 4/10 today.    To review her history, Mariah Meza is a 68 y.o. right-hand dominant female who presented on 5/17/23 with left shoulder pain and dysfunction that had started approximately 4 weeks prior with no specific injury or trauma and gradual worsening of symptoms. Her symptoms included left shoulder pain, generally about the shoulder with pain quality of constant aching and sharp pain. Her pain was aggravated by overhead movement, reaching behind, and raising arm.  She has tried rest, activity modification, muscle relaxer, and Tylenol. She noted that she had been told by a physician a some point to not take anti-inflammatories.  Of note she has a history RCR on both shoulders. Her symptoms and physical exam were consistent with rotator cuff tendinopathy. She also had evidence of glenohumeral arthritis. We elected to proceed with left shoulder corticosteroid injection into the subacromial space.    Patient's medications, allergies, past medical, surgical, social and family histories were reviewed and updated as appropriate.    Review of Systems   All systems  reviewed were negative.  Specifically, the patient denies fever, chills, weight loss, chest pain, shortness of breath, or dyspnea on exertion.      Past Medical History:   Diagnosis Date    Chronic rhinitis     DJD (degenerative joint disease), lumbar     GERD (gastroesophageal reflux disease)     Headache     Hiatal hernia     Hyperlipidemia     Hypertension     Liver disease     Fatty Liver    Low back pain     Obesity     PONV (postoperative nausea and vomiting)     Thyroid disease        Past Surgical History:   Procedure Laterality Date    BILATERAL OOPHORECTOMY  2002    CARPAL TUNNEL RELEASE      Left wrist    CHOLECYSTECTOMY      DISSECTION OF NECK Bilateral 12/20/2021    Procedure: DISSECTION, NECK;  Surgeon: Deejay Olsen MD;  Location: [facility] OR;  Service: ENT;  Laterality: Bilateral;  Central neck dissection    HYSTERECTOMY  1984    INJECTION OF ANESTHETIC AGENT AROUND MEDIAL BRANCH NERVES INNERVATING LUMBAR FACET JOINT Bilateral 12/12/2023    Procedure: Bilateral L3-5 * MBB #1 with RN IV sedation;  Surgeon: Franklin Sutton MD;  Location: [facility];  Service: Pain Management;  Laterality: Bilateral;    NECK EXPLORATION Bilateral 12/20/2021    Procedure: EXPLORATION, NECK;  Surgeon: Deejay Olsen MD;  Location: [facility] OR;  Service: ENT;  Laterality: Bilateral;  Parathyroid exploration    ROTATOR CUFF REPAIR      Right shoulder    ROTATOR CUFF REPAIR Left 2010    THYROIDECTOMY, BILATERAL Bilateral 12/20/2021    Procedure: THYROIDECTOMY,BILATERAL;  Surgeon: Deejay Olsen MD;  Location: [facility] OR;  Service: ENT;  Laterality: Bilateral;    TOTAL VAGINAL HYSTERECTOMY  1985       Patient's Medications   New Prescriptions    No medications on file   Previous Medications    ACETAMINOPHEN (TYLENOL) 650 MG TBSR    Take 650 mg by mouth daily as needed.    ALPRAZOLAM (XANAX) 0.5 MG TABLET    Take 1 tablet (0.5 mg total) by mouth On call Procedure for Anxiety.    CALCIUM CARBONATE 470 MG CALCIUM (1,177 MG) CHEW    Take one tablet  by mouth twice daily    CARVEDILOL (COREG) 12.5 MG TABLET    Take 12.5 mg by mouth 2 (two) times daily with meals.    CYCLOBENZAPRINE (FLEXERIL) 10 MG TABLET    Take 1 tablet (10 mg total) by mouth 3 (three) times daily as needed for Muscle spasms.    EZETIMIBE (ZETIA) 10 MG TABLET    Take 1 tablet (10 mg total) by mouth once daily.    FUROSEMIDE (LASIX) 40 MG TABLET    Take 1 tablet by mouth twice daily as needed    LEVOTHYROXINE (SYNTHROID) 100 MCG TABLET    Take 100 mcg by mouth before breakfast.    LINACLOTIDE (LINZESS ORAL)    Take 72 mg by mouth once daily.    MAGNESIUM OXIDE (MAG-OX) 400 MG (241.3 MG MAGNESIUM) TABLET    Take 400 mg by mouth once daily.    OLMESARTAN-HYDROCHLOROTHIAZIDE (BENICAR HCT) 40-25 MG PER TABLET    Take 1 tablet by mouth.    PANTOPRAZOLE (PROTONIX) 40 MG TABLET    Take 1 tablet (40 mg total) by mouth once daily.    PSYLLIUM HUSK/ASPARTAME (METAMUCIL FIBER SINGLES ORAL)    Take by mouth.    VITAMIN D (VITAMIN D3) 1000 UNITS TAB    Take 1,000 Units by mouth once daily.    VITAMIN E 400 UNIT CAPSULE    Take 400 Units by mouth once daily.    WHEAT DEXTRIN/L.ACID/ASPARTAME (FIBER WITH PROBIOTIC ORAL)    Take by mouth Daily.   Modified Medications    No medications on file   Discontinued Medications    No medications on file       Family History   Problem Relation Age of Onset    Diabetes Mother     Hypertension Mother     Hyperlipidemia Mother     Heart disease Mother         CHF    Stroke Mother     Heart attack Mother     Hearing loss Mother     Diabetes Sister     Sickle cell anemia Other         nieces, nephews    Lupus Other         niece       Review of patient's allergies indicates:   Allergen Reactions    Iodine and iodide containing products Itching    Shellfish containing products Swelling    Statins-hmg-coa reductase inhibitors Other (See Comments)     Myalgias    Adhesive Dermatitis    Iodine Other (See Comments)    Ivp dye [iodinated contrast media] Other (See Comments)          Objective:      Physical Exam  Patient is alert and oriented, no distress. Skin is intact. Neuro is normal with no focal motor or sensory findings.    Cervical exam is unremarkable. Intact cervical ROM. Negative Spurling's test    Physical Exam:                       RIGHT                                     LEFT     Scap Dyskinesis/Winging       (-)                                             (-)     Tenderness:                                                                              Greater Tuberosity                  (-)                                            +  Bicipital Groove                       (-)                                             +  AC joint                                   (-)                                             +  Other:      ROM:  Forward Elevation       180                                          130/160  Abduction                    120                                          90/110  ER (at side)                 80                                            60  IR                                 L1                                            L5     Strength:   Supraspinatus             5/5                                           4+/5  Infraspinatus               5/5                                           4+/5  Subscap / IR               5/5                                           5/5      Special Tests:              Neer:                                       (-)                                             +              Gonsales:                                 (-)                                             +              SS Stress:                               (-)                                             +              Bear Hug:                                (-)                                             (-)              [place]'s:                                 (-)                                             +              Resisted Thrower's:                 (-)                                             +              Cross Arm Abduction:             (-)                                             (-)    Neurovascular examination  - Motor grossly intact bilaterally to shoulder abduction, elbow flexion and extension, wrist flexion and extension, and intrinsic hand musculature  - Sensation intact to light touch bilaterally in axillary, median, radial, and ulnar distributions  - Symmetrical radial pulses    Imaging:    XR Results:  Results for orders placed in visit on 05/11/23    X-Ray Shoulder 2 or More Views Left    Narrative  EXAM: XR SHOULDER COMPLETE 2 OR MORE VIEWS LEFT    CLINICAL HISTORY: Left shoulder pain    TECHNIQUE: Left shoulder, 3 views    COMPARISON:  No studies are available for comparison.    FINDINGS: Remote distal clavicle and acromial fractures.  Moderate acromio clavicular and glenohumeral arthrosis.  Alignment is satisfactory.    Impression  Remote posttraumatic changes and degenerative joint disease.        Finalized on: 5/11/2023 10:32 AM By:  KURT Buck MD, MD  [facility]# [id number]      2023-05-11 10:34:[id number]    [facility]      MRI Results:  No results found for this or any previous visit.      CT Results:  No results found for this or any previous visit.      Physician read: I agree with the above impression.    Assessment/Plan:   Mariah Meza is a 68 y.o. female with chronic left shoulder pain, rotator cuff tendinopathy, glenohumeral osteoarthritis, history of rotator cuff repair     Plan:    Her history and physical exam are consistent with rotator cuff tendinopathy after previous repair.   Overall she has tried extensive non-operative treatment in the form of rest, activity modification, muscle relaxer, Tylenol, subacromial space CSI, and home exercise program. Despite these treatments, her symptoms continue to persist.   I recommend proceeding with MRI of the left shoulder for further evaluation.   Follow up with me after MRI.            Jean-Pierre Espinal MD    I, Alex Esparza, acted as a scribe for Jean-Pierre Espinal MD for the duration of this office visit.

## 2023-12-19 ENCOUNTER — OFFICE VISIT (OUTPATIENT)
Dept: SPORTS MEDICINE | Facility: CLINIC | Age: 68
End: 2023-12-19
Payer: MEDICARE

## 2023-12-19 VITALS — HEIGHT: 64 IN | BODY MASS INDEX: 39.09 KG/M2 | RESPIRATION RATE: 17 BRPM | WEIGHT: 229 LBS

## 2023-12-19 DIAGNOSIS — G89.29 CHRONIC LEFT SHOULDER PAIN: ICD-10-CM

## 2023-12-19 DIAGNOSIS — M25.512 CHRONIC LEFT SHOULDER PAIN: ICD-10-CM

## 2023-12-19 DIAGNOSIS — M19.012 PRIMARY OSTEOARTHRITIS OF LEFT SHOULDER: ICD-10-CM

## 2023-12-19 DIAGNOSIS — Z98.890 HISTORY OF REPAIR OF LEFT ROTATOR CUFF: ICD-10-CM

## 2023-12-19 DIAGNOSIS — M67.912 TENDINOPATHY OF ROTATOR CUFF, LEFT: Primary | ICD-10-CM

## 2023-12-19 PROCEDURE — 3044F PR MOST RECENT HEMOGLOBIN A1C LEVEL <7.0%: ICD-10-PCS | Mod: CPTII,S$GLB,, | Performed by: STUDENT IN AN ORGANIZED HEALTH CARE EDUCATION/TRAINING PROGRAM

## 2023-12-19 PROCEDURE — 99999 PR PBB SHADOW E&M-EST. PATIENT-LVL III: ICD-10-PCS | Mod: PBBFAC,,, | Performed by: STUDENT IN AN ORGANIZED HEALTH CARE EDUCATION/TRAINING PROGRAM

## 2023-12-19 PROCEDURE — 99214 OFFICE O/P EST MOD 30 MIN: CPT | Mod: S$GLB,,, | Performed by: STUDENT IN AN ORGANIZED HEALTH CARE EDUCATION/TRAINING PROGRAM

## 2023-12-19 PROCEDURE — 3008F PR BODY MASS INDEX (BMI) DOCUMENTED: ICD-10-PCS | Mod: CPTII,S$GLB,, | Performed by: STUDENT IN AN ORGANIZED HEALTH CARE EDUCATION/TRAINING PROGRAM

## 2023-12-19 PROCEDURE — 1125F PR PAIN SEVERITY QUANTIFIED, PAIN PRESENT: ICD-10-PCS | Mod: CPTII,S$GLB,, | Performed by: STUDENT IN AN ORGANIZED HEALTH CARE EDUCATION/TRAINING PROGRAM

## 2023-12-19 PROCEDURE — 3044F HG A1C LEVEL LT 7.0%: CPT | Mod: CPTII,S$GLB,, | Performed by: STUDENT IN AN ORGANIZED HEALTH CARE EDUCATION/TRAINING PROGRAM

## 2023-12-19 PROCEDURE — 1159F PR MEDICATION LIST DOCUMENTED IN MEDICAL RECORD: ICD-10-PCS | Mod: CPTII,S$GLB,, | Performed by: STUDENT IN AN ORGANIZED HEALTH CARE EDUCATION/TRAINING PROGRAM

## 2023-12-19 PROCEDURE — 4010F PR ACE/ARB THEARPY RXD/TAKEN: ICD-10-PCS | Mod: CPTII,S$GLB,, | Performed by: STUDENT IN AN ORGANIZED HEALTH CARE EDUCATION/TRAINING PROGRAM

## 2023-12-19 PROCEDURE — 3008F BODY MASS INDEX DOCD: CPT | Mod: CPTII,S$GLB,, | Performed by: STUDENT IN AN ORGANIZED HEALTH CARE EDUCATION/TRAINING PROGRAM

## 2023-12-19 PROCEDURE — 1159F MED LIST DOCD IN RCRD: CPT | Mod: CPTII,S$GLB,, | Performed by: STUDENT IN AN ORGANIZED HEALTH CARE EDUCATION/TRAINING PROGRAM

## 2023-12-19 PROCEDURE — 4010F ACE/ARB THERAPY RXD/TAKEN: CPT | Mod: CPTII,S$GLB,, | Performed by: STUDENT IN AN ORGANIZED HEALTH CARE EDUCATION/TRAINING PROGRAM

## 2023-12-19 PROCEDURE — 1101F PR PT FALLS ASSESS DOC 0-1 FALLS W/OUT INJ PAST YR: ICD-10-PCS | Mod: CPTII,S$GLB,, | Performed by: STUDENT IN AN ORGANIZED HEALTH CARE EDUCATION/TRAINING PROGRAM

## 2023-12-19 PROCEDURE — 99214 PR OFFICE/OUTPT VISIT, EST, LEVL IV, 30-39 MIN: ICD-10-PCS | Mod: S$GLB,,, | Performed by: STUDENT IN AN ORGANIZED HEALTH CARE EDUCATION/TRAINING PROGRAM

## 2023-12-19 PROCEDURE — 1125F AMNT PAIN NOTED PAIN PRSNT: CPT | Mod: CPTII,S$GLB,, | Performed by: STUDENT IN AN ORGANIZED HEALTH CARE EDUCATION/TRAINING PROGRAM

## 2023-12-19 PROCEDURE — 3288F PR FALLS RISK ASSESSMENT DOCUMENTED: ICD-10-PCS | Mod: CPTII,S$GLB,, | Performed by: STUDENT IN AN ORGANIZED HEALTH CARE EDUCATION/TRAINING PROGRAM

## 2023-12-19 PROCEDURE — 99999 PR PBB SHADOW E&M-EST. PATIENT-LVL III: CPT | Mod: PBBFAC,,, | Performed by: STUDENT IN AN ORGANIZED HEALTH CARE EDUCATION/TRAINING PROGRAM

## 2023-12-19 PROCEDURE — 1101F PT FALLS ASSESS-DOCD LE1/YR: CPT | Mod: CPTII,S$GLB,, | Performed by: STUDENT IN AN ORGANIZED HEALTH CARE EDUCATION/TRAINING PROGRAM

## 2023-12-19 PROCEDURE — 3288F FALL RISK ASSESSMENT DOCD: CPT | Mod: CPTII,S$GLB,, | Performed by: STUDENT IN AN ORGANIZED HEALTH CARE EDUCATION/TRAINING PROGRAM

## 2023-12-26 ENCOUNTER — LAB VISIT (OUTPATIENT)
Dept: LAB | Facility: HOSPITAL | Age: 68
End: 2023-12-26
Attending: FAMILY MEDICINE
Payer: MEDICARE

## 2023-12-26 ENCOUNTER — CLINICAL SUPPORT (OUTPATIENT)
Dept: INTERNAL MEDICINE | Facility: CLINIC | Age: 68
End: 2023-12-26
Payer: MEDICARE

## 2023-12-26 ENCOUNTER — OFFICE VISIT (OUTPATIENT)
Dept: INTERNAL MEDICINE | Facility: CLINIC | Age: 68
End: 2023-12-26
Payer: MEDICARE

## 2023-12-26 VITALS — DIASTOLIC BLOOD PRESSURE: 90 MMHG | SYSTOLIC BLOOD PRESSURE: 156 MMHG

## 2023-12-26 VITALS
HEIGHT: 64 IN | RESPIRATION RATE: 18 BRPM | OXYGEN SATURATION: 98 % | TEMPERATURE: 98 F | HEART RATE: 60 BPM | DIASTOLIC BLOOD PRESSURE: 84 MMHG | SYSTOLIC BLOOD PRESSURE: 136 MMHG | BODY MASS INDEX: 39.09 KG/M2 | WEIGHT: 229 LBS

## 2023-12-26 DIAGNOSIS — K59.09 CHRONIC CONSTIPATION: ICD-10-CM

## 2023-12-26 DIAGNOSIS — E03.9 HYPOTHYROIDISM (ACQUIRED): ICD-10-CM

## 2023-12-26 DIAGNOSIS — I10 PRIMARY HYPERTENSION: ICD-10-CM

## 2023-12-26 DIAGNOSIS — I10 PRIMARY HYPERTENSION: Primary | ICD-10-CM

## 2023-12-26 LAB
ALBUMIN SERPL BCP-MCNC: 3.8 G/DL (ref 3.5–5.2)
ALP SERPL-CCNC: 56 U/L (ref 55–135)
ALT SERPL W/O P-5'-P-CCNC: 21 U/L (ref 10–44)
ANION GAP SERPL CALC-SCNC: 9 MMOL/L (ref 8–16)
AST SERPL-CCNC: 17 U/L (ref 10–40)
BILIRUB SERPL-MCNC: 0.8 MG/DL (ref 0.1–1)
BUN SERPL-MCNC: 20 MG/DL (ref 8–23)
CALCIUM SERPL-MCNC: 8.3 MG/DL (ref 8.7–10.5)
CHLORIDE SERPL-SCNC: 107 MMOL/L (ref 95–110)
CO2 SERPL-SCNC: 26 MMOL/L (ref 23–29)
CREAT SERPL-MCNC: 1 MG/DL (ref 0.5–1.4)
EST. GFR  (NO RACE VARIABLE): >60 ML/MIN/1.73 M^2
GLUCOSE SERPL-MCNC: 95 MG/DL (ref 70–110)
POTASSIUM SERPL-SCNC: 3.5 MMOL/L (ref 3.5–5.1)
PROT SERPL-MCNC: 7.4 G/DL (ref 6–8.4)
SODIUM SERPL-SCNC: 142 MMOL/L (ref 136–145)
TSH SERPL DL<=0.005 MIU/L-ACNC: 0.81 UIU/ML (ref 0.4–4)

## 2023-12-26 PROCEDURE — 36415 COLL VENOUS BLD VENIPUNCTURE: CPT | Mod: PN | Performed by: FAMILY MEDICINE

## 2023-12-26 PROCEDURE — 3079F PR MOST RECENT DIASTOLIC BLOOD PRESSURE 80-89 MM HG: ICD-10-PCS | Mod: CPTII,S$GLB,, | Performed by: FAMILY MEDICINE

## 2023-12-26 PROCEDURE — 1159F PR MEDICATION LIST DOCUMENTED IN MEDICAL RECORD: ICD-10-PCS | Mod: CPTII,S$GLB,, | Performed by: FAMILY MEDICINE

## 2023-12-26 PROCEDURE — 1125F AMNT PAIN NOTED PAIN PRSNT: CPT | Mod: CPTII,S$GLB,, | Performed by: FAMILY MEDICINE

## 2023-12-26 PROCEDURE — 3008F PR BODY MASS INDEX (BMI) DOCUMENTED: ICD-10-PCS | Mod: CPTII,S$GLB,, | Performed by: FAMILY MEDICINE

## 2023-12-26 PROCEDURE — 99214 OFFICE O/P EST MOD 30 MIN: CPT | Mod: S$GLB,,, | Performed by: FAMILY MEDICINE

## 2023-12-26 PROCEDURE — 4010F ACE/ARB THERAPY RXD/TAKEN: CPT | Mod: CPTII,S$GLB,, | Performed by: FAMILY MEDICINE

## 2023-12-26 PROCEDURE — 1159F MED LIST DOCD IN RCRD: CPT | Mod: CPTII,S$GLB,, | Performed by: FAMILY MEDICINE

## 2023-12-26 PROCEDURE — 3075F SYST BP GE 130 - 139MM HG: CPT | Mod: CPTII,S$GLB,, | Performed by: FAMILY MEDICINE

## 2023-12-26 PROCEDURE — 4010F PR ACE/ARB THEARPY RXD/TAKEN: ICD-10-PCS | Mod: CPTII,S$GLB,, | Performed by: FAMILY MEDICINE

## 2023-12-26 PROCEDURE — 99999 PR PBB SHADOW E&M-EST. PATIENT-LVL V: ICD-10-PCS | Mod: PBBFAC,,, | Performed by: FAMILY MEDICINE

## 2023-12-26 PROCEDURE — 3044F HG A1C LEVEL LT 7.0%: CPT | Mod: CPTII,S$GLB,, | Performed by: FAMILY MEDICINE

## 2023-12-26 PROCEDURE — 99214 PR OFFICE/OUTPT VISIT, EST, LEVL IV, 30-39 MIN: ICD-10-PCS | Mod: S$GLB,,, | Performed by: FAMILY MEDICINE

## 2023-12-26 PROCEDURE — 3079F DIAST BP 80-89 MM HG: CPT | Mod: CPTII,S$GLB,, | Performed by: FAMILY MEDICINE

## 2023-12-26 PROCEDURE — 80053 COMPREHEN METABOLIC PANEL: CPT | Performed by: FAMILY MEDICINE

## 2023-12-26 PROCEDURE — 99999 PR PBB SHADOW E&M-EST. PATIENT-LVL V: CPT | Mod: PBBFAC,,, | Performed by: FAMILY MEDICINE

## 2023-12-26 PROCEDURE — 1125F PR PAIN SEVERITY QUANTIFIED, PAIN PRESENT: ICD-10-PCS | Mod: CPTII,S$GLB,, | Performed by: FAMILY MEDICINE

## 2023-12-26 PROCEDURE — 3044F PR MOST RECENT HEMOGLOBIN A1C LEVEL <7.0%: ICD-10-PCS | Mod: CPTII,S$GLB,, | Performed by: FAMILY MEDICINE

## 2023-12-26 PROCEDURE — 3008F BODY MASS INDEX DOCD: CPT | Mod: CPTII,S$GLB,, | Performed by: FAMILY MEDICINE

## 2023-12-26 PROCEDURE — 84443 ASSAY THYROID STIM HORMONE: CPT | Performed by: FAMILY MEDICINE

## 2023-12-26 PROCEDURE — 3075F PR MOST RECENT SYSTOLIC BLOOD PRESS GE 130-139MM HG: ICD-10-PCS | Mod: CPTII,S$GLB,, | Performed by: FAMILY MEDICINE

## 2023-12-26 RX ORDER — CLONIDINE HYDROCHLORIDE 0.1 MG/1
TABLET ORAL
COMMUNITY
Start: 2023-12-14

## 2023-12-26 RX ORDER — DICYCLOMINE HYDROCHLORIDE 10 MG/1
10 CAPSULE ORAL
COMMUNITY
Start: 2023-12-22

## 2023-12-26 NOTE — PROGRESS NOTES
Subjective:       Patient ID: Mariah Meza is a 68 y.o. female.    Chief Complaint: Constipation    Mariah Meza is 68 y.o. female who presents for follow up of HTN and chronic constipation. She was started on Lizness but stopped it due to diarrhea     HTN  Was told not to take Lasix and use Olmesartan-HCTZ instead along with carvedilol. Stopped amlodipine due to swelling   Regimen was modified by Dr. Matta     Hypothyroidism  S/p thyroidectomy, currently taking levothyroxine 100 mcg daily       Review of Systems   Constitutional:  Negative for chills and fever.   HENT:  Negative for congestion, rhinorrhea and sore throat.    Respiratory:  Negative for cough, shortness of breath and wheezing.    Cardiovascular:  Negative for chest pain, palpitations and leg swelling.   Gastrointestinal:  Positive for constipation. Negative for abdominal pain, blood in stool, diarrhea, nausea and vomiting.   Genitourinary:  Negative for dysuria, frequency and urgency.   Neurological:  Negative for headaches.   Psychiatric/Behavioral:  Negative for dysphoric mood.        Objective:      Physical Exam  Vitals reviewed.   HENT:      Head: Normocephalic and atraumatic.      Nose: No congestion or rhinorrhea.   Eyes:      General: No scleral icterus.        Right eye: No discharge.         Left eye: No discharge.      Extraocular Movements: Extraocular movements intact.      Conjunctiva/sclera: Conjunctivae normal.      Pupils: Pupils are equal, round, and reactive to light.   Cardiovascular:      Rate and Rhythm: Normal rate and regular rhythm.      Heart sounds: No murmur heard.     No friction rub. No gallop.   Pulmonary:      Effort: Pulmonary effort is normal. No respiratory distress.      Breath sounds: Normal breath sounds. No stridor. No wheezing or rhonchi.   Abdominal:      General: Bowel sounds are normal. There is no distension.      Palpations: Abdomen is soft. There is no mass.      Tenderness: There is no  abdominal tenderness. There is no guarding or rebound.      Hernia: No hernia is present.   Musculoskeletal:      Right lower leg: No edema.      Left lower leg: No edema.   Skin:     General: Skin is warm.   Neurological:      General: No focal deficit present.      Mental Status: She is alert.   Psychiatric:         Mood and Affect: Mood normal.         Behavior: Behavior normal.         Assessment:       1. Primary hypertension    2. Hypothyroidism (acquired)    3. Chronic constipation        Plan:   1. Primary hypertension  Chronic, stable, continue Rx med   -     COMPREHENSIVE METABOLIC PANEL; Future; Expected date: 12/26/2023    2. Hypothyroidism (acquired)  Chronic, stable, given constipation will obtain TSH, continue Rx med for now  -     TSH; Future; Expected date: 12/26/2023    3. Chronic constipation  Chronic, uncontrolled, advised adequate water intake, miralax daily      Future Appointments   Date Time Provider Department Center   1/2/2024  9:45 AM Jean-Pierre Espinal MD [facility] [place]   1/11/2024 10:30 AM INTERNAL MEDICINE NURSE, SELWYN SAMANO  [place]   2/5/2024  7:45 AM Franklin Sutton MD [facility] [place]   3/11/2024  9:00 AM Taina Bob, NP [facility] Ry Schmid MD  Family Medicine

## 2023-12-28 ENCOUNTER — HOSPITAL ENCOUNTER (OUTPATIENT)
Dept: RADIOLOGY | Facility: HOSPITAL | Age: 68
Discharge: HOME | End: 2023-12-28
Attending: STUDENT IN AN ORGANIZED HEALTH CARE EDUCATION/TRAINING PROGRAM
Payer: MEDICARE

## 2023-12-28 DIAGNOSIS — M25.512 CHRONIC LEFT SHOULDER PAIN: ICD-10-CM

## 2023-12-28 DIAGNOSIS — M67.912 TENDINOPATHY OF ROTATOR CUFF, LEFT: ICD-10-CM

## 2023-12-28 DIAGNOSIS — Z98.890 HISTORY OF REPAIR OF LEFT ROTATOR CUFF: ICD-10-CM

## 2023-12-28 DIAGNOSIS — M19.012 PRIMARY OSTEOARTHRITIS OF LEFT SHOULDER: ICD-10-CM

## 2023-12-28 DIAGNOSIS — G89.29 CHRONIC LEFT SHOULDER PAIN: ICD-10-CM

## 2023-12-28 PROCEDURE — 73221 MRI JOINT UPR EXTREM W/O DYE: CPT | Mod: TC,LT

## 2023-12-28 PROCEDURE — 73221 MRI JOINT UPR EXTREM W/O DYE: CPT | Mod: 26,LT,, | Performed by: RADIOLOGY

## 2023-12-28 PROCEDURE — 73221 MRI SHOULDER WITHOUT CONTRAST LEFT: ICD-10-PCS | Mod: 26,LT,, | Performed by: RADIOLOGY

## 2023-12-29 NOTE — PROGRESS NOTES
Orthopaedic Follow-Up Visit    Last Appointment: 12/19/23  Diagnosis: Chronic left shoulder pain, rotator cuff tendinopathy, glenohumeral osteoarthritis, history of rotator cuff repair  Prior Procedure: MRI    Mariah Meza is a 68 y.o. female who is here for f/u evaluation of her left shoulder. The patient was last seen here by me on 12/19/23 at which point we elected to proceed with left shoulder MRI to further evaluate for continued shoulder pain following previous repair. The patient returns today to review her MRI and discuss further treatment options.     To review her history, Mariah Meza is a 68 y.o. right-hand dominant female who presented on 5/17/23 with left shoulder pain and dysfunction that had started approximately 4 weeks prior with no specific injury or trauma and gradual worsening of symptoms. Her symptoms included left shoulder pain, generally about the shoulder with pain quality of constant aching and sharp pain. Her pain was aggravated by overhead movement, reaching behind, and raising arm.  She has tried rest, activity modification, muscle relaxer, and Tylenol. She noted that she had been told by a physician a some point to not take anti-inflammatories.  Of note she has a history RCR on both shoulders. Her symptoms and physical exam were consistent with rotator cuff tendinopathy. She also had evidence of glenohumeral arthritis. We elected to proceed with left shoulder corticosteroid injection into the subacromial space. She returned on 6/28/23 at which point she had reported good relief from previous injection but symptoms had begun to return. It had only been 6 weeks since her previous injection so proceeded with home exercise program.     Patient's medications, allergies, past medical, surgical, social and family histories were reviewed and updated as appropriate.    Review of Systems   All systems reviewed were negative.  Specifically, the patient denies fever, chills, weight  loss, chest pain, shortness of breath, or dyspnea on exertion.      Past Medical History:   Diagnosis Date    Chronic rhinitis     DJD (degenerative joint disease), lumbar     GERD (gastroesophageal reflux disease)     Headache     Hiatal hernia     Hyperlipidemia     Hypertension     Liver disease     Fatty Liver    Low back pain     Obesity     PONV (postoperative nausea and vomiting)     Thyroid disease        Past Surgical History:   Procedure Laterality Date    BILATERAL OOPHORECTOMY  2002    CARPAL TUNNEL RELEASE      Left wrist    CHOLECYSTECTOMY      DISSECTION OF NECK Bilateral 12/20/2021    Procedure: DISSECTION, NECK;  Surgeon: Deejay Olsen MD;  Location: [facility] OR;  Service: ENT;  Laterality: Bilateral;  Central neck dissection    HYSTERECTOMY  1984    INJECTION OF ANESTHETIC AGENT AROUND MEDIAL BRANCH NERVES INNERVATING LUMBAR FACET JOINT Bilateral 12/12/2023    Procedure: Bilateral L3-5 * MBB #1 with RN IV sedation;  Surgeon: Franklin Sutton MD;  Location: [facility] PAIN MGT;  Service: Pain Management;  Laterality: Bilateral;    NECK EXPLORATION Bilateral 12/20/2021    Procedure: EXPLORATION, NECK;  Surgeon: Deejay Olsen MD;  Location: [facility] OR;  Service: ENT;  Laterality: Bilateral;  Parathyroid exploration    ROTATOR CUFF REPAIR      Right shoulder    ROTATOR CUFF REPAIR Left 2010    THYROIDECTOMY, BILATERAL Bilateral 12/20/2021    Procedure: THYROIDECTOMY,BILATERAL;  Surgeon: Deejay Olsen MD;  Location: [facility] OR;  Service: ENT;  Laterality: Bilateral;    TOTAL VAGINAL HYSTERECTOMY  1985       Patient's Medications   New Prescriptions    No medications on file   Previous Medications    ACETAMINOPHEN (TYLENOL) 650 MG TBSR    Take 650 mg by mouth daily as needed.    ALPRAZOLAM (XANAX) 0.5 MG TABLET    Take 1 tablet (0.5 mg total) by mouth On call Procedure for Anxiety.    CALCIUM CARBONATE 470 MG CALCIUM (1,177 MG) CHEW    Take one tablet by mouth twice daily    CARVEDILOL (COREG) 12.5 MG TABLET    Take 12.5 mg by  mouth 2 (two) times daily with meals.    CLONIDINE (CATAPRES) 0.1 MG TABLET    Take by mouth.    CYCLOBENZAPRINE (FLEXERIL) 10 MG TABLET    Take 1 tablet (10 mg total) by mouth 3 (three) times daily as needed for Muscle spasms.    DICYCLOMINE (BENTYL) 10 MG CAPSULE    Take 10 mg by mouth.    EZETIMIBE (ZETIA) 10 MG TABLET    Take 1 tablet (10 mg total) by mouth once daily.    LEVOTHYROXINE (SYNTHROID) 100 MCG TABLET    Take 100 mcg by mouth before breakfast.    LINACLOTIDE (LINZESS ORAL)    Take 72 mg by mouth once daily.    MAGNESIUM OXIDE (MAG-OX) 400 MG (241.3 MG MAGNESIUM) TABLET    Take 400 mg by mouth once daily.    OLMESARTAN-HYDROCHLOROTHIAZIDE (BENICAR HCT) 40-25 MG PER TABLET    Take 1 tablet by mouth.    PANTOPRAZOLE (PROTONIX) 40 MG TABLET    Take 1 tablet (40 mg total) by mouth once daily.    PSYLLIUM HUSK/ASPARTAME (METAMUCIL FIBER SINGLES ORAL)    Take by mouth.    VITAMIN D (VITAMIN D3) 1000 UNITS TAB    Take 1,000 Units by mouth once daily.    VITAMIN E 400 UNIT CAPSULE    Take 400 Units by mouth once daily.    WHEAT DEXTRIN/L.ACID/ASPARTAME (FIBER WITH PROBIOTIC ORAL)    Take by mouth Daily.   Modified Medications    No medications on file   Discontinued Medications    No medications on file       Family History   Problem Relation Age of Onset    Diabetes Mother     Hypertension Mother     Hyperlipidemia Mother     Heart disease Mother         CHF    Stroke Mother     Heart attack Mother     Hearing loss Mother     Diabetes Sister     Sickle cell anemia Other         nieces, nephews    Lupus Other         niece       Review of patient's allergies indicates:   Allergen Reactions    Iodine and iodide containing products Itching    Shellfish containing products Swelling    Statins-hmg-coa reductase inhibitors Other (See Comments)     Myalgias    Adhesive Dermatitis    Iodine Other (See Comments)    Ivp dye [iodinated contrast media] Other (See Comments)         Objective:      Physical Exam  Patient  is alert and oriented, no distress. Skin is intact. Neuro is normal with no focal motor or sensory findings.    Cervical exam is unremarkable. Intact cervical ROM. Negative Spurling's test    Physical Exam:                       RIGHT                                     LEFT     Scap Dyskinesis/Winging       (-)                                             (-)     Tenderness:                                                                              Greater Tuberosity                  (-)                                            +  Bicipital Groove                       (-)                                             +  AC joint                                   (-)                                             +  Other:      ROM:  Forward Elevation       180                                          130/160  Abduction                    120                                          90/110  ER (at side)                 80                                            60  IR                                 L1                                            L5     Strength:   Supraspinatus             5/5                                           4+/5  Infraspinatus               5/5                                           4+/5  Subscap / IR               5/5                                           5/5      Special Tests:              Neer:                                       (-)                                             +              Gonsales:                                 (-)                                             +              SS Stress:                               (-)                                             +              Bear Hug:                                (-)                                             (-)              [place]'s:                                 (-)                                             +              Resisted Thrower's:                (-)                                              +              Cross Arm Abduction:             (-)                                             (-)    Neurovascular examination  - Motor grossly intact bilaterally to shoulder abduction, elbow flexion and extension, wrist flexion and extension, and intrinsic hand musculature  - Sensation intact to light touch bilaterally in axillary, median, radial, and ulnar distributions  - Symmetrical radial pulses    Imaging:    XR Results:  Results for orders placed in visit on 05/11/23    X-Ray Shoulder 2 or More Views Left    Narrative  EXAM: XR SHOULDER COMPLETE 2 OR MORE VIEWS LEFT    CLINICAL HISTORY: Left shoulder pain    TECHNIQUE: Left shoulder, 3 views    COMPARISON:  No studies are available for comparison.    FINDINGS: Remote distal clavicle and acromial fractures.  Moderate acromio clavicular and glenohumeral arthrosis.  Alignment is satisfactory.    Impression  Remote posttraumatic changes and degenerative joint disease.        Finalized on: 5/11/2023 10:32 AM By:  KURT Buck MD, MD  [facility]# [id number]      2023-05-11 10:34:[id number]    [facility]      MRI Results:  MRI Shoulder Without Contrast Left  Narrative: EXAM: MRI SHOULDER WITHOUT CONTRAST LEFT    CLINICAL HISTORY:  Left shoulder pain.    COMPARISON:  Left shoulder x-ray on 05/11/2023    TECHNIQUE:  Multiplanar, multisequence MR imaging was performed through the left shoulder without intravenous or intra-articular gadolinium contrast.    FINDINGS:    Mild acromioclavicular arthrosis.  Lateral acromial downsloping. There is a type 1  acromion.    High-grade partial-thickness articular sided tear at the critical zone fibers of the posterior supraspinatus tendon measuring 8 x 7 mm.  Mild bursal sided fraying throughout the supraspinatus tendon.  Low-grade interstitial tearing at the anterior third fibers of the supraspinatus tendon.  Mild infraspinatus and subscapularis tendinosis.  Small full-thickness tear measuring 2 to 3 mm at the superior fibers of the  subscapularis tendon.    The biceps tendon is intact.  Biceps tenosynovitis.    The biceps labral complex is intact.   The anterior/inferior labrum is intact.  The posterior labrum is normal.    There is a normal inferior glenohumeral ligament complex.    The glenoid fossa is unremarkable.  No joint effusion.  No full-thickness chondral defect.  Impression: Small full-thickness tear at the superior fibers of the subscapularis tendon measuring 2 to 3 mm.    High-grade partial-thickness articular sided tear at the critical zone fibers of the posterior supraspinatus tendon measuring 8 x 7 mm.    No evidence of labral pathology.    Finalized on: 12/28/2023 8:13 AM By:  KURT Buck MD, MD  [facility]# [id number]      2023-12-28 08:15:[id number]    [facility]         CT Results:  No results found for this or any previous visit.      Physician read: I agree with the above impression.    Assessment/Plan:   Mariah Meza is a 68 y.o. female with chronic left shoulder pain, rotator cuff tendinopathy, glenohumeral osteoarthritis, history of rotator cuff repair     Plan:    Reviewed MRI with the patient today. ***  Overall she has tried extensive non-operative treatment in the form of rest, activity modification, muscle relaxer, Tylenol, subacromial space CSI, and home exercise program. Despite these treatments, her symptoms continue to persist.   Follow up  ***          Jean-Pierre Espinal MD    I, Alex Esparza, acted as a scribe for Jean-Pierre Espinal MD for the duration of this office visit.

## 2024-01-02 ENCOUNTER — OFFICE VISIT (OUTPATIENT)
Dept: SPORTS MEDICINE | Facility: CLINIC | Age: 69
End: 2024-01-02
Payer: MEDICARE

## 2024-01-02 VITALS — BODY MASS INDEX: 39.09 KG/M2 | WEIGHT: 229 LBS | RESPIRATION RATE: 17 BRPM | HEIGHT: 64 IN

## 2024-01-02 DIAGNOSIS — Z98.890 HISTORY OF REPAIR OF LEFT ROTATOR CUFF: ICD-10-CM

## 2024-01-02 DIAGNOSIS — M75.112 NONTRAUMATIC INCOMPLETE TEAR OF LEFT ROTATOR CUFF: Primary | ICD-10-CM

## 2024-01-02 PROCEDURE — 20610 DRAIN/INJ JOINT/BURSA W/O US: CPT | Mod: LT,S$GLB,, | Performed by: STUDENT IN AN ORGANIZED HEALTH CARE EDUCATION/TRAINING PROGRAM

## 2024-01-02 PROCEDURE — 99999 PR PBB SHADOW E&M-EST. PATIENT-LVL III: CPT | Mod: PBBFAC,,, | Performed by: STUDENT IN AN ORGANIZED HEALTH CARE EDUCATION/TRAINING PROGRAM

## 2024-01-02 PROCEDURE — 99214 OFFICE O/P EST MOD 30 MIN: CPT | Mod: 25,S$GLB,, | Performed by: STUDENT IN AN ORGANIZED HEALTH CARE EDUCATION/TRAINING PROGRAM

## 2024-01-02 RX ORDER — TRIAMCINOLONE ACETONIDE 40 MG/ML
40 INJECTION, SUSPENSION INTRA-ARTICULAR; INTRAMUSCULAR
Status: DISCONTINUED | OUTPATIENT
Start: 2024-01-02 | End: 2024-01-02 | Stop reason: HOSPADM

## 2024-01-02 RX ADMIN — TRIAMCINOLONE ACETONIDE 40 MG: 40 INJECTION, SUSPENSION INTRA-ARTICULAR; INTRAMUSCULAR at 09:01

## 2024-01-02 NOTE — PROGRESS NOTES
Orthopaedic Follow-Up Visit    Last Appointment: 12/19/23  Diagnosis: Chronic left shoulder pain, rotator cuff tendinopathy, glenohumeral osteoarthritis, history of rotator cuff repair  Prior Procedure: MRI    Mariah Meza is a 68 y.o. female who is here for f/u evaluation of her left shoulder. The patient was last seen here by me on 12/19/23 at which point we elected to proceed with left shoulder MRI to further evaluate for continued shoulder pain following previous repair. The patient returns today to review her MRI and discuss further treatment options.     To review her history, Mariah Meza is a 68 y.o. right-hand dominant female who presented on 5/17/23 with left shoulder pain and dysfunction that had started approximately 4 weeks prior with no specific injury or trauma and gradual worsening of symptoms. Her symptoms included left shoulder pain, generally about the shoulder with pain quality of constant aching and sharp pain. Her pain was aggravated by overhead movement, reaching behind, and raising arm.  She has tried rest, activity modification, muscle relaxer, and Tylenol. She noted that she had been told by a physician a some point to not take anti-inflammatories.  Of note she has a history RCR on both shoulders. Her symptoms and physical exam were consistent with rotator cuff tendinopathy. She also had evidence of glenohumeral arthritis. We elected to proceed with left shoulder corticosteroid injection into the subacromial space. She returned on 6/28/23 at which point she had reported good relief from previous injection but symptoms had begun to return. It had only been 6 weeks since her previous injection so proceeded with home exercise program.     Patient's medications, allergies, past medical, surgical, social and family histories were reviewed and updated as appropriate.    Review of Systems   All systems reviewed were negative.  Specifically, the patient denies fever, chills, weight  loss, chest pain, shortness of breath, or dyspnea on exertion.      Past Medical History:   Diagnosis Date    Chronic rhinitis     DJD (degenerative joint disease), lumbar     GERD (gastroesophageal reflux disease)     Headache     Hiatal hernia     Hyperlipidemia     Hypertension     Liver disease     Fatty Liver    Low back pain     Obesity     PONV (postoperative nausea and vomiting)     Thyroid disease        Past Surgical History:   Procedure Laterality Date    BILATERAL OOPHORECTOMY  2002    CARPAL TUNNEL RELEASE      Left wrist    CHOLECYSTECTOMY      DISSECTION OF NECK Bilateral 12/20/2021    Procedure: DISSECTION, NECK;  Surgeon: Deejay Olsen MD;  Location: [facility] OR;  Service: ENT;  Laterality: Bilateral;  Central neck dissection    HYSTERECTOMY  1984    INJECTION OF ANESTHETIC AGENT AROUND MEDIAL BRANCH NERVES INNERVATING LUMBAR FACET JOINT Bilateral 12/12/2023    Procedure: Bilateral L3-5 * MBB #1 with RN IV sedation;  Surgeon: Franklin Sutton MD;  Location: [facility] PAIN MGT;  Service: Pain Management;  Laterality: Bilateral;    NECK EXPLORATION Bilateral 12/20/2021    Procedure: EXPLORATION, NECK;  Surgeon: Deejay Olsen MD;  Location: [facility] OR;  Service: ENT;  Laterality: Bilateral;  Parathyroid exploration    ROTATOR CUFF REPAIR      Right shoulder    ROTATOR CUFF REPAIR Left 2010    THYROIDECTOMY, BILATERAL Bilateral 12/20/2021    Procedure: THYROIDECTOMY,BILATERAL;  Surgeon: Deejay Olsen MD;  Location: [facility] OR;  Service: ENT;  Laterality: Bilateral;    TOTAL VAGINAL HYSTERECTOMY  1985       Patient's Medications   New Prescriptions    No medications on file   Previous Medications    ACETAMINOPHEN (TYLENOL) 650 MG TBSR    Take 650 mg by mouth daily as needed.    ALPRAZOLAM (XANAX) 0.5 MG TABLET    Take 1 tablet (0.5 mg total) by mouth On call Procedure for Anxiety.    CALCIUM CARBONATE 470 MG CALCIUM (1,177 MG) CHEW    Take one tablet by mouth twice daily    CARVEDILOL (COREG) 12.5 MG TABLET    Take 12.5 mg by  mouth 2 (two) times daily with meals.    CLONIDINE (CATAPRES) 0.1 MG TABLET    Take by mouth.    CYCLOBENZAPRINE (FLEXERIL) 10 MG TABLET    Take 1 tablet (10 mg total) by mouth 3 (three) times daily as needed for Muscle spasms.    DICYCLOMINE (BENTYL) 10 MG CAPSULE    Take 10 mg by mouth.    EZETIMIBE (ZETIA) 10 MG TABLET    Take 1 tablet (10 mg total) by mouth once daily.    LEVOTHYROXINE (SYNTHROID) 100 MCG TABLET    Take 100 mcg by mouth before breakfast.    LINACLOTIDE (LINZESS ORAL)    Take 72 mg by mouth once daily.    MAGNESIUM OXIDE (MAG-OX) 400 MG (241.3 MG MAGNESIUM) TABLET    Take 400 mg by mouth once daily.    OLMESARTAN-HYDROCHLOROTHIAZIDE (BENICAR HCT) 40-25 MG PER TABLET    Take 1 tablet by mouth.    PANTOPRAZOLE (PROTONIX) 40 MG TABLET    Take 1 tablet (40 mg total) by mouth once daily.    PSYLLIUM HUSK/ASPARTAME (METAMUCIL FIBER SINGLES ORAL)    Take by mouth.    VITAMIN D (VITAMIN D3) 1000 UNITS TAB    Take 1,000 Units by mouth once daily.    VITAMIN E 400 UNIT CAPSULE    Take 400 Units by mouth once daily.    WHEAT DEXTRIN/L.ACID/ASPARTAME (FIBER WITH PROBIOTIC ORAL)    Take by mouth Daily.   Modified Medications    No medications on file   Discontinued Medications    No medications on file       Family History   Problem Relation Age of Onset    Diabetes Mother     Hypertension Mother     Hyperlipidemia Mother     Heart disease Mother         CHF    Stroke Mother     Heart attack Mother     Hearing loss Mother     Diabetes Sister     Sickle cell anemia Other         nieces, nephews    Lupus Other         niece       Review of patient's allergies indicates:   Allergen Reactions    Iodine and iodide containing products Itching    Shellfish containing products Swelling    Statins-hmg-coa reductase inhibitors Other (See Comments)     Myalgias    Adhesive Dermatitis    Iodine Other (See Comments)    Ivp dye [iodinated contrast media] Other (See Comments)         Objective:      Physical Exam  Patient  is alert and oriented, no distress. Skin is intact. Neuro is normal with no focal motor or sensory findings.    Cervical exam is unremarkable. Intact cervical ROM. Negative Spurling's test    Physical Exam:                       RIGHT                                     LEFT     Scap Dyskinesis/Winging       (-)                                             (-)     Tenderness:                                                                              Greater Tuberosity                  (-)                                            +  Bicipital Groove                       (-)                                             +  AC joint                                   (-)                                             +  Other:      ROM:  Forward Elevation       180                                          140/170  Abduction                    120                                          100/120  ER (at side)                 80                                            60  IR                                 L1                                            L5     Strength:   Supraspinatus             5/5                                           4+/5  Infraspinatus               5/5                                           4+/5  Subscap / IR               5/5                                           5/5      Special Tests:              Neer:                                       (-)                                             +              Gonsales:                                 (-)                                             +              SS Stress:                               (-)                                             +              Bear Hug:                                (-)                                             (-)              [place]'s:                                 (-)                                             +              Resisted Thrower's:                (-)                                              +              Cross Arm Abduction:             (-)                                             (-)    Neurovascular examination  - Motor grossly intact bilaterally to shoulder abduction, elbow flexion and extension, wrist flexion and extension, and intrinsic hand musculature  - Sensation intact to light touch bilaterally in axillary, median, radial, and ulnar distributions  - Symmetrical radial pulses    Imaging:    XR Results:  Results for orders placed in visit on 05/11/23    X-Ray Shoulder 2 or More Views Left    Narrative  EXAM: XR SHOULDER COMPLETE 2 OR MORE VIEWS LEFT    CLINICAL HISTORY: Left shoulder pain    TECHNIQUE: Left shoulder, 3 views    COMPARISON:  No studies are available for comparison.    FINDINGS: Remote distal clavicle and acromial fractures.  Moderate acromio clavicular and glenohumeral arthrosis.  Alignment is satisfactory.    Impression  Remote posttraumatic changes and degenerative joint disease.        Finalized on: 5/11/2023 10:32 AM By:  KURT Buck MD, MD  [facility]# [id number]      2023-05-11 10:34:09.779    [facility]      MRI Results:  MRI Shoulder Without Contrast Left  Narrative: EXAM: MRI SHOULDER WITHOUT CONTRAST LEFT    CLINICAL HISTORY:  Left shoulder pain.    COMPARISON:  Left shoulder x-ray on 05/11/2023    TECHNIQUE:  Multiplanar, multisequence MR imaging was performed through the left shoulder without intravenous or intra-articular gadolinium contrast.    FINDINGS:    Mild acromioclavicular arthrosis.  Lateral acromial downsloping. There is a type 1  acromion.    High-grade partial-thickness articular sided tear at the critical zone fibers of the posterior supraspinatus tendon measuring 8 x 7 mm.  Mild bursal sided fraying throughout the supraspinatus tendon.  Low-grade interstitial tearing at the anterior third fibers of the supraspinatus tendon.  Mild infraspinatus and subscapularis tendinosis.  Small full-thickness tear measuring 2 to 3 mm at the superior fibers of the  subscapularis tendon.    The biceps tendon is intact.  Biceps tenosynovitis.    The biceps labral complex is intact.   The anterior/inferior labrum is intact.  The posterior labrum is normal.    There is a normal inferior glenohumeral ligament complex.    The glenoid fossa is unremarkable.  No joint effusion.  No full-thickness chondral defect.  Impression: Small full-thickness tear at the superior fibers of the subscapularis tendon measuring 2 to 3 mm.    High-grade partial-thickness articular sided tear at the critical zone fibers of the posterior supraspinatus tendon measuring 8 x 7 mm.    No evidence of labral pathology.    Finalized on: 12/28/2023 8:13 AM By:  KURT Buck MD, MD  [facility]# [id number]      2023-12-28 08:15:57.071    [facility]         CT Results:  No results found for this or any previous visit.      Physician read: I agree with the above impression.    Assessment/Plan:   Mariah Meza is a RHD 68 y.o. female with chronic left shoulder pain, high grade partial thickness rotator cuff tear    Plan:    Reviewed MRI with the patient today. Her MRI shows left shoulder Small full-thickness tear at the superior fibers of the subscapularis tendon, High-grade partial-thickness articular sided tear at the critical zone fibers of the posterior supraspinatus tendon, intra-articular thickening and partial tearing of the biceps tendon.  I discussed treatment options including conservative management of injections and physical therapy, or operative treatment in the form of a rotator cuff repair. Patient would like to defer operative treatment at this time.   Overall she has tried extensive non-operative treatment in the form of rest, activity modification, muscle relaxer, Tylenol, subacromial space CSI, and home exercise program. Despite these treatments, her symptoms continue to persist.   I recommend proceeding with intra-articular corticosteroid injection into the left glenohumeral joint. The patient is in  agreement with this plan.   Procedure performed today and patient tolerated the procedure well with no immediate complications.   Follow up 3 months or sooner if any problems persist before then.           Jean-Pierre Espinal MD    I, Sj Gates, acted as a scribe for Jean-Pierre Espinal MD for the duration of this office visit.

## 2024-01-02 NOTE — PROCEDURES
Large Joint Aspiration/Injection: L glenohumeral    Date/Time: 1/2/2024 9:45 AM    Performed by: Jean-Pierre Espinal MD  Authorized by: Jean-Pierre Espinal MD    Consent Done?:  Yes (Verbal)  Indications:  Pain  Site marked: the procedure site was marked    Timeout: prior to procedure the correct patient, procedure, and site was verified    Prep: patient was prepped and draped in usual sterile fashion      Local anesthesia used?: Yes    Anesthesia:  Local infiltration  Local anesthetic:  Lidocaine 1% without epinephrine    Details:  Needle Size:  22 G  Ultrasonic Guidance for needle placement?: No    Approach:  Posterior  Location:  Shoulder  Site:  L glenohumeral  Medications:  40 mg triamcinolone acetonide 40 mg/mL  Patient tolerance:  Patient tolerated the procedure well with no immediate complications

## 2024-01-08 ENCOUNTER — TELEPHONE (OUTPATIENT)
Dept: INTERNAL MEDICINE | Facility: CLINIC | Age: 69
End: 2024-01-08
Payer: MEDICARE

## 2024-01-08 NOTE — TELEPHONE ENCOUNTER
Called pt, requested nurse visit change to Friday, appt r/s for 1/12 @ 1030, verbalized understanding.    ----- Message from Miguel Montez sent at 1/8/2024  4:23 PM CST -----  Contact: Mariah Honeycutt is requesting a call back concerning injection. Please call back [phone]          Thanks

## 2024-01-12 ENCOUNTER — PATIENT MESSAGE (OUTPATIENT)
Dept: INTERNAL MEDICINE | Facility: CLINIC | Age: 69
End: 2024-01-12

## 2024-01-12 ENCOUNTER — CLINICAL SUPPORT (OUTPATIENT)
Dept: INTERNAL MEDICINE | Facility: CLINIC | Age: 69
End: 2024-01-12
Payer: MEDICARE

## 2024-01-12 DIAGNOSIS — E03.9 HYPOTHYROIDISM (ACQUIRED): Primary | ICD-10-CM

## 2024-01-12 DIAGNOSIS — Z12.31 BREAST CANCER SCREENING BY MAMMOGRAM: Primary | ICD-10-CM

## 2024-01-12 PROCEDURE — 96372 THER/PROPH/DIAG INJ SC/IM: CPT | Mod: S$GLB,,, | Performed by: PHYSICIAN ASSISTANT

## 2024-01-12 PROCEDURE — 99999 PR PBB SHADOW E&M-EST. PATIENT-LVL II: CPT | Mod: PBBFAC,,,

## 2024-01-12 RX ADMIN — CYANOCOBALAMIN 1000 MCG: 1000 INJECTION, SOLUTION INTRAMUSCULAR; SUBCUTANEOUS at 01:01

## 2024-01-12 NOTE — PROGRESS NOTES
Pt name/ verified. Allergies/meds reviewed. B12 administered per order. Pt tolerated well without any issues. No s/s distress.

## 2024-01-17 RX ORDER — LEVOTHYROXINE SODIUM 100 UG/1
100 TABLET ORAL
Qty: 90 TABLET | Refills: 1 | Status: SHIPPED | OUTPATIENT
Start: 2024-01-17

## 2024-01-17 NOTE — TELEPHONE ENCOUNTER
----- Message from Mercedes Andrade sent at 1/17/2024 10:35 AM CST -----  Contact: Mariah  Type:  Patient Returning Call    Who Called:Mariah  Who Left Message for Patient: unknown  Does the patient know what this is regarding?: unknown  Would the patient rather a call back or a response via [facility]? call  Best Call Back Number: [phone]   Additional Information: Patient reports missing a call and request another call back from staff member Rosa Elena or staff member who may be available.   Thank you,  GH

## 2024-01-17 NOTE — TELEPHONE ENCOUNTER
----- Message from Asiya Anthony sent at 1/17/2024  9:29 AM CST -----  Who Called: Pt    What is the request in detail: Requesting call back to discuss Rx. Please advise    Can the clinic reply by [facility]? No    Best Call Back Number: [phone]      Additional Information:

## 2024-01-17 NOTE — TELEPHONE ENCOUNTER
I returned a call back to the pt and she was requesting a refill of her levothyroxine 100 mg. I informed that I will pend it for signing upon Taina's return to clinic on tomorrow. She verbalized understanding and replied thanks. //kah

## 2024-01-18 ENCOUNTER — OFFICE VISIT (OUTPATIENT)
Dept: INTERNAL MEDICINE | Facility: CLINIC | Age: 69
End: 2024-01-18
Payer: MEDICARE

## 2024-01-18 ENCOUNTER — APPOINTMENT (OUTPATIENT)
Dept: RADIOLOGY | Facility: HOSPITAL | Age: 69
End: 2024-01-18
Attending: FAMILY MEDICINE
Payer: MEDICARE

## 2024-01-18 VITALS
SYSTOLIC BLOOD PRESSURE: 136 MMHG | BODY MASS INDEX: 38.31 KG/M2 | HEART RATE: 78 BPM | RESPIRATION RATE: 18 BRPM | OXYGEN SATURATION: 98 % | WEIGHT: 223.19 LBS | DIASTOLIC BLOOD PRESSURE: 88 MMHG | TEMPERATURE: 98 F

## 2024-01-18 DIAGNOSIS — R10.84 GENERALIZED ABDOMINAL PAIN: ICD-10-CM

## 2024-01-18 DIAGNOSIS — K59.09 CHRONIC CONSTIPATION: ICD-10-CM

## 2024-01-18 DIAGNOSIS — R10.84 GENERALIZED ABDOMINAL PAIN: Primary | ICD-10-CM

## 2024-01-18 PROCEDURE — 99213 OFFICE O/P EST LOW 20 MIN: CPT | Mod: S$GLB,,, | Performed by: FAMILY MEDICINE

## 2024-01-18 PROCEDURE — 74018 RADEX ABDOMEN 1 VIEW: CPT | Mod: TC,PN

## 2024-01-18 PROCEDURE — 74018 RADEX ABDOMEN 1 VIEW: CPT | Mod: 26,,, | Performed by: RADIOLOGY

## 2024-01-18 PROCEDURE — 99999 PR PBB SHADOW E&M-EST. PATIENT-LVL IV: CPT | Mod: PBBFAC,,, | Performed by: FAMILY MEDICINE

## 2024-01-18 RX ORDER — AMOXICILLIN 250 MG
2 CAPSULE ORAL DAILY
Qty: 60 TABLET | Refills: 0 | Status: SHIPPED | OUTPATIENT
Start: 2024-01-18 | End: 2024-04-30

## 2024-01-18 NOTE — PROGRESS NOTES
Subjective:       Patient ID: Mariah Meza is a 68 y.o. female.    Chief Complaint: Abdominal Pain (X 1 month)    Mariah Meza is 68 y.o. female who presents with abdominal pain and constipation. Last BM was on Monday 1/15/24 after using Bobo oil. Scared to eat due to irritation, burning sensation in the epigastric region. Denies previous history of pancreatitis. S/p cholecystectomy. Currently eats a bland diet. Endorse mild nausea and dry mouth but denies vomiting, hematemesis, melena, or bright red blood in stool. She drinks plenty water. Currently taking metamucil and Miralax.     Review of Systems   Constitutional:  Negative for chills and fever.   HENT:  Negative for congestion, rhinorrhea and sore throat.    Respiratory:  Negative for cough, shortness of breath and wheezing.    Cardiovascular:  Negative for chest pain, palpitations and leg swelling.   Gastrointestinal:  Positive for abdominal pain, constipation and nausea. Negative for abdominal distention, blood in stool, diarrhea, rectal pain and vomiting.   Genitourinary:  Negative for dysuria, frequency and urgency.   Neurological:  Negative for headaches.   Psychiatric/Behavioral:  Negative for dysphoric mood.        Objective:      Physical Exam  Vitals reviewed.   Constitutional:       Appearance: Normal appearance.   HENT:      Head: Normocephalic and atraumatic.   Eyes:      General: No scleral icterus.  Cardiovascular:      Rate and Rhythm: Normal rate.   Pulmonary:      Effort: Pulmonary effort is normal. No respiratory distress.   Abdominal:      General: There is no distension.      Palpations: Abdomen is soft. There is no mass.      Tenderness: There is no abdominal tenderness. There is no guarding or rebound.      Hernia: No hernia is present.   Neurological:      General: No focal deficit present.      Mental Status: She is alert.   Psychiatric:         Mood and Affect: Mood normal.         Behavior: Behavior normal.            KUB 01/18/2024 review appears to have moderate to severe stool burden present.     Assessment:       1. Generalized abdominal pain        Plan:   1. Generalized abdominal pain  2. Chronic constipation  Chronic, uncontrolled, KUB ordered today, appears to have moderate-severe stool burden present, will advise taking Senokot-s 2 tablets daily to stimulate bowels, close follow up, ED precautions provided if abdominal pain worsens, vomiting, blood in stool noted   - senna-docusate 8.6-50 mg (SENOKOT-S) 8.6-50 mg per tablet; Take 2 tablets by mouth once daily.  Dispense: 60 tablet; Refill: 0  - X-Ray Abdomen AP 1 View; Future      Follow up in about 1 week (around 1/25/2024) for Constipation .    Yocasta Schmid MD  Family Medicine

## 2024-01-22 ENCOUNTER — HOSPITAL ENCOUNTER (EMERGENCY)
Facility: HOSPITAL | Age: 69
Discharge: HOME | End: 2024-01-22
Attending: EMERGENCY MEDICINE
Payer: MEDICARE

## 2024-01-22 VITALS
DIASTOLIC BLOOD PRESSURE: 69 MMHG | HEIGHT: 64 IN | OXYGEN SATURATION: 97 % | HEART RATE: 59 BPM | RESPIRATION RATE: 18 BRPM | WEIGHT: 224.63 LBS | TEMPERATURE: 98 F | BODY MASS INDEX: 38.35 KG/M2 | SYSTOLIC BLOOD PRESSURE: 148 MMHG

## 2024-01-22 DIAGNOSIS — I10 HYPERTENSION: Primary | ICD-10-CM

## 2024-01-22 DIAGNOSIS — K59.00 CONSTIPATION, UNSPECIFIED CONSTIPATION TYPE: ICD-10-CM

## 2024-01-22 LAB
ALBUMIN SERPL BCP-MCNC: 3.7 G/DL (ref 3.5–5.2)
ALP SERPL-CCNC: 50 U/L (ref 55–135)
ALT SERPL W/O P-5'-P-CCNC: 18 U/L (ref 10–44)
ANION GAP SERPL CALC-SCNC: 7 MMOL/L (ref 8–16)
AST SERPL-CCNC: 16 U/L (ref 10–40)
BASOPHILS # BLD AUTO: 0.04 K/UL (ref 0–0.2)
BASOPHILS NFR BLD: 1.2 % (ref 0–1.9)
BILIRUB SERPL-MCNC: 1.2 MG/DL (ref 0.1–1)
BNP SERPL-MCNC: 64 PG/ML (ref 0–99)
BUN SERPL-MCNC: 13 MG/DL (ref 8–23)
CALCIUM SERPL-MCNC: 9 MG/DL (ref 8.7–10.5)
CHLORIDE SERPL-SCNC: 105 MMOL/L (ref 95–110)
CK SERPL-CCNC: 155 U/L (ref 20–180)
CO2 SERPL-SCNC: 27 MMOL/L (ref 23–29)
CREAT SERPL-MCNC: 1.2 MG/DL (ref 0.5–1.4)
DIFFERENTIAL METHOD BLD: ABNORMAL
EOSINOPHIL # BLD AUTO: 0.1 K/UL (ref 0–0.5)
EOSINOPHIL NFR BLD: 2.3 % (ref 0–8)
ERYTHROCYTE [DISTWIDTH] IN BLOOD BY AUTOMATED COUNT: 12.6 % (ref 11.5–14.5)
EST. GFR  (NO RACE VARIABLE): 49 ML/MIN/1.73 M^2
GLUCOSE SERPL-MCNC: 97 MG/DL (ref 70–110)
HCT VFR BLD AUTO: 34.9 % (ref 37–48.5)
HGB BLD-MCNC: 11.8 G/DL (ref 12–16)
IMM GRANULOCYTES # BLD AUTO: 0 K/UL (ref 0–0.04)
IMM GRANULOCYTES NFR BLD AUTO: 0 % (ref 0–0.5)
LIPASE SERPL-CCNC: 15 U/L (ref 4–60)
LYMPHOCYTES # BLD AUTO: 1.5 K/UL (ref 1–4.8)
LYMPHOCYTES NFR BLD: 42.2 % (ref 18–48)
MCH RBC QN AUTO: 29.9 PG (ref 27–31)
MCHC RBC AUTO-ENTMCNC: 33.8 G/DL (ref 32–36)
MCV RBC AUTO: 88 FL (ref 82–98)
MONOCYTES # BLD AUTO: 0.4 K/UL (ref 0.3–1)
MONOCYTES NFR BLD: 10.8 % (ref 4–15)
NEUTROPHILS # BLD AUTO: 1.5 K/UL (ref 1.8–7.7)
NEUTROPHILS NFR BLD: 43.5 % (ref 38–73)
NRBC BLD-RTO: 0 /100 WBC
PLATELET # BLD AUTO: 244 K/UL (ref 150–450)
PMV BLD AUTO: 9.9 FL (ref 9.2–12.9)
POTASSIUM SERPL-SCNC: 3.6 MMOL/L (ref 3.5–5.1)
PROT SERPL-MCNC: 7.5 G/DL (ref 6–8.4)
RBC # BLD AUTO: 3.95 M/UL (ref 4–5.4)
SODIUM SERPL-SCNC: 139 MMOL/L (ref 136–145)
TROPONIN I SERPL DL<=0.01 NG/ML-MCNC: 0.01 NG/ML (ref 0–0.03)
WBC # BLD AUTO: 3.44 K/UL (ref 3.9–12.7)

## 2024-01-22 PROCEDURE — 93005 ELECTROCARDIOGRAM TRACING: CPT

## 2024-01-22 PROCEDURE — 93010 ELECTROCARDIOGRAM REPORT: CPT | Mod: ,,, | Performed by: INTERNAL MEDICINE

## 2024-01-22 PROCEDURE — 99285 EMERGENCY DEPT VISIT HI MDM: CPT | Mod: 25

## 2024-01-22 PROCEDURE — 25000003 PHARM REV CODE 250: Performed by: EMERGENCY MEDICINE

## 2024-01-22 PROCEDURE — 84484 ASSAY OF TROPONIN QUANT: CPT | Performed by: EMERGENCY MEDICINE

## 2024-01-22 PROCEDURE — 80053 COMPREHEN METABOLIC PANEL: CPT | Performed by: NURSE PRACTITIONER

## 2024-01-22 PROCEDURE — 82550 ASSAY OF CK (CPK): CPT | Performed by: EMERGENCY MEDICINE

## 2024-01-22 PROCEDURE — 83880 ASSAY OF NATRIURETIC PEPTIDE: CPT | Performed by: EMERGENCY MEDICINE

## 2024-01-22 PROCEDURE — 83690 ASSAY OF LIPASE: CPT | Performed by: NURSE PRACTITIONER

## 2024-01-22 PROCEDURE — 85025 COMPLETE CBC W/AUTO DIFF WBC: CPT | Performed by: NURSE PRACTITIONER

## 2024-01-22 RX ORDER — DOCUSATE SODIUM 100 MG/1
100 CAPSULE, LIQUID FILLED ORAL 3 TIMES DAILY PRN
Qty: 30 CAPSULE | Refills: 0 | Status: SHIPPED | OUTPATIENT
Start: 2024-01-22

## 2024-01-22 RX ORDER — CLONIDINE HYDROCHLORIDE 0.2 MG/1
0.2 TABLET ORAL
Status: COMPLETED | OUTPATIENT
Start: 2024-01-22 | End: 2024-01-22

## 2024-01-22 RX ORDER — POLYETHYLENE GLYCOL 3350 17 G/17G
17 POWDER, FOR SOLUTION ORAL DAILY
Qty: 119 G | Refills: 0 | Status: SHIPPED | OUTPATIENT
Start: 2024-01-22 | End: 2024-01-29

## 2024-01-22 RX ORDER — HYDRALAZINE HYDROCHLORIDE 20 MG/ML
10 INJECTION INTRAMUSCULAR; INTRAVENOUS
Status: DISCONTINUED | OUTPATIENT
Start: 2024-01-22 | End: 2024-01-22

## 2024-01-22 RX ORDER — GLYCERIN 1 G/1
1 SUPPOSITORY RECTAL
Qty: 25 SUPPOSITORY | Refills: 0 | Status: SHIPPED | OUTPATIENT
Start: 2024-01-22 | End: 2024-04-30

## 2024-01-22 RX ADMIN — CLONIDINE HYDROCHLORIDE 0.2 MG: 0.2 TABLET ORAL at 09:01

## 2024-01-22 NOTE — ED PROVIDER NOTES
SCRIBE #1 NOTE: I, Goldy Villanueva, am scribing for, and in the presence of, John Kelly MD. I have scribed the entire note.      History      Chief Complaint   Patient presents with    Abdominal Pain     Constipated x 4 days        Review of patient's allergies indicates:   Allergen Reactions    Iodine and iodide containing products Itching    Shellfish containing products Swelling    Statins-hmg-coa reductase inhibitors Other (See Comments)     Myalgias    Adhesive Dermatitis    Iodine Other (See Comments)    Ivp dye [iodinated contrast media] Other (See Comments)        HPI   HPI    1/22/2024, 8:59 AM   History obtained from the patient      History of Present Illness: Mariah Meza is a 68 y.o. female patient with a PMHx of HTN who presents to the Emergency Department for generalized abdominal pain, onset 3 weeks PTA. Symptoms are constant and moderate in severity. No mitigating or exacerbating factors reported. Associated sxs include constipation x 4 days. Patient denies any fever, chills, n/v/d, SOB, CP, weakness, numbness, dizziness, headache, and all other sxs at this time. Pt states that she has been compliant with her home BP medication. No further complaints or concerns at this time.     Arrival mode: Personal vehicle    PCP: Taina Bob NP       Past Medical History:  Past Medical History:   Diagnosis Date    Chronic rhinitis     DJD (degenerative joint disease), lumbar     GERD (gastroesophageal reflux disease)     Headache     Hiatal hernia     Hyperlipidemia     Hypertension     Liver disease     Fatty Liver    Low back pain     Obesity     PONV (postoperative nausea and vomiting)     Thyroid disease        Past Surgical History:  Past Surgical History:   Procedure Laterality Date    BILATERAL OOPHORECTOMY  2002    CARPAL TUNNEL RELEASE      Left wrist    CHOLECYSTECTOMY      DISSECTION OF NECK Bilateral 12/20/2021    Procedure: DISSECTION, NECK;  Surgeon: Deejay Olsen MD;  Location:  [facility] OR;  Service: ENT;  Laterality: Bilateral;  Central neck dissection    HYSTERECTOMY  1984    INJECTION OF ANESTHETIC AGENT AROUND MEDIAL BRANCH NERVES INNERVATING LUMBAR FACET JOINT Bilateral 2023    Procedure: Bilateral L3-5 * MBB #1 with RN IV sedation;  Surgeon: Franklin Sutton MD;  Location: [facility] PAIN [facility]GT;  Service: Pain Management;  Laterality: Bilateral;    NECK EXPLORATION Bilateral 2021    Procedure: EXPLORATION, NECK;  Surgeon: Deejay Olsen MD;  Location: [facility] OR;  Service: ENT;  Laterality: Bilateral;  Parathyroid exploration    ROTATOR CUFF REPAIR      Right shoulder    ROTATOR CUFF REPAIR Left 2010    THYROIDECTOMY, BILATERAL Bilateral 2021    Procedure: THYROIDECTOMY,BILATERAL;  Surgeon: Deejay Olsen MD;  Location: [facility] OR;  Service: ENT;  Laterality: Bilateral;    TOTAL VAGINAL HYSTERECTOMY           Family History:  Family History   Problem Relation Age of Onset    Diabetes Mother     Hypertension Mother     Hyperlipidemia Mother     Heart disease Mother         CHF    Stroke Mother     Heart attack Mother     Hearing loss Mother     Diabetes Sister     Sickle cell anemia Other         nieces, nephews    Lupus Other         niece       Social History:  Social History     Tobacco Use    Smoking status: Former     Current packs/day: 0.00     Average packs/day: 0.5 packs/day for 8.0 years (4.0 ttl pk-yrs)     Types: Cigarettes     Start date: 1971     Quit date: 1979     Years since quittin.0     Passive exposure: Never    Smokeless tobacco: Never   Substance and Sexual Activity    Alcohol use: No    Drug use: Never    Sexual activity: Never     Partners: Male     Birth control/protection: See Surgical Hx       ROS   Review of Systems   Constitutional:  Negative for chills and fever.   HENT:  Negative for sore throat.    Respiratory:  Negative for shortness of breath.    Cardiovascular:  Negative for chest pain.   Gastrointestinal:  Positive for abdominal pain  "(generalized) and constipation. Negative for diarrhea, nausea and vomiting.   Genitourinary:  Negative for dysuria.   Musculoskeletal:  Negative for back pain.   Skin:  Negative for rash.   Neurological:  Negative for dizziness, weakness, numbness and headaches.   Hematological:  Does not bruise/bleed easily.   All other systems reviewed and are negative.    Physical Exam      Initial Vitals [01/22/24 0851]   BP Pulse Resp Temp SpO2   (!) 214/100 61 18 97.9 °F (36.6 °C) 97 %      MAP       --          Physical Exam  Nursing Notes and Vital Signs Reviewed.  Constitutional: Patient is in no acute distress. Well-developed and well-nourished.  Head: Atraumatic. Normocephalic.  Eyes: PERRL. EOM intact. Conjunctivae are not pale. No scleral icterus.  ENT: Mucous membranes are moist. Oropharynx is clear and symmetric.    Neck: Supple. Full ROM.   Cardiovascular: Regular rate. Regular rhythm. No murmurs, rubs, or gallops. Distal pulses are 2+ and symmetric.  Pulmonary/Chest: No respiratory distress. Clear to auscultation bilaterally. No wheezing or rales.  Abdominal: Soft and non-distended.  There is no tenderness.  No rebound, guarding, or rigidity.   Musculoskeletal: Moves all extremities. No obvious deformities. No edema.  Skin: Warm and dry.  Neurological:  Alert, awake, and appropriate.  Normal speech.  No acute focal neurological deficits are appreciated.  Psychiatric: Normal affect. Good eye contact. Appropriate in content.    ED Course    Procedures  ED Vital Signs:  Vitals:    01/22/24 0851 01/22/24 0913 01/22/24 0948 01/22/24 1045   BP: (!) 214/100 (S) (!) 220/96 (!) 208/93 (!) 148/69   Pulse: 61  (!) 54    Resp: 18  (!) 23    Temp: 97.9 °F (36.6 °C)      TempSrc: Oral      SpO2: 97%  100%    Weight: 101.9 kg (224 lb 10.4 oz)      Height: 5' 4" (1.626 m)       01/22/24 1056   BP: (!) 148/69   Pulse: (!) 59   Resp: 18   Temp:    TempSrc:    SpO2: 97%   Weight:    Height:        Abnormal Lab Results:  Labs Reviewed "   CBC W/ AUTO DIFFERENTIAL - Abnormal; Notable for the following components:       Result Value    WBC 3.44 (*)     RBC 3.95 (*)     Hemoglobin 11.8 (*)     Hematocrit 34.9 (*)     Gran # (ANC) 1.5 (*)     All other components within normal limits   COMPREHENSIVE METABOLIC PANEL - Abnormal; Notable for the following components:    Total Bilirubin 1.2 (*)     Alkaline Phosphatase 50 (*)     eGFR 49 (*)     Anion Gap 7 (*)     All other components within normal limits   LIPASE   B-TYPE NATRIURETIC PEPTIDE   CK   TROPONIN I        All Lab Results:  Results for orders placed or performed during the hospital encounter of 01/22/24   CBC auto differential   Result Value Ref Range    WBC 3.44 (L) 3.90 - 12.70 K/uL    RBC 3.95 (L) 4.00 - 5.40 M/uL    Hemoglobin 11.8 (L) 12.0 - 16.0 g/dL    Hematocrit 34.9 (L) 37.0 - 48.5 %    MCV 88 82 - 98 fL    MCH 29.9 27.0 - 31.0 pg    MCHC 33.8 32.0 - 36.0 g/dL    RDW 12.6 11.5 - 14.5 %    Platelets 244 150 - 450 K/uL    MPV 9.9 9.2 - 12.9 fL    Immature Granulocytes 0.0 0.0 - 0.5 %    Gran # (ANC) 1.5 (L) 1.8 - 7.7 K/uL    Immature Grans (Abs) 0.00 0.00 - 0.04 K/uL    Lymph # 1.5 1.0 - 4.8 K/uL    Mono # 0.4 0.3 - 1.0 K/uL    Eos # 0.1 0.0 - 0.5 K/uL    Baso # 0.04 0.00 - 0.20 K/uL    nRBC 0 0 /100 WBC    Gran % 43.5 38.0 - 73.0 %    Lymph % 42.2 18.0 - 48.0 %    Mono % 10.8 4.0 - 15.0 %    Eosinophil % 2.3 0.0 - 8.0 %    Basophil % 1.2 0.0 - 1.9 %    Differential Method Automated    Comprehensive metabolic panel   Result Value Ref Range    Sodium 139 136 - 145 mmol/L    Potassium 3.6 3.5 - 5.1 mmol/L    Chloride 105 95 - 110 mmol/L    CO2 27 23 - 29 mmol/L    Glucose 97 70 - 110 mg/dL    BUN 13 8 - 23 mg/dL    Creatinine 1.2 0.5 - 1.4 mg/dL    Calcium 9.0 8.7 - 10.5 mg/dL    Total Protein 7.5 6.0 - 8.4 g/dL    Albumin 3.7 3.5 - 5.2 g/dL    Total Bilirubin 1.2 (H) 0.1 - 1.0 mg/dL    Alkaline Phosphatase 50 (L) 55 - 135 U/L    AST 16 10 - 40 U/L    ALT 18 10 - 44 U/L    eGFR 49 (A) >60  mL/min/1.73 m^2    Anion Gap 7 (L) 8 - 16 mmol/L   Lipase   Result Value Ref Range    Lipase 15 4 - 60 U/L   BNP   Result Value Ref Range    BNP 64 0 - 99 pg/mL   CK   Result Value Ref Range     20 - 180 U/L   Troponin I   Result Value Ref Range    Troponin I 0.009 0.000 - 0.026 ng/mL     Imaging Results:  Imaging Results              CT Renal Stone Study ABD Pelvis WO (Final result)  Result time 01/22/24 10:54:32      Final result by Markos Villagomez MD (01/22/24 10:54:32)                   Impression:      1. No acute finding in the abdomen or pelvis.  2. Small volume chronic appearing stool in the rectum and distal colon suggesting mild constipation.  3. No renal calculus or hydronephrosis.      Electronically signed by: Markos Villagomez  Date:    01/22/2024  Time:    10:54               Narrative:    EXAMINATION:  CT RENAL STONE STUDY ABD PELVIS WO    CLINICAL HISTORY:  Flank pain, kidney stone suspected;    COMPARISON:  None available.    TECHNIQUE:  Axial CT images were obtained of the abdomen and pelvis.  Iterative reconstruction technique was used. The CT exam was performed using one or more of the following dose reduction techniques- Automated exposure control, adjustment of the mA and/or kV according to patient size, and/or use of iterative reconstructed technique.    FINDINGS:  Heart: Normal in size. No pericardial effusion.    Lung Bases: Mild bibasilar atelectasis/scarring.    Liver: Unremarkable.    Gallbladder: Surgically absent.    Bile Ducts: No evidence of dilated ducts.    Pancreas: No mass or peripancreatic fat stranding.    Spleen: Unremarkable.    Adrenals: Unremarkable.    Kidneys/ Ureters: Unremarkable.  No nephrolithiasis or hydronephrosis.    Bladder: No evidence of wall thickening.    Reproductive organs: He hysterectomy.    GI Tract/Mesentery: No evidence of bowel obstruction or acute inflammation.  Small volume chronic appearing stool in the rectum and distal colon.    Peritoneal  Space: Unremarkable.    Retroperitoneum: No adenopathy.    Abdominal wall: Unremarkable.    Vasculature: Scratch no aortic aneurysm.    Bones: No acute fracture.  Mild multilevel degenerative changes.                                       X-Ray Chest AP Portable (Final result)  Result time 01/22/24 09:26:31      Final result by Corbin De MD (Timothy) (01/22/24 09:26:31)                   Impression:      Clear lungs.      Electronically signed by: Corbin De MD  Date:    01/22/2024  Time:    09:26               Narrative:    EXAMINATION:  XR CHEST AP PORTABLE    CLINICAL HISTORY:  Hypertension, htn;    COMPARISON:  Chest 12/12/2023.    FINDINGS:  One view.  Mild cardiomegaly.  The lung fields are clear. No acute cardiopulmonary infiltrate.                                     The EKG was ordered, reviewed, and independently interpreted by the ED provider.  Interpretation time: 09:40  Rate: 54 BPM  Rhythm: sinus bradycardia  Interpretation: No acute ST changes. No STEMI.           The Emergency Provider reviewed the vital signs and test results, which are outlined above.    ED Discussion     11:06 AM: Reassessed pt at this time. Discussed with pt all pertinent ED information and results. Discussed pt dx and plan of tx. Gave pt all f/u and return to the ED instructions. All questions and concerns were addressed at this time. Pt expresses understanding of information and instructions, and is comfortable with plan to discharge. Pt is stable for discharge.    I discussed with patient and/or family/caretaker that evaluation in the ED does not suggest any emergent or life threatening medical conditions requiring immediate intervention beyond what was provided in the ED, and I believe patient is safe for discharge.  Regardless, an unremarkable evaluation in the ED does not preclude the development or presence of a serious of life threatening condition. As such, patient was instructed to return immediately for any  worsening or change in current symptoms.         ED Medication(s):  Medications   cloNIDine tablet 0.2 mg (0.2 mg Oral Given 1/22/24 0913)       New Prescriptions    DOCUSATE SODIUM (COLACE) 100 MG CAPSULE    Take 1 capsule (100 mg total) by mouth 3 (three) times daily as needed for Constipation.    GLYCERIN ADULT SUPPOSITORY    Place 1 suppository rectally as needed for Constipation.    POLYETHYLENE GLYCOL (GLYCOLAX) 17 GRAM/DOSE POWDER    Take 17 g by mouth once daily. for 7 days      Follow-up Information       Taina Bob NP.    Specialty: Internal Medicine  Contact information:  [address]  [place] [address] [phone]                               Medical Decision Making    Medical Decision Making  Constipation for the last four days.  Also with elevated blood pressure  DDx: DICK, NSTEMI, constipation    Amount and/or Complexity of Data Reviewed  Labs: ordered. Decision-making details documented in ED Course.  Radiology: ordered. Decision-making details documented in ED Course.  ECG/medicine tests: ordered and independent interpretation performed. Decision-making details documented in ED Course.    Risk  OTC drugs.  Prescription drug management.                Scribe Attestation:   Scribe #1: I performed the above scribed service and the documentation accurately describes the services I performed. I attest to the accuracy of the note.    Attending:   Physician Attestation Statement for Scribe #1: I, John Kelly MD, personally performed the services described in this documentation, as scribed by Goldy Villanueva, in my presence, and it is both accurate and complete.          Clinical Impression       ICD-10-CM ICD-9-CM   1. Hypertension  I10 401.9   2. Constipation, unspecified constipation type  K59.00 564.00       Disposition:   Disposition: Discharged  Condition: Stable         John Kelly MD  01/22/24 1129

## 2024-02-01 NOTE — PROGRESS NOTES
Chronic Pain -- Established Patient (Follow-up visit)    Referring Physician: Beverly Mcpherson PA-C  - Neurology (2nd referral); Smiley Weaver (initial referral)    PCP: Taina Bob NP    Chief Complaint:   Chief Complaint   Patient presents with    Low-back Pain     SUBJECTIVE:  Interval history 02/05/2024  Patient presents status post bilateral L3-5 lumbar medial branch block 12/12/2023.  Patient reports 90% sustained relief in lower axial back pain following her procedure.  She reports this pain relief is still ineffective.  Today she reports burning pains down bilateral lower extremities.  Pain is intermittent and today is rated a 5/10.  Patient is concerned whether this was related to the prior injection.  She reports pain which can radiate down the lateral and posterior aspects of bilateral lower extremities in L4-S1 distribution to the calf.  Patient has continued judicious use of Flexeril which she does not even take daily as well as Tylenol.  During last schedule procedure, patient had hypertensive urgency, 229/100.  Of note patient went to the emergency room and received hydralazine and clonidine.  Patient has scheduled follow-up with nurse practitioner, Ms. Bob.    Interval History (11/22/2023):  Patient presents today for follow-up visit.  Patient was referred back to our clinic by Beverly Mcpherson PA-C (Neurology).  She complains mostly of low back pain.  She does have some leg pain on the left side occasionally.  She reports she stretches at home.  She takes Flexeril as needed, but she tries to avoid due to dry mouth.  She is taking gabapentin in the past with no relief.  She wants to hold off on adding additional medications at this time.  Patient reports pain as 6/10 today.    10/26/23 Neurology:  Ms. Maraih Meza is a 67 y.o. female presenting for evaluation of back pain. She noted that she has had some baseline back pain for quite some time (years) however in  "September she began to have increased pain. She required an injection of ketorolac at that time which helped for a day or two but then her pain returned. She did complete EMG/NCS previously unable to locate in epic or care everywhere. She describes her pain as sharp and some "electric shock" sensation that occasionally occurs. She does get some stiffness and weakness also which she reports is present in her entire body. She notes that her pain is worse on the L and in the lumbar region. She does endorse neck pain. She did have a cervical spine MRI completed one year ago which showed multilevel degenerative changes at C4-C5 and mild canal stenosis. She notes that her pain is constant all day but worsened with movement or prolonged sitting. She previously completed physical therapy in Jan/Feb of this year which benefited her greatly and she found that she was able to be much more active. She then had a gout flare and seemed to regress in her ability to move. She denies any falls. She does endorse that she is having some constipation. She does have some baseline bladder problems but attributes this to her medications. She notes that she cannot lift a lot of things she use to be able to and she drops things more frequently. She reports numbness/tingling in bilateral hands and feet but worse on L hand.     Interval History (4/6/2023):  Mariah Meza presents today for follow-up visit.  Patient was last seen on 2/9/2023. At that visit, the plan was to start Gabapentin.  She reports she discontinued this medication after getting up to the 600 mg dosage due to side effects.  She reports it made her feel crazy in the head.  She reports improvement in her neck pain since last visit as she is been performing physician directed exercises at home.  She reports pain is primarily located in her ankles and feet right worse than left.  She reports that she was previously diagnosed with gout in November and since then she has " had recurrent gouty flares.  She has noticed that these flares occur when she eats a lot of certain types of food.  She is had flare secondary to eating excessive amounts of shrimp, raisins, pineapple, and serial.  She reports the current flare began approximately 1 week ago and has been persistent she has noticed redness warmth and swelling in both of her ankles and great toe.  She has not taken anything other than Tylenol which has not been beneficial.  She is scheduled to follow-up with podiatry per referral from her primary care doctor.  Patient reports pain as 7/10 today.  She reports she has also noticed mild to moderate swelling in her hands and feet.  She reports this began after she started taking blood pressure medications, she thinks this may be due to medications.    Initial HPI 02/09/2023  Mariah Meza is a 67y.o. female with past medical history significant for hyperlipidemia, hypertension, sicca syndrome, history of papillary thyroid cancer status post parathyroidectomy, obesity, GERD who presents to the clinic for the evaluation of mid back pain.  Patient reports pain has been present for the last 2 years without inciting accident injury or trauma.  Pain is constant and today is rated a 6/10.  Pain at its best is a 3/10 and at its worse is a 10/10.  Pain is described as sharp, stabbing and stiffness in nature.  Patient reports pain which begins in between the shoulder blades and radiates to the midthoracic spine.  Patient reports prior radiation down the left upper extremity to the thumb in C6 distribution.  Patient also reports pain in bilateral ankles.  Patient reports associated weakness in the lower extremities associated with this pain and with standing and ambulation.  This pain is described as burning in nature.  All of her pain is exacerbated when moving from sitting to standing, standing and with ambulation.  Patient reports in the past she was able to ambulate 10,000 steps per day  but this has been reduced secondary to her pain.  Pain is improved with sitting and pain medications such as relief.  Patient reports she completed 12 months of physical therapy for the neck and lower back 2 months prior at [facility] in [place].  Patient reports no significant improvement in her pain with completion of physical therapy.  Patient also takes Flexeril which is marginally helpful.  Patient reports significant lower extremity weakness.  Patient denies night fever/night sweats, urinary incontinence, bowel incontinence, significant weight loss, and loss of sensations.      Pain Disability Index Review:      2/5/2024     7:50 AM 11/22/2023     1:55 PM 2/9/2023     7:17 AM   Last 3 PDI Scores   Pain Disability Index (PDI) 30 36 50       Non-Pharmacologic Treatments:  Physical Therapy/Home Exercise: yes  Ice/Heat:yes  TENS: no  Acupuncture: no  Massage: no  Chiropractic: no    Other: no      Pain Medications:  - Adjuvant Medications: Cyclobenzaprine (Flexeril) and Tylenol (Acetaminophen) Diclofenac tablet    Pain Procedures:   -12/12/2023: Bilateral L3-5 lumbar medial branch block             Review of Systems:  GENERAL:  No weight loss, malaise or fevers.  HEENT:   No recent changes in vision or hearing  NECK:  Negative for lumps, no difficulty with swallowing.  RESPIRATORY:  Negative for cough, wheezing or shortness of breath, patient denies any recent URI.  CARDIOVASCULAR:  Negative for chest pain or palpitations.  GI:  Negative for abdominal discomfort, blood in stools or black stools or change in bowel habits.  MUSCULOSKELETAL:  See HPI.  SKIN:  Negative for lesions, rash, and itching.  PSYCH:  No mood disorder or recent psychosocial stressors.   HEMATOLOGY/LYMPHOLOGY:  Negative for prolonged bleeding, bruising easily or swollen nodes.    NEURO:   No history of syncope, paralysis, seizures or tremors.  All other reviewed and negative other than HPI.      OBJECTIVE:    Physical Exam:  Vitals:  "   02/05/24 0749   BP: (!) 162/85   Pulse: (!) (P) 52   Resp: 17   Weight: 101 kg (222 lb 10.6 oz)   Height: 5' 4" (1.626 m)   PainSc:   5      Body mass index is 38.22 kg/m².   (reviewed on 2/5/2024)    GENERAL: Well appearing, in no acute distress, alert and oriented x3.  PSYCH:  Mood and affect appropriate.  SKIN: Skin color, texture, turgor normal, no rashes or lesions.  HEAD/FACE:  Normocephalic, atraumatic. Cranial nerves grossly intact.    NECK: no pain to palpation over the cervical paraspinous muscles. Spurling Negative. Mild pain with neck flexion, extension, or lateral flexion.   Normaltesting biceps, triceps and brachioradialis bilaterally.    5/5 strength testing deltoid, biceps, triceps, wrist extensor, wrist flexor and ulnar intrinsics bilaterally.    Normal  strength bilaterally  MUSCULOSKELETAL:  Mild swelling along left ankle, moderate swelling to right ankle redness and swelling along the base of her left great toe swelling along right great toe, bilateral bunions along great toes  PULM: No evidence of respiratory difficulty, symmetric chest rise.  GI:  Soft and non-tender.    NEURO: Bilateral upper and lower extremity coordination and muscle stretch reflexes are physiologic and symmetric. No loss of sensation is noted.  GAIT: normal.        Imaging (Reviewed on 2/5/2024):    11/06/2023 MRI Lumbar Spine Without Contrast  COMPARISON:  Plain films of the lumbar spine from 03/06/2014.    FINDINGS:  There is equivocal 1-2 mm of anterolisthesis of L4 on L5. The vertebral body heights are well maintained, with no fracture.  No marrow signal abnormality suspicious for an infiltrative process.    The conus medullaris terminates at approximately the L1-L2 disk space.  There is a probable small cyst seen within the upper pole to interpolar right kidney that measures approximately 6 mm in size.  Consider further evaluation with ultrasound for confirmation.  The discs are relatively well hydrated with " only some minimal disc desiccation seen along the periphery of the L5-S1 disc and slightly more centrally at the L2-3 disc.    L1-L2: No significant central canal or neural foraminal narrowing.Mild bilateral facet arthropathy noted.    L2-L3: No significant central canal or neural foraminal narrowing.Mild-to-moderate bilateral facet arthropathy noted.    L3-L4: No significant central canal or neural foraminal narrowing.Moderate to severe bilateral facet arthropathy noted.    L4-L5:  Minimal diffuse disc bulge along with equivocal grade 1 spondylolisthesis and severe bilateral facet arthropathy resulting in mild effacement of the ventral thecal sac.  No significant neural foraminal canal narrowing.    L5-S1:  Mild diffuse disc bulge slightly more prominent the left paracentral region along with moderate bilateral facet arthropathy resulting in no significant central or neural foraminal canal narrowing.  Impression:   1. Multilevel degenerative changes of the lumbar spine as detailed above.  No high-grade central or neural foraminal canal narrowing noted.  Multilevel bilateral facet arthropathy.  2. Incidental note made of a probable 6 mm right renal cyst.  Consider ultrasound for confirmation.        11/06/2023 MRI Cervical Spine Without Contrast  COMPARISON:  Plain films from 04/13/2021.  Report from outside MRI dated 08/09/2022.  Films unavailable for comparison.    FINDINGS:  There is a couple mm of anterolisthesis of C2 on C3 and C3 on C4. No fracture. No marrow signal abnormality suspicious for an infiltrative process.  There is disc desiccation noted throughout the cervical spine with mild disc space narrowing seen at C3-4, C4-5 and C7-C6 with more mild-to-moderate disc space narrowing seen at C5-6.    The cervical cord is normal in caliber and signal characteristics.  The craniocervical junction and visualized intracranial contents are unremarkable.  The adjacent soft tissue structures show no significant  abnormalities.    C2-C3:  No significant central canal or neural foraminal narrowing.    C3-C4:  There is a diffuse disc bulge along with some posterior spondylotic ridging that results in effacement of ventral thecal sac and moderate effacement of the ventral cord.  The AP dimension of the central canal this level measures approximately 9 mm.  No significant neural foraminal canal narrowing suggested.    C4-C5:  Diffuse disc bulge and posterior spondylotic ridging more prominent in the left paracentral region resulting in significant effacement of the thecal sac and moderate face mint of the left side of the cord.  The AP dimension of the central canal at this level also measures approximately 9 mm.  No significant neural foraminal canal narrowing.    C5-C6:  Diffuse disc bulge resulting in effacement of ventral thecal sac but not quite abutting the cord.  The AP dimension of the central canal measures approximately 10 mm at this level.  The right C5-6 neural foraminal canal is mildly narrowed secondary to uncovertebral joint hypertrophy.  No significant left neural foraminal canal narrowing.    C6-C7:  Mild diffuse disc bulge resulting in mild effacement of ventral thecal sac.  No abutment of the anterior cord.  The AP dimension of the central canal at this level measures approximately 10.5 mm.  The right C6-7 neural foraminal canal appears to be minimally narrowed secondary to uncovertebral joint hypertrophy.    C7-T1:   Mild diffuse disc bulge resulting in no significant central canal narrowing.  The right neural foraminal canal appears to be at least mildly narrowed.  Impression:   1. Multilevel degenerative changes of the cervical spine as detailed above.        03/04/14 X-Ray Lumbar Spine Ap And Lateral  Findings:  Five non-rib bearing lumbar type vertebrae are present without significant lateral curvature abnormality or subluxation.  Degenerative endplate changes as well as facet joint hypertrophy,  particularly inferiorly, are noted without acute fracture or  suspicious focal bony lesion identified.  Disk spaces are fairly well-maintained.     04/13/21 X-Ray Cervical Spine Complete 5 view  FINDINGS:  Patient's hair limits the exam.  There is visualization to the C7-T1 level on the swimmer's view.  Multilevel marginal spurring and varying degrees of uniform loss of disc height throughout the C-spine.  No fracture or subluxation.  Pre dens space, prevertebral soft tissues, C1-2 articulation and odontoid normal in appearance allowing for positioning.  Neural foramina widely patent bilaterally at all levels.  Remaining visualized osseous structures intact.  Included upper lung fields clear.    EMG 10/2021  IMPRESSION  1. ABNORMAL study  2. There is electrodiagnostic evidence of an acute on chronic radiculopathy of the L5 and S1 nerve roots-slightly worse on the right        ASSESSMENT: 68 y.o. year old female with neck, mid back, bilateral ankles pain, consistent with     1. Lumbar radiculopathy  EMG W/ ULTRASOUND AND NERVE CONDUCTION TEST 2 Extremities      2. Spondylolisthesis, lumbar region  cyclobenzaprine (FLEXERIL) 10 MG tablet      3. Lumbar spondylosis        4. Lumbar facet arthropathy                PLAN:   - Interventions:  Patient has 90% sustained relief in lower axial back pain following bilateral L3-5 lumbar medial branch block.  We have discussed repeating this injection in the future should symptoms exacerbate.    - Anticoagulation use: no no anticoagulation    - Medications:   report:  Reviewed and consistent with medication use as prescribed.    - Continue Flexeril 10mg PRN.  We have discussed potential deleterious side effects associated with this medication including  dry mouth, headache, blurred vision, drowsiness or dizziness, loss of appetite.  Patient expresses understanding.  -6 mo supply given    -DC Gabapentin due to adverse side effects (made her feel crazy) and no improvement in  symptoms    - Consults/referral:   -Physical Medicine Rehabilitation for lower extremity electromyography studies to help guide diagnosis and treatment  -PCP: uncontrolled HTN    - Therapy: We discussed continuing physical therapy to help manage the patient/s painful condition. The patient was counseled that muscle strengthening will improve the long term prognosis in regards to pain and may also help increase range of motion and mobility.     - Diagnostic/ Imaging:  Lumbar MRI reviewed in patient's questions answered.      - Follow up visit:  Six-months      The above plan and management options were discussed at length with patient. Patient is in agreement with the above and verbalized understanding.    - I discussed the goals of interventional chronic pain management with the patient on today's visit. We discussed a multimodal and systematic approach to pain.  This includes diagnostic and therapeutic injections, adjuvant pharmacologic treatment, physical therapy, and at times psychiatry.  I emphasized the importance of regular exercise, core strengthening and stretching, diet and weight loss as a cornerstone of long-term pain management.    - This condition does not require this patient to take time off of work, and the primary goal of our Pain Management services is to improve the patient's functional capacity.  - Patient Questions: Answered all of the patient's questions regarding diagnoses, therapy, treatment and next steps        Franklin Sutton MD  Interventional Pain Management - [facility]    Disclaimer:  This note was prepared using voice recognition system and is likely to have sound alike errors that may have been overlooked even after proof reading.  Please call me with any questions.

## 2024-02-02 ENCOUNTER — TELEPHONE (OUTPATIENT)
Dept: INTERNAL MEDICINE | Facility: CLINIC | Age: 69
End: 2024-02-02
Payer: MEDICARE

## 2024-02-02 NOTE — TELEPHONE ENCOUNTER
----- Message from Geovanna Hernandez sent at 2/2/2024  3:37 PM CST -----  Regarding: Please she ask can you se her it would take 10 minutes to get ther  Type:  Same Day Appointment Request    Caller is requesting a same day appointment.  Caller declined first available appointment listed below.    Name of Caller:pt  When is the first available appointment?3:40 today  Symptoms:Pt has body aches. Please call  Best Call Back Number: [phone]  Additional Information:

## 2024-02-05 ENCOUNTER — TELEPHONE (OUTPATIENT)
Dept: PHYSICAL MEDICINE AND REHAB | Facility: CLINIC | Age: 69
End: 2024-02-05
Payer: MEDICARE

## 2024-02-05 ENCOUNTER — OFFICE VISIT (OUTPATIENT)
Dept: PAIN MEDICINE | Facility: CLINIC | Age: 69
End: 2024-02-05
Payer: MEDICARE

## 2024-02-05 VITALS
BODY MASS INDEX: 38.02 KG/M2 | WEIGHT: 222.69 LBS | SYSTOLIC BLOOD PRESSURE: 162 MMHG | DIASTOLIC BLOOD PRESSURE: 85 MMHG | HEIGHT: 64 IN | RESPIRATION RATE: 17 BRPM

## 2024-02-05 DIAGNOSIS — M47.816 LUMBAR FACET ARTHROPATHY: ICD-10-CM

## 2024-02-05 DIAGNOSIS — M43.16 SPONDYLOLISTHESIS, LUMBAR REGION: ICD-10-CM

## 2024-02-05 DIAGNOSIS — M54.16 LUMBAR RADICULOPATHY: Primary | ICD-10-CM

## 2024-02-05 DIAGNOSIS — M47.816 LUMBAR SPONDYLOSIS: ICD-10-CM

## 2024-02-05 PROCEDURE — 99999 PR PBB SHADOW E&M-EST. PATIENT-LVL IV: CPT | Mod: PBBFAC,,, | Performed by: ANESTHESIOLOGY

## 2024-02-05 PROCEDURE — 99214 OFFICE O/P EST MOD 30 MIN: CPT | Mod: S$GLB,,, | Performed by: ANESTHESIOLOGY

## 2024-02-05 RX ORDER — CYCLOBENZAPRINE HCL 10 MG
10 TABLET ORAL DAILY PRN
Qty: 90 TABLET | Refills: 1 | Status: SHIPPED | OUTPATIENT
Start: 2024-02-05 | End: 2024-05-05

## 2024-02-05 NOTE — TELEPHONE ENCOUNTER
POST PROCEDURE INSTRUCTIONS    Follow discharge instructions given per photo handout.     ----- Message from Tom Romero MA sent at 2/5/2024  8:10 AM CST -----  Regarding: emg  Good Morning,    Can you schedule this patient EMG please.     Thank you.  Tom Romero   Medical Assistant

## 2024-02-08 ENCOUNTER — OFFICE VISIT (OUTPATIENT)
Dept: PHYSICAL MEDICINE AND REHAB | Facility: CLINIC | Age: 69
End: 2024-02-08
Payer: MEDICARE

## 2024-02-08 VITALS — WEIGHT: 222 LBS | BODY MASS INDEX: 37.9 KG/M2 | HEIGHT: 64 IN | RESPIRATION RATE: 13 BRPM

## 2024-02-08 DIAGNOSIS — M54.16 LUMBAR RADICULOPATHY: ICD-10-CM

## 2024-02-08 PROCEDURE — 95910 NRV CNDJ TEST 7-8 STUDIES: CPT | Mod: S$GLB,,, | Performed by: PHYSICAL MEDICINE & REHABILITATION

## 2024-02-08 PROCEDURE — 95885 MUSC TST DONE W/NERV TST LIM: CPT | Mod: S$GLB,,, | Performed by: PHYSICAL MEDICINE & REHABILITATION

## 2024-02-08 PROCEDURE — 99499 UNLISTED E&M SERVICE: CPT | Mod: S$GLB,,, | Performed by: PHYSICAL MEDICINE & REHABILITATION

## 2024-02-08 PROCEDURE — 99999 PR PBB SHADOW E&M-EST. PATIENT-LVL III: CPT | Mod: PBBFAC,,, | Performed by: PHYSICAL MEDICINE & REHABILITATION

## 2024-02-08 NOTE — PROGRESS NOTES
[facility]   [address]  [place] [address]  Phone: [phone]        Full Name: yung betts YOB: 1954  Patient ID: [id number]      Visit Date: 2/8/2024 07:55  Age: 69 Years 3 Months Old  Examining Physician: Agustina Dacosta M.D.  Referring Physician: Dr Sutton  Reason for Referral: lower ext  Chief Complaint   Patient presents with    Back Pain     Into legs       HPI: This is a 68 y.o.  female being seen in clinic today for re-evaluation of chronic achy low back pain with radiation of symptoms into her legs to the bottom of her feet. With increased activity/walking her symptoms can worsen.  Rest and position change provides some relief.  Past injections provided relief as well.   History obtained from patient    Past family, medical, social, and surgical history reviewed in chart    Review of Systems:     General- denies lethargy, weight change, fever, chills  Head/neck- denies swallowing difficulties  ENT- denies hearing changes  Cardiovascular-denies chest pain  Pulmonary- denies shortness of breath  GI- denies constipation or bowel incontinence  - denies bladder incontinence  Skin- denies wounds or rashes  Musculoskeletal- denies weakness, +pain  Neurologic- +numbness and tingling  Psychiatric- denies depressive or psychotic features, denies anxiety  Lymphatic-denies swelling  Endocrine- denies hypoglycemic symptoms/DM history  All other pertinent systems negative     Physical Examination:  General: Well developed, well nourished female, NAD  HEENT:NCAT EOMI bilaterally   Pulmonary:Normal respirations    Spinal Examination: CERVICAL  Active ROM is within normal limits.  Inspection: No deformity of spinal alignment.    Spinal Examination: LUMBAR or THORACIC  Active ROM is limited at endranges  Inspection: No deformity of spinal alignment.    Slr neg     Bilateral Upper and Lower Extremities:  Pulses are 2+ at radial bilaterally.  Shoulder/Elbow/Wrist/Hand ROM    Hip/Knee/Ankle ROM wnl except limited right hip ER/IR  Bilateral Extremities show normal capillary refill.  No signs of cyanosis, rubor, edema, skin changes, or dysvascular changes of appendages.  Nails appear intact.    Neurological Exam:  Cranial Nerves:  II-XII grossly intact    Manual Muscle Testing: (Motor 5=normal)  5/5 strength bilateral lower extremities    No focal atrophy is noted of either lower extremity.    Bilateral Reflexes:  No clonus at knee or ankle.    Sensation: tested to light touch  - intact in legs except dec at lower ankles in S1 dist  Gait: Narrow base and good arm swing.      Entire procedure explained to patient prior to proceeding.  Verbal consent obtained         SNC      Nerve / Sites Rec. Site Onset Lat Peak Lat Amp Segments Distance Velocity     ms ms µV  mm m/s   R Sural - Ankle (Calf)      Calf Ankle 2.9 3.9 14.0 Calf - Ankle 140 48   L Sural - Ankle (Calf)      Calf Ankle 3.2 4.0 8.9 Calf - Ankle 140 43   R Superficial peroneal - Ankle      Lat leg Ankle 3.1 3.9 7.2 Lat leg - Ankle 140 45   L Superficial peroneal - Ankle      Lat leg Ankle 2.9 3.2 10.9 Lat leg - Ankle 140 49       MNC      Nerve / Sites Muscle Latency Amplitude Duration Rel Amp Segments Distance Lat Diff Velocity     ms mV ms %  mm ms m/s   R Peroneal - EDB      Ankle EDB 4.3 6.1 5.2 100 Ankle - EDB 80        Fibular head EDB 10.4 5.3 6.5 86.3 Fibular head - Ankle 330 6.1 54      Pop fossa EDB 11.9 5.4 5.9 103 Pop fossa - Fibular head 90 1.6 58   L Peroneal - EDB      Ankle EDB 4.1 8.4 5.3 100 Ankle - EDB 80        Fibular head EDB 10.1 6.7 7.2 80 Fibular head - Ankle 310 6.0 52      Pop fossa EDB 11.7 6.9 6.2 102 Pop fossa - Fibular head 90 1.6 58   R Tibial - AH      Ankle AH 5.1 4.6 4.4 100 Ankle - Ankle 80        Pop fossa AH 16.2 3.2  70.3 Pop fossa - Ankle 450 11.1 41   L Tibial - AH      Ankle AH 5.5 4.3 3.9 100 Ankle - Ankle 80        Pop fossa AH 14.8 3.4  79.8 Pop fossa - Ankle 420 9.4 45       EMG          EMG Summary Table     Spontaneous MUAP Recruitment   Muscle IA Fib PSW Fasc Other Dur. Dur Amp Dur Polys Pattern Effort   L. Extensor digitorum brevis N None None None . _NFT_ _NFT_ Sl Incr N 1+ Reduced Max   L. Abductor hallucis Incr 1+ 2+ None . _NFT_ _NFT_ Sl Incr Incr 1+ Reduced Max   L. Rectus femoris N None None None . _NFT_ _NFT_ N N N N Max   R. Extensor digitorum brevis N None None None . _NFT_ _NFT_ Sl Incr Sl Incr 1+ sl red Max   R. Abductor hallucis Incr 1+ None None . _NFT_ _NFT_ Sl Incr Sl Incr 1+ Reduced Max                                      INTERPRETATION  -Bilateral superficial peroneal sensorynerve conduction study showed normal peak latency and amplitude  -Bilateral sural sensory nerve conduction study showed normal peak latency and amplitude  -Bilateral peroneal motor nerve conduction study showed normal latency, amplitude, and conduction velocity  -Bilateral tibial motor nerve conduction study showed normal latency, amplitude, and conduction velocity  -Needle EMG examination performed to above mentioned muscles       IMPRESSION  1. ABNORMAL study  2. There is electrodiagnostic evidence of an acute on chronic radiculopathy of the L5 and S1 nerve roots-slightly worse on the right    PLAN  Discussed in detail for greater than 30 minutes about diagnosis and treatment plan    1. Follow up with referring provider: Dr. Franklin Sutton  2. Handouts on back care, exercise, lumbar radic, etc provided. Rec fu with IPM  3. This study is good for one year. If symptoms worsen or do not improve, please re-consult.    Agustina Dacosta M.D.  Physical Medicine and Rehab

## 2024-02-14 ENCOUNTER — TELEPHONE (OUTPATIENT)
Dept: INTERNAL MEDICINE | Facility: CLINIC | Age: 69
End: 2024-02-14
Payer: MEDICARE

## 2024-02-14 ENCOUNTER — OFFICE VISIT (OUTPATIENT)
Dept: INTERNAL MEDICINE | Facility: CLINIC | Age: 69
End: 2024-02-14
Payer: MEDICARE

## 2024-02-14 VITALS
OXYGEN SATURATION: 98 % | WEIGHT: 223.19 LBS | SYSTOLIC BLOOD PRESSURE: 124 MMHG | BODY MASS INDEX: 38.31 KG/M2 | TEMPERATURE: 98 F | DIASTOLIC BLOOD PRESSURE: 76 MMHG | RESPIRATION RATE: 15 BRPM | HEART RATE: 63 BPM

## 2024-02-14 DIAGNOSIS — N30.00 ACUTE CYSTITIS WITHOUT HEMATURIA: Primary | ICD-10-CM

## 2024-02-14 LAB
BILIRUB SERPL-MCNC: ABNORMAL MG/DL
BLOOD URINE, POC: ABNORMAL
CLARITY, POC UA: ABNORMAL
COLOR, POC UA: ABNORMAL
GLUCOSE UR QL STRIP: ABNORMAL
KETONES UR QL STRIP: ABNORMAL
LEUKOCYTE ESTERASE URINE, POC: ABNORMAL
NITRITE, POC UA: ABNORMAL
PH, POC UA: 5
PROTEIN, POC: ABNORMAL
SPECIFIC GRAVITY, POC UA: 1
UROBILINOGEN, POC UA: 0.2

## 2024-02-14 PROCEDURE — 81002 URINALYSIS NONAUTO W/O SCOPE: CPT | Mod: S$GLB,,, | Performed by: FAMILY MEDICINE

## 2024-02-14 PROCEDURE — 99213 OFFICE O/P EST LOW 20 MIN: CPT | Mod: S$GLB,,, | Performed by: FAMILY MEDICINE

## 2024-02-14 PROCEDURE — 99999 PR PBB SHADOW E&M-EST. PATIENT-LVL V: CPT | Mod: PBBFAC,,, | Performed by: FAMILY MEDICINE

## 2024-02-14 PROCEDURE — 87086 URINE CULTURE/COLONY COUNT: CPT | Performed by: FAMILY MEDICINE

## 2024-02-14 RX ORDER — NITROFURANTOIN 25; 75 MG/1; MG/1
100 CAPSULE ORAL 2 TIMES DAILY
Qty: 10 CAPSULE | Refills: 0 | Status: SHIPPED | OUTPATIENT
Start: 2024-02-14 | End: 2024-03-04 | Stop reason: ALTCHOICE

## 2024-02-14 NOTE — TELEPHONE ENCOUNTER
----- Message from Geovanna Hernandez sent at 2/14/2024  8:06 AM CST -----  Pt is asking for orders to do a urine specimen, she believes she has UTI,Please call back  [phone]

## 2024-02-16 LAB
BACTERIA UR CULT: NORMAL
BACTERIA UR CULT: NORMAL

## 2024-02-21 LAB — CRC RECOMMENDATION EXT: NORMAL

## 2024-02-22 ENCOUNTER — PATIENT MESSAGE (OUTPATIENT)
Dept: INTERNAL MEDICINE | Facility: CLINIC | Age: 69
End: 2024-02-22
Payer: MEDICARE

## 2024-02-22 DIAGNOSIS — E89.0 POST-OPERATIVE HYPOTHYROIDISM: Primary | ICD-10-CM

## 2024-02-28 ENCOUNTER — PATIENT OUTREACH (OUTPATIENT)
Dept: ADMINISTRATIVE | Facility: HOSPITAL | Age: 69
End: 2024-02-28
Payer: MEDICARE

## 2024-02-28 NOTE — LETTER
AUTHORIZATION FOR RELEASE OF   CONFIDENTIAL INFORMATION    [id number]    We are seeing Mariah Meza, date of birth 1955, in the clinic at [facility] INTERNAL MEDICINE. Taina Bob NP is the patient's PCP. Mariah Meza has an outstanding lab/procedure at the time we reviewed her chart. In order to help keep her health information updated, she has authorized us to request the following medical record(s):                                           ( X )  COLON PATHOLOGY 2024          Please fax records to [facility], Thomas, Alisha N., NP, [phone]    [facility]  Care Coordination Department  [facility]  [phone]     Patient Name: Mariah Meza  : 1955  Patient Phone #: [phone]

## 2024-02-29 NOTE — PROGRESS NOTES
Subjective:       Patient ID: Mariah Meza is a 68 y.o. female.    Chief Complaint: Urinary Tract Infection (X few days, burning/discomfort )    Urinary Tract Infection: Patient complains of burning with urination She has had symptoms for 3 days. Patient denies back pain, congestion, cough, fever, headache, rhinitis, sorethroat, stomach ache, and vaginal discharge. Patient does not have a history of recurrent UTI.  Patient does not have a history of pyelonephritis.                ROS per HPI    Objective:      Physical Exam  HENT:      Head: Normocephalic and atraumatic.      Nose: No congestion or rhinorrhea.   Eyes:      General: No scleral icterus.        Right eye: No discharge.         Left eye: No discharge.      Extraocular Movements: Extraocular movements intact.      Conjunctiva/sclera: Conjunctivae normal.      Pupils: Pupils are equal, round, and reactive to light.   Cardiovascular:      Rate and Rhythm: Normal rate and regular rhythm.      Heart sounds: No murmur heard.     No friction rub. No gallop.   Pulmonary:      Effort: Pulmonary effort is normal. No respiratory distress.      Breath sounds: Normal breath sounds. No stridor. No wheezing or rhonchi.   Abdominal:      General: Bowel sounds are normal. There is no distension.      Palpations: Abdomen is soft. There is no mass.      Tenderness: There is no abdominal tenderness. There is no right CVA tenderness, left CVA tenderness, guarding or rebound.      Hernia: No hernia is present.   Musculoskeletal:      Right lower leg: No edema.      Left lower leg: No edema.   Skin:     General: Skin is warm.   Neurological:      General: No focal deficit present.      Mental Status: She is alert.   Psychiatric:         Mood and Affect: Mood normal.         Behavior: Behavior normal.         Assessment:       1. Acute cystitis without hematuria        Plan:   1. Acute cystitis without hematuria  -     POCT URINE DIPSTICK WITHOUT MICROSCOPE  -      CULTURE, URINE  -     nitrofurantoin, macrocrystal-monohydrate, (MACROBID) 100 MG capsule; Take 1 capsule (100 mg total) by mouth 2 (two) times daily. for 5 days  Dispense: 10 capsule; Refill: 0         Follow up if symptoms worsen or fail to improve.    Yocasta Schmid MD  Family Medicine       160

## 2024-03-01 ENCOUNTER — LAB VISIT (OUTPATIENT)
Dept: LAB | Facility: HOSPITAL | Age: 69
End: 2024-03-01
Attending: NURSE PRACTITIONER
Payer: MEDICARE

## 2024-03-01 DIAGNOSIS — E89.0 POST-OPERATIVE HYPOTHYROIDISM: ICD-10-CM

## 2024-03-01 LAB
T4 FREE SERPL-MCNC: 1.25 NG/DL (ref 0.71–1.51)
TSH SERPL DL<=0.005 MIU/L-ACNC: 0.56 UIU/ML (ref 0.4–4)

## 2024-03-01 PROCEDURE — 84443 ASSAY THYROID STIM HORMONE: CPT | Performed by: NURSE PRACTITIONER

## 2024-03-01 PROCEDURE — 84439 ASSAY OF FREE THYROXINE: CPT | Performed by: NURSE PRACTITIONER

## 2024-03-01 PROCEDURE — 36415 COLL VENOUS BLD VENIPUNCTURE: CPT | Mod: PN | Performed by: NURSE PRACTITIONER

## 2024-03-04 ENCOUNTER — OFFICE VISIT (OUTPATIENT)
Dept: OBSTETRICS AND GYNECOLOGY | Facility: CLINIC | Age: 69
End: 2024-03-04
Payer: MEDICARE

## 2024-03-04 VITALS
SYSTOLIC BLOOD PRESSURE: 104 MMHG | DIASTOLIC BLOOD PRESSURE: 56 MMHG | HEIGHT: 64 IN | WEIGHT: 224.88 LBS | BODY MASS INDEX: 38.39 KG/M2

## 2024-03-04 DIAGNOSIS — Z12.31 SCREENING MAMMOGRAM, ENCOUNTER FOR: ICD-10-CM

## 2024-03-04 DIAGNOSIS — Z12.4 SCREENING FOR CERVICAL CANCER: ICD-10-CM

## 2024-03-04 DIAGNOSIS — N95.1 MENOPAUSAL SYNDROME (HOT FLUSHES): ICD-10-CM

## 2024-03-04 DIAGNOSIS — N95.2 VAGINAL ATROPHY: ICD-10-CM

## 2024-03-04 DIAGNOSIS — Z01.419 ENCOUNTER FOR GYNECOLOGICAL EXAMINATION (GENERAL) (ROUTINE) WITHOUT ABNORMAL FINDINGS: Primary | ICD-10-CM

## 2024-03-04 PROCEDURE — G0101 CA SCREEN;PELVIC/BREAST EXAM: HCPCS | Mod: GZ,S$GLB,, | Performed by: OBSTETRICS & GYNECOLOGY

## 2024-03-04 PROCEDURE — 99999 PR PBB SHADOW E&M-EST. PATIENT-LVL III: CPT | Mod: PBBFAC,,, | Performed by: OBSTETRICS & GYNECOLOGY

## 2024-03-04 RX ORDER — ESTRADIOL 0.1 MG/G
CREAM VAGINAL
Qty: 42.5 G | Refills: 1 | Status: SHIPPED | OUTPATIENT
Start: 2024-03-04 | End: 2025-03-04

## 2024-03-04 RX ORDER — ESTRADIOL 0.5 MG/1
0.5 TABLET ORAL DAILY
Qty: 90 TABLET | Refills: 3 | Status: SHIPPED | OUTPATIENT
Start: 2024-03-04 | End: 2025-03-04

## 2024-03-04 NOTE — PROGRESS NOTES
Subjective:       Patient ID: Mariah Meza is a 68 y.o. female.    Chief Complaint:  Annual Exam      History of Present Illness  HPI  Annual Exam-Postmenopausal  Patient presents for annual exam. The patient c/o worsening hot flushes and vaginal burning sensation.. The patient is not currently sexually active. GYN screening history: last pap: was normal and patient does not recall when last pap was and last mammogram: approximate date  and was normal. The patient is not currently taking hormone replacement therapy. Patient denies post-menopausal vaginal bleeding. The patient wears seatbelts: yes. The patient participates in regular exercise: yes.--walking/stretching 10-15 min;  Has the patient ever been transfused or tattooed?: no. The patient reports that there is not domestic violence in her life.  Does not leak with cough or sneeze; can make it to restroom in time  Problems falling and staying asleep    C/o hot flushes  Followed by [facility] for constipation--reports recent colonoscopy last week  GYN & OB History  Patient's last menstrual period was 02/10/1984 (approximate).   Date of Last Pap: No result found    OB History    Para Term  AB Living   2 2 2     2   SAB IAB Ectopic Multiple Live Births           2      # Outcome Date GA Lbr Gonsalo/2nd Weight Sex Delivery Anes PTL Lv   2 Term      Vag-Spont   HAROON   1 Term      Vag-Spont   HAROON       Review of Systems  Review of Systems   Genitourinary:  Positive for hot flashes and vaginal dryness.   Psychiatric/Behavioral:  Positive for sleep disturbance.    All other systems reviewed and are negative.          Objective:      Physical Exam:   Constitutional: She is oriented to person, place, and time. She appears well-developed and well-nourished.     Eyes: Pupils are equal, round, and reactive to light. Conjunctivae and EOM are normal.      Pulmonary/Chest: Effort normal. Right breast exhibits no mass, no nipple discharge, no skin change  and no tenderness. Left breast exhibits no mass, no nipple discharge, no skin change and no tenderness. Breasts are symmetrical.        Abdominal: Soft.     Genitourinary:    Inguinal canal, urethra, bladder, vagina, right adnexa, left adnexa and rectum normal.      Pelvic exam was performed with patient supine.   The external female genitalia was normal.     Labial bartholins normal.Right adnexum displays no mass and no tenderness. Left adnexum displays no mass and no tenderness. No erythema, vaginal discharge, bleeding, rectocele, cystocele or prolapse of vaginal walls in the vagina. Vaginal atrophy noted. Cervix is absent.Uterus is absent. Normal urethral meatus.Urethra findings: no urethral massBladder findings: no bladder distention and no bladder tenderness          Musculoskeletal: Normal range of motion and moves all extremeties.       Neurological: She is alert and oriented to person, place, and time.    Skin: Skin is warm.    Psychiatric: She has a normal mood and affect. Her behavior is normal.           Assessment:        1. Encounter for gynecological examination (general) (routine) without abnormal findings    2. Menopausal syndrome (hot flushes)    3. Vaginal atrophy    4. Screening for cervical cancer    5. Screening mammogram, encounter for               Plan:      Continue annual well woman exam.   Pap not indicated due to hx of nml pap and hx of lamar  Pt does not feel clonidine helps with hot flushes or sleep; wishes to resume ERT  Rx sent for estrace and vaginal estrogen  Osteoporosis prevention; 1200mg calcium/d with source of vitamin d  Bmd 2021 wnl, due in 5 yrs  Continue diet, exercise, weight loss  mammo ordered, continue yearly until age 73

## 2024-03-08 ENCOUNTER — TELEPHONE (OUTPATIENT)
Dept: SPORTS MEDICINE | Facility: CLINIC | Age: 69
End: 2024-03-08
Payer: MEDICARE

## 2024-03-08 NOTE — TELEPHONE ENCOUNTER
Called PT, she verbally stated that she had already gotten her appt Rescheduled for 4/9/2/24.      ----- Message from Reta Farooq sent at 3/7/2024  2:21 PM CST -----  Regarding: appt  Name of who is calling:   Mariah      What is the request in detail: Pt is requesting a call back in ref to rescheduling cancelled appt       Can the clinic reply by [facility]:no      What number to call back if not [facility]: [phone]

## 2024-03-11 ENCOUNTER — LAB VISIT (OUTPATIENT)
Dept: LAB | Facility: HOSPITAL | Age: 69
End: 2024-03-11
Attending: NURSE PRACTITIONER
Payer: MEDICARE

## 2024-03-11 ENCOUNTER — OFFICE VISIT (OUTPATIENT)
Dept: INTERNAL MEDICINE | Facility: CLINIC | Age: 69
End: 2024-03-11
Payer: MEDICARE

## 2024-03-11 VITALS
BODY MASS INDEX: 38.52 KG/M2 | OXYGEN SATURATION: 98 % | TEMPERATURE: 98 F | SYSTOLIC BLOOD PRESSURE: 142 MMHG | DIASTOLIC BLOOD PRESSURE: 80 MMHG | WEIGHT: 225.63 LBS | HEART RATE: 57 BPM | HEIGHT: 64 IN

## 2024-03-11 DIAGNOSIS — E78.2 MIXED HYPERLIPIDEMIA: ICD-10-CM

## 2024-03-11 DIAGNOSIS — Z00.00 ANNUAL PHYSICAL EXAM: Primary | ICD-10-CM

## 2024-03-11 DIAGNOSIS — I10 PRIMARY HYPERTENSION: ICD-10-CM

## 2024-03-11 DIAGNOSIS — E89.0 POST-OPERATIVE HYPOTHYROIDISM: ICD-10-CM

## 2024-03-11 DIAGNOSIS — R73.09 ELEVATED GLUCOSE: ICD-10-CM

## 2024-03-11 DIAGNOSIS — E53.8 VITAMIN B12 DEFICIENCY: ICD-10-CM

## 2024-03-11 LAB
CHOLEST SERPL-MCNC: 196 MG/DL (ref 120–199)
CHOLEST/HDLC SERPL: 4.2 {RATIO} (ref 2–5)
ESTIMATED AVG GLUCOSE: 94 MG/DL (ref 68–131)
HBA1C MFR BLD: 4.9 % (ref 4–5.6)
HDLC SERPL-MCNC: 47 MG/DL (ref 40–75)
HDLC SERPL: 24 % (ref 20–50)
LDLC SERPL CALC-MCNC: 130.4 MG/DL (ref 63–159)
NONHDLC SERPL-MCNC: 149 MG/DL
TRIGL SERPL-MCNC: 93 MG/DL (ref 30–150)

## 2024-03-11 PROCEDURE — 99999 PR PBB SHADOW E&M-EST. PATIENT-LVL V: CPT | Mod: PBBFAC,,, | Performed by: NURSE PRACTITIONER

## 2024-03-11 PROCEDURE — 83036 HEMOGLOBIN GLYCOSYLATED A1C: CPT | Performed by: NURSE PRACTITIONER

## 2024-03-11 PROCEDURE — 99214 OFFICE O/P EST MOD 30 MIN: CPT | Mod: S$GLB,,, | Performed by: NURSE PRACTITIONER

## 2024-03-11 PROCEDURE — 80061 LIPID PANEL: CPT | Performed by: NURSE PRACTITIONER

## 2024-03-11 PROCEDURE — 82607 VITAMIN B-12: CPT | Performed by: NURSE PRACTITIONER

## 2024-03-11 PROCEDURE — 36415 COLL VENOUS BLD VENIPUNCTURE: CPT | Mod: PN | Performed by: NURSE PRACTITIONER

## 2024-03-11 NOTE — PROGRESS NOTES
Subjective     Patient ID: Mariah Meza is a 68 y.o. female.    Chief Complaint: Follow-up    Patient presents for 3 month follow up.    Medical history:  HTN, HLP, Obesity, Chronic Rhinitis, GERD, Back pain, DJD, lumbar, Lumbar radiculopathy, Papillary thyroid carcinoma, Post-op hypothyroidism, Headaches, H/o parathyroidectomy, Hot flashes, Sicca Syndrome, Elevated uric acid in blood.     Having high blood pressure readings. Reports 2 ER visits due to HTN and constipation: 12/2023 and 01/2024.    Saw cardiologist last week-was started on Clonidine.  Taking at bedtime.  Monitoring routinely at home. Overall stable with a few elevated readings.     Reports her injection has helped.  Able to exercise.      Followed up with GI--had colonoscopy 02/2024.          Continues to have pain from EEG to right foot.  Pain improved but has swelling as well.      Can no longer see Dr. Cortez (endocrinology).  Insurance gave her a providers name and information.      Imipram 10 mg at bedtime.  Prescribed by GI provider.  Was feeling sad during the time she was ill: HTN, Back pain/ Constipation.  Did not start.  Feeling better.      Follow-up  Associated symptoms include arthralgias. Pertinent negatives include no abdominal pain, chest pain, chills, coughing, fatigue or fever.     Review of Systems   Constitutional:  Negative for chills, fatigue and fever.   Respiratory:  Negative for cough and shortness of breath.    Cardiovascular:  Negative for chest pain and leg swelling.   Gastrointestinal:  Negative for abdominal pain, constipation and diarrhea.   Musculoskeletal:  Positive for arthralgias and back pain.   Psychiatric/Behavioral:  Negative for agitation and confusion.           Objective     Physical Exam  Vitals reviewed.   Constitutional:       Appearance: Normal appearance.   Cardiovascular:      Rate and Rhythm: Normal rate and regular rhythm.   Pulmonary:      Effort: Pulmonary effort is normal.      Breath  sounds: Normal breath sounds.   Abdominal:      General: Bowel sounds are normal. There is no distension.      Tenderness: There is no abdominal tenderness.   Musculoskeletal:         General: Tenderness present. Normal range of motion.        Feet:    Feet:      Comments: Tenderness.  No erythema or swelling.  Advised to use topical Voltaren to area.   Neurological:      General: No focal deficit present.      Mental Status: She is alert.   Psychiatric:         Mood and Affect: Mood normal.         Behavior: Behavior is cooperative.            Assessment and Plan     1. Vitamin B12 deficiency  -     VITAMIN B12; Future; Expected date: 03/11/2024    2. Annual physical exam    3. Post-operative hypothyroidism    4. Primary hypertension  -     Lipid Panel; Future; Expected date: 03/11/2024    5. Mixed hyperlipidemia  -     Lipid Panel; Future; Expected date: 03/11/2024    6. Elevated glucose  -     HEMOGLOBIN A1C; Future; Expected date: 03/11/2024  -     Lipid Panel; Future; Expected date: 03/11/2024          Lab today     Three month follow up    Recheck BP before leaving        No follow-ups on file.

## 2024-03-12 LAB — VIT B12 SERPL-MCNC: 854 PG/ML (ref 210–950)

## 2024-03-24 ENCOUNTER — PATIENT MESSAGE (OUTPATIENT)
Dept: OBSTETRICS AND GYNECOLOGY | Facility: CLINIC | Age: 69
End: 2024-03-24
Payer: MEDICARE

## 2024-03-26 ENCOUNTER — TELEPHONE (OUTPATIENT)
Dept: OTOLARYNGOLOGY | Facility: CLINIC | Age: 69
End: 2024-03-26
Payer: MEDICARE

## 2024-03-26 ENCOUNTER — HOSPITAL ENCOUNTER (OUTPATIENT)
Dept: RADIOLOGY | Facility: HOSPITAL | Age: 69
Discharge: HOME | End: 2024-03-26
Attending: NURSE PRACTITIONER
Payer: MEDICARE

## 2024-03-26 ENCOUNTER — HOSPITAL ENCOUNTER (OUTPATIENT)
Dept: RADIOLOGY | Facility: HOSPITAL | Age: 69
Discharge: HOME | End: 2024-03-26
Attending: ORTHOPAEDIC SURGERY
Payer: MEDICARE

## 2024-03-26 VITALS — WEIGHT: 225.5 LBS | BODY MASS INDEX: 38.5 KG/M2 | HEIGHT: 64 IN

## 2024-03-26 DIAGNOSIS — K11.6 PAROTID CYST: ICD-10-CM

## 2024-03-26 DIAGNOSIS — K11.8 PAROTID MASS: Primary | ICD-10-CM

## 2024-03-26 DIAGNOSIS — Z12.31 BREAST CANCER SCREENING BY MAMMOGRAM: ICD-10-CM

## 2024-03-26 PROCEDURE — 77063 BREAST TOMOSYNTHESIS BI: CPT | Mod: 26,,, | Performed by: RADIOLOGY

## 2024-03-26 PROCEDURE — 76536 US EXAM OF HEAD AND NECK: CPT | Mod: 26,,, | Performed by: RADIOLOGY

## 2024-03-26 PROCEDURE — 76536 US EXAM OF HEAD AND NECK: CPT | Mod: TC

## 2024-03-26 PROCEDURE — 77067 SCR MAMMO BI INCL CAD: CPT | Mod: 26,,, | Performed by: RADIOLOGY

## 2024-03-26 PROCEDURE — 77063 BREAST TOMOSYNTHESIS BI: CPT | Mod: TC

## 2024-03-26 NOTE — TELEPHONE ENCOUNTER
Spoke to pt and informed parotid mass is stable at this time and has not had any significant growth. Appt scheduled for U/S on 10/8 and to see Dr. Ramos on 10/15. Voiced understanding.

## 2024-03-26 NOTE — TELEPHONE ENCOUNTER
----- Message from Melba Manzano sent at 3/26/2024  1:35 PM CDT -----  Contact: [phone] or portal message -Patient  Patient is returning a phone call.  Who left a message for the patient: Gael Godinez LPN  Does patient know what this is regarding:    Would you like a call back, or a response through your [facility] portal?:   Call and portal   Comments:

## 2024-03-26 NOTE — TELEPHONE ENCOUNTER
----- Message from Sherita Duran MD sent at 3/26/2024  9:02 AM CDT -----  Please let patient know that her parotid mass is largely stable, and there has been no significant change in size. Please schedule her repeat US 10/2024 with in person exam with me following.

## 2024-04-02 NOTE — PROGRESS NOTES
Orthopaedic Follow-Up Visit    Last Appointment: 1/2/24  Diagnosis: Chronic left shoulder pain, high grade partial thickness rotator cuff tear  Prior Procedure: L GH CSI    Mariah Meza is a 68 y.o. female who is here for f/u evaluation of her left shoulder. The patient was last seen here by me on 1/2/24 at which point her MRI showed left shoulder small full-thickness tear at the superior fibers of the subscapularis tendon, high-grade partial-thickness articular sided tear at the critical zone fibers of the posterior supraspinatus tendon, intra-articular thickening and partial tearing of the biceps tendon. She elected to continue with non-operative treatment and we proceeded with left glenohumeral CSI. The patient returns today reporting ***    To review her history, Mariah Meza is a 68 y.o. right-hand dominant female who presented on 5/17/23 with left shoulder pain and dysfunction that had started approximately 4 weeks prior with no specific injury or trauma and gradual worsening of symptoms. Her symptoms included left shoulder pain, generally about the shoulder with pain quality of constant aching and sharp pain. Her pain was aggravated by overhead movement, reaching behind, and raising arm.  She has tried rest, activity modification, muscle relaxer, and Tylenol. She noted that she had been told by a physician a some point to not take anti-inflammatories.  Of note she has a history RCR on both shoulders. Her symptoms and physical exam were consistent with rotator cuff tendinopathy. She also had evidence of glenohumeral arthritis. We elected to proceed with left shoulder corticosteroid injection into the subacromial space. She returned on 6/28/23 at which point she had reported good relief from previous injection but symptoms had begun to return. It had only been 6 weeks since her previous injection so proceeded with home exercise program. She returned on 12/19/23 at which point we elected to proceed  with left shoulder MRI to further evaluate for continued shoulder pain following previous repair.    Patient's medications, allergies, past medical, surgical, social and family histories were reviewed and updated as appropriate.    Review of Systems   All systems reviewed were negative.  Specifically, the patient denies fever, chills, weight loss, chest pain, shortness of breath, or dyspnea on exertion.      Past Medical History:   Diagnosis Date    Chronic rhinitis     DJD (degenerative joint disease), lumbar     GERD (gastroesophageal reflux disease)     Headache     Hiatal hernia     Hyperlipidemia     Hypertension     Liver disease     Fatty Liver    Low back pain     Obesity     PONV (postoperative nausea and vomiting)     Thyroid disease        Past Surgical History:   Procedure Laterality Date    BILATERAL OOPHORECTOMY  2002    CARPAL TUNNEL RELEASE      Left wrist    CHOLECYSTECTOMY      DISSECTION OF NECK Bilateral 12/20/2021    Procedure: DISSECTION, NECK;  Surgeon: Deejay Olsen MD;  Location: [facility] OR;  Service: ENT;  Laterality: Bilateral;  Central neck dissection    HYSTERECTOMY  1984    INJECTION OF ANESTHETIC AGENT AROUND MEDIAL BRANCH NERVES INNERVATING LUMBAR FACET JOINT Bilateral 12/12/2023    Procedure: Bilateral L3-5 * MBB #1 with RN IV sedation;  Surgeon: Franklin Sutton MD;  Location: [facility] PAIN MGT;  Service: Pain Management;  Laterality: Bilateral;    NECK EXPLORATION Bilateral 12/20/2021    Procedure: EXPLORATION, NECK;  Surgeon: Deejay Olsen MD;  Location: [facility] OR;  Service: ENT;  Laterality: Bilateral;  Parathyroid exploration    ROTATOR CUFF REPAIR      Right shoulder    ROTATOR CUFF REPAIR Left 2010    THYROIDECTOMY, BILATERAL Bilateral 12/20/2021    Procedure: THYROIDECTOMY,BILATERAL;  Surgeon: Deejay Olsen MD;  Location: [facility] OR;  Service: ENT;  Laterality: Bilateral;    TOTAL VAGINAL HYSTERECTOMY  1985       Patient's Medications   New Prescriptions    No medications on file   Previous  Medications    ACETAMINOPHEN (TYLENOL) 650 MG TBSR    Take 650 mg by mouth daily as needed.    ALPRAZOLAM (XANAX) 0.5 MG TABLET    Take 1 tablet (0.5 mg total) by mouth On call Procedure for Anxiety.    CALCIUM CARBONATE 470 MG CALCIUM (1,177 MG) CHEW    Take one tablet by mouth twice daily    CARVEDILOL (COREG) 12.5 MG TABLET    Take 12.5 mg by mouth 2 (two) times daily with meals.    CLONIDINE (CATAPRES) 0.1 MG TABLET    Take by mouth.    CYCLOBENZAPRINE (FLEXERIL) 10 MG TABLET    Take 1 tablet (10 mg total) by mouth daily as needed for Muscle spasms.    DICYCLOMINE (BENTYL) 10 MG CAPSULE    Take 10 mg by mouth.    DOCUSATE SODIUM (COLACE) 100 MG CAPSULE    Take 1 capsule (100 mg total) by mouth 3 (three) times daily as needed for Constipation.    ESTRADIOL (ESTRACE) 0.01 % (0.1 MG/GRAM) VAGINAL CREAM    Place 1 g vaginally every evening for 14 days, THEN 1 g twice a week.    ESTRADIOL (ESTRACE) 0.5 MG TABLET    Take 1 tablet (0.5 mg total) by mouth once daily.    EZETIMIBE (ZETIA) 10 MG TABLET    Take 1 tablet (10 mg total) by mouth once daily.    GLYCERIN ADULT SUPPOSITORY    Place 1 suppository rectally as needed for Constipation.    LEVOTHYROXINE (SYNTHROID) 100 MCG TABLET    Take 1 tablet (100 mcg total) by mouth before breakfast.    MAGNESIUM OXIDE (MAG-OX) 400 MG (241.3 MG MAGNESIUM) TABLET    Take 400 mg by mouth once daily.    OLMESARTAN-HYDROCHLOROTHIAZIDE (BENICAR HCT) 40-25 MG PER TABLET    Take 1 tablet by mouth.    PANTOPRAZOLE (PROTONIX) 40 MG TABLET    Take 1 tablet (40 mg total) by mouth once daily.    PSYLLIUM HUSK/ASPARTAME (METAMUCIL FIBER SINGLES ORAL)    Take by mouth.    SENNA-DOCUSATE 8.6-50 MG (SENOKOT-S) 8.6-50 MG PER TABLET    Take 2 tablets by mouth once daily.    VITAMIN D (VITAMIN D3) 1000 UNITS TAB    Take 1,000 Units by mouth once daily.    VITAMIN E 400 UNIT CAPSULE    Take 400 Units by mouth once daily.    WHEAT DEXTRIN/L.ACID/ASPARTAME (FIBER WITH PROBIOTIC ORAL)    Take by mouth  Daily.   Modified Medications    No medications on file   Discontinued Medications    No medications on file       Family History   Problem Relation Age of Onset    Diabetes Mother     Hypertension Mother     Hyperlipidemia Mother     Heart disease Mother         CHF    Stroke Mother     Heart attack Mother     Hearing loss Mother     Diabetes Sister     Sickle cell anemia Other         nieces, nephews    Lupus Other         niece       Review of patient's allergies indicates:   Allergen Reactions    Iodine and iodide containing products Itching    Shellfish containing products Swelling    Statins-hmg-coa reductase inhibitors Other (See Comments)     Myalgias    Adhesive Dermatitis    Iodine Other (See Comments)    Ivp dye [iodinated contrast media] Other (See Comments)         Objective:      Physical Exam  Patient is alert and oriented, no distress. Skin is intact. Neuro is normal with no focal motor or sensory findings.    Cervical exam is unremarkable. Intact cervical ROM. Negative Spurling's test    Physical Exam:                       RIGHT                                     LEFT     Scap Dyskinesis/Winging       (-)                                             (-)     Tenderness:                                                                              Greater Tuberosity                  (-)                                            +  Bicipital Groove                       (-)                                             +  AC joint                                   (-)                                             +  Other:      ROM:  Forward Elevation       180                                          140/170  Abduction                    120                                          100/120  ER (at side)                 80                                            60  IR                                 L1                                            L5     Strength:   Supraspinatus             5/5                                            4+/5  Infraspinatus               5/5                                           4+/5  Subscap / IR               5/5                                           5/5      Special Tests:              Neer:                                       (-)                                             +              Gonsales:                                 (-)                                             +              SS Stress:                               (-)                                             +              Bear Hug:                                (-)                                             (-)              [place]'s:                                 (-)                                             +              Resisted Thrower's:                (-)                                             +              Cross Arm Abduction:             (-)                                             (-)    Neurovascular examination  - Motor grossly intact bilaterally to shoulder abduction, elbow flexion and extension, wrist flexion and extension, and intrinsic hand musculature  - Sensation intact to light touch bilaterally in axillary, median, radial, and ulnar distributions  - Symmetrical radial pulses    Imaging:      XR Results:  Results for orders placed in visit on 05/11/23    X-Ray Shoulder 2 or More Views Left    Narrative  EXAM: XR SHOULDER COMPLETE 2 OR MORE VIEWS LEFT    CLINICAL HISTORY: Left shoulder pain    TECHNIQUE: Left shoulder, 3 views    COMPARISON:  No studies are available for comparison.    FINDINGS: Remote distal clavicle and acromial fractures.  Moderate acromio clavicular and glenohumeral arthrosis.  Alignment is satisfactory.    Impression  Remote posttraumatic changes and degenerative joint disease.        Finalized on: 5/11/2023 10:32 AM By:  KURT Buck MD, MD  [facility]# [id number]      2023-05-11 10:34:09.779    [facility]      MRI Results:  Results for orders placed during  the hospital encounter of 12/28/23    MRI Shoulder Without Contrast Left    Narrative  EXAM: MRI SHOULDER WITHOUT CONTRAST LEFT    CLINICAL HISTORY:  Left shoulder pain.      COMPARISON:  Left shoulder x-ray on 05/11/2023    TECHNIQUE:  Multiplanar, multisequence MR imaging was performed through the left shoulder without intravenous or intra-articular gadolinium contrast.      FINDINGS:    Mild acromioclavicular arthrosis.  Lateral acromial downsloping. There is a type 1  acromion.    High-grade partial-thickness articular sided tear at the critical zone fibers of the posterior supraspinatus tendon measuring 8 x 7 mm.  Mild bursal sided fraying throughout the supraspinatus tendon.  Low-grade interstitial tearing at the anterior third fibers of the supraspinatus tendon.  Mild infraspinatus and subscapularis tendinosis.  Small full-thickness tear measuring 2 to 3 mm at the superior fibers of the subscapularis tendon.    The biceps tendon is intact.  Biceps tenosynovitis.    The biceps labral complex is intact.   The anterior/inferior labrum is intact.  The posterior labrum is normal.    There is a normal inferior glenohumeral ligament complex.    The glenoid fossa is unremarkable.  No joint effusion.  No full-thickness chondral defect.    Impression  Small full-thickness tear at the superior fibers of the subscapularis tendon measuring 2 to 3 mm.    High-grade partial-thickness articular sided tear at the critical zone fibers of the posterior supraspinatus tendon measuring 8 x 7 mm.    No evidence of labral pathology.      Finalized on: 12/28/2023 8:13 AM By:  KURT Buck MD, MD  [facility]# [id number]      2023-12-28 08:15:57.071    [facility]      CT Results:  No results found for this or any previous visit.      Physician read: I agree with the above impression.    Assessment/Plan:   Mariah Meza is a RHD 68 y.o. female with chronic left shoulder pain, high grade partial thickness rotator cuff tear    Plan:    She has  known high grade partial thickness rotator cuff tearing following previous repair as seen on previous MRI. ***  Overall she has tried extensive non-operative treatment in the form of rest, activity modification, muscle relaxer, Tylenol, subacromial space CSI, home exercise program, and glenohumeral CSI. Despite these treatments, her symptoms continue to persist.   Follow up ***          Jean-Pierre Espinal MD    I, Alex Esparza, acted as a scribe for Jean-Pierre Espinal MD for the duration of this office visit.

## 2024-04-09 ENCOUNTER — OFFICE VISIT (OUTPATIENT)
Dept: SPORTS MEDICINE | Facility: CLINIC | Age: 69
End: 2024-04-09
Payer: MEDICARE

## 2024-04-09 ENCOUNTER — PATIENT MESSAGE (OUTPATIENT)
Dept: INTERNAL MEDICINE | Facility: CLINIC | Age: 69
End: 2024-04-09
Payer: MEDICARE

## 2024-04-09 VITALS — HEIGHT: 64 IN | WEIGHT: 225.5 LBS | BODY MASS INDEX: 38.5 KG/M2 | RESPIRATION RATE: 17 BRPM

## 2024-04-09 DIAGNOSIS — E89.0 POST-OPERATIVE HYPOTHYROIDISM: Primary | ICD-10-CM

## 2024-04-09 DIAGNOSIS — Z98.890 H/O PARATHYROIDECTOMY: ICD-10-CM

## 2024-04-09 DIAGNOSIS — Z90.89 H/O PARATHYROIDECTOMY: ICD-10-CM

## 2024-04-09 DIAGNOSIS — M75.112 NONTRAUMATIC INCOMPLETE TEAR OF LEFT ROTATOR CUFF: Primary | ICD-10-CM

## 2024-04-09 DIAGNOSIS — Z98.890 HISTORY OF REPAIR OF LEFT ROTATOR CUFF: ICD-10-CM

## 2024-04-09 PROCEDURE — 3288F FALL RISK ASSESSMENT DOCD: CPT | Mod: CPTII,S$GLB,, | Performed by: STUDENT IN AN ORGANIZED HEALTH CARE EDUCATION/TRAINING PROGRAM

## 2024-04-09 PROCEDURE — 3008F BODY MASS INDEX DOCD: CPT | Mod: CPTII,S$GLB,, | Performed by: STUDENT IN AN ORGANIZED HEALTH CARE EDUCATION/TRAINING PROGRAM

## 2024-04-09 PROCEDURE — 1159F MED LIST DOCD IN RCRD: CPT | Mod: CPTII,S$GLB,, | Performed by: STUDENT IN AN ORGANIZED HEALTH CARE EDUCATION/TRAINING PROGRAM

## 2024-04-09 PROCEDURE — 3044F HG A1C LEVEL LT 7.0%: CPT | Mod: CPTII,S$GLB,, | Performed by: STUDENT IN AN ORGANIZED HEALTH CARE EDUCATION/TRAINING PROGRAM

## 2024-04-09 PROCEDURE — 99214 OFFICE O/P EST MOD 30 MIN: CPT | Mod: S$GLB,,, | Performed by: STUDENT IN AN ORGANIZED HEALTH CARE EDUCATION/TRAINING PROGRAM

## 2024-04-09 PROCEDURE — 99999 PR PBB SHADOW E&M-EST. PATIENT-LVL IV: CPT | Mod: PBBFAC,,, | Performed by: STUDENT IN AN ORGANIZED HEALTH CARE EDUCATION/TRAINING PROGRAM

## 2024-04-09 PROCEDURE — 1125F AMNT PAIN NOTED PAIN PRSNT: CPT | Mod: CPTII,S$GLB,, | Performed by: STUDENT IN AN ORGANIZED HEALTH CARE EDUCATION/TRAINING PROGRAM

## 2024-04-09 PROCEDURE — 1101F PT FALLS ASSESS-DOCD LE1/YR: CPT | Mod: CPTII,S$GLB,, | Performed by: STUDENT IN AN ORGANIZED HEALTH CARE EDUCATION/TRAINING PROGRAM

## 2024-04-09 PROCEDURE — 1160F RVW MEDS BY RX/DR IN RCRD: CPT | Mod: CPTII,S$GLB,, | Performed by: STUDENT IN AN ORGANIZED HEALTH CARE EDUCATION/TRAINING PROGRAM

## 2024-04-09 NOTE — PROGRESS NOTES
Orthopaedic Follow-Up Visit    Last Appointment: 1/2/24  Diagnosis: Chronic left shoulder pain, high grade partial thickness rotator cuff tear  Prior Procedure: L DORITA CSI    Mariah Meza is a 68 y.o. female who is here for f/u evaluation of her left shoulder. The patient was last seen here by me on 1/2/24 at which point her MRI showed left shoulder small full-thickness tear at the superior fibers of the subscapularis tendon, high-grade partial-thickness articular sided tear at the critical zone fibers of the posterior supraspinatus tendon, intra-articular thickening and partial tearing of the biceps tendon. She elected to continue with non-operative treatment and we proceeded with left glenohumeral CSI. The patient returns today reporting the steroid shot did not provide much relief. She states that she has started using an ice-pack on her shoulder that has provided a lot of relief for her.     To review her history, Mariah Meza is a 68 y.o. right-hand dominant female who presented on 5/17/23 with left shoulder pain and dysfunction that had started approximately 4 weeks prior with no specific injury or trauma and gradual worsening of symptoms. Her symptoms included left shoulder pain, generally about the shoulder with pain quality of constant aching and sharp pain. Her pain was aggravated by overhead movement, reaching behind, and raising arm.  She has tried rest, activity modification, muscle relaxer, and Tylenol. She noted that she had been told by a physician a some point to not take anti-inflammatories.  Of note she has a history RCR on both shoulders. Her symptoms and physical exam were consistent with rotator cuff tendinopathy. She also had evidence of glenohumeral arthritis. We elected to proceed with left shoulder corticosteroid injection into the subacromial space. She returned on 6/28/23 at which point she had reported good relief from previous injection but symptoms had begun to return. It  had only been 6 weeks since her previous injection so proceeded with home exercise program. She returned on 12/19/23 at which point we elected to proceed with left shoulder MRI to further evaluate for continued shoulder pain following previous repair.    Patient's medications, allergies, past medical, surgical, social and family histories were reviewed and updated as appropriate.    Review of Systems   All systems reviewed were negative.  Specifically, the patient denies fever, chills, weight loss, chest pain, shortness of breath, or dyspnea on exertion.      Past Medical History:   Diagnosis Date    Chronic rhinitis     DJD (degenerative joint disease), lumbar     GERD (gastroesophageal reflux disease)     Headache     Hiatal hernia     Hyperlipidemia     Hypertension     Liver disease     Fatty Liver    Low back pain     Obesity     PONV (postoperative nausea and vomiting)     Thyroid disease        Past Surgical History:   Procedure Laterality Date    BILATERAL OOPHORECTOMY  2002    CARPAL TUNNEL RELEASE      Left wrist    CHOLECYSTECTOMY      DISSECTION OF NECK Bilateral 12/20/2021    Procedure: DISSECTION, NECK;  Surgeon: Deejay Olsen MD;  Location: [facility] OR;  Service: ENT;  Laterality: Bilateral;  Central neck dissection    HYSTERECTOMY  1984    INJECTION OF ANESTHETIC AGENT AROUND MEDIAL BRANCH NERVES INNERVATING LUMBAR FACET JOINT Bilateral 12/12/2023    Procedure: Bilateral L3-5 * MBB #1 with RN IV sedation;  Surgeon: Franklin Sutton MD;  Location: [facility] PAIN MGT;  Service: Pain Management;  Laterality: Bilateral;    NECK EXPLORATION Bilateral 12/20/2021    Procedure: EXPLORATION, NECK;  Surgeon: Deejay Olsen MD;  Location: [facility] OR;  Service: ENT;  Laterality: Bilateral;  Parathyroid exploration    ROTATOR CUFF REPAIR      Right shoulder    ROTATOR CUFF REPAIR Left 2010    THYROIDECTOMY, BILATERAL Bilateral 12/20/2021    Procedure: THYROIDECTOMY,BILATERAL;  Surgeon: Deejay Olsen MD;  Location: [facility] OR;  Service:  ENT;  Laterality: Bilateral;    TOTAL VAGINAL HYSTERECTOMY  1985       Patient's Medications   New Prescriptions    No medications on file   Previous Medications    ACETAMINOPHEN (TYLENOL) 650 MG TBSR    Take 650 mg by mouth daily as needed.    ALPRAZOLAM (XANAX) 0.5 MG TABLET    Take 1 tablet (0.5 mg total) by mouth On call Procedure for Anxiety.    CALCIUM CARBONATE 470 MG CALCIUM (1,177 MG) CHEW    Take one tablet by mouth twice daily    CARVEDILOL (COREG) 12.5 MG TABLET    Take 12.5 mg by mouth 2 (two) times daily with meals.    CLONIDINE (CATAPRES) 0.1 MG TABLET    Take by mouth.    CYCLOBENZAPRINE (FLEXERIL) 10 MG TABLET    Take 1 tablet (10 mg total) by mouth daily as needed for Muscle spasms.    DICYCLOMINE (BENTYL) 10 MG CAPSULE    Take 10 mg by mouth.    DOCUSATE SODIUM (COLACE) 100 MG CAPSULE    Take 1 capsule (100 mg total) by mouth 3 (three) times daily as needed for Constipation.    ESTRADIOL (ESTRACE) 0.01 % (0.1 MG/GRAM) VAGINAL CREAM    Place 1 g vaginally every evening for 14 days, THEN 1 g twice a week.    ESTRADIOL (ESTRACE) 0.5 MG TABLET    Take 1 tablet (0.5 mg total) by mouth once daily.    EZETIMIBE (ZETIA) 10 MG TABLET    Take 1 tablet (10 mg total) by mouth once daily.    GLYCERIN ADULT SUPPOSITORY    Place 1 suppository rectally as needed for Constipation.    LEVOTHYROXINE (SYNTHROID) 100 MCG TABLET    Take 1 tablet (100 mcg total) by mouth before breakfast.    MAGNESIUM OXIDE (MAG-OX) 400 MG (241.3 MG MAGNESIUM) TABLET    Take 400 mg by mouth once daily.    OLMESARTAN-HYDROCHLOROTHIAZIDE (BENICAR HCT) 40-25 MG PER TABLET    Take 1 tablet by mouth.    PANTOPRAZOLE (PROTONIX) 40 MG TABLET    Take 1 tablet (40 mg total) by mouth once daily.    PSYLLIUM HUSK/ASPARTAME (METAMUCIL FIBER SINGLES ORAL)    Take by mouth.    SENNA-DOCUSATE 8.6-50 MG (SENOKOT-S) 8.6-50 MG PER TABLET    Take 2 tablets by mouth once daily.    VITAMIN D (VITAMIN D3) 1000 UNITS TAB    Take 1,000 Units by mouth once  daily.    VITAMIN E 400 UNIT CAPSULE    Take 400 Units by mouth once daily.    WHEAT DEXTRIN/L.ACID/ASPARTAME (FIBER WITH PROBIOTIC ORAL)    Take by mouth Daily.   Modified Medications    No medications on file   Discontinued Medications    No medications on file       Family History   Problem Relation Age of Onset    Diabetes Mother     Hypertension Mother     Hyperlipidemia Mother     Heart disease Mother         CHF    Stroke Mother     Heart attack Mother     Hearing loss Mother     Diabetes Sister     Sickle cell anemia Other         nieces, nephews    Lupus Other         niece       Review of patient's allergies indicates:   Allergen Reactions    Iodine and iodide containing products Itching    Shellfish containing products Swelling    Statins-hmg-coa reductase inhibitors Other (See Comments)     Myalgias    Adhesive Dermatitis    Iodine Other (See Comments)    Ivp dye [iodinated contrast media] Other (See Comments)         Objective:      Physical Exam  Patient is alert and oriented, no distress. Skin is intact. Neuro is normal with no focal motor or sensory findings.    Cervical exam is unremarkable. Intact cervical ROM. Negative Spurling's test    Physical Exam:                       RIGHT                                     LEFT     Scap Dyskinesis/Winging       (-)                                             (-)     Tenderness:                                                                              Greater Tuberosity                  (-)                                            +  Bicipital Groove                       (-)                                             +  AC joint                                   (-)                                             +  Other:      ROM:  Forward Elevation       180                                          140/170  Abduction                    120                                          100/120  ER (at side)                 80                                             60  IR                                 L1                                            L5     Strength:   Supraspinatus             5/5                                           4+/5  Infraspinatus               5/5                                           4+/5  Subscap / IR               5/5                                           5/5      Special Tests:              Neer:                                       (-)                                             +              Gonsales:                                 (-)                                             +              SS Stress:                               (-)                                             +              Bear Hug:                                (-)                                             (-)              Valyermo's:                                 (-)                                             +              Resisted Thrower's:                (-)                                             (-)              Cross Arm Abduction:             (-)                                             (-)    Neurovascular examination  - Motor grossly intact bilaterally to shoulder abduction, elbow flexion and extension, wrist flexion and extension, and intrinsic hand musculature  - Sensation intact to light touch bilaterally in axillary, median, radial, and ulnar distributions  - Symmetrical radial pulses    Imaging:      XR Results:  Results for orders placed in visit on 05/11/23    X-Ray Shoulder 2 or More Views Left    Narrative  EXAM: XR SHOULDER COMPLETE 2 OR MORE VIEWS LEFT    CLINICAL HISTORY: Left shoulder pain    TECHNIQUE: Left shoulder, 3 views    COMPARISON:  No studies are available for comparison.    FINDINGS: Remote distal clavicle and acromial fractures.  Moderate acromio clavicular and glenohumeral arthrosis.  Alignment is satisfactory.    Impression  Remote posttraumatic changes and degenerative joint  disease.        Finalized on: 5/11/2023 10:32 AM By:  KURT Buck MD, MD  [facility]# [id number]      2023-05-11 10:34:09.779    [facility]      MRI Results:  Results for orders placed during the hospital encounter of 12/28/23    MRI Shoulder Without Contrast Left    Narrative  EXAM: MRI SHOULDER WITHOUT CONTRAST LEFT    CLINICAL HISTORY:  Left shoulder pain.      COMPARISON:  Left shoulder x-ray on 05/11/2023    TECHNIQUE:  Multiplanar, multisequence MR imaging was performed through the left shoulder without intravenous or intra-articular gadolinium contrast.      FINDINGS:    Mild acromioclavicular arthrosis.  Lateral acromial downsloping. There is a type 1  acromion.    High-grade partial-thickness articular sided tear at the critical zone fibers of the posterior supraspinatus tendon measuring 8 x 7 mm.  Mild bursal sided fraying throughout the supraspinatus tendon.  Low-grade interstitial tearing at the anterior third fibers of the supraspinatus tendon.  Mild infraspinatus and subscapularis tendinosis.  Small full-thickness tear measuring 2 to 3 mm at the superior fibers of the subscapularis tendon.    The biceps tendon is intact.  Biceps tenosynovitis.    The biceps labral complex is intact.   The anterior/inferior labrum is intact.  The posterior labrum is normal.    There is a normal inferior glenohumeral ligament complex.    The glenoid fossa is unremarkable.  No joint effusion.  No full-thickness chondral defect.    Impression  Small full-thickness tear at the superior fibers of the subscapularis tendon measuring 2 to 3 mm.    High-grade partial-thickness articular sided tear at the critical zone fibers of the posterior supraspinatus tendon measuring 8 x 7 mm.    No evidence of labral pathology.      Finalized on: 12/28/2023 8:13 AM By:  KURT Buck MD, MD  [facility]# [id number]      2023-12-28 08:15:57.071    [facility]      CT Results:  No results found for this or any previous visit.      Physician read: I agree with the above  impression.    Assessment/Plan:   Mariah Meza is a RHD 68 y.o. female with chronic left shoulder pain, high grade partial thickness rotator cuff tear    Plan:    She has known high grade partial thickness rotator cuff tearing following previous repair as seen on MRI.   Overall she has tried extensive non-operative treatment in the form of rest, activity modification, muscle relaxer, Tylenol, subacromial space CSI, home exercise program, and glenohumeral CSI. Despite these treatments, her symptoms continue to persist.   I discussed that at this point, given her exam, MRI, and continued symptoms now would be a reasonable time to consider surgery to repair her rotator cuff. Patient would like to defer this option today, is not ready for surgery.   For symptomatic pain relief I recommend OTC NSAIDs such as ibuprofen, alleve, naproxen or tylenol.   Follow up as needed          Jean-Pierre Espinal MD    I, jS Gates, acted as a scribe for Jean-Pierre Espinal MD for the duration of this office visit.

## 2024-04-11 ENCOUNTER — PATIENT OUTREACH (OUTPATIENT)
Dept: ADMINISTRATIVE | Facility: HOSPITAL | Age: 69
End: 2024-04-11
Payer: MEDICARE

## 2024-04-11 ENCOUNTER — PATIENT MESSAGE (OUTPATIENT)
Dept: ADMINISTRATIVE | Facility: HOSPITAL | Age: 69
End: 2024-04-11
Payer: MEDICARE

## 2024-04-11 DIAGNOSIS — Z78.0 POSTMENOPAUSAL: Primary | ICD-10-CM

## 2024-04-11 NOTE — PROGRESS NOTES
Replying to Campaign Questionnaire for Overdue HM:  Dexa    Patient responded to have dexa scheduled. Sent message to offer scheduling.

## 2024-04-29 ENCOUNTER — OFFICE VISIT (OUTPATIENT)
Dept: INTERNAL MEDICINE | Facility: CLINIC | Age: 69
End: 2024-04-29
Payer: MEDICARE

## 2024-04-29 VITALS
DIASTOLIC BLOOD PRESSURE: 84 MMHG | RESPIRATION RATE: 18 BRPM | WEIGHT: 229.25 LBS | BODY MASS INDEX: 39.36 KG/M2 | HEART RATE: 59 BPM | TEMPERATURE: 98 F | SYSTOLIC BLOOD PRESSURE: 136 MMHG | OXYGEN SATURATION: 99 %

## 2024-04-29 DIAGNOSIS — E66.01 CLASS 2 SEVERE OBESITY DUE TO EXCESS CALORIES WITH SERIOUS COMORBIDITY AND BODY MASS INDEX (BMI) OF 38.0 TO 38.9 IN ADULT: ICD-10-CM

## 2024-04-29 DIAGNOSIS — Z00.00 ROUTINE MEDICAL EXAM: Primary | ICD-10-CM

## 2024-04-29 DIAGNOSIS — E89.0 POST-OPERATIVE HYPOTHYROIDISM: ICD-10-CM

## 2024-04-29 DIAGNOSIS — R10.2 VAGINAL PAIN: ICD-10-CM

## 2024-04-29 DIAGNOSIS — K11.6 CYST OF LEFT PAROTID GLAND: ICD-10-CM

## 2024-04-29 DIAGNOSIS — I10 PRIMARY HYPERTENSION: ICD-10-CM

## 2024-04-29 PROCEDURE — 3079F DIAST BP 80-89 MM HG: CPT | Mod: CPTII,S$GLB,, | Performed by: NURSE PRACTITIONER

## 2024-04-29 PROCEDURE — 3008F BODY MASS INDEX DOCD: CPT | Mod: CPTII,S$GLB,, | Performed by: NURSE PRACTITIONER

## 2024-04-29 PROCEDURE — 1160F RVW MEDS BY RX/DR IN RCRD: CPT | Mod: CPTII,S$GLB,, | Performed by: NURSE PRACTITIONER

## 2024-04-29 PROCEDURE — 99214 OFFICE O/P EST MOD 30 MIN: CPT | Mod: S$GLB,,, | Performed by: NURSE PRACTITIONER

## 2024-04-29 PROCEDURE — 3044F HG A1C LEVEL LT 7.0%: CPT | Mod: CPTII,S$GLB,, | Performed by: NURSE PRACTITIONER

## 2024-04-29 PROCEDURE — 99999 PR PBB SHADOW E&M-EST. PATIENT-LVL V: CPT | Mod: PBBFAC,,, | Performed by: NURSE PRACTITIONER

## 2024-04-29 PROCEDURE — 1125F AMNT PAIN NOTED PAIN PRSNT: CPT | Mod: CPTII,S$GLB,, | Performed by: NURSE PRACTITIONER

## 2024-04-29 PROCEDURE — 3075F SYST BP GE 130 - 139MM HG: CPT | Mod: CPTII,S$GLB,, | Performed by: NURSE PRACTITIONER

## 2024-04-29 PROCEDURE — 1159F MED LIST DOCD IN RCRD: CPT | Mod: CPTII,S$GLB,, | Performed by: NURSE PRACTITIONER

## 2024-04-29 RX ORDER — OLMESARTAN MEDOXOMIL AND HYDROCHLOROTHIAZIDE 40/25 40; 25 MG/1; MG/1
1 TABLET ORAL DAILY
Qty: 90 TABLET | Refills: 3 | Status: SHIPPED | OUTPATIENT
Start: 2024-04-29

## 2024-04-29 NOTE — PROGRESS NOTES
Subjective     Patient ID: Mariah Meza is a 68 y.o. female.    Chief Complaint: Follow-up (Yeast infection )    Patient presents for 3 month follow up.    Unable to tolerated Zetia.  Reports hand pain and weakness.  Stopped medications.  Unable to tolerate statin.  Advised to see cardiology.   Plans to schedule a visit to discuss other symptoms.     Reports vaginal burning and itching.  Some odor.  Reports discomfort down legs.  Was started on Diflucan at recent urgent care visit.  [facility] 04/23/2024.  Reports having a vaginal swab and urine testing.      Follow-up  Associated symptoms include arthralgias. Pertinent negatives include no abdominal pain, chest pain, coughing, fatigue, fever, headaches, neck pain, numbness, rash or sore throat.     Review of Systems   Constitutional:  Negative for activity change, appetite change, fatigue and fever.   HENT:  Negative for ear discharge, ear pain, mouth sores, nosebleeds, sore throat and tinnitus.    Eyes:  Negative for discharge, redness and itching.   Respiratory:  Negative for apnea, cough and shortness of breath.    Cardiovascular:  Negative for chest pain and leg swelling.   Gastrointestinal:  Negative for abdominal distention, abdominal pain, blood in stool and constipation.   Endocrine: Negative for polydipsia, polyphagia and polyuria.   Genitourinary:  Positive for vaginal pain. Negative for difficulty urinating, flank pain, frequency and hematuria.   Musculoskeletal:  Positive for arthralgias and leg pain. Negative for back pain, gait problem, neck pain and neck stiffness.   Integumentary:  Negative for rash and wound.   Allergic/Immunologic: Negative for environmental allergies, food allergies and immunocompromised state.   Neurological:  Negative for dizziness, seizures, numbness and headaches.   Psychiatric/Behavioral:  Negative for agitation, confusion, hallucinations, self-injury and suicidal ideas.           Objective     Physical  Exam  Vitals reviewed.   Constitutional:       Appearance: She is well-developed. She is obese.   HENT:      Head: Normocephalic and atraumatic.   Cardiovascular:      Rate and Rhythm: Normal rate and regular rhythm.   Pulmonary:      Effort: Pulmonary effort is normal.      Breath sounds: Normal breath sounds.   Musculoskeletal:         General: Normal range of motion.   Skin:     General: Skin is warm and dry.   Neurological:      General: No focal deficit present.      Mental Status: She is alert and oriented to person, place, and time.   Psychiatric:         Mood and Affect: Mood normal.         Behavior: Behavior normal. Behavior is cooperative.            Assessment and Plan     1. Routine medical exam    2. Post-operative hypothyroidism  Comments:  Stable.  Continue current treatment plan.    3. Cyst of left parotid gland  Comments:  Stable. Continue current treatment plan per ENT    4. Class 2 severe obesity due to excess calories with serious comorbidity and body mass index (BMI) of 38.0 to 38.9 in adult  Comments:  Stable. Continue current treatment plan.    5. Primary hypertension  Comments:  Stable. Continue current treatment plan.    6. Vaginal pain  Comments:  Follow up with GYN on tomorrow.    Other orders  -     olmesartan-hydrochlorothiazide (BENICAR HCT) 40-25 mg per tablet; Take 1 tablet by mouth once daily.  Dispense: 90 tablet; Refill: 3             Follow up in about 3 months (around 7/29/2024).

## 2024-04-30 ENCOUNTER — OFFICE VISIT (OUTPATIENT)
Dept: OBSTETRICS AND GYNECOLOGY | Facility: CLINIC | Age: 69
End: 2024-04-30
Payer: MEDICARE

## 2024-04-30 VITALS
WEIGHT: 229.81 LBS | DIASTOLIC BLOOD PRESSURE: 76 MMHG | BODY MASS INDEX: 39.45 KG/M2 | SYSTOLIC BLOOD PRESSURE: 154 MMHG

## 2024-04-30 DIAGNOSIS — N76.3 CHRONIC VULVITIS: Primary | ICD-10-CM

## 2024-04-30 PROCEDURE — 3078F DIAST BP <80 MM HG: CPT | Mod: CPTII,S$GLB,, | Performed by: OBSTETRICS & GYNECOLOGY

## 2024-04-30 PROCEDURE — 3288F FALL RISK ASSESSMENT DOCD: CPT | Mod: CPTII,S$GLB,, | Performed by: OBSTETRICS & GYNECOLOGY

## 2024-04-30 PROCEDURE — 3008F BODY MASS INDEX DOCD: CPT | Mod: CPTII,S$GLB,, | Performed by: OBSTETRICS & GYNECOLOGY

## 2024-04-30 PROCEDURE — 1125F AMNT PAIN NOTED PAIN PRSNT: CPT | Mod: CPTII,S$GLB,, | Performed by: OBSTETRICS & GYNECOLOGY

## 2024-04-30 PROCEDURE — 99999 PR PBB SHADOW E&M-EST. PATIENT-LVL III: CPT | Mod: PBBFAC,,, | Performed by: OBSTETRICS & GYNECOLOGY

## 2024-04-30 PROCEDURE — 3077F SYST BP >= 140 MM HG: CPT | Mod: CPTII,S$GLB,, | Performed by: OBSTETRICS & GYNECOLOGY

## 2024-04-30 PROCEDURE — 1101F PT FALLS ASSESS-DOCD LE1/YR: CPT | Mod: CPTII,S$GLB,, | Performed by: OBSTETRICS & GYNECOLOGY

## 2024-04-30 PROCEDURE — 99214 OFFICE O/P EST MOD 30 MIN: CPT | Mod: S$GLB,,, | Performed by: OBSTETRICS & GYNECOLOGY

## 2024-04-30 PROCEDURE — 1159F MED LIST DOCD IN RCRD: CPT | Mod: CPTII,S$GLB,, | Performed by: OBSTETRICS & GYNECOLOGY

## 2024-04-30 PROCEDURE — 3044F HG A1C LEVEL LT 7.0%: CPT | Mod: CPTII,S$GLB,, | Performed by: OBSTETRICS & GYNECOLOGY

## 2024-04-30 RX ORDER — FLUCONAZOLE 200 MG/1
200 TABLET ORAL DAILY
Qty: 3 TABLET | Refills: 0 | Status: SHIPPED | OUTPATIENT
Start: 2024-04-30 | End: 2024-05-03

## 2024-04-30 RX ORDER — HYDROXYZINE HYDROCHLORIDE 25 MG/1
25 TABLET, FILM COATED ORAL 3 TIMES DAILY PRN
Qty: 15 TABLET | Refills: 0 | Status: SHIPPED | OUTPATIENT
Start: 2024-04-30

## 2024-04-30 RX ORDER — CLOTRIMAZOLE AND BETAMETHASONE DIPROPIONATE 10; .64 MG/G; MG/G
CREAM TOPICAL 2 TIMES DAILY
Qty: 45 G | Refills: 3 | Status: SHIPPED | OUTPATIENT
Start: 2024-04-30

## 2024-04-30 NOTE — PROGRESS NOTES
Subjective:       Patient ID: Mariah Meza is a 68 y.o. female.    Chief Complaint:  No chief complaint on file.      History of Present Illness  HPI  Here for problem    Treated at urgent care for yeast infection with dilfucan 150mg x 2    Still c/o itching and burning to skin    Dial soaps, showers      GYN & OB History  Patient's last menstrual period was 02/10/1984 (approximate).   Date of Last Pap: No result found    OB History    Para Term  AB Living   2 2 2     2   SAB IAB Ectopic Multiple Live Births           2      # Outcome Date GA Lbr Gonsalo/2nd Weight Sex Type Anes PTL Lv   2 Term      Vag-Spont   HAROON   1 Term      Vag-Spont   HAROON       Review of Systems  Review of Systems   Genitourinary:  Positive for vaginal pain.   All other systems reviewed and are negative.        Objective:      Physical Exam:   Constitutional: She appears well-developed.     Eyes: Pupils are equal, round, and reactive to light. Conjunctivae and EOM are normal.      Pulmonary/Chest: Effort normal. Right breast exhibits no mass, no nipple discharge, no skin change, no tenderness and presence. Left breast exhibits no mass, no nipple discharge, no skin change, no tenderness and presence. Breasts are symmetrical.        Abdominal: Soft.     Genitourinary:    Inguinal canal and rectum normal.            Pelvic exam was performed with patient supine.   The external female genitalia was abnormal.   There is rash on the right labia.           Musculoskeletal: Normal range of motion.       Neurological: She is alert.    Skin: Skin is warm.    Psychiatric: She has a normal mood and affect.         Assessment:     Encounter Diagnosis   Name Primary?    Chronic vulvitis Yes               Plan:      Gentle skin cleansing--dove soap  Try not to scratch  Diflucan  Lotrisone rx sent  Vistaril prn itching--especially at bedtime

## 2024-06-03 ENCOUNTER — APPOINTMENT (OUTPATIENT)
Dept: RADIOLOGY | Facility: HOSPITAL | Age: 69
End: 2024-06-03
Attending: NURSE PRACTITIONER
Payer: MEDICARE

## 2024-06-03 DIAGNOSIS — Z78.0 POSTMENOPAUSAL: ICD-10-CM

## 2024-06-03 PROCEDURE — 77080 DXA BONE DENSITY AXIAL: CPT | Mod: TC

## 2024-06-03 PROCEDURE — 77080 DXA BONE DENSITY AXIAL: CPT | Mod: 26,,, | Performed by: RADIOLOGY

## 2024-06-18 ENCOUNTER — TELEPHONE (OUTPATIENT)
Dept: INTERNAL MEDICINE | Facility: CLINIC | Age: 69
End: 2024-06-18
Payer: MEDICARE

## 2024-06-18 NOTE — TELEPHONE ENCOUNTER
I returned a call back to the pt who was inquiring about her Endo referral being sent to Dr/Rosa Elena Mendoza MD at [facility] her ph: [phone] and fax : [phone] and I informed it was faxed but, I faxed it again and called to check the status of the referral. I spoke with Ms.Sally at the providers office and she asked that I call back after lunch to check the status of the referral being received. I agreed and informed the pt that I will check and contact her with an update . She verbalized understanding . //kah

## 2024-06-18 NOTE — TELEPHONE ENCOUNTER
----- Message from Irina Hinton sent at 6/18/2024  8:51 AM CDT -----  Contact: self   Patient needs call back. It's regarding her referral to [facility] that is in her chart. She states it was suppose to be sent to [facility]. Please call to advise

## 2024-06-25 NOTE — PROGRESS NOTES
Interventional Pain -- Established Patient (Follow-up visit)    Referring Physician: Beverly Mcpherson PA-C  - Neurology (2nd referral); Smiley Weaver (initial referral)    PCP: Taina Bob NP    Chief Complaint:   Chief Complaint   Patient presents with    Low-back Pain       Radiates to the feet    Leg Pain     SUBJECTIVE:  Interval history 06/26/2024  Patient presents for four-month follow-up.  Today she reports exacerbation of lower back and leg pain.  Pain is constant and today is rated a 6/10.  Pain is described as burning and tingling in nature.  Today she reports pain in a bandlike distribution in the lower back which radiates down the posterior aspects of bilateral lower extremities in L5-S2 distribution to the feet.  Pain can be exacerbated with lumbar extension, standing and with ambulation.  She has continued physician directed physical therapy exercises over the last 8 weeks from 04/26/2024 through 06/26/2024 with marginal improvement in her symptoms.  She would like to refrain from any pharmacologic management at this time secondary to cognitive side effects from membrane stabilizing agents.  Pain is improved with lumbar forward flexion.    Interval history 02/05/2024  Patient presents status post bilateral L3-5 lumbar medial branch block 12/12/2023.  Patient reports 90% sustained relief in lower axial back pain following her procedure.  She reports this pain relief is still ineffective.  Today she reports burning pains down bilateral lower extremities.  Pain is intermittent and today is rated a 5/10.  Patient is concerned whether this was related to the prior injection.  She reports pain which can radiate down the lateral and posterior aspects of bilateral lower extremities in L4-S1 distribution to the calf.  Patient has continued judicious use of Flexeril which she does not even take daily as well as Tylenol.  During last schedule procedure, patient had hypertensive urgency,  "229/100.  Of note patient went to the emergency room and received hydralazine and clonidine.  Patient has scheduled follow-up with nurse practitioner, Ms. Bob.    Interval History (11/22/2023):  Patient presents today for follow-up visit.  Patient was referred back to our clinic by Beverly Mcpherson PA-C (Neurology).  She complains mostly of low back pain.  She does have some leg pain on the left side occasionally.  She reports she stretches at home.  She takes Flexeril as needed, but she tries to avoid due to dry mouth.  She is taking gabapentin in the past with no relief.  She wants to hold off on adding additional medications at this time.  Patient reports pain as 6/10 today.    10/26/23 Neurology:  Ms. Mariah Meza is a 67 y.o. female presenting for evaluation of back pain. She noted that she has had some baseline back pain for quite some time (years) however in September she began to have increased pain. She required an injection of ketorolac at that time which helped for a day or two but then her pain returned. She did complete EMG/NCS previously unable to locate in [facility] or care everywhere. She describes her pain as sharp and some "electric shock" sensation that occasionally occurs. She does get some stiffness and weakness also which she reports is present in her entire body. She notes that her pain is worse on the L and in the lumbar region. She does endorse neck pain. She did have a cervical spine MRI completed one year ago which showed multilevel degenerative changes at C4-C5 and mild canal stenosis. She notes that her pain is constant all day but worsened with movement or prolonged sitting. She previously completed physical therapy in Jan/Feb of this year which benefited her greatly and she found that she was able to be much more active. She then had a gout flare and seemed to regress in her ability to move. She denies any falls. She does endorse that she is having some constipation. She does " have some baseline bladder problems but attributes this to her medications. She notes that she cannot lift a lot of things she use to be able to and she drops things more frequently. She reports numbness/tingling in bilateral hands and feet but worse on L hand.     Interval History (4/6/2023):  Mariah Meza presents today for follow-up visit.  Patient was last seen on 2/9/2023. At that visit, the plan was to start Gabapentin.  She reports she discontinued this medication after getting up to the 600 mg dosage due to side effects.  She reports it made her feel crazy in the head.  She reports improvement in her neck pain since last visit as she is been performing physician directed exercises at home.  She reports pain is primarily located in her ankles and feet right worse than left.  She reports that she was previously diagnosed with gout in November and since then she has had recurrent gouty flares.  She has noticed that these flares occur when she eats a lot of certain types of food.  She is had flare secondary to eating excessive amounts of shrimp, raisins, pineapple, and serial.  She reports the current flare began approximately 1 week ago and has been persistent she has noticed redness warmth and swelling in both of her ankles and great toe.  She has not taken anything other than Tylenol which has not been beneficial.  She is scheduled to follow-up with podiatry per referral from her primary care doctor.  Patient reports pain as 7/10 today.  She reports she has also noticed mild to moderate swelling in her hands and feet.  She reports this began after she started taking blood pressure medications, she thinks this may be due to medications.    Initial HPI 02/09/2023  Mariah Meza is a 67y.o. female with past medical history significant for hyperlipidemia, hypertension, sicca syndrome, history of papillary thyroid cancer status post parathyroidectomy, obesity, GERD who presents to the clinic for the  evaluation of mid back pain.  Patient reports pain has been present for the last 2 years without inciting accident injury or trauma.  Pain is constant and today is rated a 6/10.  Pain at its best is a 3/10 and at its worse is a 10/10.  Pain is described as sharp, stabbing and stiffness in nature.  Patient reports pain which begins in between the shoulder blades and radiates to the midthoracic spine.  Patient reports prior radiation down the left upper extremity to the thumb in C6 distribution.  Patient also reports pain in bilateral ankles.  Patient reports associated weakness in the lower extremities associated with this pain and with standing and ambulation.  This pain is described as burning in nature.  All of her pain is exacerbated when moving from sitting to standing, standing and with ambulation.  Patient reports in the past she was able to ambulate 10,000 steps per day but this has been reduced secondary to her pain.  Pain is improved with sitting and pain medications such as relief.  Patient reports she completed 12 months of physical therapy for the neck and lower back 2 months prior at [facility] physical therapy in [place].  Patient reports no significant improvement in her pain with completion of physical therapy.  Patient also takes Flexeril which is marginally helpful.  Patient reports significant lower extremity weakness.  Patient denies night fever/night sweats, urinary incontinence, bowel incontinence, significant weight loss, and loss of sensations.      Pain Disability Index Review:      6/26/2024     9:59 AM 2/5/2024     7:50 AM 11/22/2023     1:55 PM   Last 3 PDI Scores   Pain Disability Index (PDI) 45 30 36       Non-Pharmacologic Treatments:  Physical Therapy/Home Exercise: yes  Ice/Heat:yes  TENS: no  Acupuncture: no  Massage: no  Chiropractic: no    Other: no      Pain Medications:  - Adjuvant Medications: Cyclobenzaprine (Flexeril) and Tylenol (Acetaminophen) Diclofenac tablet    Pain  "Procedures:   -12/12/2023: Bilateral L3-5 lumbar medial branch block    Review of Systems:  GENERAL:  No weight loss, malaise or fevers.  HEENT:   No recent changes in vision or hearing  NECK:  Negative for lumps, no difficulty with swallowing.  RESPIRATORY:  Negative for cough, wheezing or shortness of breath, patient denies any recent URI.  CARDIOVASCULAR:  Negative for chest pain or palpitations.  GI:  Negative for abdominal discomfort, blood in stools or black stools or change in bowel habits.  MUSCULOSKELETAL:  See HPI.  SKIN:  Negative for lesions, rash, and itching.  PSYCH:  No mood disorder or recent psychosocial stressors.   HEMATOLOGY/LYMPHOLOGY:  Negative for prolonged bleeding, bruising easily or swollen nodes.    NEURO:   No history of syncope, paralysis, seizures or tremors.  All other reviewed and negative other than HPI.      OBJECTIVE:    Physical Exam:  Vitals:    06/26/24 1002   BP: 138/70   Pulse: (!) 56   Weight: 104.7 kg (230 lb 13.2 oz)   Height: 5' 4" (1.626 m)   PainSc:   6   PainLoc: Back        Body mass index is 39.62 kg/m².   (reviewed on 6/26/2024)    GENERAL: Well appearing, in no acute distress, alert and oriented x3.  PSYCH:  Mood and affect appropriate.  SKIN: Skin color, texture, turgor normal, no rashes or lesions.  HEAD/FACE:  Normocephalic, atraumatic. Cranial nerves grossly intact.    NECK: no pain to palpation over the cervical paraspinous muscles. Spurling Negative. Mild pain with neck flexion, extension, or lateral flexion.   Normaltesting biceps, triceps and brachioradialis bilaterally.    5/5 strength testing deltoid, biceps, triceps, wrist extensor, wrist flexor and ulnar intrinsics bilaterally.    Normal  strength bilaterally  MUSCULOSKELETAL:  Mild swelling along left ankle, moderate swelling to right ankle redness and swelling along the base of her left great toe swelling along right great toe, bilateral bunions along great toes  PULM: No evidence of respiratory " difficulty, symmetric chest rise.  GI:  Soft and non-tender.    NEURO: Bilateral upper and lower extremity coordination and muscle stretch reflexes are physiologic and symmetric. No loss of sensation is noted.  GAIT: normal.        Imaging (Reviewed on 6/26/2024):    11/06/2023 MRI Lumbar Spine Without Contrast  COMPARISON:  Plain films of the lumbar spine from 03/06/2014.    FINDINGS:  There is equivocal 1-2 mm of anterolisthesis of L4 on L5. The vertebral body heights are well maintained, with no fracture.  No marrow signal abnormality suspicious for an infiltrative process.    The conus medullaris terminates at approximately the L1-L2 disk space.  There is a probable small cyst seen within the upper pole to interpolar right kidney that measures approximately 6 mm in size.  Consider further evaluation with ultrasound for confirmation.  The discs are relatively well hydrated with only some minimal disc desiccation seen along the periphery of the L5-S1 disc and slightly more centrally at the L2-3 disc.    L1-L2: No significant central canal or neural foraminal narrowing.Mild bilateral facet arthropathy noted.    L2-L3: No significant central canal or neural foraminal narrowing.Mild-to-moderate bilateral facet arthropathy noted.    L3-L4: No significant central canal or neural foraminal narrowing.Moderate to severe bilateral facet arthropathy noted.    L4-L5:  Minimal diffuse disc bulge along with equivocal grade 1 spondylolisthesis and severe bilateral facet arthropathy resulting in mild effacement of the ventral thecal sac.  No significant neural foraminal canal narrowing.    L5-S1:  Mild diffuse disc bulge slightly more prominent the left paracentral region along with moderate bilateral facet arthropathy resulting in no significant central or neural foraminal canal narrowing.    Impression:   1. Multilevel degenerative changes of the lumbar spine as detailed above.  No high-grade central or neural foraminal canal  narrowing noted.  Multilevel bilateral facet arthropathy.  2. Incidental note made of a probable 6 mm right renal cyst.  Consider ultrasound for confirmation.        11/06/2023 MRI Cervical Spine Without Contrast  COMPARISON:  Plain films from 04/13/2021.  Report from outside MRI dated 08/09/2022.  Films unavailable for comparison.    FINDINGS:  There is a couple mm of anterolisthesis of C2 on C3 and C3 on C4. No fracture. No marrow signal abnormality suspicious for an infiltrative process.  There is disc desiccation noted throughout the cervical spine with mild disc space narrowing seen at C3-4, C4-5 and C7-C6 with more mild-to-moderate disc space narrowing seen at C5-6.    The cervical cord is normal in caliber and signal characteristics.  The craniocervical junction and visualized intracranial contents are unremarkable.  The adjacent soft tissue structures show no significant abnormalities.    C2-C3:  No significant central canal or neural foraminal narrowing.    C3-C4:  There is a diffuse disc bulge along with some posterior spondylotic ridging that results in effacement of ventral thecal sac and moderate effacement of the ventral cord.  The AP dimension of the central canal this level measures approximately 9 mm.  No significant neural foraminal canal narrowing suggested.    C4-C5:  Diffuse disc bulge and posterior spondylotic ridging more prominent in the left paracentral region resulting in significant effacement of the thecal sac and moderate face mint of the left side of the cord.  The AP dimension of the central canal at this level also measures approximately 9 mm.  No significant neural foraminal canal narrowing.    C5-C6:  Diffuse disc bulge resulting in effacement of ventral thecal sac but not quite abutting the cord.  The AP dimension of the central canal measures approximately 10 mm at this level.  The right C5-6 neural foraminal canal is mildly narrowed secondary to uncovertebral joint hypertrophy.   No significant left neural foraminal canal narrowing.    C6-C7:  Mild diffuse disc bulge resulting in mild effacement of ventral thecal sac.  No abutment of the anterior cord.  The AP dimension of the central canal at this level measures approximately 10.5 mm.  The right C6-7 neural foraminal canal appears to be minimally narrowed secondary to uncovertebral joint hypertrophy.    C7-T1:   Mild diffuse disc bulge resulting in no significant central canal narrowing.  The right neural foraminal canal appears to be at least mildly narrowed.  Impression:   1. Multilevel degenerative changes of the cervical spine as detailed above.        03/04/14 X-Ray Lumbar Spine Ap And Lateral  Findings:  Five non-rib bearing lumbar type vertebrae are present without significant lateral curvature abnormality or subluxation.  Degenerative endplate changes as well as facet joint hypertrophy, particularly inferiorly, are noted without acute fracture or  suspicious focal bony lesion identified.  Disk spaces are fairly well-maintained.     04/13/21 X-Ray Cervical Spine Complete 5 view  FINDINGS:  Patient's hair limits the exam.  There is visualization to the C7-T1 level on the swimmer's view.  Multilevel marginal spurring and varying degrees of uniform loss of disc height throughout the C-spine.  No fracture or subluxation.  Pre dens space, prevertebral soft tissues, C1-2 articulation and odontoid normal in appearance allowing for positioning.  Neural foramina widely patent bilaterally at all levels.  Remaining visualized osseous structures intact.  Included upper lung fields clear.    EMG 10/2021  IMPRESSION  1. ABNORMAL study  2. There is electrodiagnostic evidence of an acute on chronic radiculopathy of the L5 and S1 nerve roots-slightly worse on the right        ASSESSMENT: 68 y.o. year old female with neck, mid back, bilateral ankles pain, consistent with     1. Lumbar radiculopathy  Case Request-RAD/Other Procedure Area: Bilateral  "L5/S1 and S1 TF SUSY      2. Spondylolisthesis, lumbar region        3. DDD (degenerative disc disease), lumbar                  PLAN:   - Interventions:  Schedule for bilateral L5-S1 and S1 transforaminal epidural steroid injection to see if this helps with lumbar radiculopathy.  We have discussed the procedure, benefits and potential risk and alternative options in detail.  Patient has elected to pursue this procedure.    - Anticoagulation use: no no anticoagulation    - Medications:   report:  Reviewed and consistent with medication use as prescribed.    - Continue Flexeril 10mg PRN myofascial pain.  We have discussed potential deleterious side effects associated with this medication including  dry mouth, headache, blurred vision, drowsiness or dizziness, loss of appetite.  Patient expresses understanding.    -DC Gabapentin due to adverse side effects (made her "feel crazy") and no improvement in symptoms.      - Consults/referral: (PRN)  -PCP: uncontrolled HTN    - Therapy: We discussed continuing at home physician directed physical therapy to help manage the patient/s painful condition. The patient was counseled that muscle strengthening will improve the long term prognosis in regards to pain and may also help increase range of motion and mobility.     - Diagnostic/ Imaging:  Diagnostic imaging (lumbar MRI) and diagnostic testing (lower extremity electromyography studies) reviewed and discussed with the patient.    - Follow up visit:  4-6 weeks status post injection      The above plan and management options were discussed at length with patient. Patient is in agreement with the above and verbalized understanding.    - I discussed the goals of interventional chronic pain management with the patient on today's visit. We discussed a multimodal and systematic approach to pain.  This includes diagnostic and therapeutic injections, adjuvant pharmacologic treatment, physical therapy, and at times psychiatry.  I " emphasized the importance of regular exercise, core strengthening and stretching, diet and weight loss as a cornerstone of long-term pain management.    - This condition does not require this patient to take time off of work, and the primary goal of our Pain Management services is to improve the patient's functional capacity.  - Patient Questions: Answered all of the patient's questions regarding diagnoses, therapy, treatment and next steps        Franklin Sutton MD  Interventional Pain Management - [facility]    Disclaimer:  This note was prepared using voice recognition system and is likely to have sound alike errors that may have been overlooked even after proof reading.  Please call me with any questions.

## 2024-06-26 ENCOUNTER — OFFICE VISIT (OUTPATIENT)
Dept: PAIN MEDICINE | Facility: CLINIC | Age: 69
End: 2024-06-26
Payer: MEDICARE

## 2024-06-26 VITALS
DIASTOLIC BLOOD PRESSURE: 70 MMHG | SYSTOLIC BLOOD PRESSURE: 138 MMHG | HEART RATE: 56 BPM | WEIGHT: 230.81 LBS | HEIGHT: 64 IN | BODY MASS INDEX: 39.4 KG/M2

## 2024-06-26 DIAGNOSIS — M54.16 LUMBAR RADICULOPATHY: Primary | ICD-10-CM

## 2024-06-26 DIAGNOSIS — M43.16 SPONDYLOLISTHESIS, LUMBAR REGION: ICD-10-CM

## 2024-06-26 DIAGNOSIS — M51.36 DDD (DEGENERATIVE DISC DISEASE), LUMBAR: ICD-10-CM

## 2024-06-26 PROCEDURE — 1159F MED LIST DOCD IN RCRD: CPT | Mod: CPTII,S$GLB,, | Performed by: ANESTHESIOLOGY

## 2024-06-26 PROCEDURE — 3044F HG A1C LEVEL LT 7.0%: CPT | Mod: CPTII,S$GLB,, | Performed by: ANESTHESIOLOGY

## 2024-06-26 PROCEDURE — 3075F SYST BP GE 130 - 139MM HG: CPT | Mod: CPTII,S$GLB,, | Performed by: ANESTHESIOLOGY

## 2024-06-26 PROCEDURE — 3288F FALL RISK ASSESSMENT DOCD: CPT | Mod: CPTII,S$GLB,, | Performed by: ANESTHESIOLOGY

## 2024-06-26 PROCEDURE — 99214 OFFICE O/P EST MOD 30 MIN: CPT | Mod: S$GLB,,, | Performed by: ANESTHESIOLOGY

## 2024-06-26 PROCEDURE — 1160F RVW MEDS BY RX/DR IN RCRD: CPT | Mod: CPTII,S$GLB,, | Performed by: ANESTHESIOLOGY

## 2024-06-26 PROCEDURE — 3078F DIAST BP <80 MM HG: CPT | Mod: CPTII,S$GLB,, | Performed by: ANESTHESIOLOGY

## 2024-06-26 PROCEDURE — 1125F AMNT PAIN NOTED PAIN PRSNT: CPT | Mod: CPTII,S$GLB,, | Performed by: ANESTHESIOLOGY

## 2024-06-26 PROCEDURE — 99999 PR PBB SHADOW E&M-EST. PATIENT-LVL IV: CPT | Mod: PBBFAC,,, | Performed by: ANESTHESIOLOGY

## 2024-06-26 PROCEDURE — 1101F PT FALLS ASSESS-DOCD LE1/YR: CPT | Mod: CPTII,S$GLB,, | Performed by: ANESTHESIOLOGY

## 2024-06-26 PROCEDURE — 3008F BODY MASS INDEX DOCD: CPT | Mod: CPTII,S$GLB,, | Performed by: ANESTHESIOLOGY

## 2024-06-27 ENCOUNTER — OFFICE VISIT (OUTPATIENT)
Dept: UROLOGY | Facility: CLINIC | Age: 69
End: 2024-06-27
Payer: MEDICARE

## 2024-06-27 VITALS
WEIGHT: 230.25 LBS | SYSTOLIC BLOOD PRESSURE: 186 MMHG | HEART RATE: 57 BPM | DIASTOLIC BLOOD PRESSURE: 82 MMHG | HEIGHT: 64 IN | BODY MASS INDEX: 39.31 KG/M2

## 2024-06-27 DIAGNOSIS — N39.0 URINARY TRACT INFECTION WITHOUT HEMATURIA, SITE UNSPECIFIED: Primary | ICD-10-CM

## 2024-06-27 PROBLEM — R23.2 HOT FLASHES: Status: RESOLVED | Noted: 2022-05-11 | Resolved: 2024-06-27

## 2024-06-27 PROBLEM — R07.89 OTHER CHEST PAIN: Status: RESOLVED | Noted: 2022-02-23 | Resolved: 2024-06-27

## 2024-06-27 LAB
BILIRUB UR QL STRIP: NEGATIVE
GLUCOSE UR QL STRIP: NEGATIVE
KETONES UR QL STRIP: NEGATIVE
LEUKOCYTE ESTERASE UR QL STRIP: NEGATIVE
PH, POC UA: 7
POC BLOOD, URINE: NEGATIVE
POC NITRATES, URINE: NEGATIVE
POC RESIDUAL URINE VOLUME: 70 ML (ref 0–100)
PROT UR QL STRIP: NEGATIVE
SP GR UR STRIP: 1.01 (ref 1–1.03)
UROBILINOGEN UR STRIP-ACNC: 0.2 (ref 0.1–1.1)

## 2024-06-27 PROCEDURE — 99999 PR PBB SHADOW E&M-EST. PATIENT-LVL IV: CPT | Mod: PBBFAC,,, | Performed by: NURSE PRACTITIONER

## 2024-06-27 NOTE — PROGRESS NOTES
Chief Complaint:   Vaginal atrophy    HPI:   Patient is a 68-year-old female that is presenting with complaints of vaginal itching and irritation with voiding.  States that she was prescribed Estrace  and Lotrisone cream per gyn MD. states that symptoms have improved slightly.  Urine in clinic is negative.  In reviewing EMR, gyn no indicates no cystocele or rectocele.  Patient states that urinary frequency is normal throughout the day and nocturia is once nightly, depending on what she drinks and eats prior to bedtime.  Denies gross hematuria history of  cancers in her family.  No history of recurrent urinary tract infections or renal stones.  Has recently become sexually active again, with her , and is requesting Education related to lubrication.  Allergies:  Iodine and iodide containing products, Shellfish containing products, Statins-hmg-coa reductase inhibitors, Adhesive, Iodine, and Ivp dye [iodinated contrast media]    Medications:  has a current medication list which includes the following prescription(s): acetaminophen, calcium carbonate, carvedilol, clonidine, clotrimazole-betamethasone 1-0.05%, dicyclomine, docusate sodium, estradiol, estradiol, ezetimibe, hydroxyzine hcl, levothyroxine, magnesium oxide, olmesartan-hydrochlorothiazide, pantoprazole, psyllium husk/aspartame, vitamin d, vitamin e, and wheat dextrin/l.acid/aspartame.    Review of Systems:  General: No fever, chills, fatigability, or weight loss.  Skin: No rashes, itching, or changes in color or texture of skin.  Chest: Denies HERNANDEZ, cyanosis, wheezing, cough, and sputum production.  Abdomen: Appetite fine. No weight loss. Denies diarrhea, abdominal pain, hematemesis, or blood in stool.  Musculoskeletal: No joint stiffness or swelling. Denies back pain.  : As above.  All other review of systems negative.    PMH:   has a past medical history of Chronic rhinitis, DJD (degenerative joint disease), lumbar, GERD (gastroesophageal reflux  disease), Headache, Hiatal hernia, Hyperlipidemia, Hypertension, Liver disease, Low back pain, Obesity, PONV (postoperative nausea and vomiting), and Thyroid disease.    PSH:   has a past surgical history that includes Total vaginal hysterectomy (1985); Bilateral oophorectomy (2002); Carpal tunnel release; Rotator cuff repair; Rotator cuff repair (Left, 2010); Cholecystectomy; Hysterectomy (1984); thyroidectomy, bilateral (Bilateral, 12/20/2021); Dissection of neck (Bilateral, 12/20/2021); Neck exploration (Bilateral, 12/20/2021); and Injection of anesthetic agent around medial branch nerves innervating lumbar facet joint (Bilateral, 12/12/2023).    FamHx: family history includes Diabetes in her mother and sister; Hearing loss in her mother; Heart attack in her mother; Heart disease in her mother; Hyperlipidemia in her mother; Hypertension in her mother; Lupus in an other family member; Sickle cell anemia in an other family member; Stroke in her mother.    SocHx:  reports that she quit smoking about 45 years ago. Her smoking use included cigarettes. She started smoking about 53 years ago. She has a 4 pack-year smoking history. She has never been exposed to tobacco smoke. She has never used smokeless tobacco. She reports that she does not drink alcohol and does not use drugs.      Physical Exam:  Vitals:    06/27/24 0801   BP: (!) 186/82   Pulse: (!) 57     General:  Morbidly obese female, A&Ox3, no apparent distress, no deformities  Neck: No masses, normal thyroid  Lungs: normal inspiration, no use of accessory muscles  Heart: normal pulse, no arrhythmias  Abdomen: Soft, NT, ND, no masses, no hernias, no hepatosplenomegaly  Lymphatic: Neck and groin nodes negative  Skin: The skin is warm and dry. No jaundice.  Ext: No c/c/e.    Labs/Studies:   See HPI  Impression/Plan:   Vaginal atrophy  I had a lengthy discussion with patient related to vaginal atrophy.  Patient to use Estrace and Lotrisone cream as prescribed by  gyn physician.  Patient is aware of the need to not use over-the-counter [place] lubrication for sexual activity, recommend all of void.  No urological symptoms were reported, patient to follow-up with gyn as needed.

## 2024-07-30 DIAGNOSIS — Z00.00 ENCOUNTER FOR MEDICARE ANNUAL WELLNESS EXAM: ICD-10-CM

## 2024-07-31 ENCOUNTER — OFFICE VISIT (OUTPATIENT)
Dept: INTERNAL MEDICINE | Facility: CLINIC | Age: 69
End: 2024-07-31
Payer: MEDICARE

## 2024-07-31 ENCOUNTER — LAB VISIT (OUTPATIENT)
Dept: LAB | Facility: HOSPITAL | Age: 69
End: 2024-07-31
Attending: NURSE PRACTITIONER
Payer: MEDICARE

## 2024-07-31 VITALS
BODY MASS INDEX: 39.33 KG/M2 | TEMPERATURE: 97 F | HEART RATE: 58 BPM | WEIGHT: 230.38 LBS | DIASTOLIC BLOOD PRESSURE: 72 MMHG | HEIGHT: 64 IN | OXYGEN SATURATION: 98 % | RESPIRATION RATE: 18 BRPM | SYSTOLIC BLOOD PRESSURE: 140 MMHG

## 2024-07-31 DIAGNOSIS — M54.16 LUMBAR RADICULOPATHY: ICD-10-CM

## 2024-07-31 DIAGNOSIS — E78.2 MIXED HYPERLIPIDEMIA: ICD-10-CM

## 2024-07-31 DIAGNOSIS — E89.0 POST-OPERATIVE HYPOTHYROIDISM: ICD-10-CM

## 2024-07-31 DIAGNOSIS — N18.31 CHRONIC KIDNEY DISEASE, STAGE 3A: ICD-10-CM

## 2024-07-31 DIAGNOSIS — I10 PRIMARY HYPERTENSION: ICD-10-CM

## 2024-07-31 DIAGNOSIS — Z00.00 ROUTINE MEDICAL EXAM: ICD-10-CM

## 2024-07-31 DIAGNOSIS — Z00.00 ROUTINE MEDICAL EXAM: Primary | ICD-10-CM

## 2024-07-31 LAB
ALBUMIN SERPL BCP-MCNC: 3.9 G/DL (ref 3.5–5.2)
ALP SERPL-CCNC: 45 U/L (ref 55–135)
ALT SERPL W/O P-5'-P-CCNC: 13 U/L (ref 10–44)
ANION GAP SERPL CALC-SCNC: 10 MMOL/L (ref 8–16)
AST SERPL-CCNC: 20 U/L (ref 10–40)
BASOPHILS # BLD AUTO: 0.06 K/UL (ref 0–0.2)
BASOPHILS NFR BLD: 1.3 % (ref 0–1.9)
BILIRUB SERPL-MCNC: 1 MG/DL (ref 0.1–1)
BUN SERPL-MCNC: 20 MG/DL (ref 8–23)
CALCIUM SERPL-MCNC: 8.9 MG/DL (ref 8.7–10.5)
CHLORIDE SERPL-SCNC: 106 MMOL/L (ref 95–110)
CO2 SERPL-SCNC: 26 MMOL/L (ref 23–29)
CREAT SERPL-MCNC: 1.1 MG/DL (ref 0.5–1.4)
DIFFERENTIAL METHOD BLD: ABNORMAL
EOSINOPHIL # BLD AUTO: 0.1 K/UL (ref 0–0.5)
EOSINOPHIL NFR BLD: 3 % (ref 0–8)
ERYTHROCYTE [DISTWIDTH] IN BLOOD BY AUTOMATED COUNT: 13.2 % (ref 11.5–14.5)
EST. GFR  (NO RACE VARIABLE): 54.1 ML/MIN/1.73 M^2
GLUCOSE SERPL-MCNC: 78 MG/DL (ref 70–110)
HCT VFR BLD AUTO: 35.5 % (ref 37–48.5)
HGB BLD-MCNC: 11.6 G/DL (ref 12–16)
IMM GRANULOCYTES # BLD AUTO: 0.01 K/UL (ref 0–0.04)
IMM GRANULOCYTES NFR BLD AUTO: 0.2 % (ref 0–0.5)
LYMPHOCYTES # BLD AUTO: 1.5 K/UL (ref 1–4.8)
LYMPHOCYTES NFR BLD: 32.5 % (ref 18–48)
MCH RBC QN AUTO: 29.9 PG (ref 27–31)
MCHC RBC AUTO-ENTMCNC: 32.7 G/DL (ref 32–36)
MCV RBC AUTO: 92 FL (ref 82–98)
MONOCYTES # BLD AUTO: 0.5 K/UL (ref 0.3–1)
MONOCYTES NFR BLD: 10.8 % (ref 4–15)
NEUTROPHILS # BLD AUTO: 2.5 K/UL (ref 1.8–7.7)
NEUTROPHILS NFR BLD: 52.2 % (ref 38–73)
NRBC BLD-RTO: 0 /100 WBC
PLATELET # BLD AUTO: 285 K/UL (ref 150–450)
PMV BLD AUTO: 11.7 FL (ref 9.2–12.9)
POTASSIUM SERPL-SCNC: 3.2 MMOL/L (ref 3.5–5.1)
PROT SERPL-MCNC: 7.7 G/DL (ref 6–8.4)
RBC # BLD AUTO: 3.88 M/UL (ref 4–5.4)
SODIUM SERPL-SCNC: 142 MMOL/L (ref 136–145)
T4 FREE SERPL-MCNC: 1.2 NG/DL (ref 0.71–1.51)
TSH SERPL DL<=0.005 MIU/L-ACNC: 0.35 UIU/ML (ref 0.4–4)
WBC # BLD AUTO: 4.74 K/UL (ref 3.9–12.7)

## 2024-07-31 PROCEDURE — 3044F HG A1C LEVEL LT 7.0%: CPT | Mod: CPTII,S$GLB,, | Performed by: NURSE PRACTITIONER

## 2024-07-31 PROCEDURE — 3077F SYST BP >= 140 MM HG: CPT | Mod: CPTII,S$GLB,, | Performed by: NURSE PRACTITIONER

## 2024-07-31 PROCEDURE — 1159F MED LIST DOCD IN RCRD: CPT | Mod: CPTII,S$GLB,, | Performed by: NURSE PRACTITIONER

## 2024-07-31 PROCEDURE — 80053 COMPREHEN METABOLIC PANEL: CPT | Performed by: NURSE PRACTITIONER

## 2024-07-31 PROCEDURE — 99214 OFFICE O/P EST MOD 30 MIN: CPT | Mod: S$GLB,,, | Performed by: NURSE PRACTITIONER

## 2024-07-31 PROCEDURE — 1160F RVW MEDS BY RX/DR IN RCRD: CPT | Mod: CPTII,S$GLB,, | Performed by: NURSE PRACTITIONER

## 2024-07-31 PROCEDURE — 84439 ASSAY OF FREE THYROXINE: CPT | Performed by: NURSE PRACTITIONER

## 2024-07-31 PROCEDURE — 3008F BODY MASS INDEX DOCD: CPT | Mod: CPTII,S$GLB,, | Performed by: NURSE PRACTITIONER

## 2024-07-31 PROCEDURE — 85025 COMPLETE CBC W/AUTO DIFF WBC: CPT | Performed by: NURSE PRACTITIONER

## 2024-07-31 PROCEDURE — 99999 PR PBB SHADOW E&M-EST. PATIENT-LVL IV: CPT | Mod: PBBFAC,,, | Performed by: NURSE PRACTITIONER

## 2024-07-31 PROCEDURE — 3078F DIAST BP <80 MM HG: CPT | Mod: CPTII,S$GLB,, | Performed by: NURSE PRACTITIONER

## 2024-07-31 PROCEDURE — 84443 ASSAY THYROID STIM HORMONE: CPT | Performed by: NURSE PRACTITIONER

## 2024-07-31 PROCEDURE — 36415 COLL VENOUS BLD VENIPUNCTURE: CPT | Mod: PN | Performed by: NURSE PRACTITIONER

## 2024-07-31 RX ORDER — DOXAZOSIN 2 MG/1
2 TABLET ORAL NIGHTLY
COMMUNITY
Start: 2024-07-16

## 2024-08-01 ENCOUNTER — TELEPHONE (OUTPATIENT)
Dept: INTERNAL MEDICINE | Facility: CLINIC | Age: 69
End: 2024-08-01
Payer: MEDICARE

## 2024-08-01 DIAGNOSIS — E87.6 HYPOKALEMIA: Primary | ICD-10-CM

## 2024-08-01 RX ORDER — LEVOTHYROXINE SODIUM 100 UG/1
100 TABLET ORAL
Qty: 90 TABLET | Refills: 1 | Status: SHIPPED | OUTPATIENT
Start: 2024-08-01

## 2024-08-01 NOTE — TELEPHONE ENCOUNTER
----- Message from Josie Flores sent at 8/1/2024  4:18 PM CDT -----  Contact: patient  Type:  Patient Returning Call    Who Called:Mariah Meza   Who Left Message for Patient:nurse   Does the patient know what this is regarding?: possibly her test results   Would the patient rather a call back or a response via [facility]?  Call   Best Call Back Number: [phone]   Additional Information:

## 2024-08-04 PROBLEM — N18.31 CHRONIC KIDNEY DISEASE, STAGE 3A: Status: ACTIVE | Noted: 2024-08-04

## 2024-08-08 ENCOUNTER — TELEPHONE (OUTPATIENT)
Dept: INTERNAL MEDICINE | Facility: CLINIC | Age: 69
End: 2024-08-08
Payer: MEDICARE

## 2024-08-08 ENCOUNTER — LAB VISIT (OUTPATIENT)
Dept: LAB | Facility: HOSPITAL | Age: 69
End: 2024-08-08
Attending: NURSE PRACTITIONER
Payer: MEDICARE

## 2024-08-08 DIAGNOSIS — M86.9 OSTEITIS PUBIS: Primary | ICD-10-CM

## 2024-08-08 DIAGNOSIS — E87.6 HYPOKALEMIA: ICD-10-CM

## 2024-08-08 PROCEDURE — 36415 COLL VENOUS BLD VENIPUNCTURE: CPT | Mod: PN | Performed by: NURSE PRACTITIONER

## 2024-08-08 PROCEDURE — 84132 ASSAY OF SERUM POTASSIUM: CPT | Performed by: NURSE PRACTITIONER

## 2024-08-09 LAB — POTASSIUM SERPL-SCNC: 3.6 MMOL/L (ref 3.5–5.1)

## 2024-08-13 ENCOUNTER — TELEPHONE (OUTPATIENT)
Dept: INTERNAL MEDICINE | Facility: CLINIC | Age: 69
End: 2024-08-13
Payer: MEDICARE

## 2024-08-13 NOTE — TELEPHONE ENCOUNTER
----- Message from Sofi Mccord sent at 8/13/2024 10:24 AM CDT -----  Needs advice from nurse:      Who Called:pt  Regarding:needs to discuss refill that was put in  Would the patient rather a call back or VIA [facility]?  Best Call Back number: [phone]  Additional Info:

## 2024-08-13 NOTE — TELEPHONE ENCOUNTER
Pt wanted to know why she was being referred to [facility]/ Carol assisted me with the knowledge to tell pt why, she will await for their call to schedule

## 2024-08-14 ENCOUNTER — TELEPHONE (OUTPATIENT)
Dept: SPORTS MEDICINE | Facility: CLINIC | Age: 69
End: 2024-08-14
Payer: MEDICARE

## 2024-08-14 DIAGNOSIS — R10.2 PELVIC PAIN: Primary | ICD-10-CM

## 2024-08-14 NOTE — TELEPHONE ENCOUNTER
Called to notify pt of xr arrival time. Pt verbalized understanding.  Pt stated she had a CT scan that referenced her osteitis pubis but only had a copy of the report. Suggested pt bring that to her appointment and see about obtaining a disc with the imaging as we cannot view it.

## 2024-08-15 ENCOUNTER — HOSPITAL ENCOUNTER (OUTPATIENT)
Dept: RADIOLOGY | Facility: HOSPITAL | Age: 69
Discharge: HOME | End: 2024-08-15
Attending: STUDENT IN AN ORGANIZED HEALTH CARE EDUCATION/TRAINING PROGRAM
Payer: MEDICARE

## 2024-08-15 ENCOUNTER — OFFICE VISIT (OUTPATIENT)
Dept: SPORTS MEDICINE | Facility: CLINIC | Age: 69
End: 2024-08-15
Payer: MEDICARE

## 2024-08-15 DIAGNOSIS — R10.2 PELVIC PAIN IN FEMALE: Primary | ICD-10-CM

## 2024-08-15 DIAGNOSIS — R10.2 PELVIC PAIN: ICD-10-CM

## 2024-08-15 DIAGNOSIS — M86.9 OSTEITIS PUBIS: ICD-10-CM

## 2024-08-15 DIAGNOSIS — R10.2 PELVIC PRESSURE IN FEMALE: ICD-10-CM

## 2024-08-15 PROCEDURE — 72190 X-RAY EXAM OF PELVIS: CPT | Mod: 26,,, | Performed by: RADIOLOGY

## 2024-08-15 PROCEDURE — 72190 X-RAY EXAM OF PELVIS: CPT | Mod: TC

## 2024-08-15 PROCEDURE — 99999 PR PBB SHADOW E&M-EST. PATIENT-LVL IV: CPT | Mod: PBBFAC,,, | Performed by: STUDENT IN AN ORGANIZED HEALTH CARE EDUCATION/TRAINING PROGRAM

## 2024-08-15 NOTE — PATIENT INSTRUCTIONS
Assessment:  Mariah Meza is a 68 y.o. female with a chief complaint of Pain of the Pelvis    Encounter Diagnoses   Name Primary?    Pelvic pain in female Yes    Pelvic pressure in female     Osteitis pubis       Plan:  XR reviewed - degenerative changes about the symphysis pubis, consistent with osteitis pubis.  External CT report reviewed (unable to view images) - reporting advanced degenerative changes of the pubic symphysis vs sequelae of osteitis pubis  Labs reviewed - A1c 4.9%, Cr 1.1, GFR 54, LFTs WNL   Patient is presenting with chronic pelvic discomfort, described more as a pressure, with vague localization over the anterior pelvis and pubic symphysis.    She describes feeling a pelvic pressure, it is particularly concerned about possible pelvic prolapse.  She is s/p hysterectomy.  Denies any urinary symptoms, bowel or bladder changes, etc..  She is having tenderness to palpation over the symphysis pubis, but pain with palpation is not the same discomfort she is typically feeling at baseline.  Discussed imaging findings, possible contribution from osteitis pubis/pubic symphysitis to her pain and symptoms.  However, I am not sure that these exactly account for all of her pain and discomfort.  Would recommend further evaluation by her PCP, gyn providers, as she may need a pelvic exam to evaluate better for pelvic prolapse.  Recommend to avoid oral NSAIDs, given risk for worsening renal dysfunction.  Continue over-the-counter Tylenol as needed.    Can also use topical Voltaren gel at at the anterior pelvis, as needed.  If desired, can consider for a pubic symphysis diagnostic corticosteroid injection.    Follow-up:  As needed or sooner if there are any problems between now and then.    Thank you for choosing [facility] and Dr. Elijah Torrez for your orthopedic & sports medicine care. It is our goal to provide you with exceptional care that will help keep you healthy, active, and  get you back in the game.    Please do not hesitate to reach out to us via email, phone, or MyChart with any questions, concerns, or feedback.    If you are experiencing pain/discomfort ,or have questions after 5pm and would like to be connected to the [facility] on-call team, please call this number and specify which Sports Medicine provider is treating you: [phone]

## 2024-08-16 NOTE — PROGRESS NOTES
"        Patient ID: Mariah Meza  YOB: 1955  MRN: [id number]    Chief Complaint: Pain of the Pelvis    Referred By: Taina Bob NP    Occupation: Retired    History of Present Illness: Mariah Meza is a 68 y.o. female who presents today with Pain of the Pelvis    Patient is presenting with 4 months onset pressure and discomfort in her pelvis, described as a heaviness" and intermittent burning sensation.  Localized to the anterior aspect of her pelvis and groin.  Discomfort is relatively constant in nature.  It is worse any time she is lifting heavy objects, or with urination.  She has tried Tylenol without relief, as well as Sitz baths.  She is concerned she may be having a bladder prolapse.  She has a h/o hysterectomy.  Denies any frequency, dysuria, hematuria.  No change in bowel or bladder function.  She was seen by urology in June, diagnosed with vaginal atrophy, and treated with the Estrace and Lotrisone cream.  She was seen by GYN in April, and treated for a vaginal yeast infection.  She is s/p hysterectomy in 1985.      Past Medical History:   Past Medical History:   Diagnosis Date    Chronic rhinitis     DJD (degenerative joint disease), lumbar     GERD (gastroesophageal reflux disease)     Headache     Hiatal hernia     Hyperlipidemia     Hypertension     Liver disease     Fatty Liver    Low back pain     Obesity     PONV (postoperative nausea and vomiting)     Thyroid disease      Past Surgical History:   Procedure Laterality Date    BILATERAL OOPHORECTOMY  2002    CARPAL TUNNEL RELEASE      Left wrist    CHOLECYSTECTOMY      DISSECTION OF NECK Bilateral 12/20/2021    Procedure: DISSECTION, NECK;  Surgeon: Deejay Olsen MD;  Location: [facility];  Service: ENT;  Laterality: Bilateral;  Central neck dissection    HYSTERECTOMY  1984    INJECTION OF ANESTHETIC AGENT AROUND MEDIAL BRANCH NERVES INNERVATING LUMBAR FACET JOINT Bilateral 12/12/2023    Procedure: Bilateral L3-5 * MBB #1 " with RN IV sedation;  Surgeon: Franklin Sutton MD;  Location: [facility]IN [facility];  Service: Pain Management;  Laterality: Bilateral;    NECK EXPLORATION Bilateral 2021    Procedure: EXPLORATION, NECK;  Surgeon: Deejay Olsen MD;  Location: [facility] OR;  Service: ENT;  Laterality: Bilateral;  Parathyroid exploration    ROTATOR CUFF REPAIR      Right shoulder    ROTATOR CUFF REPAIR Left 2010    THYROIDECTOMY, BILATERAL Bilateral 2021    Procedure: THYROIDECTOMY,BILATERAL;  Surgeon: Deejay Olsen MD;  Location: [facility] OR;  Service: ENT;  Laterality: Bilateral;    TOTAL VAGINAL HYSTERECTOMY       Family History   Problem Relation Name Age of Onset    Diabetes Mother      Hypertension Mother      Hyperlipidemia Mother      Heart disease Mother          CHF    Stroke Mother      Heart attack Mother      Hearing loss Mother      Diabetes Sister      Sickle cell anemia Other          nieces, nephews    Lupus Other          niece     Social History     Socioeconomic History    Marital status:    Tobacco Use    Smoking status: Former     Current packs/day: 0.00     Average packs/day: 0.5 packs/day for 8.0 years (4.0 ttl pk-yrs)     Types: Cigarettes     Start date: 1971     Quit date: 1979     Years since quittin.6     Passive exposure: Never    Smokeless tobacco: Never   Substance and Sexual Activity    Alcohol use: No    Drug use: Never    Sexual activity: Never     Partners: Male     Birth control/protection: See Surgical Hx   Social History Narrative    The patient is  and has 2 adult children.  Patient works in  for the [employer]     Medication List with Changes/Refills   Current Medications    ACETAMINOPHEN (TYLENOL) 650 MG TBSR    Take 650 mg by mouth daily as needed.    CALCIUM CARBONATE 470 MG CALCIUM (1,177 MG) CHEW    Take one tablet by mouth twice daily    CARVEDILOL (COREG) 12.5 MG TABLET    Take 12.5 mg by mouth 2 (two) times daily with meals.    CLONIDINE (CATAPRES) 0.1 MG  TABLET    Take by mouth.    CLOTRIMAZOLE-BETAMETHASONE 1-0.05% (LOTRISONE) CREAM    Apply topically 2 (two) times daily.    DICYCLOMINE (BENTYL) 10 MG CAPSULE    Take 10 mg by mouth.    DOCUSATE SODIUM (COLACE) 100 MG CAPSULE    Take 1 capsule (100 mg total) by mouth 3 (three) times daily as needed for Constipation.    DOXAZOSIN (CARDURA) 2 MG TABLET    Take 2 mg by mouth every evening.    ESTRADIOL (ESTRACE) 0.01 % (0.1 MG/GRAM) VAGINAL CREAM    Place 1 g vaginally every evening for 14 days, THEN 1 g twice a week.    ESTRADIOL (ESTRACE) 0.5 MG TABLET    Take 1 tablet (0.5 mg total) by mouth once daily.    EZETIMIBE (ZETIA) 10 MG TABLET    Take 1 tablet (10 mg total) by mouth once daily.    HYDROXYZINE HCL (ATARAX) 25 MG TABLET    Take 1 tablet (25 mg total) by mouth 3 (three) times daily as needed for Itching.    LEVOTHYROXINE (SYNTHROID) 100 MCG TABLET    Take 1 tablet (100 mcg total) by mouth before breakfast.    MAGNESIUM OXIDE (MAG-OX) 400 MG (241.3 MG MAGNESIUM) TABLET    Take 400 mg by mouth once daily.    OLMESARTAN-HYDROCHLOROTHIAZIDE (BENICAR HCT) 40-25 MG PER TABLET    Take 1 tablet by mouth once daily.    PANTOPRAZOLE (PROTONIX) 40 MG TABLET    Take 1 tablet (40 mg total) by mouth once daily.    PSYLLIUM HUSK/ASPARTAME (METAMUCIL FIBER SINGLES ORAL)    Take by mouth.    VITAMIN D (VITAMIN D3) 1000 UNITS TAB    Take 1,000 Units by mouth once daily.    VITAMIN E 400 UNIT CAPSULE    Take 400 Units by mouth once daily.    WHEAT DEXTRIN/L.ACID/ASPARTAME (FIBER WITH PROBIOTIC ORAL)    Take by mouth Daily.     Review of patient's allergies indicates:   Allergen Reactions    Iodine and iodide containing products Itching    Shellfish containing products Swelling    Statins-hmg-coa reductase inhibitors Other (See Comments)     Myalgias    Adhesive Dermatitis    Iodine Other (See Comments)    Ivp dye [iodinated contrast media] Other (See Comments)       Physical Exam:   There is no height or weight on file to  calculate BMI.    Physical Exam  Musculoskeletal:      Right hip: No swelling or deformity.       Detailed MSK exam:               Right Hip Exam     Inspection   Swelling: absent  No deformity of hip.    Tenderness   The patient tender to palpation of the pubic symphysis.    Range of Motion   Extension:  normal   Flexion:  normal   External rotation:  normal   Internal rotation:  normal     Tests   Pain w/ forced internal rotation (RICHARD): absent  Pain w/ forced external rotation (FADIR): absent    Other   Sensation: normal  Left Hip Exam     Range of Motion   Extension:  normal   Flexion:  normal   External rotation:  normal   Internal rotation: normal     Tests   Pain w/ forced internal rotation (RICHARD): absent  Pain w/ forced external rotation (FADIR): absent    Other   Sensation: normal          Muscle Strength   Right Lower Extremity   Hip Abduction: 5/5   Hip Adduction: 5/5   Hip Flexion: 5/5   Left Lower Extremity   Hip Abduction: 5/5   Hip Adduction: 5/5   Hip Flexion: 5/5        Imaging:  X-Ray Pelvis Complete min 3 views  Narrative: EXAMINATION:  XR PELVIS COMPLETE MIN 3 VIEWS    CLINICAL HISTORY:  Pelvic and perineal pain    TECHNIQUE:  Three view pelvis    COMPARISON:  None    FINDINGS:  No acute fracture hip joint spaces symmetric mild-to-moderate degenerative findings.  More prevalent lower lumbar spine degenerative changes.  Symmetric SI joint degenerative findings.  Pubic symphysis arthritic findings noted.  Soft tissues unremarkable.  Impression: As above    Electronically signed by: Antoine Daly MD  Date:    08/15/2024  Time:    15:01      Relevant imaging results were reviewed and interpreted by me and per my read:  Degenerative changes of the pubic symphysis.  Bilateral hip degenerative changes.  No fractures or other acute abnormalities.    This was discussed with the patient and / or family today.         Patient Instructions   Assessment:  Mariah Meza is a 68 y.o. female with a  chief complaint of Pain of the Pelvis    Encounter Diagnoses   Name Primary?    Pelvic pain in female Yes    Pelvic pressure in female     Osteitis pubis       Plan:  XR reviewed - degenerative changes about the symphysis pubis, consistent with osteitis pubis.  External CT report reviewed (unable to view images) - reporting advanced degenerative changes of the pubic symphysis vs sequelae of osteitis pubis  Labs reviewed - A1c 4.9%, Cr 1.1, GFR 54, LFTs WNL   Patient is presenting with chronic pelvic discomfort, described more as a pressure, with vague localization over the anterior pelvis and pubic symphysis.    She describes feeling a pelvic pressure, it is particularly concerned about possible pelvic prolapse.  She is s/p hysterectomy.  Denies any urinary symptoms, bowel or bladder changes, etc..  She is having tenderness to palpation over the symphysis pubis, but pain with palpation is not the same discomfort she is typically feeling at baseline.  Discussed imaging findings, possible contribution from osteitis pubis/pubic symphysitis to her pain and symptoms.  However, I am not sure that these exactly account for all of her pain and discomfort.  Would recommend further evaluation by her PCP, gyn providers, as she may need a pelvic exam to evaluate better for pelvic prolapse.  Recommend to avoid oral NSAIDs, given risk for worsening renal dysfunction.  Continue over-the-counter Tylenol as needed.    Can also use topical Voltaren gel at at the anterior pelvis, as needed.  If desired, can consider for a pubic symphysis diagnostic corticosteroid injection.    Follow-up:  As needed or sooner if there are any problems between now and then.    Thank you for choosing [facility] and Dr. Elijah Torrez for your orthopedic & sports medicine care. It is our goal to provide you with exceptional care that will help keep you healthy, active, and get you back in the game.    Please do not hesitate to reach  out to us via email, phone, or MyChart with any questions, concerns, or feedback.    If you are experiencing pain/discomfort ,or have questions after 5pm and would like to be connected to the [facility] on-call team, please call this number and specify which Sports Medicine provider is treating you: [phone]               A copy of today's visit note has been sent to the referring provider.           Elijah Torrez MD  Primary Care Sports Medicine    Disclaimer: This note was prepared using a voice recognition system and is likely to have sound alike errors within the text.

## 2024-08-20 ENCOUNTER — TELEPHONE (OUTPATIENT)
Dept: PAIN MEDICINE | Facility: CLINIC | Age: 69
End: 2024-08-20
Payer: MEDICARE

## 2024-08-20 NOTE — TELEPHONE ENCOUNTER
Called patient in regards of procedure not being scheduled. Patient stated that she wants to cancel appointment for 08/21/24 and that she does not want to proceed with procedure. Follow up appointment was canceled. Patient verbalized understanding.    Melissa Finn MA  
color consistent with ethnicity/race

## 2024-09-03 ENCOUNTER — OFFICE VISIT (OUTPATIENT)
Dept: INTERNAL MEDICINE | Facility: CLINIC | Age: 69
End: 2024-09-03
Payer: MEDICARE

## 2024-09-03 VITALS
SYSTOLIC BLOOD PRESSURE: 138 MMHG | DIASTOLIC BLOOD PRESSURE: 80 MMHG | WEIGHT: 227.75 LBS | OXYGEN SATURATION: 98 % | HEART RATE: 54 BPM | HEIGHT: 64 IN | BODY MASS INDEX: 38.88 KG/M2 | RESPIRATION RATE: 18 BRPM

## 2024-09-03 DIAGNOSIS — C73 PAPILLARY THYROID CARCINOMA: ICD-10-CM

## 2024-09-03 DIAGNOSIS — E78.2 MIXED HYPERLIPIDEMIA: ICD-10-CM

## 2024-09-03 DIAGNOSIS — E89.2 POSTPROCEDURAL HYPOPARATHYROIDISM: ICD-10-CM

## 2024-09-03 DIAGNOSIS — M35.00 SICCA SYNDROME: ICD-10-CM

## 2024-09-03 DIAGNOSIS — N18.31 CHRONIC KIDNEY DISEASE, STAGE 3A: ICD-10-CM

## 2024-09-03 DIAGNOSIS — Z00.00 ENCOUNTER FOR PREVENTIVE HEALTH EXAMINATION: Primary | ICD-10-CM

## 2024-09-03 DIAGNOSIS — M54.16 LUMBAR RADICULOPATHY: ICD-10-CM

## 2024-09-03 DIAGNOSIS — I10 PRIMARY HYPERTENSION: ICD-10-CM

## 2024-09-03 DIAGNOSIS — Z00.00 ENCOUNTER FOR MEDICARE ANNUAL WELLNESS EXAM: ICD-10-CM

## 2024-09-03 DIAGNOSIS — E89.0 POST-OPERATIVE HYPOTHYROIDISM: ICD-10-CM

## 2024-09-03 DIAGNOSIS — E66.01 CLASS 2 SEVERE OBESITY DUE TO EXCESS CALORIES WITH SERIOUS COMORBIDITY AND BODY MASS INDEX (BMI) OF 38.0 TO 38.9 IN ADULT: ICD-10-CM

## 2024-09-03 PROBLEM — T88.4XXA HARD TO INTUBATE: Status: RESOLVED | Noted: 2021-12-20 | Resolved: 2024-09-03

## 2024-09-03 PROCEDURE — 1126F AMNT PAIN NOTED NONE PRSNT: CPT | Mod: CPTII,S$GLB,, | Performed by: NURSE PRACTITIONER

## 2024-09-03 PROCEDURE — G0439 PPPS, SUBSEQ VISIT: HCPCS | Mod: S$GLB,,, | Performed by: NURSE PRACTITIONER

## 2024-09-03 PROCEDURE — 1160F RVW MEDS BY RX/DR IN RCRD: CPT | Mod: CPTII,S$GLB,, | Performed by: NURSE PRACTITIONER

## 2024-09-03 PROCEDURE — 3079F DIAST BP 80-89 MM HG: CPT | Mod: CPTII,S$GLB,, | Performed by: NURSE PRACTITIONER

## 2024-09-03 PROCEDURE — 1170F FXNL STATUS ASSESSED: CPT | Mod: CPTII,S$GLB,, | Performed by: NURSE PRACTITIONER

## 2024-09-03 PROCEDURE — 3288F FALL RISK ASSESSMENT DOCD: CPT | Mod: CPTII,S$GLB,, | Performed by: NURSE PRACTITIONER

## 2024-09-03 PROCEDURE — 99999 PR PBB SHADOW E&M-EST. PATIENT-LVL V: CPT | Mod: PBBFAC,,, | Performed by: NURSE PRACTITIONER

## 2024-09-03 PROCEDURE — 3075F SYST BP GE 130 - 139MM HG: CPT | Mod: CPTII,S$GLB,, | Performed by: NURSE PRACTITIONER

## 2024-09-03 PROCEDURE — 1101F PT FALLS ASSESS-DOCD LE1/YR: CPT | Mod: CPTII,S$GLB,, | Performed by: NURSE PRACTITIONER

## 2024-09-03 PROCEDURE — 1158F ADVNC CARE PLAN TLK DOCD: CPT | Mod: CPTII,S$GLB,, | Performed by: NURSE PRACTITIONER

## 2024-09-03 PROCEDURE — 1159F MED LIST DOCD IN RCRD: CPT | Mod: CPTII,S$GLB,, | Performed by: NURSE PRACTITIONER

## 2024-09-03 PROCEDURE — 3044F HG A1C LEVEL LT 7.0%: CPT | Mod: CPTII,S$GLB,, | Performed by: NURSE PRACTITIONER

## 2024-09-03 RX ORDER — UBIDECARENONE 75 MG
500 CAPSULE ORAL DAILY
COMMUNITY

## 2024-09-03 NOTE — PATIENT INSTRUCTIONS
Counseling and Referral of Other Preventative  (Italic type indicates deductible and co-insurance are waived)    Patient Name: Mariah Manzano's Date: 9/3/2024    Health Maintenance       Date Due Completion Date    Annual UACr Never done ---    TETANUS VACCINE Never done ---    RSV Vaccine (Age 60+ and Pregnant patients) (1 - 1-dose 60+ series) Never done ---    Influenza Vaccine (1) 09/01/2024 12/28/2023    COVID-19 Vaccine (5 - 2023-24 season) 09/01/2024 11/4/2022    Shingles Vaccine (1 of 2) 04/29/2025 (Originally 11/8/1974) ---    Mammogram 03/26/2025 3/26/2024    Hemoglobin A1c (Diabetic Prevention Screening) 03/11/2027 3/11/2024    DEXA Scan 06/03/2027 6/3/2024    Colorectal Cancer Screening 02/21/2029 2/21/2024    Lipid Panel 03/11/2029 3/11/2024        No orders of the defined types were placed in this encounter.    The following information is provided to all patients.  This information is to help you find resources for any of the problems found today that may be affecting your health:                  Living healthy guide: www.[facility].[place].gov      Understanding Diabetes: www.diabetes.org      Eating healthy: www.cdc.gov/healthyweight      CDC home safety checklist: www.cdc.gov/steadi/patient.html      Agency on Aging: www.goea.[place].[facility]      Alcoholics anonymous (AA): www.aa.org      Physical Activity: www.christina.nih.gov/ci3wddk      Tobacco use: www.quitwithusla.org

## 2024-09-03 NOTE — PROGRESS NOTES
"  Mariah Meza presented for a  Medicare AWV and comprehensive Health Risk Assessment today. The following components were reviewed and updated:    Medical history  Family History  Social history  Allergies and Current Medications  Health Risk Assessment  Health Maintenance  Care Team         ** See Completed Assessments for Annual Wellness Visit within the encounter summary.**         The following assessments were completed:  Living Situation  CAGE  Depression Screening  Timed Get Up and Go  Whisper Test  Cognitive Function Screening    Nutrition Screening  ADL Screening  PAQ Screening      Opioid documentation:      Patient does not have a current opioid prescription.        Vitals:    09/03/24 0845   BP: 138/80   Pulse: (!) 54   Resp: 18   SpO2: 98%   Weight: 103.3 kg (227 lb 11.8 oz)   Height: 5' 4" (1.626 m)     Body mass index is 39.09 kg/m².  Physical Exam  Constitutional:       General: She is not in acute distress.     Appearance: Normal appearance. She is well-developed. She is obese. She is not ill-appearing, toxic-appearing or diaphoretic.   HENT:      Head: Normocephalic and atraumatic.      Right Ear: External ear normal.      Left Ear: External ear normal.      Nose: Nose normal.      Mouth/Throat:      Mouth: Mucous membranes are moist.   Eyes:      General: No scleral icterus.        Right eye: No discharge.         Left eye: No discharge.      Conjunctiva/sclera: Conjunctivae normal.   Neck:      Thyroid: No thyromegaly.      Vascular: No carotid bruit.      Trachea: No tracheal deviation.   Cardiovascular:      Rate and Rhythm: Normal rate and regular rhythm.      Pulses: Normal pulses.      Heart sounds: Normal heart sounds. No murmur heard.     No friction rub. No gallop.   Pulmonary:      Effort: Pulmonary effort is normal. No respiratory distress.      Breath sounds: Normal breath sounds. No wheezing or rales.   Chest:      Chest wall: No tenderness.   Abdominal:      General: Bowel sounds are " normal. There is no distension.      Palpations: Abdomen is soft. There is no mass.      Tenderness: There is no abdominal tenderness. There is no guarding or rebound.   Musculoskeletal:         General: No tenderness. Normal range of motion.      Cervical back: Normal range of motion and neck supple. No edema. Normal range of motion.   Lymphadenopathy:      Head:      Right side of head: No tonsillar adenopathy.      Left side of head: No tonsillar adenopathy.      Cervical: No cervical adenopathy.      Upper Body:      Right upper body: No supraclavicular adenopathy.      Left upper body: No supraclavicular adenopathy.   Skin:     General: Skin is warm and dry.      Findings: No lesion or rash.   Neurological:      Mental Status: She is alert and oriented to person, place, and time.      Deep Tendon Reflexes: Reflexes are normal and symmetric.   Psychiatric:         Mood and Affect: Mood normal.         Behavior: Behavior normal.               Diagnoses and health risks identified today and associated recommendations/orders:    1. Encounter for preventive health examination  Screenings performed, as noted above.  Personal preventative testing needs reviewed.      2. Encounter for Medicare annual wellness exam  Screenings performed, as noted above.  Personal preventative testing needs reviewed.    - Ambulatory Referral/Consult to Enhanced Annual Wellness Visit (eAWV)    3. Papillary thyroid carcinoma  Treated with thyroidectomy in 2021, is followed by ENT, scheduled to see in Oct    4. Postprocedural hypoparathyroidism  Treated with thyroidectomy in 2021, is followed by ENT, scheduled to see in Oct    5. Sicca syndrome  Monitored, stable, follow up with pcp    6. Mixed hyperlipidemia  Treated with Zetia in the past, has stopped taking this, discussed diet and exercise, follow up with your cardiologist    7. Primary hypertension  Treated with Benicar, Coreg, Cardura, Clonidine, stable, cont tx    8. Chronic kidney  disease, stage 3a  Monitored, stable, last GFR 54.1, follow up with pcp    9. Class 2 severe obesity due to excess calories with serious comorbidity and body mass index (BMI) of 38.0 to 38.9 in adult  Monitored, unstable, discussed diet and exercise    10. Post-operative hypothyroidism  Treated with levothyroxine, stable, cont tx    11. Lumbar radiculopathy  Treated with SUSY in the past, is doing her own exercises    Discussed vaccines      Provided Mariah with a 5-10 year written screening schedule and personal prevention plan. Recommendations were developed using the USPSTF age appropriate recommendations. Education, counseling, and referrals were provided as needed. After Visit Summary printed and given to patient which includes a list of additional screenings\tests needed.    No follow-ups on file.    Beto Hwang, LYDIA  I offered to discuss advanced care planning, including how to pick a person who would make decisions for you if you were unable to make them for yourself, called a health care power of , and what kind of decisions you might make such as use of life sustaining treatments such as ventilators and tube feeding when faced with a life limiting illness recorded on a living will that they will need to know. (How you want to be cared for as you near the end of your natural life)     X Patient is interested in learning more about how to make advanced directives.  I provided them paperwork and offered to discuss this with them.

## 2024-09-24 ENCOUNTER — PATIENT MESSAGE (OUTPATIENT)
Dept: SPORTS MEDICINE | Facility: CLINIC | Age: 69
End: 2024-09-24
Payer: MEDICARE

## 2024-09-30 ENCOUNTER — PATIENT MESSAGE (OUTPATIENT)
Dept: OTOLARYNGOLOGY | Facility: CLINIC | Age: 69
End: 2024-09-30
Payer: MEDICARE

## 2024-10-08 ENCOUNTER — HOSPITAL ENCOUNTER (OUTPATIENT)
Dept: RADIOLOGY | Facility: HOSPITAL | Age: 69
Discharge: HOME | End: 2024-10-08
Attending: ORTHOPAEDIC SURGERY
Payer: MEDICARE

## 2024-10-08 DIAGNOSIS — K11.8 PAROTID MASS: ICD-10-CM

## 2024-10-08 PROCEDURE — 76536 US EXAM OF HEAD AND NECK: CPT | Mod: 26,,, | Performed by: RADIOLOGY

## 2024-10-08 PROCEDURE — 76536 US EXAM OF HEAD AND NECK: CPT | Mod: TC

## 2024-10-15 ENCOUNTER — OFFICE VISIT (OUTPATIENT)
Dept: OTOLARYNGOLOGY | Facility: CLINIC | Age: 69
End: 2024-10-15
Payer: MEDICARE

## 2024-10-15 VITALS — HEIGHT: 64 IN | BODY MASS INDEX: 39.22 KG/M2 | WEIGHT: 229.75 LBS

## 2024-10-15 DIAGNOSIS — J30.2 SEASONAL ALLERGIC RHINITIS, UNSPECIFIED TRIGGER: ICD-10-CM

## 2024-10-15 DIAGNOSIS — M26.622 ARTHRALGIA OF LEFT TEMPOROMANDIBULAR JOINT: ICD-10-CM

## 2024-10-15 DIAGNOSIS — K11.8 PAROTID MASS: Primary | ICD-10-CM

## 2024-10-15 PROCEDURE — 3288F FALL RISK ASSESSMENT DOCD: CPT | Mod: CPTII,S$GLB,, | Performed by: ORTHOPAEDIC SURGERY

## 2024-10-15 PROCEDURE — 99999 PR PBB SHADOW E&M-EST. PATIENT-LVL IV: CPT | Mod: PBBFAC,,, | Performed by: ORTHOPAEDIC SURGERY

## 2024-10-15 PROCEDURE — 1159F MED LIST DOCD IN RCRD: CPT | Mod: CPTII,S$GLB,, | Performed by: ORTHOPAEDIC SURGERY

## 2024-10-15 PROCEDURE — 3044F HG A1C LEVEL LT 7.0%: CPT | Mod: CPTII,S$GLB,, | Performed by: ORTHOPAEDIC SURGERY

## 2024-10-15 PROCEDURE — 1101F PT FALLS ASSESS-DOCD LE1/YR: CPT | Mod: CPTII,S$GLB,, | Performed by: ORTHOPAEDIC SURGERY

## 2024-10-15 PROCEDURE — 1126F AMNT PAIN NOTED NONE PRSNT: CPT | Mod: CPTII,S$GLB,, | Performed by: ORTHOPAEDIC SURGERY

## 2024-10-15 PROCEDURE — 99214 OFFICE O/P EST MOD 30 MIN: CPT | Mod: S$GLB,,, | Performed by: ORTHOPAEDIC SURGERY

## 2024-10-15 PROCEDURE — 3008F BODY MASS INDEX DOCD: CPT | Mod: CPTII,S$GLB,, | Performed by: ORTHOPAEDIC SURGERY

## 2024-10-15 NOTE — PROGRESS NOTES
Subjective:      Patient ID: Mariah Meza is a 68 y.o. female.    Chief Complaint: Follow-up (Pt is coming in today follow up  parotid mass/ ultrasound results pt c/o of sinus pressure left jaw pain )    Patient is a 67 year old female seen today for evaluation of a parotid mass.  She was last here me 10/2023, prior with Dr. Olsen 9/2022.  At that time, she had an incidental finding of a parotid mass as seen on MRI brain.  She has since seen Dr. Cortez, who did order an US of the area 4/2023.  She has not noted any significant swelling or change in her exam, but does have some intermittent discomfort in the area.  She does have dry mouth.  She has never had an FNA of this area.    Second, she has previously had a total thyroidectomy with Dr. Olsen 12/21 for a pT1b pN0 papillary thyroid carcinoma.  She follows with endocrine at [facility] for synthroid management and thyroid f/up.    She also notes significant left jaw and ear pain.  She has dentures, has not seen her dentist in some time but has an appointment in the next two weeks.  She has noted increased allergy symptoms (sneezing, postnasal drip) as well as mucus in her throat.  She is not currently on any allergy medication, was on claritin and flonase many years ago.  She does not like using nasal spray.  She also is requesting a hearing exam, last was over five years ago.          Review of Systems   HENT:  Positive for ear pain and hearing loss.    Allergic/Immunologic: Positive for environmental allergies.       Objective:       Physical Exam  Constitutional:       General: She is not in acute distress.     Appearance: She is well-developed.   HENT:      Head: Normocephalic and atraumatic.      Right Ear: Tympanic membrane, ear canal and external ear normal.      Left Ear: Tympanic membrane, ear canal and external ear normal.      Nose: Nose normal. No nasal deformity, septal deviation, mucosal edema or rhinorrhea.      Right Sinus: No maxillary sinus  tenderness or frontal sinus tenderness.      Left Sinus: No maxillary sinus tenderness or frontal sinus tenderness.      Mouth/Throat:      Mouth: Mucous membranes are not pale and not dry.      Dentition: Has dentures.      Pharynx: Uvula midline. No oropharyngeal exudate or posterior oropharyngeal erythema.      Comments: Pain with palpation anterior to left ear with jaw movement  Eyes:      General: Lids are normal. No scleral icterus.     Extraocular Movements:      Right eye: Normal extraocular motion and no nystagmus.      Left eye: Normal extraocular motion and no nystagmus.      Conjunctiva/sclera: Conjunctivae normal.      Right eye: Right conjunctiva is not injected. No chemosis.     Left eye: Left conjunctiva is not injected. No chemosis.     Pupils: Pupils are equal, round, and reactive to light.   Neck:      Thyroid: No thyroid mass or thyromegaly.      Trachea: Trachea and phonation normal. No tracheal tenderness or tracheal deviation.   Pulmonary:      Effort: Pulmonary effort is normal. No respiratory distress.      Breath sounds: No stridor.   Abdominal:      General: There is no distension.   Lymphadenopathy:      Head:      Right side of head: No submental, submandibular, preauricular, posterior auricular or occipital adenopathy.      Left side of head: No submental, submandibular, preauricular, posterior auricular or occipital adenopathy.      Cervical: No cervical adenopathy.   Skin:     General: Skin is warm and dry.      Findings: No erythema or rash.   Neurological:      Mental Status: She is alert and oriented to person, place, and time.      Cranial Nerves: No cranial nerve deficit.   Psychiatric:         Behavior: Behavior normal.     US PAROTID:  FINDINGS:  Compared to 03/26/2024.     There is stable appearance of a hypoechoic mass identified medially in the left parotid gland measuring 16 x 13 x 8 mm.  Echogenicity of the left parotid is otherwise normal.  No internal vascularity is  "visible within the mass on color flow Doppler.        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       Assessment:       1. Parotid mass    2. Arthralgia of left temporomandibular joint    3. Seasonal allergic rhinitis, unspecified trigger        Plan:     Parotid mass:  Stable.  RTC one year with repeat US.  -     US Soft Tissue Head Neck; Future; Expected date: 10/15/2024    Arthralgia of left temporomandibular joint    Seasonal allergic rhinitis, unspecified trigger:  I would recommend the patient start on daily, consistent medication for allergies.  I would recommend daily use of an oral antihistamine as well as a steroid nasal spray.  There are a variety of oral antihistamines available over the counter, and one is not inherently the "best," though often patients will find that one works best for them.  We also discussed the proper mechanism of using a steroid nasal spray, to direct the spray away from the patient's nasal septum.  I would recommend continuing with these medications for 2-3 weeks, and then will have the patient contact me with progress.  If allergy symptoms persist at that time, would then consider pursuing allergy skin testing.          "

## 2024-10-16 ENCOUNTER — CLINICAL SUPPORT (OUTPATIENT)
Dept: AUDIOLOGY | Facility: CLINIC | Age: 69
End: 2024-10-16
Payer: MEDICARE

## 2024-10-16 DIAGNOSIS — H90.3 HEARING LOSS, SENSORINEURAL, ASYMMETRICAL: Primary | ICD-10-CM

## 2024-10-16 NOTE — PROGRESS NOTES
Mariah Meza was seen 10/16/2024 for an audiological evaluation. Patient complains of left jaw and ear pain since thyroidectomy with Dr. Olsen in 2021. She reports a history of bilateral gradual hearing loss with her right ear being the poorer hearing ear. Her last hearing test was about 5 years ago and she feels that her hearing has been stable since then. She reports feeling off balance occasionally. She denies any spinning sensation or noise exposure history.     Results reveal a mild sensorineural hearing loss 250-8000 Hz for the right ear, and  mild sensorineural hearing loss 250-8000 Hz for the left ear.   Speech Reception Thresholds were  25 dBHL for the right ear and 25 dBHL for the left ear.   Word recognition scores were excellent for the right ear and excellent for the left ear.  Tympanograms were Type A, normal for the right ear and Type As, shallow for the left ear.    Patient was counseled on the above findings. We discussed that she may receive benefit from OTC hearing aids.     Recommendations:  Follow-up with ENT, as scheduled.   Repeat audiological evaluation in 1-2 years to monitor hearing, or sooner if needed.  Consider a hearing aid consultation. Patient provided with a copy of audiogram.   Wear hearing protection devices when around loud noise.

## 2024-10-21 ENCOUNTER — TELEPHONE (OUTPATIENT)
Dept: PAIN MEDICINE | Facility: CLINIC | Age: 69
End: 2024-10-21
Payer: MEDICARE

## 2024-10-21 ENCOUNTER — OFFICE VISIT (OUTPATIENT)
Dept: INTERNAL MEDICINE | Facility: CLINIC | Age: 69
End: 2024-10-21
Payer: MEDICARE

## 2024-10-21 ENCOUNTER — LAB VISIT (OUTPATIENT)
Dept: LAB | Facility: HOSPITAL | Age: 69
End: 2024-10-21
Attending: NURSE PRACTITIONER
Payer: MEDICARE

## 2024-10-21 ENCOUNTER — CLINICAL SUPPORT (OUTPATIENT)
Dept: INTERNAL MEDICINE | Facility: CLINIC | Age: 69
End: 2024-10-21
Payer: MEDICARE

## 2024-10-21 VITALS
OXYGEN SATURATION: 97 % | WEIGHT: 230.5 LBS | RESPIRATION RATE: 20 BRPM | SYSTOLIC BLOOD PRESSURE: 142 MMHG | TEMPERATURE: 97 F | BODY MASS INDEX: 38.4 KG/M2 | HEART RATE: 66 BPM | HEIGHT: 65 IN | DIASTOLIC BLOOD PRESSURE: 68 MMHG

## 2024-10-21 DIAGNOSIS — Z00.00 ROUTINE MEDICAL EXAM: Primary | ICD-10-CM

## 2024-10-21 DIAGNOSIS — R07.9 CHEST PAIN, UNSPECIFIED TYPE: ICD-10-CM

## 2024-10-21 DIAGNOSIS — E78.2 MIXED HYPERLIPIDEMIA: ICD-10-CM

## 2024-10-21 DIAGNOSIS — I10 PRIMARY HYPERTENSION: ICD-10-CM

## 2024-10-21 DIAGNOSIS — M54.16 LUMBAR RADICULOPATHY: ICD-10-CM

## 2024-10-21 DIAGNOSIS — M43.16 SPONDYLOLISTHESIS, LUMBAR REGION: ICD-10-CM

## 2024-10-21 DIAGNOSIS — E89.0 POST-OPERATIVE HYPOTHYROIDISM: ICD-10-CM

## 2024-10-21 DIAGNOSIS — M50.30 DDD (DEGENERATIVE DISC DISEASE), CERVICAL: ICD-10-CM

## 2024-10-21 DIAGNOSIS — Z23 NEEDS FLU SHOT: ICD-10-CM

## 2024-10-21 LAB
BACTERIA #/AREA URNS AUTO: ABNORMAL /HPF
BILIRUB UR QL STRIP: NEGATIVE
CLARITY UR REFRACT.AUTO: CLEAR
COLOR UR AUTO: YELLOW
GLUCOSE UR QL STRIP: NEGATIVE
HGB UR QL STRIP: NEGATIVE
HYALINE CASTS UR QL AUTO: 3 /LPF
KETONES UR QL STRIP: NEGATIVE
LEUKOCYTE ESTERASE UR QL STRIP: ABNORMAL
MICROSCOPIC COMMENT: ABNORMAL
NITRITE UR QL STRIP: NEGATIVE
PH UR STRIP: 6 [PH] (ref 5–8)
PROT UR QL STRIP: NEGATIVE
RBC #/AREA URNS AUTO: 1 /HPF (ref 0–4)
SP GR UR STRIP: 1.01 (ref 1–1.03)
SQUAMOUS #/AREA URNS AUTO: 9 /HPF
URN SPEC COLLECT METH UR: ABNORMAL
WBC #/AREA URNS AUTO: 0 /HPF (ref 0–5)

## 2024-10-21 PROCEDURE — 93010 ELECTROCARDIOGRAM REPORT: CPT | Mod: S$GLB,,, | Performed by: INTERNAL MEDICINE

## 2024-10-21 PROCEDURE — 3288F FALL RISK ASSESSMENT DOCD: CPT | Mod: CPTII,S$GLB,, | Performed by: NURSE PRACTITIONER

## 2024-10-21 PROCEDURE — 3077F SYST BP >= 140 MM HG: CPT | Mod: CPTII,S$GLB,, | Performed by: NURSE PRACTITIONER

## 2024-10-21 PROCEDURE — 81001 URINALYSIS AUTO W/SCOPE: CPT | Performed by: NURSE PRACTITIONER

## 2024-10-21 PROCEDURE — 93005 ELECTROCARDIOGRAM TRACING: CPT | Mod: S$GLB,,, | Performed by: NURSE PRACTITIONER

## 2024-10-21 PROCEDURE — 90653 IIV ADJUVANT VACCINE IM: CPT | Mod: JZ,S$GLB,, | Performed by: NURSE PRACTITIONER

## 2024-10-21 PROCEDURE — 99999 PR PBB SHADOW E&M-EST. PATIENT-LVL IV: CPT | Mod: PBBFAC,,, | Performed by: NURSE PRACTITIONER

## 2024-10-21 PROCEDURE — 1101F PT FALLS ASSESS-DOCD LE1/YR: CPT | Mod: CPTII,S$GLB,, | Performed by: NURSE PRACTITIONER

## 2024-10-21 PROCEDURE — 1125F AMNT PAIN NOTED PAIN PRSNT: CPT | Mod: CPTII,S$GLB,, | Performed by: NURSE PRACTITIONER

## 2024-10-21 PROCEDURE — G0008 ADMIN INFLUENZA VIRUS VAC: HCPCS | Mod: S$GLB,,, | Performed by: NURSE PRACTITIONER

## 2024-10-21 PROCEDURE — 3044F HG A1C LEVEL LT 7.0%: CPT | Mod: CPTII,S$GLB,, | Performed by: NURSE PRACTITIONER

## 2024-10-21 PROCEDURE — 3078F DIAST BP <80 MM HG: CPT | Mod: CPTII,S$GLB,, | Performed by: NURSE PRACTITIONER

## 2024-10-21 PROCEDURE — 99214 OFFICE O/P EST MOD 30 MIN: CPT | Mod: 25,S$GLB,, | Performed by: NURSE PRACTITIONER

## 2024-10-21 PROCEDURE — 96372 THER/PROPH/DIAG INJ SC/IM: CPT | Mod: 59,S$GLB,, | Performed by: NURSE PRACTITIONER

## 2024-10-21 PROCEDURE — 3008F BODY MASS INDEX DOCD: CPT | Mod: CPTII,S$GLB,, | Performed by: NURSE PRACTITIONER

## 2024-10-21 RX ORDER — CARVEDILOL 12.5 MG/1
12.5 TABLET ORAL 2 TIMES DAILY WITH MEALS
Qty: 180 TABLET | Refills: 3 | Status: SHIPPED | OUTPATIENT
Start: 2024-10-21

## 2024-10-21 RX ORDER — LEVOTHYROXINE SODIUM 100 UG/1
100 TABLET ORAL
Qty: 90 TABLET | Refills: 3 | Status: SHIPPED | OUTPATIENT
Start: 2024-10-21

## 2024-10-21 RX ORDER — CYCLOBENZAPRINE HCL 10 MG
10 TABLET ORAL NIGHTLY
Qty: 90 TABLET | Refills: 1 | Status: SHIPPED | OUTPATIENT
Start: 2024-10-21

## 2024-10-21 RX ORDER — DOXAZOSIN 2 MG/1
2 TABLET ORAL NIGHTLY
Qty: 90 TABLET | Refills: 3 | Status: SHIPPED | OUTPATIENT
Start: 2024-10-21

## 2024-10-21 RX ORDER — OLMESARTAN MEDOXOMIL AND HYDROCHLOROTHIAZIDE 40/25 40; 25 MG/1; MG/1
1 TABLET ORAL DAILY
Qty: 90 TABLET | Refills: 3 | Status: SHIPPED | OUTPATIENT
Start: 2024-10-21

## 2024-10-21 RX ORDER — KETOROLAC TROMETHAMINE 30 MG/ML
30 INJECTION, SOLUTION INTRAMUSCULAR; INTRAVENOUS
Status: COMPLETED | OUTPATIENT
Start: 2024-10-21 | End: 2024-10-21

## 2024-10-21 RX ORDER — PANTOPRAZOLE SODIUM 40 MG/1
40 TABLET, DELAYED RELEASE ORAL DAILY
Qty: 30 TABLET | Refills: 5 | Status: SHIPPED | OUTPATIENT
Start: 2024-10-21

## 2024-10-21 RX ADMIN — KETOROLAC TROMETHAMINE 30 MG: 30 INJECTION, SOLUTION INTRAMUSCULAR; INTRAVENOUS at 03:10

## 2024-10-21 NOTE — TELEPHONE ENCOUNTER
----- Message from Akiko sent at 10/21/2024  8:23 AM CDT -----  Name of Who is Calling:MACK MORSE [[id number]]        What is the request in detail:Pt would like a callback from the office to discuss getting stacey for injection.Please advise thank you       Can the clinic reply by [facility]:NO         What Number to Call Back [facility]:Telephone Information:  Mobile          [phone]

## 2024-10-21 NOTE — TELEPHONE ENCOUNTER
Patient called stated she wanted to schedule her next procedure however, she stated she is having some pain in her neck now. Patient stated she would like to come in office to discuss her new pain she is having.    Linda PRYOR ([facility])

## 2024-10-21 NOTE — H&P (VIEW-ONLY)
Interventional Pain -- Established Patient (Follow-up visit)    Referring Physician: Beverly Mcpherson PA-C  - Neurology (2nd referral); Smiley Weaver (initial referral)    PCP: Taina Bob NP      Chief Complaint:   Chief Complaint   Patient presents with    [facility]     Neck pain     Low back pain with BLE radicular pain   Cervical Neck Pain - facet-mediated, axial in nature   Neck pain with BUE radiation (down the LUE to the thumb in C6 distribution, down RUE into shoulder area)  Left shoulder pain - previously seen by Dr. Espinal (Orthopedics)       SUBJECTIVE:    Interval History (10/22/2024):  Mariah Meza presents today for follow-up visit.  Patient was last seen about 4 months ago. At that visit, the plan was to schedule lumbar SUSY, which was ultimately not completed.  Her blood pressure was uncontrolled, so the procedure had to be canceled.  She continues to have bilateral leg pain associated with lumbar pain.  She presents today to discuss neck pain.  Neck pain is somewhat axial, but also radiates down the left arm.  She has some pain into the right shoulder area.  She was told she has left shoulder rotator cuff issues, but she does not want to have surgery.  She had 2 injections, but the 2nd time was too painful , so she did not want to repeat.  Patient reports pain as 6/10 today.  She saw PCP yesterday and had Toradol injection, which helped some short term.    Interval history 06/26/2024  Patient presents for four-month follow-up.  Today she reports exacerbation of lower back and leg pain.  Pain is constant and today is rated a 6/10.  Pain is described as burning and tingling in nature.  Today she reports pain in a bandlike distribution in the lower back which radiates down the posterior aspects of bilateral lower extremities in L5-S2 distribution to the feet.  Pain can be exacerbated with lumbar extension, standing and with ambulation.  She has continued physician  directed physical therapy exercises over the last 8 weeks from 04/26/2024 through 06/26/2024 with marginal improvement in her symptoms.  She would like to refrain from any pharmacologic management at this time secondary to cognitive side effects from membrane stabilizing agents.  Pain is improved with lumbar forward flexion.    Interval history 02/05/2024  Patient presents status post bilateral L3-5 lumbar medial branch block 12/12/2023.  Patient reports 90% sustained relief in lower axial back pain following her procedure.  She reports this pain relief is still ineffective.  Today she reports burning pains down bilateral lower extremities.  Pain is intermittent and today is rated a 5/10.  Patient is concerned whether this was related to the prior injection.  She reports pain which can radiate down the lateral and posterior aspects of bilateral lower extremities in L4-S1 distribution to the calf.  Patient has continued judicious use of Flexeril which she does not even take daily as well as Tylenol.  During last schedule procedure, patient had hypertensive urgency, 229/100.  Of note patient went to the emergency room and received hydralazine and clonidine.  Patient has scheduled follow-up with nurse practitioner, Ms. Bob.    Interval History (11/22/2023):  Patient presents today for follow-up visit.  Patient was referred back to our clinic by Beverly Mcpherson PA-C (Neurology).  She complains mostly of low back pain.  She does have some leg pain on the left side occasionally.  She reports she stretches at home.  She takes Flexeril as needed, but she tries to avoid due to dry mouth.  She is taking gabapentin in the past with no relief.  She wants to hold off on adding additional medications at this time.  Patient reports pain as 6/10 today.    10/26/23 Neurology:  Ms. Mariah Meza is a 67 y.o. female presenting for evaluation of back pain. She noted that she has had some baseline back pain for quite  "some time (years) however in September she began to have increased pain. She required an injection of ketorolac at that time which helped for a day or two but then her pain returned. She did complete EMG/NCS previously unable to locate in [facility] or care everywhere. She describes her pain as sharp and some "electric shock" sensation that occasionally occurs. She does get some stiffness and weakness also which she reports is present in her entire body. She notes that her pain is worse on the L and in the lumbar region. She does endorse neck pain. She did have a cervical spine MRI completed one year ago which showed multilevel degenerative changes at C4-C5 and mild canal stenosis. She notes that her pain is constant all day but worsened with movement or prolonged sitting. She previously completed physical therapy in Jan/Feb of this year which benefited her greatly and she found that she was able to be much more active. She then had a gout flare and seemed to regress in her ability to move. She denies any falls. She does endorse that she is having some constipation. She does have some baseline bladder problems but attributes this to her medications. She notes that she cannot lift a lot of things she use to be able to and she drops things more frequently. She reports numbness/tingling in bilateral hands and feet but worse on L hand.     Interval History (4/6/2023):  Mariah Meza presents today for follow-up visit.  Patient was last seen on 2/9/2023. At that visit, the plan was to start Gabapentin.  She reports she discontinued this medication after getting up to the 600 mg dosage due to side effects.  She reports it made her feel crazy in the head.  She reports improvement in her neck pain since last visit as she is been performing physician directed exercises at home.  She reports pain is primarily located in her ankles and feet right worse than left.  She reports that she was previously diagnosed with gout in " November and since then she has had recurrent gouty flares.  She has noticed that these flares occur when she eats a lot of certain types of food.  She is had flare secondary to eating excessive amounts of shrimp, raisins, pineapple, and serial.  She reports the current flare began approximately 1 week ago and has been persistent she has noticed redness warmth and swelling in both of her ankles and great toe.  She has not taken anything other than Tylenol which has not been beneficial.  She is scheduled to follow-up with podiatry per referral from her primary care doctor.  Patient reports pain as 7/10 today.  She reports she has also noticed mild to moderate swelling in her hands and feet.  She reports this began after she started taking blood pressure medications, she thinks this may be due to medications.    Initial HPI 02/09/2023  Mariah Meza is a 67y.o. female with past medical history significant for hyperlipidemia, hypertension, sicca syndrome, history of papillary thyroid cancer status post parathyroidectomy, obesity, GERD who presents to the clinic for the evaluation of mid back pain.  Patient reports pain has been present for the last 2 years without inciting accident injury or trauma.  Pain is constant and today is rated a 6/10.  Pain at its best is a 3/10 and at its worse is a 10/10.  Pain is described as sharp, stabbing and stiffness in nature.  Patient reports pain which begins in between the shoulder blades and radiates to the midthoracic spine.  Patient reports prior radiation down the left upper extremity to the thumb in C6 distribution.  Patient also reports pain in bilateral ankles.  Patient reports associated weakness in the lower extremities associated with this pain and with standing and ambulation.  This pain is described as burning in nature.  All of her pain is exacerbated when moving from sitting to standing, standing and with ambulation.  Patient reports in the past she was able to  ambulate 10,000 steps per day but this has been reduced secondary to her pain.  Pain is improved with sitting and pain medications such as relief.  Patient reports she completed 12 months of physical therapy for the neck and lower back 2 months prior at [facility] physical therapy in [place].  Patient reports no significant improvement in her pain with completion of physical therapy.  Patient also takes Flexeril which is marginally helpful.  Patient reports significant lower extremity weakness.  Patient denies night fever/night sweats, urinary incontinence, bowel incontinence, significant weight loss, and loss of sensations.    Pain Disability Index (PDI) Score Review:      10/22/2024    10:11 AM 6/26/2024     9:59 AM 2/5/2024     7:50 AM   Last 3 PDI Scores   Pain Disability Index (PDI) 42 45 30       Non-Pharmacologic Treatments:  Physical Therapy/Home Exercise: yes  Ice/Heat:yes  TENS: no  Acupuncture: no  Massage: no  Chiropractic: no    Other: no      Pain Medications:  - Adjuvant Medications: Cyclobenzaprine (Flexeril) and Tylenol (Acetaminophen) Diclofenac tablet      Pain Procedures:   -12/12/2023: Bilateral L3-5 lumbar medial branch block with 90% pain relief      Review of Systems:  GENERAL:  No weight loss, malaise or fevers.  HEENT:   No recent changes in vision or hearing  NECK:  Negative for lumps, no difficulty with swallowing.  RESPIRATORY:  Negative for cough, wheezing or shortness of breath, patient denies any recent URI.  CARDIOVASCULAR:  Negative for chest pain or palpitations.  GI:  Negative for abdominal discomfort, blood in stools or black stools or change in bowel habits.  MUSCULOSKELETAL:  See HPI.  SKIN:  Negative for lesions, rash, and itching.  PSYCH:  No mood disorder or recent psychosocial stressors.   HEMATOLOGY/LYMPHOLOGY:  Negative for prolonged bleeding, bruising easily or swollen nodes.    NEURO:   No history of syncope, paralysis, seizures or tremors.  All other reviewed and negative  "other than HPI.      OBJECTIVE:    Physical Exam:  Vitals:    10/22/24 1003   BP: (!) 155/82   Pulse: (!) 58   Resp: 17   Weight: 105.6 kg (232 lb 12.9 oz)   Height: 5' 4.75" (1.645 m)   PainSc:   6   PainLoc: Neck   Body mass index is 39.04 kg/m².   (reviewed on 10/22/2024)    GENERAL: Well appearing, in no acute distress, alert and oriented x3.  PSYCH:  Mood and affect appropriate.  SKIN: Skin color, texture, turgor normal, no rashes or lesions.  HEAD/FACE:  Normocephalic, atraumatic.    NECK: no pain to palpation over the cervical paraspinous muscles. Spurling Negative. Mild pain with neck flexion, extension, or lateral flexion. Normal testing biceps, triceps and brachioradialis bilaterally.  Cervical facet loading causes pain bilaterally.  LUMBAR:  Pain with flexion.  Facet loading is equivocal.  TTP diffusely over lumbar paraspinals.  SLR equivocal bilaterally. Limited ROM secondary to pain reproduction.    5/5 strength testing deltoid, biceps, triceps, wrist extensor, wrist flexor and ulnar intrinsics bilaterally.    Normal  strength bilaterally  MUSCULOSKELETAL:  Mild swelling along left ankle, moderate swelling to right ankle redness and swelling along the base of her left great toe swelling along right great toe, bilateral bunions along great toes  PULM: No evidence of respiratory difficulty, symmetric chest rise.  GI:  Soft and non-tender.    NEURO: BUE & BLE coordination and muscle stretch reflexes are physiologic and symmetric. No loss of sensation is noted.  GAIT: normal.        Imaging/ Diagnostic Studies/ Labs (Reviewed on 10/22/2024):    11/06/2023 MRI Lumbar Spine Without Contrast  COMPARISON:  Plain films of the lumbar spine from 03/06/2014.    FINDINGS:  There is equivocal 1-2 mm of anterolisthesis of L4 on L5. The vertebral body heights are well maintained, with no fracture.  No marrow signal abnormality suspicious for an infiltrative process.    The conus medullaris terminates at " approximately the L1-L2 disk space.  There is a probable small cyst seen within the upper pole to interpolar right kidney that measures approximately 6 mm in size.  Consider further evaluation with ultrasound for confirmation.  The discs are relatively well hydrated with only some minimal disc desiccation seen along the periphery of the L5-S1 disc and slightly more centrally at the L2-3 disc.    L1-L2: No significant central canal or neural foraminal narrowing.Mild bilateral facet arthropathy noted.    L2-L3: No significant central canal or neural foraminal narrowing.Mild-to-moderate bilateral facet arthropathy noted.    L3-L4: No significant central canal or neural foraminal narrowing.Moderate to severe bilateral facet arthropathy noted.    L4-L5:  Minimal diffuse disc bulge along with equivocal grade 1 spondylolisthesis and severe bilateral facet arthropathy resulting in mild effacement of the ventral thecal sac.  No significant neural foraminal canal narrowing.    L5-S1:  Mild diffuse disc bulge slightly more prominent the left paracentral region along with moderate bilateral facet arthropathy resulting in no significant central or neural foraminal canal narrowing.    Impression:   1. Multilevel degenerative changes of the lumbar spine as detailed above.  No high-grade central or neural foraminal canal narrowing noted.  Multilevel bilateral facet arthropathy.  2. Incidental note made of a probable 6 mm right renal cyst.  Consider ultrasound for confirmation.        11/06/2023 MRI Cervical Spine Without Contrast  COMPARISON:  Plain films from 04/13/2021.  Report from outside MRI dated 08/09/2022.  Films unavailable for comparison.    FINDINGS:  There is a couple mm of anterolisthesis of C2 on C3 and C3 on C4. No fracture. No marrow signal abnormality suspicious for an infiltrative process.  There is disc desiccation noted throughout the cervical spine with mild disc space narrowing seen at C3-4, C4-5 and C7-C6  with more mild-to-moderate disc space narrowing seen at C5-6.    The cervical cord is normal in caliber and signal characteristics.  The craniocervical junction and visualized intracranial contents are unremarkable.  The adjacent soft tissue structures show no significant abnormalities.    C2-C3:  No significant central canal or neural foraminal narrowing.    C3-C4:  There is a diffuse disc bulge along with some posterior spondylotic ridging that results in effacement of ventral thecal sac and moderate effacement of the ventral cord.  The AP dimension of the central canal this level measures approximately 9 mm.  No significant neural foraminal canal narrowing suggested.    C4-C5:  Diffuse disc bulge and posterior spondylotic ridging more prominent in the left paracentral region resulting in significant effacement of the thecal sac and moderate face mint of the left side of the cord.  The AP dimension of the central canal at this level also measures approximately 9 mm.  No significant neural foraminal canal narrowing.    C5-C6:  Diffuse disc bulge resulting in effacement of ventral thecal sac but not quite abutting the cord.  The AP dimension of the central canal measures approximately 10 mm at this level.  The right C5-6 neural foraminal canal is mildly narrowed secondary to uncovertebral joint hypertrophy.  No significant left neural foraminal canal narrowing.    C6-C7:  Mild diffuse disc bulge resulting in mild effacement of ventral thecal sac.  No abutment of the anterior cord.  The AP dimension of the central canal at this level measures approximately 10.5 mm.  The right C6-7 neural foraminal canal appears to be minimally narrowed secondary to uncovertebral joint hypertrophy.    C7-T1:   Mild diffuse disc bulge resulting in no significant central canal narrowing.  The right neural foraminal canal appears to be at least mildly narrowed.  Impression:   1. Multilevel degenerative changes of the cervical spine as  detailed above.      03/04/14 X-Ray Lumbar Spine Ap And Lateral  Five non-rib bearing lumbar type vertebrae are present without significant lateral curvature abnormality or subluxation.  Degenerative endplate changes as well as facet joint hypertrophy, particularly inferiorly, are noted without acute fracture or  suspicious focal bony lesion identified.  Disk spaces are fairly well-maintained.       04/13/21 X-Ray Cervical Spine Complete 5 view  Patient's hair limits the exam.  There is visualization to the C7-T1 level on the swimmer's view.  Multilevel marginal spurring and varying degrees of uniform loss of disc height throughout the C-spine.  No fracture or subluxation.  Pre dens space, prevertebral soft tissues, C1-2 articulation and odontoid normal in appearance allowing for positioning.  Neural foramina widely patent bilaterally at all levels.  Remaining visualized osseous structures intact.  Included upper lung fields clear.      EMG 10/2021  IMPRESSION  1. ABNORMAL study  2. There is electrodiagnostic evidence of an acute on chronic radiculopathy of the L5 and S1 nerve roots-slightly worse on the right        ASSESSMENT: 68 y.o. year old female with neck, mid back, bilateral ankles pain, consistent with     1. Neck pain        2. DDD (degenerative disc disease), cervical  Ambulatory referral/consult to Physical/Occupational Therapy      3. Bilateral lumbar radiculopathy  Case Request-RAD/Other Procedure Area: Bilateral L5/S1 + S1 TF SUSY    Ambulatory referral/consult to Physical/Occupational Therapy      4. Cervical spondylosis  Case Request-RAD/Other Procedure Area: Diagnostic Bilateral C4-6 MBB #12      5. Chronic left shoulder pain  Ambulatory referral/consult to Physical/Occupational Therapy      6. Cervical radicular pain, L>R        7. Headache, cervicogenic                    PLAN:   - Interventions:    - Re-schedule bilateral L5-S1 + S1 TF SUSY to see if this helps with lumbar radiculopathy.  We have  "discussed the procedure, benefits and potential risk and alternative options in detail.   Patient is not taking prescription blood thinners or ASA.      - Schedule Diagnostic bilateral C4-6 MBB #1. Patient is not taking prescription blood thinners or ASA.  Previously explained the risks and benefits of the procedure in detail with the patient in clinic along with alternative treatment options, and the patient elected to pursue the intervention at this time.  At this time, cervical pain is moderate to severe & more axial in nature, and I think patient would benefit more from cervical facet joint treatment for cervical facet-mediated pain. Patient's ADLs are affected by this pain.   Call patient to get for % relief to determine potential RFA eligibility.  1st MBB: if > 80% pain relief, schedule 2nd diagnostic cervical MBB (orders in).                 If <80% pain relief, possible consideration for cervical SUSY.   2nd MBB: if > 80% pain relief, schedule bilateral cervical RFA (orders in).   Patient given handout about MBB/RFA, also discussed requirement of 2 diagnostic MBBs prior to RFA.      - Consider left suprascapular/ axillary nerve block for shoulder pain.    - Anticoagulation use: no no anticoagulation    - Medications:  - Continue Flexeril 10mg PRN myofascial pain.  We have discussed potential deleterious side effects associated with this medication including  dry mouth, headache, blurred vision, drowsiness or dizziness, loss of appetite.     -Previously stopped Gabapentin due to adverse side effects (made her "feel crazy") and no improvement in symptoms.      - LA  reviewed and appropriate.       - Consults/referral:    -Continue follow-up with PCP: uncontrolled HTN.  -Continue follow-up with Dr. Espinal (Orthopedics) in regards to left shoulder pain (although she wants to avoid sx right now).     - Therapy: Start PT with passive modalities, including ice and heat, and active modalities, including a " regimen for stretching and strengthening.  [facility] sent [facility] closer to her home.  She went [facility] in the past and did not find it was helpful.    - Diagnostic/ Imaging:  Diagnostic imaging (lumbar & cervical MRI 11/06/2023) and diagnostic testing (lower extremity electromyography studies) reviewed and discussed with the patient.    - Follow up visit:  4 weeks post-procedure (x2) & Will call for % relief after cervical MBB to determine RFA eligibility    Future Appointments   Date Time Provider Department Center   12/3/2024  9:00 AM Brenna Johnson PA-C [facility] INT [place] [facility]   10/9/2025  9:00 AM [facility] US3 [facility] ULSOUND [facility]   10/9/2025 10:15 AM Sherita Duran MD [facility]       Visit today included increased complexity associated with the care of the episodic problem of chronic pain which was addressed and continue to manage the longitudinal care of the patient due to the serious and/or complex managed problem(s) listed above.    - Patient Questions: Answered all of the patient's questions regarding diagnosis, therapy, and treatment.    - This condition does not require this patient to take time off of work, and the primary goal of our Pain Management services is to improve the patient's functional capacity.   - I discussed the risks, benefits, and alternatives to potential treatment options. All questions and concerns were fully addressed today in clinic.         Brenna Johnson PA-C  Interventional Pain Management - [facility]    Disclaimer:  This note was prepared using voice recognition system and is likely to have sound alike errors that may have been overlooked even after proof reading.  Please call me with any questions.

## 2024-10-21 NOTE — PROGRESS NOTES
Interventional Pain -- Established Patient (Follow-up visit)    Referring Physician: Beverly Mcpherson PA-C  - Neurology (2nd referral); Smiley Weaver (initial referral)    PCP: Taina Bob NP      Chief Complaint:   Chief Complaint   Patient presents with    \[facility]\"" Care     Neck pain     Low back pain with BLE radicular pain   Cervical Neck Pain - facet-mediated, axial in nature   Neck pain with BUE radiation (down the LUE to the thumb in C6 distribution, down RUE into shoulder area)  Left shoulder pain - previously seen by Dr. Espinal (Orthopedics)       SUBJECTIVE:    Interval History (10/22/2024):  Mariah Meza presents today for follow-up visit.  Patient was last seen about 4 months ago. At that visit, the plan was to schedule lumbar SUSY, which was ultimately not completed.  Her blood pressure was uncontrolled, so the procedure had to be canceled.  She continues to have bilateral leg pain associated with lumbar pain.  She presents today to discuss neck pain.  Neck pain is somewhat axial, but also radiates down the left arm.  She has some pain into the right shoulder area.  She was told she has left shoulder rotator cuff issues, but she does not want to have surgery.  She had 2 injections, but the 2nd time was too painful , so she did not want to repeat.  Patient reports pain as 6/10 today.  She saw PCP yesterday and had Toradol injection, which helped some short term.    Interval history 06/26/2024  Patient presents for four-month follow-up.  Today she reports exacerbation of lower back and leg pain.  Pain is constant and today is rated a 6/10.  Pain is described as burning and tingling in nature.  Today she reports pain in a bandlike distribution in the lower back which radiates down the posterior aspects of bilateral lower extremities in L5-S2 distribution to the feet.  Pain can be exacerbated with lumbar extension, standing and with ambulation.  She has continued physician  directed physical therapy exercises over the last 8 weeks from 04/26/2024 through 06/26/2024 with marginal improvement in her symptoms.  She would like to refrain from any pharmacologic management at this time secondary to cognitive side effects from membrane stabilizing agents.  Pain is improved with lumbar forward flexion.    Interval history 02/05/2024  Patient presents status post bilateral L3-5 lumbar medial branch block 12/12/2023.  Patient reports 90% sustained relief in lower axial back pain following her procedure.  She reports this pain relief is still ineffective.  Today she reports burning pains down bilateral lower extremities.  Pain is intermittent and today is rated a 5/10.  Patient is concerned whether this was related to the prior injection.  She reports pain which can radiate down the lateral and posterior aspects of bilateral lower extremities in L4-S1 distribution to the calf.  Patient has continued judicious use of Flexeril which she does not even take daily as well as Tylenol.  During last schedule procedure, patient had hypertensive urgency, 229/100.  Of note patient went to the emergency room and received hydralazine and clonidine.  Patient has scheduled follow-up with nurse practitioner, Ms. Bob.    Interval History (11/22/2023):  Patient presents today for follow-up visit.  Patient was referred back to our clinic by Beverly Mcpherson PA-C (Neurology).  She complains mostly of low back pain.  She does have some leg pain on the left side occasionally.  She reports she stretches at home.  She takes Flexeril as needed, but she tries to avoid due to dry mouth.  She is taking gabapentin in the past with no relief.  She wants to hold off on adding additional medications at this time.  Patient reports pain as 6/10 today.    10/26/23 Neurology:  Ms. Mariah Meza is a 67 y.o. female presenting for evaluation of back pain. She noted that she has had some baseline back pain for quite  "some time (years) however in September she began to have increased pain. She required an injection of ketorolac at that time which helped for a day or two but then her pain returned. She did complete EMG/NCS previously unable to locate in [facility] or care everywhere. She describes her pain as sharp and some "electric shock" sensation that occasionally occurs. She does get some stiffness and weakness also which she reports is present in her entire body. She notes that her pain is worse on the L and in the lumbar region. She does endorse neck pain. She did have a cervical spine MRI completed one year ago which showed multilevel degenerative changes at C4-C5 and mild canal stenosis. She notes that her pain is constant all day but worsened with movement or prolonged sitting. She previously completed physical therapy in Jan/Feb of this year which benefited her greatly and she found that she was able to be much more active. She then had a gout flare and seemed to regress in her ability to move. She denies any falls. She does endorse that she is having some constipation. She does have some baseline bladder problems but attributes this to her medications. She notes that she cannot lift a lot of things she use to be able to and she drops things more frequently. She reports numbness/tingling in bilateral hands and feet but worse on L hand.     Interval History (4/6/2023):  Mariah Meza presents today for follow-up visit.  Patient was last seen on 2/9/2023. At that visit, the plan was to start Gabapentin.  She reports she discontinued this medication after getting up to the 600 mg dosage due to side effects.  She reports it made her feel crazy in the head.  She reports improvement in her neck pain since last visit as she is been performing physician directed exercises at home.  She reports pain is primarily located in her ankles and feet right worse than left.  She reports that she was previously diagnosed with gout in " November and since then she has had recurrent gouty flares.  She has noticed that these flares occur when she eats a lot of certain types of food.  She is had flare secondary to eating excessive amounts of shrimp, raisins, pineapple, and serial.  She reports the current flare began approximately 1 week ago and has been persistent she has noticed redness warmth and swelling in both of her ankles and great toe.  She has not taken anything other than Tylenol which has not been beneficial.  She is scheduled to follow-up with podiatry per referral from her primary care doctor.  Patient reports pain as 7/10 today.  She reports she has also noticed mild to moderate swelling in her hands and feet.  She reports this began after she started taking blood pressure medications, she thinks this may be due to medications.    Initial HPI 02/09/2023  Mariah Meza is a 67y.o. female with past medical history significant for hyperlipidemia, hypertension, sicca syndrome, history of papillary thyroid cancer status post parathyroidectomy, obesity, GERD who presents to the clinic for the evaluation of mid back pain.  Patient reports pain has been present for the last 2 years without inciting accident injury or trauma.  Pain is constant and today is rated a 6/10.  Pain at its best is a 3/10 and at its worse is a 10/10.  Pain is described as sharp, stabbing and stiffness in nature.  Patient reports pain which begins in between the shoulder blades and radiates to the midthoracic spine.  Patient reports prior radiation down the left upper extremity to the thumb in C6 distribution.  Patient also reports pain in bilateral ankles.  Patient reports associated weakness in the lower extremities associated with this pain and with standing and ambulation.  This pain is described as burning in nature.  All of her pain is exacerbated when moving from sitting to standing, standing and with ambulation.  Patient reports in the past she was able to  ambulate 10,000 steps per day but this has been reduced secondary to her pain.  Pain is improved with sitting and pain medications such as relief.  Patient reports she completed 12 months of physical therapy for the neck and lower back 2 months prior at [facility] physical therapy in [place].  Patient reports no significant improvement in her pain with completion of physical therapy.  Patient also takes Flexeril which is marginally helpful.  Patient reports significant lower extremity weakness.  Patient denies night fever/night sweats, urinary incontinence, bowel incontinence, significant weight loss, and loss of sensations.    Pain Disability Index (PDI) Score Review:      10/22/2024    10:11 AM 6/26/2024     9:59 AM 2/5/2024     7:50 AM   Last 3 PDI Scores   Pain Disability Index (PDI) 42 45 30       Non-Pharmacologic Treatments:  Physical Therapy/Home Exercise: yes  Ice/Heat:yes  TENS: no  Acupuncture: no  Massage: no  Chiropractic: no    Other: no      Pain Medications:  - Adjuvant Medications: Cyclobenzaprine (Flexeril) and Tylenol (Acetaminophen) Diclofenac tablet      Pain Procedures:   -12/12/2023: Bilateral L3-5 lumbar medial branch block with 90% pain relief      Review of Systems:  GENERAL:  No weight loss, malaise or fevers.  HEENT:   No recent changes in vision or hearing  NECK:  Negative for lumps, no difficulty with swallowing.  RESPIRATORY:  Negative for cough, wheezing or shortness of breath, patient denies any recent URI.  CARDIOVASCULAR:  Negative for chest pain or palpitations.  GI:  Negative for abdominal discomfort, blood in stools or black stools or change in bowel habits.  MUSCULOSKELETAL:  See HPI.  SKIN:  Negative for lesions, rash, and itching.  PSYCH:  No mood disorder or recent psychosocial stressors.   HEMATOLOGY/LYMPHOLOGY:  Negative for prolonged bleeding, bruising easily or swollen nodes.    NEURO:   No history of syncope, paralysis, seizures or tremors.  All other reviewed and negative  "other than HPI.      OBJECTIVE:    Physical Exam:  Vitals:    10/22/24 1003   BP: (!) 155/82   Pulse: (!) 58   Resp: 17   Weight: 105.6 kg (232 lb 12.9 oz)   Height: 5' 4.75" (1.645 m)   PainSc:   6   PainLoc: Neck   Body mass index is 39.04 kg/m².   (reviewed on 10/22/2024)    GENERAL: Well appearing, in no acute distress, alert and oriented x3.  PSYCH:  Mood and affect appropriate.  SKIN: Skin color, texture, turgor normal, no rashes or lesions.  HEAD/FACE:  Normocephalic, atraumatic.    NECK: no pain to palpation over the cervical paraspinous muscles. Spurling Negative. Mild pain with neck flexion, extension, or lateral flexion. Normal testing biceps, triceps and brachioradialis bilaterally.  Cervical facet loading causes pain bilaterally.  LUMBAR:  Pain with flexion.  Facet loading is equivocal.  TTP diffusely over lumbar paraspinals.  SLR equivocal bilaterally. Limited ROM secondary to pain reproduction.    5/5 strength testing deltoid, biceps, triceps, wrist extensor, wrist flexor and ulnar intrinsics bilaterally.    Normal  strength bilaterally  MUSCULOSKELETAL:  Mild swelling along left ankle, moderate swelling to right ankle redness and swelling along the base of her left great toe swelling along right great toe, bilateral bunions along great toes  PULM: No evidence of respiratory difficulty, symmetric chest rise.  GI:  Soft and non-tender.    NEURO: BUE & BLE coordination and muscle stretch reflexes are physiologic and symmetric. No loss of sensation is noted.  GAIT: normal.        Imaging/ Diagnostic Studies/ Labs (Reviewed on 10/22/2024):    11/06/2023 MRI Lumbar Spine Without Contrast  COMPARISON:  Plain films of the lumbar spine from 03/06/2014.    FINDINGS:  There is equivocal 1-2 mm of anterolisthesis of L4 on L5. The vertebral body heights are well maintained, with no fracture.  No marrow signal abnormality suspicious for an infiltrative process.    The conus medullaris terminates at " approximately the L1-L2 disk space.  There is a probable small cyst seen within the upper pole to interpolar right kidney that measures approximately 6 mm in size.  Consider further evaluation with ultrasound for confirmation.  The discs are relatively well hydrated with only some minimal disc desiccation seen along the periphery of the L5-S1 disc and slightly more centrally at the L2-3 disc.    L1-L2: No significant central canal or neural foraminal narrowing.Mild bilateral facet arthropathy noted.    L2-L3: No significant central canal or neural foraminal narrowing.Mild-to-moderate bilateral facet arthropathy noted.    L3-L4: No significant central canal or neural foraminal narrowing.Moderate to severe bilateral facet arthropathy noted.    L4-L5:  Minimal diffuse disc bulge along with equivocal grade 1 spondylolisthesis and severe bilateral facet arthropathy resulting in mild effacement of the ventral thecal sac.  No significant neural foraminal canal narrowing.    L5-S1:  Mild diffuse disc bulge slightly more prominent the left paracentral region along with moderate bilateral facet arthropathy resulting in no significant central or neural foraminal canal narrowing.    Impression:   1. Multilevel degenerative changes of the lumbar spine as detailed above.  No high-grade central or neural foraminal canal narrowing noted.  Multilevel bilateral facet arthropathy.  2. Incidental note made of a probable 6 mm right renal cyst.  Consider ultrasound for confirmation.        11/06/2023 MRI Cervical Spine Without Contrast  COMPARISON:  Plain films from 04/13/2021.  Report from outside MRI dated 08/09/2022.  Films unavailable for comparison.    FINDINGS:  There is a couple mm of anterolisthesis of C2 on C3 and C3 on C4. No fracture. No marrow signal abnormality suspicious for an infiltrative process.  There is disc desiccation noted throughout the cervical spine with mild disc space narrowing seen at C3-4, C4-5 and C7-C6  with more mild-to-moderate disc space narrowing seen at C5-6.    The cervical cord is normal in caliber and signal characteristics.  The craniocervical junction and visualized intracranial contents are unremarkable.  The adjacent soft tissue structures show no significant abnormalities.    C2-C3:  No significant central canal or neural foraminal narrowing.    C3-C4:  There is a diffuse disc bulge along with some posterior spondylotic ridging that results in effacement of ventral thecal sac and moderate effacement of the ventral cord.  The AP dimension of the central canal this level measures approximately 9 mm.  No significant neural foraminal canal narrowing suggested.    C4-C5:  Diffuse disc bulge and posterior spondylotic ridging more prominent in the left paracentral region resulting in significant effacement of the thecal sac and moderate face mint of the left side of the cord.  The AP dimension of the central canal at this level also measures approximately 9 mm.  No significant neural foraminal canal narrowing.    C5-C6:  Diffuse disc bulge resulting in effacement of ventral thecal sac but not quite abutting the cord.  The AP dimension of the central canal measures approximately 10 mm at this level.  The right C5-6 neural foraminal canal is mildly narrowed secondary to uncovertebral joint hypertrophy.  No significant left neural foraminal canal narrowing.    C6-C7:  Mild diffuse disc bulge resulting in mild effacement of ventral thecal sac.  No abutment of the anterior cord.  The AP dimension of the central canal at this level measures approximately 10.5 mm.  The right C6-7 neural foraminal canal appears to be minimally narrowed secondary to uncovertebral joint hypertrophy.    C7-T1:   Mild diffuse disc bulge resulting in no significant central canal narrowing.  The right neural foraminal canal appears to be at least mildly narrowed.  Impression:   1. Multilevel degenerative changes of the cervical spine as  detailed above.      03/04/14 X-Ray Lumbar Spine Ap And Lateral  Five non-rib bearing lumbar type vertebrae are present without significant lateral curvature abnormality or subluxation.  Degenerative endplate changes as well as facet joint hypertrophy, particularly inferiorly, are noted without acute fracture or  suspicious focal bony lesion identified.  Disk spaces are fairly well-maintained.       04/13/21 X-Ray Cervical Spine Complete 5 view  Patient's hair limits the exam.  There is visualization to the C7-T1 level on the swimmer's view.  Multilevel marginal spurring and varying degrees of uniform loss of disc height throughout the C-spine.  No fracture or subluxation.  Pre dens space, prevertebral soft tissues, C1-2 articulation and odontoid normal in appearance allowing for positioning.  Neural foramina widely patent bilaterally at all levels.  Remaining visualized osseous structures intact.  Included upper lung fields clear.      EMG 10/2021  IMPRESSION  1. ABNORMAL study  2. There is electrodiagnostic evidence of an acute on chronic radiculopathy of the L5 and S1 nerve roots-slightly worse on the right        ASSESSMENT: 68 y.o. year old female with neck, mid back, bilateral ankles pain, consistent with     1. Neck pain        2. DDD (degenerative disc disease), cervical  Ambulatory referral/consult to Physical/Occupational Therapy      3. Bilateral lumbar radiculopathy  Case Request-RAD/Other Procedure Area: Bilateral L5/S1 + S1 TF SUSY    Ambulatory referral/consult to Physical/Occupational Therapy      4. Cervical spondylosis  Case Request-RAD/Other Procedure Area: Diagnostic Bilateral C4-6 MBB #12      5. Chronic left shoulder pain  Ambulatory referral/consult to Physical/Occupational Therapy      6. Cervical radicular pain, L>R        7. Headache, cervicogenic                    PLAN:   - Interventions:    - Re-schedule bilateral L5-S1 + S1 TF SUSY to see if this helps with lumbar radiculopathy.  We have  "discussed the procedure, benefits and potential risk and alternative options in detail.   Patient is not taking prescription blood thinners or ASA.      - Schedule Diagnostic bilateral C4-6 MBB #1. Patient is not taking prescription blood thinners or ASA.  Previously explained the risks and benefits of the procedure in detail with the patient in clinic along with alternative treatment options, and the patient elected to pursue the intervention at this time.  At this time, cervical pain is moderate to severe & more axial in nature, and I think patient would benefit more from cervical facet joint treatment for cervical facet-mediated pain. Patient's ADLs are affected by this pain.   Call patient to get for % relief to determine potential RFA eligibility.  1st MBB: if > 80% pain relief, schedule 2nd diagnostic cervical MBB (orders in).                 If <80% pain relief, possible consideration for cervical SUSY.   2nd MBB: if > 80% pain relief, schedule bilateral cervical RFA (orders in).   Patient given handout about MBB/RFA, also discussed requirement of 2 diagnostic MBBs prior to RFA.      - Consider left suprascapular/ axillary nerve block for shoulder pain.    - Anticoagulation use: no no anticoagulation    - Medications:  - Continue Flexeril 10mg PRN myofascial pain.  We have discussed potential deleterious side effects associated with this medication including  dry mouth, headache, blurred vision, drowsiness or dizziness, loss of appetite.     -Previously stopped Gabapentin due to adverse side effects (made her "feel crazy") and no improvement in symptoms.      - LA  reviewed and appropriate.       - Consults/referral:    -Continue follow-up with PCP: uncontrolled HTN.  -Continue follow-up with Dr. Espinal (Orthopedics) in regards to left shoulder pain (although she wants to avoid sx right now).     - Therapy: Start PT with passive modalities, including ice and heat, and active modalities, including a " regimen for stretching and strengthening.  [facility] sent [facility] closer to her home.  She went [facility] in the past and did not find it was helpful.    - Diagnostic/ Imaging:  Diagnostic imaging (lumbar & cervical MRI 11/06/2023) and diagnostic testing (lower extremity electromyography studies) reviewed and discussed with the patient.    - Follow up visit:  4 weeks post-procedure (x2) & Will call for % relief after cervical MBB to determine RFA eligibility    Future Appointments   Date Time Provider Department Center   12/3/2024  9:00 AM Brenna Johnson PA-C [facility] INT [facility]   10/9/2025  9:00 AM [facility] US3 [facility] ULSOUND [facility]   10/9/2025 10:15 AM Sherita Duran MD [facility]       Visit today included increased complexity associated with the care of the episodic problem of chronic pain which was addressed and continue to manage the longitudinal care of the patient due to the serious and/or complex managed problem(s) listed above.    - Patient Questions: Answered all of the patient's questions regarding diagnosis, therapy, and treatment.    - This condition does not require this patient to take time off of work, and the primary goal of our Pain Management services is to improve the patient's functional capacity.   - I discussed the risks, benefits, and alternatives to potential treatment options. All questions and concerns were fully addressed today in clinic.         Brenna Johnson PA-C  Interventional Pain Management - [facility]    Disclaimer:  This note was prepared using voice recognition system and is likely to have sound alike errors that may have been overlooked even after proof reading.  Please call me with any questions.

## 2024-10-21 NOTE — PROGRESS NOTES
Pt tolerated EKG without any signs of distress or complications and was transmitted for further reading and documentation. //kah

## 2024-10-22 ENCOUNTER — OFFICE VISIT (OUTPATIENT)
Dept: PAIN MEDICINE | Facility: CLINIC | Age: 69
End: 2024-10-22
Payer: MEDICARE

## 2024-10-22 VITALS
WEIGHT: 232.81 LBS | HEART RATE: 58 BPM | SYSTOLIC BLOOD PRESSURE: 155 MMHG | HEIGHT: 65 IN | BODY MASS INDEX: 38.79 KG/M2 | RESPIRATION RATE: 17 BRPM | DIASTOLIC BLOOD PRESSURE: 82 MMHG

## 2024-10-22 DIAGNOSIS — M47.812 CERVICAL SPONDYLOSIS: ICD-10-CM

## 2024-10-22 DIAGNOSIS — M54.12 CERVICAL RADICULAR PAIN: ICD-10-CM

## 2024-10-22 DIAGNOSIS — M54.2 NECK PAIN: Primary | ICD-10-CM

## 2024-10-22 DIAGNOSIS — G44.86 HEADACHE, CERVICOGENIC: ICD-10-CM

## 2024-10-22 DIAGNOSIS — M54.16 BILATERAL LUMBAR RADICULOPATHY: ICD-10-CM

## 2024-10-22 DIAGNOSIS — M50.30 DDD (DEGENERATIVE DISC DISEASE), CERVICAL: ICD-10-CM

## 2024-10-22 DIAGNOSIS — G89.29 CHRONIC LEFT SHOULDER PAIN: ICD-10-CM

## 2024-10-22 DIAGNOSIS — M25.512 CHRONIC LEFT SHOULDER PAIN: ICD-10-CM

## 2024-10-22 LAB
OHS QRS DURATION: 84 MS
OHS QTC CALCULATION: 428 MS

## 2024-10-22 PROCEDURE — 99214 OFFICE O/P EST MOD 30 MIN: CPT | Mod: S$GLB,,, | Performed by: PHYSICIAN ASSISTANT

## 2024-10-22 PROCEDURE — 3044F HG A1C LEVEL LT 7.0%: CPT | Mod: CPTII,S$GLB,, | Performed by: PHYSICIAN ASSISTANT

## 2024-10-22 PROCEDURE — 3077F SYST BP >= 140 MM HG: CPT | Mod: CPTII,S$GLB,, | Performed by: PHYSICIAN ASSISTANT

## 2024-10-22 PROCEDURE — 3079F DIAST BP 80-89 MM HG: CPT | Mod: CPTII,S$GLB,, | Performed by: PHYSICIAN ASSISTANT

## 2024-10-22 PROCEDURE — 1160F RVW MEDS BY RX/DR IN RCRD: CPT | Mod: CPTII,S$GLB,, | Performed by: PHYSICIAN ASSISTANT

## 2024-10-22 PROCEDURE — 3008F BODY MASS INDEX DOCD: CPT | Mod: CPTII,S$GLB,, | Performed by: PHYSICIAN ASSISTANT

## 2024-10-22 PROCEDURE — 3288F FALL RISK ASSESSMENT DOCD: CPT | Mod: CPTII,S$GLB,, | Performed by: PHYSICIAN ASSISTANT

## 2024-10-22 PROCEDURE — 99999 PR PBB SHADOW E&M-EST. PATIENT-LVL V: CPT | Mod: PBBFAC,,, | Performed by: PHYSICIAN ASSISTANT

## 2024-10-22 PROCEDURE — G2211 COMPLEX E/M VISIT ADD ON: HCPCS | Mod: S$GLB,,, | Performed by: PHYSICIAN ASSISTANT

## 2024-10-22 PROCEDURE — 1159F MED LIST DOCD IN RCRD: CPT | Mod: CPTII,S$GLB,, | Performed by: PHYSICIAN ASSISTANT

## 2024-10-22 PROCEDURE — 1101F PT FALLS ASSESS-DOCD LE1/YR: CPT | Mod: CPTII,S$GLB,, | Performed by: PHYSICIAN ASSISTANT

## 2024-10-22 PROCEDURE — 1125F AMNT PAIN NOTED PAIN PRSNT: CPT | Mod: CPTII,S$GLB,, | Performed by: PHYSICIAN ASSISTANT

## 2024-10-23 ENCOUNTER — PATIENT MESSAGE (OUTPATIENT)
Dept: INTERNAL MEDICINE | Facility: CLINIC | Age: 69
End: 2024-10-23
Payer: MEDICARE

## 2024-10-23 DIAGNOSIS — N39.0 URINARY TRACT INFECTION WITHOUT HEMATURIA, SITE UNSPECIFIED: Primary | ICD-10-CM

## 2024-10-23 RX ORDER — SULFAMETHOXAZOLE AND TRIMETHOPRIM 800; 160 MG/1; MG/1
1 TABLET ORAL 2 TIMES DAILY
Qty: 10 TABLET | Refills: 0 | Status: SHIPPED | OUTPATIENT
Start: 2024-10-23 | End: 2024-10-28

## 2024-10-25 NOTE — PRE-PROCEDURE INSTRUCTIONS
Spoke with patient regarding procedure scheduled on 11.8     Arrival time 0715     Has patient been sick with fever or on antibiotics within the last 7 days? No     Does the patient have any open wounds, sores or rashes? No     Does the patient have any recent fractures? no     Has patient received a vaccination within the last 7 days? No     Received the COVID vaccination? yes     Has the patient stopped all medications as directed? na     Does patient have a pacemaker, defibrillator, or implantable stimulator? No     Does the patient have a ride to and from procedure and someone reliable to remain with patient?        Is the patient diabetic? no     Does the patient have sleep apnea? Or use O2 at home? no     Is the patient receiving sedation? yes     Is the patient instructed to remain NPO beginning at midnight the night before their procedure? yes     Procedure location confirmed with patient? Yes     Covid- Denies signs/symptoms. Instructed to notify PAT/MD if any changes.

## 2024-11-07 ENCOUNTER — TELEPHONE (OUTPATIENT)
Dept: INTERNAL MEDICINE | Facility: CLINIC | Age: 69
End: 2024-11-07
Payer: MEDICARE

## 2024-11-07 NOTE — TELEPHONE ENCOUNTER
----- Message from Natalee sent at 11/7/2024  4:07 PM CST -----  Type:  Patient Returning Call    Who Called:Mariah  Who Left Message for Patient:Natalee Barragan MA  Does the patient know what this is regarding?:  Would the patient rather a call back or a response via [facility]? Callback  Best Call Back Number:[phone]  Additional Information: Missed Call

## 2024-11-07 NOTE — TELEPHONE ENCOUNTER
I returned a call back to the pt and there was no answer so, I left her a vm to contact Taina Bob NP staff. //kah

## 2024-11-07 NOTE — TELEPHONE ENCOUNTER
----- Message from Caty sent at 11/7/2024  3:04 PM CST -----  Contact: MACK MORSE [[id number]]  .Type:  Patient Returning Call    Who Called:MACK MORSE [[id number]]  Who Left Message for Patient:  Does the patient know what this is regarding?:   Would the patient rather a call back or a response via [facility]?  call  Best Call Back Number: .[phone]  Additional Information:

## 2024-11-08 ENCOUNTER — HOSPITAL ENCOUNTER (OUTPATIENT)
Facility: HOSPITAL | Age: 69
Discharge: HOME | End: 2024-11-08
Attending: ANESTHESIOLOGY | Admitting: ANESTHESIOLOGY
Payer: MEDICARE

## 2024-11-08 VITALS
RESPIRATION RATE: 15 BRPM | WEIGHT: 227.06 LBS | HEART RATE: 57 BPM | OXYGEN SATURATION: 97 % | TEMPERATURE: 96 F | DIASTOLIC BLOOD PRESSURE: 81 MMHG | HEIGHT: 64 IN | BODY MASS INDEX: 38.76 KG/M2 | SYSTOLIC BLOOD PRESSURE: 169 MMHG

## 2024-11-08 DIAGNOSIS — M54.16 LUMBAR RADICULOPATHY: ICD-10-CM

## 2024-11-08 PROCEDURE — 64483 NJX AA&/STRD TFRM EPI L/S 1: CPT | Mod: 50,,, | Performed by: ANESTHESIOLOGY

## 2024-11-08 PROCEDURE — 64483 NJX AA&/STRD TFRM EPI L/S 1: CPT | Mod: 50 | Performed by: ANESTHESIOLOGY

## 2024-11-08 PROCEDURE — 99152 MOD SED SAME PHYS/QHP 5/>YRS: CPT | Performed by: ANESTHESIOLOGY

## 2024-11-08 PROCEDURE — 25500020 PHARM REV CODE 255: Performed by: ANESTHESIOLOGY

## 2024-11-08 PROCEDURE — 63600175 PHARM REV CODE 636 W HCPCS: Mod: JZ,JG | Performed by: ANESTHESIOLOGY

## 2024-11-08 PROCEDURE — 25000003 PHARM REV CODE 250: Performed by: ANESTHESIOLOGY

## 2024-11-08 PROCEDURE — A9585 GADOBUTROL INJECTION: HCPCS | Performed by: ANESTHESIOLOGY

## 2024-11-08 RX ORDER — FENTANYL CITRATE 50 UG/ML
INJECTION, SOLUTION INTRAMUSCULAR; INTRAVENOUS
Status: DISCONTINUED | OUTPATIENT
Start: 2024-11-08 | End: 2024-11-08 | Stop reason: HOSPADM

## 2024-11-08 RX ORDER — GADOBUTROL 604.72 MG/ML
INJECTION INTRAVENOUS
Status: DISCONTINUED | OUTPATIENT
Start: 2024-11-08 | End: 2024-11-08 | Stop reason: HOSPADM

## 2024-11-08 RX ORDER — MIDAZOLAM HYDROCHLORIDE 1 MG/ML
INJECTION, SOLUTION INTRAMUSCULAR; INTRAVENOUS
Status: DISCONTINUED | OUTPATIENT
Start: 2024-11-08 | End: 2024-11-08 | Stop reason: HOSPADM

## 2024-11-08 RX ORDER — INDOMETHACIN 25 MG/1
CAPSULE ORAL
Status: DISCONTINUED | OUTPATIENT
Start: 2024-11-08 | End: 2024-11-08 | Stop reason: HOSPADM

## 2024-11-08 RX ORDER — DEXAMETHASONE SODIUM PHOSPHATE 10 MG/ML
INJECTION INTRAMUSCULAR; INTRAVENOUS
Status: DISCONTINUED | OUTPATIENT
Start: 2024-11-08 | End: 2024-11-08 | Stop reason: HOSPADM

## 2024-11-08 RX ORDER — ONDANSETRON HYDROCHLORIDE 2 MG/ML
4 INJECTION, SOLUTION INTRAVENOUS ONCE
Status: COMPLETED | OUTPATIENT
Start: 2024-11-08 | End: 2024-11-08

## 2024-11-08 RX ORDER — BUPIVACAINE HYDROCHLORIDE 2.5 MG/ML
INJECTION, SOLUTION EPIDURAL; INFILTRATION; INTRACAUDAL
Status: DISCONTINUED | OUTPATIENT
Start: 2024-11-08 | End: 2024-11-08 | Stop reason: HOSPADM

## 2024-11-08 RX ADMIN — ONDANSETRON 4 MG: 2 INJECTION INTRAMUSCULAR; INTRAVENOUS at 09:11

## 2024-11-08 NOTE — DISCHARGE INSTRUCTIONS

## 2024-11-08 NOTE — OP NOTE
Mariah Meza  69 y.o. female      Vitals:    11/08/24 0757   BP: (!) 190/90   Pulse: 61   Resp: 16   Temp: 96.3 °F (35.7 °C)     Procedure Date 11/08/2024        INFORMED CONSENT: The procedure, risks, benefits and options were discussed with patient. There are no contraindications to the procedure. The patient expressed understanding and agreed to proceed. The personnel performing the procedure was discussed. I verify that I personally obtained consent prior to the start of the procedure and the signed consent can be found on the patient's chart.       Anesthesia:   Conscious sedation provided by M.D    The patient was monitored with continuous pulse oximetry, EKG, and intermittent blood pressure monitors.  The patient was hemodynamically stable throughout the entire process was responsive to voice, and breathing spontaneously.  Supplemental O2 was provided at 2L/min via nasal cannula.  Patient was comfortable for the duration of the procedure. (See nurse documentation and case log for sedation time)    There was a total of 2mg IV Midazolam and 50mcg Fentanyl titrated for the procedure    Pre Procedure diagnosis: Bilateral lumbar radiculopathy [M54.16]  Post-Procedure diagnosis: SAME     Complications: None    Specimens: None      DESCRIPTION OF PROCEDURE: The patient was brought to the procedure room. IV access was obtained prior to the procedure. The patient was positioned prone on the fluoroscopy table. Continuous hemodynamic monitoring was initiated including blood pressure, EKG, and pulse oximetry. . The skin was prepped with chlorhexidine and draped in a sterile fashion.      The  L5/S1 and  S1 transforaminal spaces were identified with fluoroscopy in the  AP, oblique, and lateral views.  A 22 gauge spinal quinke needle was then advanced into the area of the trans foraminal spaces bilateral with confirmation of proper needle position using AP, oblique, and lateral fluoroscopic views. Once the needle  tip was in the area of the transforaminal space, and there was no blood, CSF or paraesthesias,  1.5 mL of Omnipaque 300mg/ml was injected on bilateral for a total of 3mL.  Fluoroscopic imaging in the AP and lateral views revealed a clear outline of the spinal nerve with proximal spread of agent through the neural foramen into the epidural space. A total combination of 2mL of Bupivacaine and 10 mg dexamethasone was injected on each side and at each level with displacement of the contrast dye confirming that the medication went into the area of the transforaminal spaces bilateral. A sterile bandaid was applied.   Patient tolerated the procedure well.    Patient was taken back to the recovery room for further observation.     The patient was discharged to home in stable condition

## 2024-11-08 NOTE — TELEPHONE ENCOUNTER
I returned a call back and there was no answer so, I left her a vm asking her to reach back out to me with Taina Bob NP office. //kah
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
## 2024-11-14 RX ORDER — METHYLPREDNISOLONE 4 MG/1
4 TABLET ORAL DAILY
Qty: 21 EACH | Refills: 0 | Status: SHIPPED | OUTPATIENT
Start: 2024-11-14

## 2024-11-14 RX ORDER — METHYLPREDNISOLONE 4 MG/1
4 TABLET ORAL DAILY
COMMUNITY
End: 2024-11-14 | Stop reason: SDUPTHER

## 2024-11-14 NOTE — TELEPHONE ENCOUNTER
Unfortunately  it is common to have increase pain after receiving an injection. This can because due to the numbing medication wearing off. It may take up to 2-3 weeks for the injection to take full affect.  For your pain, continue taking your medication as prescribed, or take over the counter pain medication, if possible. Ice the pain site 20min on 20 min off and rest, if possible.  Generally  we do not prescribe any stronger or new medication as we will not know if it the the procedure working or the medication. Give it some time to subside and check back in with us if pain worsens. Pt understood and will waitting for a call back once Dr.Das hamm.

## 2024-11-14 NOTE — TELEPHONE ENCOUNTER
----- Message from Mita sent at 11/14/2024  7:24 AM CST -----  Contact: pt  Type:  Needs Medical Advice    Who Called:  pt  Symptoms (please be specific):  had an injection on last week and having complications   How long has patient had these symptoms:   Saturday past  Pharmacy name and phone #:     Would the patient rather a call back or a response via [facility]? phone  Best Call Back Number:  [phone]  or [phone]  Additional Information:

## 2024-11-18 ENCOUNTER — PATIENT MESSAGE (OUTPATIENT)
Dept: PAIN MEDICINE | Facility: CLINIC | Age: 69
End: 2024-11-18
Payer: MEDICARE

## 2024-11-18 DIAGNOSIS — M54.16 BILATERAL LUMBAR RADICULOPATHY: Primary | ICD-10-CM

## 2024-11-18 DIAGNOSIS — M54.16 LUMBAR RADICULOPATHY: ICD-10-CM

## 2024-11-18 RX ORDER — PREGABALIN 75 MG/1
CAPSULE ORAL
Qty: 120 CAPSULE | Refills: 0 | Status: SHIPPED | OUTPATIENT
Start: 2024-11-18 | End: 2025-01-17

## 2024-11-18 NOTE — TELEPHONE ENCOUNTER
Reach out to pt to see if she would like to get a Toradol injection or start a new medication called Lyrica. Pt would like the medication. All questions answered

## 2024-11-20 ENCOUNTER — TELEPHONE (OUTPATIENT)
Dept: PAIN MEDICINE | Facility: CLINIC | Age: 69
End: 2024-11-20
Payer: MEDICARE

## 2024-11-20 ENCOUNTER — OFFICE VISIT (OUTPATIENT)
Dept: INTERNAL MEDICINE | Facility: CLINIC | Age: 69
End: 2024-11-20
Payer: MEDICARE

## 2024-11-20 ENCOUNTER — LAB VISIT (OUTPATIENT)
Dept: LAB | Facility: HOSPITAL | Age: 69
End: 2024-11-20
Attending: NURSE PRACTITIONER
Payer: MEDICARE

## 2024-11-20 VITALS
TEMPERATURE: 97 F | DIASTOLIC BLOOD PRESSURE: 78 MMHG | WEIGHT: 230.19 LBS | SYSTOLIC BLOOD PRESSURE: 132 MMHG | BODY MASS INDEX: 39.51 KG/M2 | OXYGEN SATURATION: 98 % | HEART RATE: 63 BPM

## 2024-11-20 DIAGNOSIS — R82.90 ABNORMAL URINALYSIS: ICD-10-CM

## 2024-11-20 DIAGNOSIS — M54.16 LUMBAR RADICULOPATHY: ICD-10-CM

## 2024-11-20 DIAGNOSIS — I10 PRIMARY HYPERTENSION: ICD-10-CM

## 2024-11-20 DIAGNOSIS — E89.0 POST-OPERATIVE HYPOTHYROIDISM: ICD-10-CM

## 2024-11-20 DIAGNOSIS — Z09 FOLLOW-UP EXAM: Primary | ICD-10-CM

## 2024-11-20 PROCEDURE — 1125F AMNT PAIN NOTED PAIN PRSNT: CPT | Mod: CPTII,S$GLB,, | Performed by: NURSE PRACTITIONER

## 2024-11-20 PROCEDURE — 3075F SYST BP GE 130 - 139MM HG: CPT | Mod: CPTII,S$GLB,, | Performed by: NURSE PRACTITIONER

## 2024-11-20 PROCEDURE — 99999 PR PBB SHADOW E&M-EST. PATIENT-LVL IV: CPT | Mod: PBBFAC,,, | Performed by: NURSE PRACTITIONER

## 2024-11-20 PROCEDURE — 3008F BODY MASS INDEX DOCD: CPT | Mod: CPTII,S$GLB,, | Performed by: NURSE PRACTITIONER

## 2024-11-20 PROCEDURE — 3288F FALL RISK ASSESSMENT DOCD: CPT | Mod: CPTII,S$GLB,, | Performed by: NURSE PRACTITIONER

## 2024-11-20 PROCEDURE — 1160F RVW MEDS BY RX/DR IN RCRD: CPT | Mod: CPTII,S$GLB,, | Performed by: NURSE PRACTITIONER

## 2024-11-20 PROCEDURE — 1101F PT FALLS ASSESS-DOCD LE1/YR: CPT | Mod: CPTII,S$GLB,, | Performed by: NURSE PRACTITIONER

## 2024-11-20 PROCEDURE — 3044F HG A1C LEVEL LT 7.0%: CPT | Mod: CPTII,S$GLB,, | Performed by: NURSE PRACTITIONER

## 2024-11-20 PROCEDURE — 3078F DIAST BP <80 MM HG: CPT | Mod: CPTII,S$GLB,, | Performed by: NURSE PRACTITIONER

## 2024-11-20 PROCEDURE — 81001 URINALYSIS AUTO W/SCOPE: CPT | Performed by: NURSE PRACTITIONER

## 2024-11-20 PROCEDURE — 99214 OFFICE O/P EST MOD 30 MIN: CPT | Mod: S$GLB,,, | Performed by: NURSE PRACTITIONER

## 2024-11-20 PROCEDURE — 1159F MED LIST DOCD IN RCRD: CPT | Mod: CPTII,S$GLB,, | Performed by: NURSE PRACTITIONER

## 2024-11-20 PROCEDURE — 87086 URINE CULTURE/COLONY COUNT: CPT | Performed by: NURSE PRACTITIONER

## 2024-11-20 RX ORDER — METHYLPREDNISOLONE 4 MG/1
4 TABLET ORAL DAILY
Qty: 21 EACH | Refills: 0 | Status: SHIPPED | OUTPATIENT
Start: 2024-11-20

## 2024-11-20 RX ORDER — FERROUS SULFATE 325(65) MG
325 TABLET, DELAYED RELEASE (ENTERIC COATED) ORAL DAILY
COMMUNITY

## 2024-11-20 NOTE — PROGRESS NOTES
Patient ID: Mariah Meza is a 69 y.o. female.    Chief Complaint: Back Pain (Pain in back down left leg)    History of Present Illness    CHIEF COMPLAINT:  Ms. Meza presents today with concerns of back pain post her back procedure.    BACK PAIN AND RELATED SYMPTOMS:  She reports worsening pain in her left leg and left buttocks radiating to the left leg since her L5-S1 epidural injection on November8th. The pain is described as burning and accompanied by numbness. Symptoms are exacerbated by sitting, walking, and standing. She demonstrates a slow and abnormal gait but denies spinal tenderness on palpation.    MEDICATIONS:  She was prescribed Lyrica 25 mg, which was increased to twice daily. A Medrol Dosepak was prescribed on November 14th, but she was unaware of this prescription. She reports not taking the prescribed Lyrica 75 mg due to previous side effects with gabapentin. She takes cyclobenzaprine 10 mg at night as needed, which helps with sleep.    SLEEP:  She reports difficulty sleeping at night due to pain, which is alleviated by cyclobenzaprine.    URINARY ISSUES:  She reports ongoing urinary symptoms. A recent urinalysis revealed moderate bacteria, for which she has completed a course of antibiotics.    BLOOD PRESSURE:  She reports intermittent high blood pressure readings at home, though it is stable during today's visit.    THYROID:  She has post-operative hypothyroidism and has requested thyroid lab checks.      ROS:  Constitutional: negative diminished activity, negative appetite change, negative fatigue, negative fever  HENT: negative ear discharge, negative ear pain, negative mouth sores, negative nosebleeds, negative sore throat, negative tinnitus  Eyes: negative discharge, negative eye redness, negative eye itchiness  Respiratory: negative apnea, negative cough, negative shortness of breath  Cardiovascular: negative chest pain, negative leg swelling  Gastrointestinal: negative abdominal  distention, negative abdominal pain, negative blood in stool, negative constipation, negative diarrhea, negative nausea, negative vomiting  Endocrine: negative polydipsia, negative polyphagia, negative polyuria  Genitourinary: negative difficulty urinating, negative flank pain, POSITIVE FREQUENCY, negative hematuria, POSITIVE URGENCY  Musculoskeletal: POSITIVE BACK PAIN, negative abnormal gait, negative neck pain, negative neck stiffness, negative joint pain, negative muscle pain  Skin: negative rash, negative wound, negative abnormal moles  Allergic/Immunologic: negative seasonal allergies, negative food allergies  Neurological: negative dizziness, negative seizures, POSITIVE NUMBNESS, negative tingling, negative headaches, negative balance issues  Psychiatric: negative agitation, negative confusion, negative hallucinations, POSITIVE SLEEP DIFFICULTY, negative suicidal ideation         Physical Exam    Vital Signs: Reviewed.  Constitutional: Well-appearing. Well-developed. No acute distress.  HENT: Normocephalic. Tympanic membranes normal bilaterally. Nares patent. Oropharynx clear. No erythema. No exudate.  Cardiovascular: Normal rate and regular rhythm. Normal heart sounds.  Pulmonary: Pulmonary effort is normal. Normal breath sounds.  Abdominal: Bowel sounds are normal. Abdomen is soft. No tenderness.  Musculoskeletal: No muscle tenderness. No joint tenderness. Normal range of motion. SLOW AND ABNORMAL GAIT.  Skin: Warm. Dry.  Neurological: No focal deficit is present. Alert and oriented to person, place, and time.  Psychiatric: Mood is normal. Behavior is normal. Behavior is cooperative.  Vitals: BLOOD PRESSURE: 132/78.         Assessment & Plan      M54.41 Lumbago with sciatica, right side  M54.50 Low back pain, unspecified  N39.0 Urinary tract infection, site not specified  E89.0 Postprocedural hypothyroidism  R26.2 Difficulty in walking, not elsewhere classified    POST-PROCEDURAL BACK PAIN AND SCIATICA:  -  Assessed patient's post-procedural back pain following L5-S1 epidural injection on August 8th.  - Considered pain possibly due to procedure side effect and absence of anesthesia.  - Noted patient reports feeling worse than before procedure, with left leg pain and urinary symptoms.  - Acknowledged patient's concern about pain and decision not to take Lyrica due to previous side effects.  - Started Medrol Dosepak for post-procedure symptoms.  - Continued cyclobenzaprine 10 mg at night as needed for sleep.    URINARY TRACT INFECTION:  - Reviewed recent urinalysis showing moderate bacteria; patient completed antibiotics.  - Ordered repeat urinalysis with culture.    HYPERTENSION:  - Evaluated blood pressure, noting intermittent elevated readings at home but normal on exam.    GAIT ABNORMALITY:  - Observed slow and abnormal gait.    HYPOTHYROIDISM:  - Ordered thyroid labs to check post-operative hypothyroidism.    FOLLOW-UP:  - Previously referred to pain management team.  - Will inquire about earlier appointment before December 3rd follow-up.              No follow-ups on file.    This note was generated with the assistance of ambient listening technology. Verbal consent was obtained by the patient and accompanying visitor(s) for the recording of patient appointment to facilitate this note. I attest to having reviewed and edited the generated note for accuracy, though some syntax or spelling errors may persist. Please contact the author of this note for any clarification.

## 2024-11-20 NOTE — TELEPHONE ENCOUNTER
Reach out to pt to inform her that she has the soonest appt. Also to  inform her that she can  their medication from the pharmacy. P.t  did not answer left destiney Romero   Medical Assistant

## 2024-11-20 NOTE — Clinical Note
Good morning,  Ms. Meza had a visit with me today.  Reports her pain is worse post procedure.  She was not aware of the Medrol Dose pack that was prescribed.  I resent to pharmacy.  I advised that I will reach out to see if you all had a sooner appointment to see her.    Thank you for your time.   Taina

## 2024-11-20 NOTE — TELEPHONE ENCOUNTER
----- Message from Taina Bob NP sent at 11/20/2024 10:55 AM CST -----  Good morning,   Ms. Meza had a visit with me today.  Reports her pain is worse post procedure.  She was not aware of the Medrol Dose pack that was prescribed.  I resent to pharmacy.  I advised that I will reach out to see if you all had a sooner appointment to see her.      Thank you for your time.     Taina

## 2024-11-21 ENCOUNTER — PATIENT MESSAGE (OUTPATIENT)
Dept: INTERNAL MEDICINE | Facility: CLINIC | Age: 69
End: 2024-11-21
Payer: MEDICARE

## 2024-11-21 LAB
BACTERIA #/AREA URNS AUTO: ABNORMAL /HPF
BILIRUB UR QL STRIP: NEGATIVE
CLARITY UR REFRACT.AUTO: ABNORMAL
COLOR UR AUTO: YELLOW
GLUCOSE UR QL STRIP: NEGATIVE
HGB UR QL STRIP: NEGATIVE
KETONES UR QL STRIP: NEGATIVE
LEUKOCYTE ESTERASE UR QL STRIP: ABNORMAL
MICROSCOPIC COMMENT: ABNORMAL
NITRITE UR QL STRIP: NEGATIVE
PH UR STRIP: 7 [PH] (ref 5–8)
PROT UR QL STRIP: NEGATIVE
SP GR UR STRIP: 1.01 (ref 1–1.03)
SQUAMOUS #/AREA URNS AUTO: 45 /HPF
URN SPEC COLLECT METH UR: ABNORMAL
WBC #/AREA URNS AUTO: 1 /HPF (ref 0–5)

## 2024-11-22 ENCOUNTER — PATIENT MESSAGE (OUTPATIENT)
Dept: INTERNAL MEDICINE | Facility: CLINIC | Age: 69
End: 2024-11-22
Payer: MEDICARE

## 2024-11-22 LAB — BACTERIA UR CULT: NORMAL

## 2024-11-26 ENCOUNTER — TELEPHONE (OUTPATIENT)
Dept: PAIN MEDICINE | Facility: CLINIC | Age: 69
End: 2024-11-26
Payer: MEDICARE

## 2024-11-26 NOTE — TELEPHONE ENCOUNTER
Spoke with patient to confirm that she wants to change virtual visit to in-clinic.  Sent to Marla Jocelyn to have it changed

## 2024-11-26 NOTE — TELEPHONE ENCOUNTER
----- Message from Kathy sent at 11/26/2024  3:06 PM CST -----  Regarding: Appt  Contact: [phone]  Patient is calling to change appointment from virtual appointment to in office no dates in epic. Please contact pt

## 2024-11-26 NOTE — TELEPHONE ENCOUNTER
----- Message from Isela sent at 11/26/2024  4:03 PM CST -----  Contact: Mariah  Type:  Patient Returning Call    Who Called:Mariah  Who Left Message for Patient: Juvencio  Does the patient know what this is regarding?: about an appt  Would the patient rather a call back or a response via [facility]?  Call back  Best Call Back Number: [phone]  Additional Information:     Thanks   Am

## 2024-12-02 ENCOUNTER — OFFICE VISIT (OUTPATIENT)
Dept: INTERNAL MEDICINE | Facility: CLINIC | Age: 69
End: 2024-12-02
Payer: MEDICARE

## 2024-12-02 VITALS
HEIGHT: 64 IN | HEART RATE: 64 BPM | DIASTOLIC BLOOD PRESSURE: 69 MMHG | SYSTOLIC BLOOD PRESSURE: 165 MMHG | WEIGHT: 233.94 LBS | OXYGEN SATURATION: 98 % | BODY MASS INDEX: 39.94 KG/M2 | TEMPERATURE: 98 F

## 2024-12-02 DIAGNOSIS — M54.16 BILATERAL LUMBAR RADICULOPATHY: ICD-10-CM

## 2024-12-02 DIAGNOSIS — Z09 FOLLOW-UP EXAM: Primary | ICD-10-CM

## 2024-12-02 DIAGNOSIS — I10 PRIMARY HYPERTENSION: ICD-10-CM

## 2024-12-02 DIAGNOSIS — M50.30 DDD (DEGENERATIVE DISC DISEASE), CERVICAL: ICD-10-CM

## 2024-12-02 DIAGNOSIS — R82.90 ABNORMAL URINE ODOR: ICD-10-CM

## 2024-12-02 LAB
BILIRUB UR QL STRIP: NEGATIVE
CLARITY UR REFRACT.AUTO: CLEAR
COLOR UR AUTO: YELLOW
GLUCOSE UR QL STRIP: NEGATIVE
HGB UR QL STRIP: NEGATIVE
KETONES UR QL STRIP: NEGATIVE
LEUKOCYTE ESTERASE UR QL STRIP: NEGATIVE
NITRITE UR QL STRIP: NEGATIVE
PH UR STRIP: 7 [PH] (ref 5–8)
PROT UR QL STRIP: NEGATIVE
SP GR UR STRIP: 1.01 (ref 1–1.03)
URN SPEC COLLECT METH UR: NORMAL

## 2024-12-02 PROCEDURE — 1160F RVW MEDS BY RX/DR IN RCRD: CPT | Mod: CPTII,S$GLB,, | Performed by: NURSE PRACTITIONER

## 2024-12-02 PROCEDURE — 3008F BODY MASS INDEX DOCD: CPT | Mod: CPTII,S$GLB,, | Performed by: NURSE PRACTITIONER

## 2024-12-02 PROCEDURE — 3077F SYST BP >= 140 MM HG: CPT | Mod: CPTII,S$GLB,, | Performed by: NURSE PRACTITIONER

## 2024-12-02 PROCEDURE — 1125F AMNT PAIN NOTED PAIN PRSNT: CPT | Mod: CPTII,S$GLB,, | Performed by: NURSE PRACTITIONER

## 2024-12-02 PROCEDURE — 3044F HG A1C LEVEL LT 7.0%: CPT | Mod: CPTII,S$GLB,, | Performed by: NURSE PRACTITIONER

## 2024-12-02 PROCEDURE — 1159F MED LIST DOCD IN RCRD: CPT | Mod: CPTII,S$GLB,, | Performed by: NURSE PRACTITIONER

## 2024-12-02 PROCEDURE — 81003 URINALYSIS AUTO W/O SCOPE: CPT | Performed by: NURSE PRACTITIONER

## 2024-12-02 PROCEDURE — 87086 URINE CULTURE/COLONY COUNT: CPT | Performed by: NURSE PRACTITIONER

## 2024-12-02 PROCEDURE — 99214 OFFICE O/P EST MOD 30 MIN: CPT | Mod: S$GLB,,, | Performed by: NURSE PRACTITIONER

## 2024-12-02 PROCEDURE — 3078F DIAST BP <80 MM HG: CPT | Mod: CPTII,S$GLB,, | Performed by: NURSE PRACTITIONER

## 2024-12-02 PROCEDURE — 99999 PR PBB SHADOW E&M-EST. PATIENT-LVL V: CPT | Mod: PBBFAC,,, | Performed by: NURSE PRACTITIONER

## 2024-12-02 NOTE — PROGRESS NOTES
Interventional Pain -- Established Patient (Follow-up visit)    Referring Physician: Beverly Mcpherson PA-C  - Neurology (2nd referral); Smiley Weaver (initial referral)    PCP: Taina Bob NP      Chief complaint:  Follow-up       Low back pain with BLE radicular pain - improved   Cervical Neck Pain - facet-mediated, axial in nature   Neck pain with BUE radiation (down the LUE to the thumb in C6 distribution, down RUE into shoulder area)  Left shoulder pain - previously seen by Dr. Espinal (Orthopedics)       SUBJECTIVE:    Interval History (12/3/2024):  Patient presents today for follow-up visit.  she underwent Bilateral L5/S1 + S1 TF SUSY on 11/8/24.  The patient reports that she is/was better following the procedure.  she reports 70% pain relief.  The changes lasted 4 weeks so far.  The changes have continued through this visit.  Patient reports pain as 7/10 today. She still has some left calf cramping, but overall, she feels much better.    At last visit, plan was to also schedule cervical medial branch block, but due to 'issues' after the epidural, she was afraid to go through with this.  She does feel that Lyrica is helping.  She can not tolerate twice a day, but she is taking at night.      Interval History (10/22/2024):  Mariah Meza presents today for follow-up visit.  Patient was last seen about 4 months ago. At that visit, the plan was to schedule lumbar SUSY, which was ultimately not completed.  Her blood pressure was uncontrolled, so the procedure had to be canceled.  She continues to have bilateral leg pain associated with lumbar pain.  She presents today to discuss neck pain.  Neck pain is somewhat axial, but also radiates down the left arm.  She has some pain into the right shoulder area.  She was told she has left shoulder rotator cuff issues, but she does not want to have surgery.  She had 2 injections, but the 2nd time was too painful , so she did not want to repeat.   Patient reports pain as 6/10 today.  She saw PCP yesterday and had Toradol injection, which helped some short term.    Interval history 06/26/2024  Patient presents for four-month follow-up.  Today she reports exacerbation of lower back and leg pain.  Pain is constant and today is rated a 6/10.  Pain is described as burning and tingling in nature.  Today she reports pain in a bandlike distribution in the lower back which radiates down the posterior aspects of bilateral lower extremities in L5-S2 distribution to the feet.  Pain can be exacerbated with lumbar extension, standing and with ambulation.  She has continued physician directed physical therapy exercises over the last 8 weeks from 04/26/2024 through 06/26/2024 with marginal improvement in her symptoms.  She would like to refrain from any pharmacologic management at this time secondary to cognitive side effects from membrane stabilizing agents.  Pain is improved with lumbar forward flexion.    Interval history 02/05/2024  Patient presents status post bilateral L3-5 lumbar medial branch block 12/12/2023.  Patient reports 90% sustained relief in lower axial back pain following her procedure.  She reports this pain relief is still ineffective.  Today she reports burning pains down bilateral lower extremities.  Pain is intermittent and today is rated a 5/10.  Patient is concerned whether this was related to the prior injection.  She reports pain which can radiate down the lateral and posterior aspects of bilateral lower extremities in L4-S1 distribution to the calf.  Patient has continued judicious use of Flexeril which she does not even take daily as well as Tylenol.  During last schedule procedure, patient had hypertensive urgency, 229/100.  Of note patient went to the emergency room and received hydralazine and clonidine.  Patient has scheduled follow-up with nurse practitioner, Ms. Bob.    Interval History (11/22/2023):  Patient presents today for  "follow-up visit.  Patient was referred back to our clinic by Beverly Mcpherson PA-C (Neurology).  She complains mostly of low back pain.  She does have some leg pain on the left side occasionally.  She reports she stretches at home.  She takes Flexeril as needed, but she tries to avoid due to dry mouth.  She is taking gabapentin in the past with no relief.  She wants to hold off on adding additional medications at this time.  Patient reports pain as 6/10 today.    10/26/23 Neurology:  Ms. Mariah Meza is a 67 y.o. female presenting for evaluation of back pain. She noted that she has had some baseline back pain for quite some time (years) however in September she began to have increased pain. She required an injection of ketorolac at that time which helped for a day or two but then her pain returned. She did complete EMG/NCS previously unable to locate in epic or care everywhere. She describes her pain as sharp and some "electric shock" sensation that occasionally occurs. She does get some stiffness and weakness also which she reports is present in her entire body. She notes that her pain is worse on the L and in the lumbar region. She does endorse neck pain. She did have a cervical spine MRI completed one year ago which showed multilevel degenerative changes at C4-C5 and mild canal stenosis. She notes that her pain is constant all day but worsened with movement or prolonged sitting. She previously completed physical therapy in Jan/Feb of this year which benefited her greatly and she found that she was able to be much more active. She then had a gout flare and seemed to regress in her ability to move. She denies any falls. She does endorse that she is having some constipation. She does have some baseline bladder problems but attributes this to her medications. She notes that she cannot lift a lot of things she use to be able to and she drops things more frequently. She reports numbness/tingling in " bilateral hands and feet but worse on L hand.     Interval History (4/6/2023):  Mariah Meza presents today for follow-up visit.  Patient was last seen on 2/9/2023. At that visit, the plan was to start Gabapentin.  She reports she discontinued this medication after getting up to the 600 mg dosage due to side effects.  She reports it made her feel crazy in the head.  She reports improvement in her neck pain since last visit as she is been performing physician directed exercises at home.  She reports pain is primarily located in her ankles and feet right worse than left.  She reports that she was previously diagnosed with gout in November and since then she has had recurrent gouty flares.  She has noticed that these flares occur when she eats a lot of certain types of food.  She is had flare secondary to eating excessive amounts of shrimp, raisins, pineapple, and serial.  She reports the current flare began approximately 1 week ago and has been persistent she has noticed redness warmth and swelling in both of her ankles and great toe.  She has not taken anything other than Tylenol which has not been beneficial.  She is scheduled to follow-up with podiatry per referral from her primary care doctor.  Patient reports pain as 7/10 today.  She reports she has also noticed mild to moderate swelling in her hands and feet.  She reports this began after she started taking blood pressure medications, she thinks this may be due to medications.    Initial HPI 02/09/2023  Mariah Meza is a 67y.o. female with past medical history significant for hyperlipidemia, hypertension, sicca syndrome, history of papillary thyroid cancer status post parathyroidectomy, obesity, GERD who presents to the clinic for the evaluation of mid back pain.  Patient reports pain has been present for the last 2 years without inciting accident injury or trauma.  Pain is constant and today is rated a 6/10.  Pain at its best is a 3/10 and at  its worse is a 10/10.  Pain is described as sharp, stabbing and stiffness in nature.  Patient reports pain which begins in between the shoulder blades and radiates to the midthoracic spine.  Patient reports prior radiation down the left upper extremity to the thumb in C6 distribution.  Patient also reports pain in bilateral ankles.  Patient reports associated weakness in the lower extremities associated with this pain and with standing and ambulation.  This pain is described as burning in nature.  All of her pain is exacerbated when moving from sitting to standing, standing and with ambulation.  Patient reports in the past she was able to ambulate 10,000 steps per day but this has been reduced secondary to her pain.  Pain is improved with sitting and pain medications such as relief.  Patient reports she completed 12 months of physical therapy for the neck and lower back 2 months prior at [facility] physical therapy in [place].  Patient reports no significant improvement in her pain with completion of physical therapy.  Patient also takes Flexeril which is marginally helpful.  Patient reports significant lower extremity weakness.  Patient denies night fever/night sweats, urinary incontinence, bowel incontinence, significant weight loss, and loss of sensations.    Pain Disability Index (PDI) Score Review:      10/22/2024    10:11 AM 6/26/2024     9:59 AM 2/5/2024     7:50 AM   Last 3 PDI Scores   Pain Disability Index (PDI) 42 45 30       Non-Pharmacologic Treatments:  Physical Therapy/Home Exercise: yes  Ice/Heat:yes  TENS: no  Acupuncture: no  Massage: no  Chiropractic: no    Other: no      Pain Medications:  - Adjuvant Medications: Cyclobenzaprine (Flexeril) and Tylenol (Acetaminophen) Diclofenac tablet      Pain Procedures:   -12/12/2023: Bilateral L3-5 MBB with 90% pain relief  -11/8/2024: Bilateral L5/S1 + S1 TF SUSY with 70% pain relief        Review of Systems:  GENERAL:  No weight loss, malaise or fevers.  HEENT:    "No recent changes in vision or hearing  NECK:  Negative for lumps, no difficulty with swallowing.  RESPIRATORY:  Negative for cough, wheezing or shortness of breath, patient denies any recent URI.  CARDIOVASCULAR:  Negative for chest pain or palpitations.  GI:  Negative for abdominal discomfort, blood in stools or black stools or change in bowel habits.  MUSCULOSKELETAL:  See HPI.  SKIN:  Negative for lesions, rash, and itching.  PSYCH:  No mood disorder or recent psychosocial stressors.   HEMATOLOGY/LYMPHOLOGY:  Negative for prolonged bleeding, bruising easily or swollen nodes.    NEURO:   No history of syncope, paralysis, seizures or tremors.  All other reviewed and negative other than HPI.      OBJECTIVE:    Telemedicine Physical Exam:   Vitals:    12/03/24 0924   Height: 5' 4" (1.626 m)  Comment: Patient reported     Body mass index is 40.15 kg/m².   (reviewed on 12/3/2024)     (Physical exam performed virtually with patient guided on specific actions and diagnostic maneuvers)  GENERAL: Well appearing, in no acute distress, alert and oriented x3.  Cooperative.  PSYCH:  Mood and affect appropriate.  SKIN: Skin color & texture with no obvious abnormalities.    HEAD/FACE:  Normocephalic, atraumatic.    PULM:  No difficulty breathing. No nasal flaring. No obvious wheezing.  EXTREMITIES: No obvious deformities. Moving all extremities well, appears to have symmetric strength throughout.  MUSCULOSKELETAL: No obvious atrophy abnormalities are noted.   NEURO: No obvious neurologic deficit.   GAIT: sitting.     Physical Exam: last in clinic visit:  GENERAL: Well appearing, in no acute distress, alert and oriented x3.  PSYCH:  Mood and affect appropriate.  SKIN: Skin color, texture, turgor normal, no rashes or lesions.  HEAD/FACE:  Normocephalic, atraumatic.  PULMONARY: no audible wheezing.     NECK: no pain to palpation over the cervical paraspinous muscles. Spurling Negative. Mild pain with neck flexion, extension, or " lateral flexion. Normal testing biceps, triceps and brachioradialis bilaterally.  Cervical facet loading causes pain bilaterally.  LUMBAR:  Pain with flexion.  Facet loading is equivocal.  TTP diffusely over lumbar paraspinals.  SLR equivocal bilaterally. Limited ROM secondary to pain reproduction.    5/5 strength testing deltoid, biceps, triceps, wrist extensor, wrist flexor and ulnar intrinsics bilaterally.    Normal  strength bilaterally  MUSCULOSKELETAL:  Mild swelling along left ankle, moderate swelling to right ankle redness and swelling along the base of her left great toe swelling along right great toe, bilateral bunions along great toes  PULM: No evidence of respiratory difficulty, symmetric chest rise.  GI:  Soft and non-tender.    NEURO: BUE & BLE coordination and muscle stretch reflexes are physiologic and symmetric. No loss of sensation is noted.  GAIT: normal.        Imaging/ Diagnostic Studies/ Labs (Reviewed on 12/3/2024):    11/06/2023 MRI Lumbar Spine Without Contrast  COMPARISON:  Plain films of the lumbar spine from 03/06/2014.    FINDINGS:  There is equivocal 1-2 mm of anterolisthesis of L4 on L5. The vertebral body heights are well maintained, with no fracture.  No marrow signal abnormality suspicious for an infiltrative process.    The conus medullaris terminates at approximately the L1-L2 disk space.  There is a probable small cyst seen within the upper pole to interpolar right kidney that measures approximately 6 mm in size.  Consider further evaluation with ultrasound for confirmation.  The discs are relatively well hydrated with only some minimal disc desiccation seen along the periphery of the L5-S1 disc and slightly more centrally at the L2-3 disc.    L1-L2: No significant central canal or neural foraminal narrowing.Mild bilateral facet arthropathy noted.    L2-L3: No significant central canal or neural foraminal narrowing.Mild-to-moderate bilateral facet arthropathy noted.    L3-L4:  No significant central canal or neural foraminal narrowing.Moderate to severe bilateral facet arthropathy noted.    L4-L5:  Minimal diffuse disc bulge along with equivocal grade 1 spondylolisthesis and severe bilateral facet arthropathy resulting in mild effacement of the ventral thecal sac.  No significant neural foraminal canal narrowing.    L5-S1:  Mild diffuse disc bulge slightly more prominent the left paracentral region along with moderate bilateral facet arthropathy resulting in no significant central or neural foraminal canal narrowing.    Impression:   1. Multilevel degenerative changes of the lumbar spine as detailed above.  No high-grade central or neural foraminal canal narrowing noted.  Multilevel bilateral facet arthropathy.  2. Incidental note made of a probable 6 mm right renal cyst.  Consider ultrasound for confirmation.        11/06/2023 MRI Cervical Spine Without Contrast  COMPARISON:  Plain films from 04/13/2021.  Report from outside MRI dated 08/09/2022.  Films unavailable for comparison.    FINDINGS:  There is a couple mm of anterolisthesis of C2 on C3 and C3 on C4. No fracture. No marrow signal abnormality suspicious for an infiltrative process.  There is disc desiccation noted throughout the cervical spine with mild disc space narrowing seen at C3-4, C4-5 and C7-C6 with more mild-to-moderate disc space narrowing seen at C5-6.    The cervical cord is normal in caliber and signal characteristics.  The craniocervical junction and visualized intracranial contents are unremarkable.  The adjacent soft tissue structures show no significant abnormalities.    C2-C3:  No significant central canal or neural foraminal narrowing.    C3-C4:  There is a diffuse disc bulge along with some posterior spondylotic ridging that results in effacement of ventral thecal sac and moderate effacement of the ventral cord.  The AP dimension of the central canal this level measures approximately 9 mm.  No significant  neural foraminal canal narrowing suggested.    C4-C5:  Diffuse disc bulge and posterior spondylotic ridging more prominent in the left paracentral region resulting in significant effacement of the thecal sac and moderate face mint of the left side of the cord.  The AP dimension of the central canal at this level also measures approximately 9 mm.  No significant neural foraminal canal narrowing.    C5-C6:  Diffuse disc bulge resulting in effacement of ventral thecal sac but not quite abutting the cord.  The AP dimension of the central canal measures approximately 10 mm at this level.  The right C5-6 neural foraminal canal is mildly narrowed secondary to uncovertebral joint hypertrophy.  No significant left neural foraminal canal narrowing.    C6-C7:  Mild diffuse disc bulge resulting in mild effacement of ventral thecal sac.  No abutment of the anterior cord.  The AP dimension of the central canal at this level measures approximately 10.5 mm.  The right C6-7 neural foraminal canal appears to be minimally narrowed secondary to uncovertebral joint hypertrophy.    C7-T1:   Mild diffuse disc bulge resulting in no significant central canal narrowing.  The right neural foraminal canal appears to be at least mildly narrowed.  Impression:   1. Multilevel degenerative changes of the cervical spine as detailed above.      03/04/14 X-Ray Lumbar Spine Ap And Lateral  Five non-rib bearing lumbar type vertebrae are present without significant lateral curvature abnormality or subluxation.  Degenerative endplate changes as well as facet joint hypertrophy, particularly inferiorly, are noted without acute fracture or  suspicious focal bony lesion identified.  Disk spaces are fairly well-maintained.       04/13/21 X-Ray Cervical Spine Complete 5 view  Patient's hair limits the exam.  There is visualization to the C7-T1 level on the swimmer's view.  Multilevel marginal spurring and varying degrees of uniform loss of disc height  "throughout the C-spine.  No fracture or subluxation.  Pre dens space, prevertebral soft tissues, C1-2 articulation and odontoid normal in appearance allowing for positioning.  Neural foramina widely patent bilaterally at all levels.  Remaining visualized osseous structures intact.  Included upper lung fields clear.      BLE EMG 10/2021  1. ABNORMAL study  2. There is electrodiagnostic evidence of an acute on chronic radiculopathy of the L5 and S1 nerve roots-slightly worse on the right        ASSESSMENT: 69 y.o. year old female with neck, mid back, bilateral ankles pain, consistent with     1. Bilateral lumbar radiculopathy        2. DDD (degenerative disc disease), cervical        3. Cervical spondylosis        4. Chronic left shoulder pain        5. Headache, cervicogenic                      PLAN:   - Interventions:    - S/p Bilateral L5/S1 + S1 TF SUSY on 11/8/24 with 70% pain relief.    - Hold off on Diagnostic bilateral C4-6 MBB #1. She wants to wait on this. Consider rescheduling in the future.     - Consider left suprascapular/ axillary nerve block for shoulder pain.    - Anticoagulation use: no no anticoagulation    - Medications:  - Refill Lyrica (pregabalin) 75mg QHS; cannot tolerate BID due to drowsiness.  - Continue Flexeril 10mg PRN myofascial pain.       -Previously stopped Gabapentin due to adverse side effects (made her "feel crazy") and no improvement in symptoms.      - LA  reviewed and appropriate.       - Consults/referral:    -Continue follow-up with PCP: uncontrolled HTN.  -Continue follow-up with Dr. Espinal (Orthopedics) in regards to left shoulder pain (although she wants to avoid sx right now).     - Therapy: Start PT with passive modalities, including ice and heat, and active modalities, including a regimen for stretching and strengthening.  [facility] sent [facility] closer to her home.  She went [facility] in the past and did not find it was helpful.    - Diagnostic/ Imaging: "  Diagnostic imaging (lumbar & cervical MRI 11/06/2023) and diagnostic testing (lower extremity electromyography studies) reviewed and discussed with the patient.    - Follow up visit:  3 months follow-up - virtual visit?    Future Appointments   Date Time Provider Department Center   12/11/2024  8:40 AM Micah Hanna MD [facility]   3/3/2025  1:20 PM [facility]   10/9/2025  9:00 AM [address]   10/9/2025 10:15 AM Sherita Duran MD [facility]       Visit today included increased complexity associated with the care of the episodic problem of chronic pain which was addressed and continue to manage the longitudinal care of the patient due to the serious and/or complex managed problem(s) listed above.    - Patient Questions: Answered all of the patient's questions regarding diagnosis, therapy, and treatment.    - This condition does not require this patient to take time off of work, and the primary goal of our Pain Management services is to improve the patient's functional capacity.   - I discussed the risks, benefits, and alternatives to potential treatment options. All questions and concerns were fully addressed today in clinic.         Brenna Johnson PA-C  Interventional Pain Management - [facility]    Disclaimer:  This note was prepared using voice recognition system and is likely to have sound alike errors that may have been overlooked even after proof reading.  Please call me with any questions.

## 2024-12-02 NOTE — PROGRESS NOTES
Reviewed.  Constitutional: Well-appearing. Well-developed. No acute distress.  Cardiovascular: Normal rate and regular rhythm. Normal heart sounds.  Pulmonary: Pulmonary effort is normal. Normal breath sounds.  Abdominal: Bowel sounds are normal. Abdomen is soft. No tenderness.  Musculoskeletal: No muscle tenderness. No joint tenderness. Normal range of motion.  Skin: Warm. Dry.  Neurological: No focal deficit is present. Alert and oriented to person, place, and time.  Psychiatric: Mood is normal. Behavior is normal. Behavior is cooperative.  Vitals: BLOOD PRESSURE /69.         Assessment & Plan    I10 Primary Hypertension  M54.16 Radiculopathy, lumbar region  M50.30 Other cervical disc degeneration, unspecified cervical region  M54.50 Low back pain, unspecified  R39.15 Urgency of urination    HYPERTENSION:  - Noted elevated blood pressure (165/69).  - Reviewed current antihypertensive regimen.  - Assessed patient's decision to discontinue doxazosin due to misunderstanding about its use.  - Emphasized the importance of continuing doxazosin despite its use in prostate conditions, as it is also effective for hypertension.  - Restarted doxazosin 2 mg at bedtime.  - Continued carvedilol 12.5 mg twice daily.  - Continued almastortin/hydrochlorothiazide 40-25 mg daily.  - Referred to cardiology at [facility].  -Need two week BP check    LUMBAR RADICULOPATHY AND CERVICAL DISC DEGENERATION:  - Evaluated improvement in back pain and leg radiation following Medrol Dosepak and Lyrica initiation.  - Continued Lyrica 25 mg at bedtime.    URINARY URGENCY:  - Determined need for urinalysis and urine culture to investigate abnormal urine odor.  - Ordered urinalysis.  - Ordered urine culture.  - Referred to gynecology if urinalysis/culture is normal.    FOLLOW-UP:  - Follow up with pain management tomorrow regarding lumbar radiculopathy and degenerative disc disease of the cervical spine.              Follow up in about 3 months  (around 3/2/2025).    This note was generated with the assistance of ambient listening technology. Verbal consent was obtained by the patient and accompanying visitor(s) for the recording of patient appointment to facilitate this note. I attest to having reviewed and edited the generated note for accuracy, though some syntax or spelling errors may persist. Please contact the author of this note for any clarification.

## 2024-12-03 ENCOUNTER — OFFICE VISIT (OUTPATIENT)
Dept: PAIN MEDICINE | Facility: CLINIC | Age: 69
End: 2024-12-03
Payer: MEDICARE

## 2024-12-03 VITALS — HEIGHT: 64 IN | BODY MASS INDEX: 40.15 KG/M2

## 2024-12-03 DIAGNOSIS — M25.512 CHRONIC LEFT SHOULDER PAIN: ICD-10-CM

## 2024-12-03 DIAGNOSIS — M50.30 DDD (DEGENERATIVE DISC DISEASE), CERVICAL: ICD-10-CM

## 2024-12-03 DIAGNOSIS — G44.86 HEADACHE, CERVICOGENIC: ICD-10-CM

## 2024-12-03 DIAGNOSIS — M54.16 BILATERAL LUMBAR RADICULOPATHY: Primary | ICD-10-CM

## 2024-12-03 DIAGNOSIS — M47.812 CERVICAL SPONDYLOSIS: ICD-10-CM

## 2024-12-03 DIAGNOSIS — G89.29 CHRONIC LEFT SHOULDER PAIN: ICD-10-CM

## 2024-12-03 PROCEDURE — G2211 COMPLEX E/M VISIT ADD ON: HCPCS | Mod: 95,,, | Performed by: PHYSICIAN ASSISTANT

## 2024-12-03 PROCEDURE — 1160F RVW MEDS BY RX/DR IN RCRD: CPT | Mod: CPTII,95,, | Performed by: PHYSICIAN ASSISTANT

## 2024-12-03 PROCEDURE — 3008F BODY MASS INDEX DOCD: CPT | Mod: CPTII,95,, | Performed by: PHYSICIAN ASSISTANT

## 2024-12-03 PROCEDURE — 3044F HG A1C LEVEL LT 7.0%: CPT | Mod: CPTII,95,, | Performed by: PHYSICIAN ASSISTANT

## 2024-12-03 PROCEDURE — 1159F MED LIST DOCD IN RCRD: CPT | Mod: CPTII,95,, | Performed by: PHYSICIAN ASSISTANT

## 2024-12-03 PROCEDURE — 99214 OFFICE O/P EST MOD 30 MIN: CPT | Mod: 95,,, | Performed by: PHYSICIAN ASSISTANT

## 2024-12-04 LAB
BACTERIA UR CULT: NORMAL
BACTERIA UR CULT: NORMAL

## 2024-12-11 ENCOUNTER — LAB VISIT (OUTPATIENT)
Dept: LAB | Facility: HOSPITAL | Age: 69
End: 2024-12-11
Attending: STUDENT IN AN ORGANIZED HEALTH CARE EDUCATION/TRAINING PROGRAM
Payer: MEDICARE

## 2024-12-11 ENCOUNTER — OFFICE VISIT (OUTPATIENT)
Dept: CARDIOLOGY | Facility: CLINIC | Age: 69
End: 2024-12-11
Payer: MEDICARE

## 2024-12-11 VITALS
BODY MASS INDEX: 40.19 KG/M2 | OXYGEN SATURATION: 99 % | HEIGHT: 64 IN | DIASTOLIC BLOOD PRESSURE: 80 MMHG | HEART RATE: 60 BPM | WEIGHT: 235.44 LBS | SYSTOLIC BLOOD PRESSURE: 140 MMHG

## 2024-12-11 DIAGNOSIS — K21.9 GASTROESOPHAGEAL REFLUX DISEASE, UNSPECIFIED WHETHER ESOPHAGITIS PRESENT: ICD-10-CM

## 2024-12-11 DIAGNOSIS — I10 PRIMARY HYPERTENSION: ICD-10-CM

## 2024-12-11 DIAGNOSIS — N18.31 CHRONIC KIDNEY DISEASE, STAGE 3A: ICD-10-CM

## 2024-12-11 DIAGNOSIS — C73 PAPILLARY THYROID CARCINOMA: Primary | ICD-10-CM

## 2024-12-11 DIAGNOSIS — I1A.0 RESISTANT HYPERTENSION: ICD-10-CM

## 2024-12-11 DIAGNOSIS — E66.01 MORBID OBESITY: ICD-10-CM

## 2024-12-11 DIAGNOSIS — E89.0 POST-OPERATIVE HYPOTHYROIDISM: ICD-10-CM

## 2024-12-11 DIAGNOSIS — E78.2 MIXED HYPERLIPIDEMIA: ICD-10-CM

## 2024-12-11 DIAGNOSIS — M47.26 OSTEOARTHRITIS OF SPINE WITH RADICULOPATHY, LUMBAR REGION: ICD-10-CM

## 2024-12-11 PROCEDURE — 84244 ASSAY OF RENIN: CPT | Performed by: STUDENT IN AN ORGANIZED HEALTH CARE EDUCATION/TRAINING PROGRAM

## 2024-12-11 PROCEDURE — 82384 ASSAY THREE CATECHOLAMINES: CPT | Performed by: STUDENT IN AN ORGANIZED HEALTH CARE EDUCATION/TRAINING PROGRAM

## 2024-12-11 PROCEDURE — 36415 COLL VENOUS BLD VENIPUNCTURE: CPT | Performed by: STUDENT IN AN ORGANIZED HEALTH CARE EDUCATION/TRAINING PROGRAM

## 2024-12-11 PROCEDURE — 99999 PR PBB SHADOW E&M-EST. PATIENT-LVL IV: CPT | Mod: PBBFAC,,, | Performed by: STUDENT IN AN ORGANIZED HEALTH CARE EDUCATION/TRAINING PROGRAM

## 2024-12-11 PROCEDURE — 99204 OFFICE O/P NEW MOD 45 MIN: CPT | Mod: S$GLB,,, | Performed by: STUDENT IN AN ORGANIZED HEALTH CARE EDUCATION/TRAINING PROGRAM

## 2024-12-11 PROCEDURE — 1159F MED LIST DOCD IN RCRD: CPT | Mod: CPTII,S$GLB,, | Performed by: STUDENT IN AN ORGANIZED HEALTH CARE EDUCATION/TRAINING PROGRAM

## 2024-12-11 PROCEDURE — 3008F BODY MASS INDEX DOCD: CPT | Mod: CPTII,S$GLB,, | Performed by: STUDENT IN AN ORGANIZED HEALTH CARE EDUCATION/TRAINING PROGRAM

## 2024-12-11 PROCEDURE — 3079F DIAST BP 80-89 MM HG: CPT | Mod: CPTII,S$GLB,, | Performed by: STUDENT IN AN ORGANIZED HEALTH CARE EDUCATION/TRAINING PROGRAM

## 2024-12-11 PROCEDURE — 1101F PT FALLS ASSESS-DOCD LE1/YR: CPT | Mod: CPTII,S$GLB,, | Performed by: STUDENT IN AN ORGANIZED HEALTH CARE EDUCATION/TRAINING PROGRAM

## 2024-12-11 PROCEDURE — 3077F SYST BP >= 140 MM HG: CPT | Mod: CPTII,S$GLB,, | Performed by: STUDENT IN AN ORGANIZED HEALTH CARE EDUCATION/TRAINING PROGRAM

## 2024-12-11 PROCEDURE — 3288F FALL RISK ASSESSMENT DOCD: CPT | Mod: CPTII,S$GLB,, | Performed by: STUDENT IN AN ORGANIZED HEALTH CARE EDUCATION/TRAINING PROGRAM

## 2024-12-11 PROCEDURE — 83835 ASSAY OF METANEPHRINES: CPT | Performed by: STUDENT IN AN ORGANIZED HEALTH CARE EDUCATION/TRAINING PROGRAM

## 2024-12-11 PROCEDURE — 3044F HG A1C LEVEL LT 7.0%: CPT | Mod: CPTII,S$GLB,, | Performed by: STUDENT IN AN ORGANIZED HEALTH CARE EDUCATION/TRAINING PROGRAM

## 2024-12-11 RX ORDER — DOXAZOSIN 8 MG/1
8 TABLET ORAL NIGHTLY
Qty: 30 TABLET | Refills: 6 | Status: SHIPPED | OUTPATIENT
Start: 2024-12-11

## 2024-12-11 NOTE — PROGRESS NOTES
Section of Cardiology                  Cardiac Clinic Note    Chief Complaint/Reason for consultation: HTN      HPI:   Mariah Meza is a 69 y.o. female with h/o obesity, HTN, HLD, GERD, chronic back pain who was referred to cardiology clinic by LYDIA Bob for evaluation.     12/11/24  Was seeing Dr. Major in cardiology , but wanting to switch providers  Says BP has been high  Was having side effects from medications   Takes clonidine when BP is >160, takes at night because makes her sleep  Tries not to add salt, and watching salty foods  Eats mostly at home  1 coffee a day, but decaf  Water and fruit drinks  Exercises at times, but havnig back pain- doing PT (but not helping)  Did get injection in back Nov 8 but not helping- always in pain at home   SOB with exercising- usually in pain with this  No palpitations       Denies chest pain, syncope, LE swelling      Stopped smoking >20 years   No ETOH abuse    Family hx: mom- heart disease, DM        EKG 10/21/24 SB, no acute ST - T wave changes    ECHO  No results found for this or any previous visit.       STRESS TEST No results found for this or any previous visit.       LHC No results found for this or any previous visit.            ROS: All 10 systems reviewed. Please refer to the HPI for pertinent positives. All other systems negative.     Past Medical History  Past Medical History:   Diagnosis Date    Chronic rhinitis     DJD (degenerative joint disease), lumbar     GERD (gastroesophageal reflux disease)     Hard to intubate 12/20/2021    Headache     Hiatal hernia     Hyperlipidemia     Hypertension     Liver disease     Fatty Liver    Low back pain     Obesity     PONV (postoperative nausea and vomiting)     Thyroid disease        Surgical History  Past Surgical History:   Procedure Laterality Date    BILATERAL OOPHORECTOMY  2002    CARPAL TUNNEL RELEASE      Left wrist    CHOLECYSTECTOMY      DISSECTION OF NECK Bilateral 12/20/2021     Procedure: DISSECTION, NECK;  Surgeon: Deejay Olsen MD;  Location: [facility] OR;  Service: ENT;  Laterality: Bilateral;  Central neck dissection    HYSTERECTOMY  1984    INJECTION OF ANESTHETIC AGENT AROUND MEDIAL BRANCH NERVES INNERVATING LUMBAR FACET JOINT Bilateral 2023    Procedure: Bilateral L3-5 * MBB #1 with RN IV sedation;  Surgeon: Franklin Sutton MD;  Location: [facility] PAIN MGT;  Service: Pain Management;  Laterality: Bilateral;    NECK EXPLORATION Bilateral 2021    Procedure: EXPLORATION, NECK;  Surgeon: Deejay Olsen MD;  Location: [facility] OR;  Service: ENT;  Laterality: Bilateral;  Parathyroid exploration    ROTATOR CUFF REPAIR      Right shoulder    ROTATOR CUFF REPAIR Left 2010    SELECTIVE INJECTION OF ANESTHETIC AGENT AROUND LUMBAR SPINAL NERVE ROOT BY TRANSFORAMINAL APPROACH Bilateral 2024    Procedure: Bilateral L5/S1 + S1 TF SUSY;  Surgeon: Franklin Sutton MD;  Location: [facility] PAIN MGT;  Service: Pain Management;  Laterality: Bilateral;    THYROIDECTOMY, BILATERAL Bilateral 2021    Procedure: THYROIDECTOMY,BILATERAL;  Surgeon: Deejay Olsen MD;  Location: [facility] OR;  Service: ENT;  Laterality: Bilateral;    TOTAL VAGINAL HYSTERECTOMY            Allergies:   Review of patient's allergies indicates:   Allergen Reactions    Amlodipine Edema    Nifedipine Edema    Iodine and iodide containing products Itching    Shellfish containing products Swelling    Statins-hmg-coa reductase inhibitors Other (See Comments)     Myalgias    Adhesive Dermatitis    Iodine Other (See Comments)    Ivp dye [iodinated contrast media] Other (See Comments)       Social History:  Social History     Socioeconomic History    Marital status:    Tobacco Use    Smoking status: Former     Current packs/day: 0.00     Average packs/day: 0.5 packs/day for 8.0 years (4.0 ttl pk-yrs)     Types: Cigarettes     Start date: 1971     Quit date: 1979     Years since quittin.9     Passive exposure: Never    Smokeless  tobacco: Never   Substance and Sexual Activity    Alcohol use: No    Drug use: Never    Sexual activity: Never     Partners: Male     Birth control/protection: See Surgical Hx   Social History Narrative    The patient is  and has 2 adult children.  Patient works in  for the [employer].     Social Drivers of Health     Financial Resource Strain: Low Risk  (9/3/2024)    Overall Financial Resource Strain (CARDIA)     Difficulty of Paying Living Expenses: Not hard at all   Food Insecurity: No Food Insecurity (9/3/2024)    Hunger Vital Sign     Worried About Running Out of Food in the Last Year: Never true     Ran Out of Food in the Last Year: Never true   Transportation Needs: No Transportation Needs (9/3/2024)    PRAPARE - Transportation     Lack of Transportation (Medical): No     Lack of Transportation (Non-Medical): No   Physical Activity: Insufficiently Active (9/3/2024)    Exercise Vital Sign     Days of Exercise per Week: 5 days     Minutes of Exercise per Session: 10 min   Stress: Stress Concern Present (9/3/2024)    [employer] [place] [employer] - Occupational Stress Questionnaire     Feeling of Stress : To some extent   Housing Stability: Low Risk  (9/3/2024)    Housing Stability Vital Sign     Unable to Pay for Housing in the Last Year: No     Homeless in the Last Year: No       Family History:  family history includes Diabetes in her mother and sister; Hearing loss in her mother; Heart attack in her mother; Heart disease in her mother; Hyperlipidemia in her mother; Hypertension in her mother; Lupus in an other family member; Sickle cell anemia in an other family member; Stroke in her mother.    Home Medications:  Current Outpatient Medications on File Prior to Visit   Medication Sig Dispense Refill    acetaminophen (TYLENOL) 650 MG TbSR Take 650 mg by mouth daily as needed.      calcium carbonate 470 mg calcium (1,177 mg) Chew Take one tablet by mouth twice daily 60 each 0     carvediloL (COREG) 12.5 MG tablet Take 1 tablet (12.5 mg total) by mouth 2 (two) times daily with meals. 180 tablet 3    cloNIDine (CATAPRES) 0.1 MG tablet Take by mouth.      cyanocobalamin 500 MCG tablet Take 500 mcg by mouth once daily.      cyclobenzaprine (FLEXERIL) 10 MG tablet Take 1 tablet (10 mg total) by mouth every evening. 90 tablet 1    dicyclomine (BENTYL) 10 MG capsule Take 10 mg by mouth.      docusate sodium (COLACE) 100 MG capsule Take 1 capsule (100 mg total) by mouth 3 (three) times daily as needed for Constipation. 30 capsule 0    ferrous sulfate 325 (65 FE) MG EC tablet Take 325 mg by mouth once daily.      hydrOXYzine HCL (ATARAX) 25 MG tablet Take 1 tablet (25 mg total) by mouth 3 (three) times daily as needed for Itching. 15 tablet 0    levothyroxine (SYNTHROID) 100 MCG tablet Take 1 tablet (100 mcg total) by mouth before breakfast. 90 tablet 3    magnesium oxide (MAG-OX) 400 mg (241.3 mg magnesium) tablet Take 400 mg by mouth once daily.      olmesartan-hydrochlorothiazide (BENICAR HCT) 40-25 mg per tablet Take 1 tablet by mouth once daily. 90 tablet 3    pantoprazole (PROTONIX) 40 MG tablet Take 1 tablet (40 mg total) by mouth once daily. 30 tablet 5    pregabalin (LYRICA) 75 MG capsule Take 1 capsule (75 mg total) by mouth every evening for 5 days, THEN 1 capsule (75 mg total) 2 (two) times daily. 120 capsule 0    psyllium husk/aspartame (METAMUCIL FIBER SINGLES ORAL) Take by mouth.      vitamin D (VITAMIN D3) 1000 units Tab Take 1,000 Units by mouth once daily.      vitamin E 400 UNIT capsule Take 400 Units by mouth once daily.      wheat dextrin/L.acid/aspartame (FIBER WITH PROBIOTIC ORAL) Take by mouth Daily.      [DISCONTINUED] doxazosin (CARDURA) 2 MG tablet Take 1 tablet (2 mg total) by mouth every evening. 90 tablet 3     No current facility-administered medications on file prior to visit.       Physical exam:  BP (!) 140/80 (BP Location: Right arm, Patient Position: Sitting)    "Pulse 60   Ht 5' 4" (1.626 m)   Wt 106.8 kg (235 lb 7.2 oz)   LMP 02/10/1984 (Approximate)   SpO2 99%   BMI 40.42 kg/m²         General: Pt is a 69 y.o. year old female who is AAOx3, in NAD, is pleasant, well nourished, looks stated age  HEENT: PERRL, EOMI, Oral mucosa pink & moist  CVS  No abnormal cardiac pulsations noted on inspection. JVP not raised. The apical impulse is normal on palpation, and is located in the left 5th intercostal space in the mid - clavicular line. No palpable thrills or abnormal pulsations noted. RR, S1 - S2 heard, no murmurs, rubs or gallops appreciated.   PUL : CTA B/L. No wheezes/crackles heard   ABD : BS +, soft. No tenderness elicited   LE : No C/C/E. Distal Pulses palpable B/L         LABS:    Chemistry:   Lab Results   Component Value Date     10/21/2024    K 3.5 10/21/2024     10/21/2024    CO2 27 10/21/2024    BUN 18 10/21/2024    CREATININE 1.1 10/21/2024    CALCIUM 9.5 10/21/2024     Cardiac Markers:   Lab Results   Component Value Date    TROPONINI <0.006 10/21/2024     Cardiac Markers (Last 3):   Lab Results   Component Value Date    TROPONINI <0.006 10/21/2024    TROPONINI 0.009 01/22/2024    TROPONINI <0.006 12/12/2023     CBC:   Lab Results   Component Value Date    WBC 4.33 10/21/2024    HGB 11.5 (L) 10/21/2024    HCT 34.1 (L) 10/21/2024    MCV 90 10/21/2024     10/21/2024     Lipids:   Lab Results   Component Value Date    CHOL 196 03/11/2024    TRIG 93 03/11/2024    HDL 47 03/11/2024     Coagulation: No results found for: "PT", "INR", "APTT"        Assessment        1. Papillary thyroid carcinoma    2. Primary hypertension    3. Mixed hyperlipidemia    4. Chronic kidney disease, stage 3a    5. Morbid obesity    6. Osteoarthritis of spine with radiculopathy, lumbar region    7. Post-operative hypothyroidism    8. Gastroesophageal reflux disease, unspecified whether esophagitis present    9. Primary hypertension    10. Resistant hypertension     "     Plan:    Hypertension   Resistant, elevated   Patient with allergies to multiple medications   Will obtain renal artery ultrasound to rule out stenosis   Increase Cardura to 8 mg nightly   Continue carvedilol 12.5 mg b.i.d., Benicar/HCTZ  Clonidine p.r.n. for blood pressure > 160  Check blood pressure labs    Papillary thyroid carcinoma   s/p thyroidectomy   Continue Synthroid     CKD   Worsening recently (new onset)   Possibly due to elevated blood pressures   Renal ultrasound as above     Osteoarthritis   Significant back pain   This could be contributing to elevated blood pressure    Obesity, Body mass index is 40.42 kg/m².   Low salt, low fat diet  Exercise as tolerated, at least 30 min daily     This note was prepared using voice recognition system and is likely to have sound alike errors that may have been overlooked even after proofreading.     I have reviewed all pertinent chart information.  Plans and recommendations have been formulated under my direct supervision. All questions answered and patient voiced understanding.   If symptoms persist go to the ED.    RTC in 1wk        Micah Hanna MD  Cardiology

## 2024-12-13 ENCOUNTER — HOSPITAL ENCOUNTER (OUTPATIENT)
Dept: RADIOLOGY | Facility: HOSPITAL | Age: 69
Discharge: HOME | End: 2024-12-13
Attending: STUDENT IN AN ORGANIZED HEALTH CARE EDUCATION/TRAINING PROGRAM
Payer: MEDICARE

## 2024-12-13 DIAGNOSIS — I1A.0 RESISTANT HYPERTENSION: ICD-10-CM

## 2024-12-13 PROCEDURE — 93975 VASCULAR STUDY: CPT | Mod: 26,,, | Performed by: RADIOLOGY

## 2024-12-13 PROCEDURE — 76770 US EXAM ABDO BACK WALL COMP: CPT | Mod: TC

## 2024-12-13 PROCEDURE — 76770 US EXAM ABDO BACK WALL COMP: CPT | Mod: 26,59,, | Performed by: RADIOLOGY

## 2024-12-16 LAB
ALDOST SERPL-MCNC: 11.9 NG/DL
ALDOST/RENIN PLAS-RTO: 59.5 RATIO
RENIN PLAS-CCNC: 0.2 NG/ML/HR

## 2024-12-19 ENCOUNTER — OFFICE VISIT (OUTPATIENT)
Dept: CARDIOLOGY | Facility: CLINIC | Age: 69
End: 2024-12-19
Payer: MEDICARE

## 2024-12-19 VITALS
HEART RATE: 56 BPM | SYSTOLIC BLOOD PRESSURE: 160 MMHG | OXYGEN SATURATION: 97 % | WEIGHT: 236.75 LBS | BODY MASS INDEX: 40.42 KG/M2 | HEIGHT: 64 IN | DIASTOLIC BLOOD PRESSURE: 82 MMHG

## 2024-12-19 DIAGNOSIS — E89.0 POST-OPERATIVE HYPOTHYROIDISM: ICD-10-CM

## 2024-12-19 DIAGNOSIS — C73 PAPILLARY THYROID CARCINOMA: ICD-10-CM

## 2024-12-19 DIAGNOSIS — E78.2 MIXED HYPERLIPIDEMIA: ICD-10-CM

## 2024-12-19 DIAGNOSIS — I10 PRIMARY HYPERTENSION: Primary | ICD-10-CM

## 2024-12-19 DIAGNOSIS — I10 PRIMARY HYPERTENSION: ICD-10-CM

## 2024-12-19 DIAGNOSIS — E66.01 MORBID OBESITY: ICD-10-CM

## 2024-12-19 DIAGNOSIS — I1A.0 RESISTANT HYPERTENSION: ICD-10-CM

## 2024-12-19 DIAGNOSIS — K21.9 GASTROESOPHAGEAL REFLUX DISEASE, UNSPECIFIED WHETHER ESOPHAGITIS PRESENT: ICD-10-CM

## 2024-12-19 DIAGNOSIS — N18.31 CHRONIC KIDNEY DISEASE, STAGE 3A: ICD-10-CM

## 2024-12-19 DIAGNOSIS — M47.26 OSTEOARTHRITIS OF SPINE WITH RADICULOPATHY, LUMBAR REGION: ICD-10-CM

## 2024-12-19 PROCEDURE — 3044F HG A1C LEVEL LT 7.0%: CPT | Mod: CPTII,S$GLB,, | Performed by: STUDENT IN AN ORGANIZED HEALTH CARE EDUCATION/TRAINING PROGRAM

## 2024-12-19 PROCEDURE — 3288F FALL RISK ASSESSMENT DOCD: CPT | Mod: CPTII,S$GLB,, | Performed by: STUDENT IN AN ORGANIZED HEALTH CARE EDUCATION/TRAINING PROGRAM

## 2024-12-19 PROCEDURE — 99214 OFFICE O/P EST MOD 30 MIN: CPT | Mod: S$GLB,,, | Performed by: STUDENT IN AN ORGANIZED HEALTH CARE EDUCATION/TRAINING PROGRAM

## 2024-12-19 PROCEDURE — 3077F SYST BP >= 140 MM HG: CPT | Mod: CPTII,S$GLB,, | Performed by: STUDENT IN AN ORGANIZED HEALTH CARE EDUCATION/TRAINING PROGRAM

## 2024-12-19 PROCEDURE — 3008F BODY MASS INDEX DOCD: CPT | Mod: CPTII,S$GLB,, | Performed by: STUDENT IN AN ORGANIZED HEALTH CARE EDUCATION/TRAINING PROGRAM

## 2024-12-19 PROCEDURE — 99999 PR PBB SHADOW E&M-EST. PATIENT-LVL IV: CPT | Mod: PBBFAC,,, | Performed by: STUDENT IN AN ORGANIZED HEALTH CARE EDUCATION/TRAINING PROGRAM

## 2024-12-19 PROCEDURE — 1101F PT FALLS ASSESS-DOCD LE1/YR: CPT | Mod: CPTII,S$GLB,, | Performed by: STUDENT IN AN ORGANIZED HEALTH CARE EDUCATION/TRAINING PROGRAM

## 2024-12-19 PROCEDURE — 3079F DIAST BP 80-89 MM HG: CPT | Mod: CPTII,S$GLB,, | Performed by: STUDENT IN AN ORGANIZED HEALTH CARE EDUCATION/TRAINING PROGRAM

## 2024-12-19 PROCEDURE — 1159F MED LIST DOCD IN RCRD: CPT | Mod: CPTII,S$GLB,, | Performed by: STUDENT IN AN ORGANIZED HEALTH CARE EDUCATION/TRAINING PROGRAM

## 2024-12-19 PROCEDURE — 1125F AMNT PAIN NOTED PAIN PRSNT: CPT | Mod: CPTII,S$GLB,, | Performed by: STUDENT IN AN ORGANIZED HEALTH CARE EDUCATION/TRAINING PROGRAM

## 2024-12-19 RX ORDER — DOXAZOSIN 4 MG/1
4 TABLET ORAL NIGHTLY
Start: 2024-12-19

## 2024-12-19 RX ORDER — SPIRONOLACTONE 25 MG/1
12.5 TABLET ORAL DAILY
Qty: 15 TABLET | Refills: 6 | Status: SHIPPED | OUTPATIENT
Start: 2024-12-19 | End: 2025-12-19

## 2024-12-19 NOTE — PROGRESS NOTES
Section of Cardiology                  Cardiac Clinic Note    Chief Complaint/Reason for consultation: HTN      HPI:   Mariah Meza is a 69 y.o. female with h/o obesity, HTN, HLD, GERD, chronic back pain who was referred to cardiology clinic by LYDIA Bob for evaluation.     12/11/24  Was seeing Dr. Major in cardiology , but wanting to switch providers  Says BP has been high  Was having side effects from medications   Takes clonidine when BP is >160, takes at night because makes her sleep  Tries not to add salt, and watching salty foods  Eats mostly at home  1 coffee a day, but decaf  Water and fruit drinks  Exercises at times, but havnig back pain- doing PT (but not helping)  Did get injection in back Nov 8 but not helping- always in pain at home   SOB with exercising- usually in pain with this  No palpitations       Denies chest pain, syncope, LE swelling      Stopped smoking >20 years   No ETOH abuse    Family hx: mom- heart disease, DM        12/19/24  BP elevated here but says better at home 120s at home, which is better since going up on Cardura  Cardura makes head hurt and feels sleepy and drowsy  Used to take a lot of Aleeve- but stopped >1 year ago  RA U/S no narrowing  Elevated aldos/renin ratio  Has not needed clonidine   Says she does not eat much - but does eat sugar     Denies chest pain, syncope, LE swelling        EKG 10/21/24 SB, no acute ST - T wave changes    ECHO  No results found for this or any previous visit.       STRESS TEST No results found for this or any previous visit.       C No results found for this or any previous visit.            ROS: All 10 systems reviewed. Please refer to the HPI for pertinent positives. All other systems negative.     Past Medical History  Past Medical History:   Diagnosis Date    Chronic rhinitis     DJD (degenerative joint disease), lumbar     GERD (gastroesophageal reflux disease)     Hard to intubate 12/20/2021    Headache      Hiatal hernia     Hyperlipidemia     Hypertension     Liver disease     Fatty Liver    Low back pain     Obesity     PONV (postoperative nausea and vomiting)     Thyroid disease        Surgical History  Past Surgical History:   Procedure Laterality Date    BILATERAL OOPHORECTOMY  2002    CARPAL TUNNEL RELEASE      Left wrist    CHOLECYSTECTOMY      DISSECTION OF NECK Bilateral 12/20/2021    Procedure: DISSECTION, NECK;  Surgeon: Deejay Olsen MD;  Location: [facility] OR;  Service: ENT;  Laterality: Bilateral;  Central neck dissection    HYSTERECTOMY  1984    INJECTION OF ANESTHETIC AGENT AROUND MEDIAL BRANCH NERVES INNERVATING LUMBAR FACET JOINT Bilateral 12/12/2023    Procedure: Bilateral L3-5 * MBB #1 with RN IV sedation;  Surgeon: Franklin Sutton MD;  Location: [facility] PAIN MGT;  Service: Pain Management;  Laterality: Bilateral;    NECK EXPLORATION Bilateral 12/20/2021    Procedure: EXPLORATION, NECK;  Surgeon: Deejay Olsen MD;  Location: [facility] OR;  Service: ENT;  Laterality: Bilateral;  Parathyroid exploration    ROTATOR CUFF REPAIR      Right shoulder    ROTATOR CUFF REPAIR Left 2010    SELECTIVE INJECTION OF ANESTHETIC AGENT AROUND LUMBAR SPINAL NERVE ROOT BY TRANSFORAMINAL APPROACH Bilateral 11/8/2024    Procedure: Bilateral L5/S1 + S1 TF SUSY;  Surgeon: Franklin Sutton MD;  Location: [facility] PAIN MGT;  Service: Pain Management;  Laterality: Bilateral;    THYROIDECTOMY, BILATERAL Bilateral 12/20/2021    Procedure: THYROIDECTOMY,BILATERAL;  Surgeon: Deejay Olsen MD;  Location: [facility];  Service: ENT;  Laterality: Bilateral;    TOTAL VAGINAL HYSTERECTOMY  1985          Allergies:   Review of patient's allergies indicates:   Allergen Reactions    Amlodipine Edema    Nifedipine Edema    Iodine and iodide containing products Itching    Shellfish containing products Swelling    Statins-hmg-coa reductase inhibitors Other (See Comments)     Myalgias    Adhesive Dermatitis    Iodine Other (See Comments)    Ivp dye [iodinated contrast  media] Other (See Comments)       Social History:  Social History     Socioeconomic History    Marital status:    Tobacco Use    Smoking status: Former     Current packs/day: 0.00     Average packs/day: 0.5 packs/day for 8.0 years (4.0 ttl pk-yrs)     Types: Cigarettes     Start date: 1971     Quit date: 1979     Years since quittin.9     Passive exposure: Never    Smokeless tobacco: Never   Substance and Sexual Activity    Alcohol use: No    Drug use: Never    Sexual activity: Never     Partners: Male     Birth control/protection: See Surgical Hx   Social History Narrative    The patient is  and has 2 adult children.  Patient works in  for the [employer].     Social Drivers of Health     Financial Resource Strain: Low Risk  (9/3/2024)    Overall Financial Resource Strain (CARDIA)     Difficulty of Paying Living Expenses: Not hard at all   Food Insecurity: No Food Insecurity (9/3/2024)    Hunger Vital Sign     Worried About Running Out of Food in the Last Year: Never true     Ran Out of Food in the Last Year: Never true   Transportation Needs: No Transportation Needs (9/3/2024)    PRAPARE - Transportation     Lack of Transportation (Medical): No     Lack of Transportation (Non-Medical): No   Physical Activity: Insufficiently Active (9/3/2024)    Exercise Vital Sign     Days of Exercise per Week: 5 days     Minutes of Exercise per Session: 10 min   Stress: Stress Concern Present (9/3/2024)    [employer] [place] [employer] - Occupational Stress Questionnaire     Feeling of Stress : To some extent   Housing Stability: Low Risk  (9/3/2024)    Housing Stability Vital Sign     Unable to Pay for Housing in the Last Year: No     Homeless in the Last Year: No       Family History:  family history includes Diabetes in her mother and sister; Hearing loss in her mother; Heart attack in her mother; Heart disease in her mother; Hyperlipidemia in her mother; Hypertension in her mother;  Lupus in an other family member; Sickle cell anemia in an other family member; Stroke in her mother.    Home Medications:  Current Outpatient Medications on File Prior to Visit   Medication Sig Dispense Refill    acetaminophen (TYLENOL) 650 MG TbSR Take 650 mg by mouth daily as needed.      calcium carbonate 470 mg calcium (1,177 mg) Chew Take one tablet by mouth twice daily 60 each 0    carvediloL (COREG) 12.5 MG tablet Take 1 tablet (12.5 mg total) by mouth 2 (two) times daily with meals. 180 tablet 3    cloNIDine (CATAPRES) 0.1 MG tablet Take by mouth.      cyanocobalamin 500 MCG tablet Take 500 mcg by mouth once daily.      cyclobenzaprine (FLEXERIL) 10 MG tablet Take 1 tablet (10 mg total) by mouth every evening. 90 tablet 1    dicyclomine (BENTYL) 10 MG capsule Take 10 mg by mouth.      docusate sodium (COLACE) 100 MG capsule Take 1 capsule (100 mg total) by mouth 3 (three) times daily as needed for Constipation. 30 capsule 0    doxazosin (CARDURA) 8 MG Tab Take 1 tablet (8 mg total) by mouth every evening. 30 tablet 6    ferrous sulfate 325 (65 FE) MG EC tablet Take 325 mg by mouth once daily.      hydrOXYzine HCL (ATARAX) 25 MG tablet Take 1 tablet (25 mg total) by mouth 3 (three) times daily as needed for Itching. 15 tablet 0    levothyroxine (SYNTHROID) 100 MCG tablet Take 1 tablet (100 mcg total) by mouth before breakfast. 90 tablet 3    magnesium oxide (MAG-OX) 400 mg (241.3 mg magnesium) tablet Take 400 mg by mouth once daily.      olmesartan-hydrochlorothiazide (BENICAR HCT) 40-25 mg per tablet Take 1 tablet by mouth once daily. 90 tablet 3    pantoprazole (PROTONIX) 40 MG tablet Take 1 tablet (40 mg total) by mouth once daily. 30 tablet 5    pregabalin (LYRICA) 75 MG capsule Take 1 capsule (75 mg total) by mouth every evening for 5 days, THEN 1 capsule (75 mg total) 2 (two) times daily. 120 capsule 0    psyllium husk/aspartame (METAMUCIL FIBER SINGLES ORAL) Take by mouth.      vitamin D (VITAMIN D3)  "1000 units Tab Take 1,000 Units by mouth once daily.      vitamin E 400 UNIT capsule Take 400 Units by mouth once daily.      wheat dextrin/L.acid/aspartame (FIBER WITH PROBIOTIC ORAL) Take by mouth Daily.       No current facility-administered medications on file prior to visit.       Physical exam:  BP (!) 160/82 (BP Location: Right arm, Patient Position: Sitting)   Pulse (!) 56   Ht 5' 4" (1.626 m)   Wt 107.4 kg (236 lb 12.4 oz)   LMP 02/10/1984 (Approximate)   SpO2 97%   BMI 40.64 kg/m²         General: Pt is a 69 y.o. year old female who is AAOx3, in NAD, is pleasant, well nourished, looks stated age  HEENT: PERRL, EOMI, Oral mucosa pink & moist  CVS  No abnormal cardiac pulsations noted on inspection. JVP not raised. The apical impulse is normal on palpation, and is located in the left 5th intercostal space in the mid - clavicular line. No palpable thrills or abnormal pulsations noted. RR, S1 - S2 heard, no murmurs, rubs or gallops appreciated.   PUL : CTA B/L. No wheezes/crackles heard   ABD : BS +, soft. No tenderness elicited   LE : No C/C/E. Distal Pulses palpable B/L         LABS:    Chemistry:   Lab Results   Component Value Date     10/21/2024    K 3.5 10/21/2024     10/21/2024    CO2 27 10/21/2024    BUN 18 10/21/2024    CREATININE 1.1 10/21/2024    CALCIUM 9.5 10/21/2024     Cardiac Markers:   Lab Results   Component Value Date    TROPONINI <0.006 10/21/2024     Cardiac Markers (Last 3):   Lab Results   Component Value Date    TROPONINI <0.006 10/21/2024    TROPONINI 0.009 01/22/2024    TROPONINI <0.006 12/12/2023     CBC:   Lab Results   Component Value Date    WBC 4.33 10/21/2024    HGB 11.5 (L) 10/21/2024    HCT 34.1 (L) 10/21/2024    MCV 90 10/21/2024     10/21/2024     Lipids:   Lab Results   Component Value Date    CHOL 196 03/11/2024    TRIG 93 03/11/2024    HDL 47 03/11/2024     Coagulation: No results found for: "PT", "INR", "APTT"        Assessment        1. Primary " hypertension    2. Papillary thyroid carcinoma    3. Mixed hyperlipidemia    4. Chronic kidney disease, stage 3a    5. Morbid obesity    6. Osteoarthritis of spine with radiculopathy, lumbar region    7. Post-operative hypothyroidism    8. Gastroesophageal reflux disease, unspecified whether esophagitis present    9. Resistant hypertension           Plan:    Hypertension   Resistant, elevated - better at home per patient   Patient with allergies to multiple medications   Renal artery ultrasound 12/24- no stenosis    Continue carvedilol 12.5 mg b.i.d., Benicar/HCTZ  Clonidine p.r.n. for blood pressure > 160  Aldosterone/renin ratio- elevated- start spironolactone 12.5- CMP in 1 week   Rest of labs pending   Reduce Cardura to 4 mg nightly due to side effects     Papillary thyroid carcinoma   s/p thyroidectomy   Continue Synthroid     CKD   Worsening recently (new onset)   Possibly due to elevated blood pressures     Osteoarthritis   Significant back pain   This could be contributing to elevated blood pressure    Obesity, Body mass index is 40.64 kg/m².   Low salt, low fat diet  Exercise as tolerated, at least 30 min daily     This note was prepared using voice recognition system and is likely to have sound alike errors that may have been overlooked even after proofreading.     I have reviewed all pertinent chart information.  Plans and recommendations have been formulated under my direct supervision. All questions answered and patient voiced understanding.   If symptoms persist go to the ED.    RTC in 4-6 wk        Micah Hanna MD  Cardiology

## 2024-12-23 ENCOUNTER — CLINICAL SUPPORT (OUTPATIENT)
Dept: INTERNAL MEDICINE | Facility: CLINIC | Age: 69
End: 2024-12-23
Payer: MEDICARE

## 2024-12-23 VITALS — SYSTOLIC BLOOD PRESSURE: 130 MMHG | DIASTOLIC BLOOD PRESSURE: 78 MMHG

## 2024-12-23 DIAGNOSIS — I10 PRIMARY HYPERTENSION: Primary | ICD-10-CM

## 2024-12-23 NOTE — PROGRESS NOTES
Pt came in for a B/P recheck. B/P read at 130/78. Pt was allowed to leave and provider was notified of B/P levels.

## 2024-12-24 ENCOUNTER — PATIENT OUTREACH (OUTPATIENT)
Dept: ADMINISTRATIVE | Facility: HOSPITAL | Age: 69
End: 2024-12-24
Payer: MEDICARE

## 2024-12-24 ENCOUNTER — TELEPHONE (OUTPATIENT)
Dept: ADMINISTRATIVE | Facility: HOSPITAL | Age: 69
End: 2024-12-24
Payer: MEDICARE

## 2024-12-27 ENCOUNTER — LAB VISIT (OUTPATIENT)
Dept: LAB | Facility: HOSPITAL | Age: 69
End: 2024-12-27
Attending: STUDENT IN AN ORGANIZED HEALTH CARE EDUCATION/TRAINING PROGRAM
Payer: MEDICARE

## 2024-12-27 ENCOUNTER — OFFICE VISIT (OUTPATIENT)
Dept: INTERNAL MEDICINE | Facility: CLINIC | Age: 69
End: 2024-12-27
Payer: MEDICARE

## 2024-12-27 VITALS
HEART RATE: 65 BPM | HEIGHT: 64 IN | BODY MASS INDEX: 40.37 KG/M2 | OXYGEN SATURATION: 98 % | WEIGHT: 236.44 LBS | TEMPERATURE: 97 F | DIASTOLIC BLOOD PRESSURE: 84 MMHG | RESPIRATION RATE: 18 BRPM | SYSTOLIC BLOOD PRESSURE: 130 MMHG

## 2024-12-27 DIAGNOSIS — N18.31 CHRONIC KIDNEY DISEASE, STAGE 3A: ICD-10-CM

## 2024-12-27 DIAGNOSIS — Z13.31 POSITIVE DEPRESSION SCREENING: ICD-10-CM

## 2024-12-27 DIAGNOSIS — I10 PRIMARY HYPERTENSION: ICD-10-CM

## 2024-12-27 DIAGNOSIS — K59.09 CHRONIC CONSTIPATION: ICD-10-CM

## 2024-12-27 DIAGNOSIS — M54.16 BILATERAL LUMBAR RADICULOPATHY: Primary | ICD-10-CM

## 2024-12-27 DIAGNOSIS — F32.1 CURRENT MODERATE EPISODE OF MAJOR DEPRESSIVE DISORDER WITHOUT PRIOR EPISODE: ICD-10-CM

## 2024-12-27 LAB
ALBUMIN SERPL BCP-MCNC: 3.7 G/DL (ref 3.5–5.2)
ALP SERPL-CCNC: 41 U/L (ref 40–150)
ALT SERPL W/O P-5'-P-CCNC: 14 U/L (ref 10–44)
ANION GAP SERPL CALC-SCNC: 10 MMOL/L (ref 8–16)
AST SERPL-CCNC: 16 U/L (ref 10–40)
BILIRUB SERPL-MCNC: 0.8 MG/DL (ref 0.1–1)
BUN SERPL-MCNC: 17 MG/DL (ref 8–23)
CALCIUM SERPL-MCNC: 8.9 MG/DL (ref 8.7–10.5)
CHLORIDE SERPL-SCNC: 108 MMOL/L (ref 95–110)
CO2 SERPL-SCNC: 24 MMOL/L (ref 23–29)
CREAT SERPL-MCNC: 1.1 MG/DL (ref 0.5–1.4)
EST. GFR  (NO RACE VARIABLE): 54 ML/MIN/1.73 M^2
GLUCOSE SERPL-MCNC: 88 MG/DL (ref 70–110)
POTASSIUM SERPL-SCNC: 3.5 MMOL/L (ref 3.5–5.1)
PROT SERPL-MCNC: 6.9 G/DL (ref 6–8.4)
SODIUM SERPL-SCNC: 142 MMOL/L (ref 136–145)

## 2024-12-27 PROCEDURE — 80053 COMPREHEN METABOLIC PANEL: CPT | Performed by: STUDENT IN AN ORGANIZED HEALTH CARE EDUCATION/TRAINING PROGRAM

## 2024-12-27 PROCEDURE — 36415 COLL VENOUS BLD VENIPUNCTURE: CPT | Mod: PN | Performed by: STUDENT IN AN ORGANIZED HEALTH CARE EDUCATION/TRAINING PROGRAM

## 2024-12-27 PROCEDURE — 99999 PR PBB SHADOW E&M-EST. PATIENT-LVL V: CPT | Mod: PBBFAC,,, | Performed by: FAMILY MEDICINE

## 2024-12-27 RX ORDER — DULOXETIN HYDROCHLORIDE 30 MG/1
30 CAPSULE, DELAYED RELEASE ORAL DAILY
Qty: 30 CAPSULE | Refills: 11 | Status: SHIPPED | OUTPATIENT
Start: 2024-12-27 | End: 2025-12-27

## 2024-12-27 RX ORDER — LIDOCAINE 50 MG/G
1 PATCH TOPICAL DAILY
Qty: 30 PATCH | Refills: 1 | Status: SHIPPED | OUTPATIENT
Start: 2024-12-27

## 2024-12-27 NOTE — PROGRESS NOTES
Subjective:       Patient ID: Mariah Meza is a 69 y.o. female.    Chief Complaint: Back Pain    History of Present Illness    CHIEF COMPLAINT:  Ms. Meza presents with chronic back pain and associated symptoms, seeking alternative treatment options and management.    HPI:  Ms. Meza reports chronic back pain for an extended period, including bilateral radicular pain, neck pain, and shoulder pain. On severe pain days, she is unable to perform physical therapy. She has numbness and tingling in her legs, with one leg becoming particularly numb. The pain significantly limits her daily activities, including inability to lift objects and difficulty making her bed in the morning without exacerbating discomfort.    A pain specialist previously diagnosed her with bilateral radicular pain. She received a lumbar injection but reports persistent back pain. She has tried various treatments, including gabapentin, which caused excessive drowsiness and altered mental status. She takes Flexeril (cyclobenzaprine) as needed, which causes significant drowsiness.    Ms. Meza describes her upper back pain as chronic, present for years, characterized by an intense tightening sensation. She screens positive for feeling down or depressed, potentially related to her chronic pain condition. She expresses frustration with multiple prescribed medications and their side effects.    Ms. Meza declines a Toradol injection.    MEDICAL HISTORY:  Ms. Meza has a history of chronic kidney disease, depression, and hypertension.    MEDICATIONS:  Ms. Meza is on Coreg and Flexeril (muscle relaxant) as needed.  For chronic constipation, she is on Colace and Metamucil 3 times per week. Ms. Meza is on Pregabalin for nerve pain.         Review of Systems   Constitutional:  Negative for chills and fever.   HENT:  Negative for congestion, rhinorrhea and sore throat.    Respiratory:  Negative for cough, shortness of breath and wheezing.    Cardiovascular:  Negative  for chest pain, palpitations and leg swelling.   Gastrointestinal:  Negative for abdominal pain, constipation, diarrhea, nausea and vomiting.   Genitourinary:  Negative for dysuria, frequency and urgency.   Musculoskeletal:  Positive for arthralgias, back pain and neck pain.   Neurological:  Negative for headaches.   Psychiatric/Behavioral:  Positive for dysphoric mood.        Objective:      Physical Exam  Constitutional:       Appearance: Normal appearance. She is obese.   HENT:      Head: Normocephalic and atraumatic.      Nose: No congestion or rhinorrhea.   Eyes:      General: No scleral icterus.        Right eye: No discharge.         Left eye: No discharge.      Extraocular Movements: Extraocular movements intact.      Conjunctiva/sclera: Conjunctivae normal.      Pupils: Pupils are equal, round, and reactive to light.   Cardiovascular:      Rate and Rhythm: Normal rate and regular rhythm.      Heart sounds: No murmur heard.     No friction rub. No gallop.   Pulmonary:      Effort: Pulmonary effort is normal. No respiratory distress.      Breath sounds: Normal breath sounds. No stridor. No wheezing or rhonchi.   Abdominal:      General: Bowel sounds are normal.      Palpations: Abdomen is soft.   Musculoskeletal:      Right lower leg: No edema.      Left lower leg: No edema.   Skin:     General: Skin is warm.   Neurological:      General: No focal deficit present.      Mental Status: She is alert.   Psychiatric:         Mood and Affect: Mood normal.         Behavior: Behavior normal.         Assessment/Plan:       1. Positive depression screening    2. Lumbar radiculopathy    3. Bilateral lumbar radiculopathy    4. Chronic kidney disease, stage 3a      Assessment & Plan    IMPRESSION:  - Considered various pain management options for chronic back pain, including mind-body approaches, topical treatments, and oral medications  - Recommend Cymbalta (duloxetine) for addressing both chronic pain and depression  -  Suggested lidocaine patches as a topical pain management option  - Advised discontinuing Flexeril (cyclobenzaprine) due to excessive drowsiness and limited efficacy  - Evaluated kidney function, noting chronic kidney disease   - Patient desires pause pain specialist visits and manage pain treatment in primary care setting    Bilateral lumbar radiculopathy  Chronic, uncontrolled   - DULoxetine (CYMBALTA) 30 MG capsule; Take 1 capsule (30 mg total) by mouth once daily.  Dispense: 30 capsule; Refill: 11  - Started lidocaine patches for lower back pain, apply for 12 hours on, 12 hours off.  - Prescribed 30 lidocaine patches with 1 refill.  - Discontinued Flexeril (cyclobenzaprine) due to excessive drowsiness.  - Continued pregabalin at current dose.    Chronic kidney disease, stage 3a  Chronic, stable, eGFR 54, urine negative for proteinuria, blood pressure is adequately controlled  - Continue to monitor labs, maintain adequate blood pressure control  - Explained age-related decline in kidney function and factors affecting kidney health.    Positive depression screening (Primary)  Current moderate episode of major depressive disorder without prior episode  Chronic, uncontrolled  - DULoxetine (CYMBALTA) 30 MG capsule; Take 1 capsule (30 mg total) by mouth once daily.  Dispense: 30 capsule; Refill: 11  - I have used clinical judgement based on duration and functional status to consider definite necessity for treatment.  - Explained the connection between chronic pain and depression.  - Discussed the multifaceted approach to pain management, including mind-body therapies, topical treatments, and oral medications.  - Started Cymbalta (duloxetine) 30 mg daily for 2 weeks for chronic pain and depression.  - Provided information on common side effects of Cymbalta, including potential weight changes, sexual dysfunction, electrolyte imbalances, abdominal pain, drowsiness, and headaches.    CONSTIPATION:  - Continued Metamucil 3  times per week for constipation management.    FOLLOW-UP:  - Follow up in 2 weeks to assess response to Cymbalta and evaluate medication efficacy.               Future Appointments   Date Time Provider Department Center   1/21/2025  3:40 PM Yocasta Schmid MD [facility]   2/4/2025  8:00 AM Elsa Santiago, NP [facility] CARDIO [facility]   3/3/2025  1:20 PM Taina Bob, NP [facility]   3/4/2025  9:40 AM Brenna Johnson, ATIF [facility] INT [place] [facility]   10/9/2025  9:00 AM [facility] US3 [facility] ULSOUND [facility]   10/9/2025 10:15 AM Sherita Duran MD [facility] ENT [facility]       Yocasta Schmid MD  [facility] [place] [address] [place] [address] [place] [address]  [phone]      This note was generated with the assistance of ambient listening technology. Verbal consent was obtained by the patient and accompanying visitor(s) for the recording of patient appointment to facilitate this note. I attest to having reviewed and edited the generated note for accuracy, though some syntax or spelling errors may persist. Please contact the author of this note for any clarification.

## 2025-01-02 ENCOUNTER — TELEPHONE (OUTPATIENT)
Dept: INTERNAL MEDICINE | Facility: CLINIC | Age: 70
End: 2025-01-02
Payer: MEDICARE

## 2025-01-28 ENCOUNTER — TELEPHONE (OUTPATIENT)
Dept: INTERNAL MEDICINE | Facility: CLINIC | Age: 70
End: 2025-01-28
Payer: MEDICARE

## 2025-01-28 NOTE — TELEPHONE ENCOUNTER
I contacted the pt to assit with rescheduling missed appt during the weather closure and she declined and stated she will keep her upcoming appt in March 2025. /fyi //kah

## 2025-02-04 ENCOUNTER — OFFICE VISIT (OUTPATIENT)
Dept: CARDIOLOGY | Facility: CLINIC | Age: 70
End: 2025-02-04
Payer: MEDICARE

## 2025-02-04 VITALS
OXYGEN SATURATION: 98 % | DIASTOLIC BLOOD PRESSURE: 78 MMHG | BODY MASS INDEX: 41.36 KG/M2 | WEIGHT: 240.94 LBS | SYSTOLIC BLOOD PRESSURE: 160 MMHG | HEART RATE: 65 BPM

## 2025-02-04 DIAGNOSIS — E78.2 MIXED HYPERLIPIDEMIA: ICD-10-CM

## 2025-02-04 DIAGNOSIS — I10 PRIMARY HYPERTENSION: Primary | ICD-10-CM

## 2025-02-04 DIAGNOSIS — E66.01 MORBID OBESITY: ICD-10-CM

## 2025-02-04 PROCEDURE — 99999 PR PBB SHADOW E&M-EST. PATIENT-LVL IV: CPT | Mod: PBBFAC,,,

## 2025-02-04 PROCEDURE — 1159F MED LIST DOCD IN RCRD: CPT | Mod: CPTII,S$GLB,,

## 2025-02-04 PROCEDURE — 1101F PT FALLS ASSESS-DOCD LE1/YR: CPT | Mod: CPTII,S$GLB,,

## 2025-02-04 PROCEDURE — 3288F FALL RISK ASSESSMENT DOCD: CPT | Mod: CPTII,S$GLB,,

## 2025-02-04 PROCEDURE — 1160F RVW MEDS BY RX/DR IN RCRD: CPT | Mod: CPTII,S$GLB,,

## 2025-02-04 PROCEDURE — 3077F SYST BP >= 140 MM HG: CPT | Mod: CPTII,S$GLB,,

## 2025-02-04 PROCEDURE — 3078F DIAST BP <80 MM HG: CPT | Mod: CPTII,S$GLB,,

## 2025-02-04 PROCEDURE — 3008F BODY MASS INDEX DOCD: CPT | Mod: CPTII,S$GLB,,

## 2025-02-04 PROCEDURE — 99214 OFFICE O/P EST MOD 30 MIN: CPT | Mod: S$GLB,,,

## 2025-02-04 RX ORDER — SPIRONOLACTONE 25 MG/1
25 TABLET ORAL DAILY
Start: 2025-02-04 | End: 2025-03-03 | Stop reason: SDUPTHER

## 2025-02-04 NOTE — PROGRESS NOTES
Subjective:   Patient ID:  Mariah Meza is a 69 y.o. female who presents for evaluation of No chief complaint on file.      HPI 69-year-old female whose current medical conditions include obesity, HTN, HLD, GERD, chronic back pain previously followed by Dr. Hanna in [facility] as well as Dr. Major in the past here today for CV follow-up recently started on 12.5 mg of spironolactone, complaining of bilateral lower extremity edema and HERNANDEZ as well as feeling fatigued and tired (recently started on Cymbalta).  Also complaining of nasal congestion. Denies any chest pain, dizziness, palpitations    Past Medical History:   Diagnosis Date    Chronic rhinitis     DJD (degenerative joint disease), lumbar     GERD (gastroesophageal reflux disease)     Hard to intubate 12/20/2021    Headache     Hiatal hernia     Hyperlipidemia     Hypertension     Liver disease     Fatty Liver    Low back pain     Obesity     PONV (postoperative nausea and vomiting)     Thyroid disease        Past Surgical History:   Procedure Laterality Date    BILATERAL OOPHORECTOMY  2002    CARPAL TUNNEL RELEASE      Left wrist    CHOLECYSTECTOMY      DISSECTION OF NECK Bilateral 12/20/2021    Procedure: DISSECTION, NECK;  Surgeon: Deejay Olsen MD;  Location: [facility] OR;  Service: ENT;  Laterality: Bilateral;  Central neck dissection    HYSTERECTOMY  1984    INJECTION OF ANESTHETIC AGENT AROUND MEDIAL BRANCH NERVES INNERVATING LUMBAR FACET JOINT Bilateral 12/12/2023    Procedure: Bilateral L3-5 * MBB #1 with RN IV sedation;  Surgeon: Franklin Sutton MD;  Location: [facility] PAIN MGT;  Service: Pain Management;  Laterality: Bilateral;    NECK EXPLORATION Bilateral 12/20/2021    Procedure: EXPLORATION, NECK;  Surgeon: Deejay Olsen MD;  Location: [facility] OR;  Service: ENT;  Laterality: Bilateral;  Parathyroid exploration    ROTATOR CUFF REPAIR      Right shoulder    ROTATOR CUFF REPAIR Left 2010    SELECTIVE INJECTION OF ANESTHETIC AGENT AROUND LUMBAR SPINAL  NERVE ROOT BY TRANSFORAMINAL APPROACH Bilateral 2024    Procedure: Bilateral L5/S1 + S1 TF SUSY;  Surgeon: Franklin Sutton MD;  Location: [facility] PAIN MGT;  Service: Pain Management;  Laterality: Bilateral;    THYROIDECTOMY, BILATERAL Bilateral 2021    Procedure: THYROIDECTOMY,BILATERAL;  Surgeon: Deejay Olsen MD;  Location: [facility] OR;  Service: ENT;  Laterality: Bilateral;    TOTAL VAGINAL HYSTERECTOMY         Social History     Tobacco Use    Smoking status: Former     Current packs/day: 0.00     Average packs/day: 0.5 packs/day for 8.0 years (4.0 ttl pk-yrs)     Types: Cigarettes     Start date: 1971     Quit date: 1979     Years since quittin.1     Passive exposure: Never    Smokeless tobacco: Never   Substance Use Topics    Alcohol use: No    Drug use: Never       Family History   Problem Relation Name Age of Onset    Diabetes Mother      Hypertension Mother      Hyperlipidemia Mother      Heart disease Mother          CHF    Stroke Mother      Heart attack Mother      Hearing loss Mother      Diabetes Sister      Sickle cell anemia Other          nieces, nephews    Lupus Other          niece       Current Outpatient Medications on File Prior to Visit   Medication Sig Dispense Refill    acetaminophen (TYLENOL) 650 MG TbSR Take 650 mg by mouth daily as needed.      calcium carbonate 470 mg calcium (1,177 mg) Chew Take one tablet by mouth twice daily 60 each 0    carvediloL (COREG) 12.5 MG tablet Take 1 tablet (12.5 mg total) by mouth 2 (two) times daily with meals. 180 tablet 3    cyanocobalamin 500 MCG tablet Take 500 mcg by mouth once daily.      cyclobenzaprine (FLEXERIL) 10 MG tablet Take 1 tablet (10 mg total) by mouth every evening. 90 tablet 1    dicyclomine (BENTYL) 10 MG capsule Take 10 mg by mouth.      docusate sodium (COLACE) 100 MG capsule Take 1 capsule (100 mg total) by mouth 3 (three) times daily as needed for Constipation. 30 capsule 0    doxazosin (CARDURA) 4 MG tablet Take  1 tablet (4 mg total) by mouth every evening.      DULoxetine (CYMBALTA) 30 MG capsule Take 1 capsule (30 mg total) by mouth once daily. 30 capsule 11    ferrous sulfate 325 (65 FE) MG EC tablet Take 325 mg by mouth once daily.      hydrOXYzine HCL (ATARAX) 25 MG tablet Take 1 tablet (25 mg total) by mouth 3 (three) times daily as needed for Itching. 15 tablet 0    levothyroxine (SYNTHROID) 100 MCG tablet Take 1 tablet (100 mcg total) by mouth before breakfast. 90 tablet 3    LIDOcaine (LIDODERM) 5 % Place 1 patch onto the skin once daily. Remove & Discard patch within 12 hours or as directed by MD 30 patch 1    magnesium oxide (MAG-OX) 400 mg (241.3 mg magnesium) tablet Take 400 mg by mouth once daily.      olmesartan-hydrochlorothiazide (BENICAR HCT) 40-25 mg per tablet Take 1 tablet by mouth once daily. 90 tablet 3    pantoprazole (PROTONIX) 40 MG tablet Take 1 tablet (40 mg total) by mouth once daily. 30 tablet 5    psyllium husk/aspartame (METAMUCIL FIBER SINGLES ORAL) Take by mouth.      vitamin D (VITAMIN D3) 1000 units Tab Take 1,000 Units by mouth once daily.      vitamin E 400 UNIT capsule Take 400 Units by mouth once daily.      wheat dextrin/L.acid/aspartame (FIBER WITH PROBIOTIC ORAL) Take by mouth Daily.      [DISCONTINUED] spironolactone (ALDACTONE) 25 MG tablet Take 0.5 tablets (12.5 mg total) by mouth once daily. 15 tablet 6    cloNIDine (CATAPRES) 0.1 MG tablet Take by mouth. (Patient not taking: Reported on 2/4/2025)      pregabalin (LYRICA) 75 MG capsule Take 1 capsule (75 mg total) by mouth every evening for 5 days, THEN 1 capsule (75 mg total) 2 (two) times daily. 120 capsule 0     No current facility-administered medications on file prior to visit.      Wt Readings from Last 3 Encounters:   02/04/25 109.3 kg (240 lb 15.4 oz)   12/27/24 107.2 kg (236 lb 7.1 oz)   12/19/24 107.4 kg (236 lb 12.4 oz)     Temp Readings from Last 3 Encounters:   12/27/24 97 °F (36.1 °C) (Tympanic)   12/02/24 97.9 °F  (36.6 °C) (Tympanic)   11/20/24 97.1 °F (36.2 °C) (Tympanic)     BP Readings from Last 3 Encounters:   02/04/25 (!) 160/78   12/27/24 130/84   12/23/24 130/78     Pulse Readings from Last 3 Encounters:   02/04/25 65   12/27/24 65   12/19/24 (!) 56        Review of Systems   Constitutional: Positive for malaise/fatigue.   HENT:  Positive for congestion.    Eyes: Negative.    Cardiovascular:  Positive for dyspnea on exertion and leg swelling.   Respiratory:  Positive for wheezing.    Skin: Negative.    Musculoskeletal:  Positive for arthritis, back pain and joint pain.   Gastrointestinal:  Positive for bloating.   Genitourinary: Negative.    Neurological:  Positive for numbness and weakness.   Psychiatric/Behavioral: Negative.         Objective:   Physical Exam  Vitals and nursing note reviewed.   Constitutional:       Appearance: Normal appearance. She is obese.   HENT:      Head: Normocephalic.   Eyes:      Pupils: Pupils are equal, round, and reactive to light.   Cardiovascular:      Rate and Rhythm: Normal rate and regular rhythm.      Heart sounds: Normal heart sounds, S1 normal and S2 normal. No murmur heard.     No S3 or S4 sounds.   Pulmonary:      Effort: Pulmonary effort is normal.      Breath sounds: Normal breath sounds.   Abdominal:      General: Bowel sounds are normal.      Palpations: Abdomen is soft.   Musculoskeletal:         General: Normal range of motion.      Cervical back: Normal range of motion.      Right lower leg: Edema present.      Left lower leg: Edema present.      Comments: mild   Skin:     Capillary Refill: Capillary refill takes less than 2 seconds.   Neurological:      General: No focal deficit present.      Mental Status: She is alert and oriented to person, place, and time.   Psychiatric:         Mood and Affect: Mood normal.         Behavior: Behavior normal.         Thought Content: Thought content normal.         Lab Results   Component Value Date    CHOL 196 03/11/2024    CHOL  "233 (H) 10/09/2023    CHOL 194 09/29/2021     Lab Results   Component Value Date    HDL 47 03/11/2024    HDL 54 10/09/2023    HDL 52 09/29/2021     Lab Results   Component Value Date    LDLCALC 130.4 03/11/2024    LDLCALC 155.6 10/09/2023    LDLCALC 124.6 09/29/2021     Lab Results   Component Value Date    TRIG 93 03/11/2024    TRIG 117 10/09/2023    TRIG 87 09/29/2021     Lab Results   Component Value Date    CHOLHDL 24.0 03/11/2024    CHOLHDL 23.2 10/09/2023    CHOLHDL 26.8 09/29/2021       Chemistry        Component Value Date/Time     12/27/2024 0948    K 3.5 12/27/2024 0948     12/27/2024 0948    CO2 24 12/27/2024 0948    BUN 17 12/27/2024 0948    CREATININE 1.1 12/27/2024 0948    GLU 88 12/27/2024 0948        Component Value Date/Time    CALCIUM 8.9 12/27/2024 0948    ALKPHOS 41 12/27/2024 0948    AST 16 12/27/2024 0948    ALT 14 12/27/2024 0948    BILITOT 0.8 12/27/2024 0948    ESTGFRAFRICA >60.0 07/18/2022 1109    EGFRNONAA >60.0 07/18/2022 1109          Lab Results   Component Value Date    TSH 0.692 11/20/2024     No results found for: "INR", "PROTIME"  @RESUFAST(WBC,HGB,HCT,MCV,PLT)  @LABRCNTIP(BNP,BNPTRIAGEBLO)@  CrCl cannot be calculated (Patient's most recent lab result is older than the maximum 7 days allowed.).     No results found for this or any previous visit.     No results found for this or any previous visit.     Assessment:      1. Primary hypertension    2. Mixed hyperlipidemia        Plan:   Primary hypertension  -     Echo; Future  -     spironolactone (ALDACTONE) 25 MG tablet; Take 1 tablet (25 mg total) by mouth once daily.    Mixed hyperlipidemia      Can start 25 mg of spironolactone     Carvedilol, doxazosin, Benicar-HCTZ, spironolactone, low-sodium diet, profile blood pressure- HTN  Low-fat diet, allergy to statins-HTN  Risk factor modifications   Daily exercise as tolerated     Check echo   Return to clinic in 1 month blood pressure check    Elsa Santiago, " FNP-C [facility], Cardiology

## 2025-02-06 ENCOUNTER — OFFICE VISIT (OUTPATIENT)
Dept: PODIATRY | Facility: CLINIC | Age: 70
End: 2025-02-06
Payer: MEDICARE

## 2025-02-06 ENCOUNTER — PATIENT MESSAGE (OUTPATIENT)
Dept: INTERNAL MEDICINE | Facility: CLINIC | Age: 70
End: 2025-02-06
Payer: MEDICARE

## 2025-02-06 VITALS — HEIGHT: 64 IN | BODY MASS INDEX: 41.13 KG/M2 | WEIGHT: 240.94 LBS

## 2025-02-06 DIAGNOSIS — M25.571 PAIN IN RIGHT ANKLE AND JOINTS OF RIGHT FOOT: ICD-10-CM

## 2025-02-06 DIAGNOSIS — M21.612 BILATERAL BUNIONS: ICD-10-CM

## 2025-02-06 DIAGNOSIS — M21.611 BILATERAL BUNIONS: ICD-10-CM

## 2025-02-06 DIAGNOSIS — M54.16 BILATERAL LUMBAR RADICULOPATHY: ICD-10-CM

## 2025-02-06 DIAGNOSIS — M25.572 PAIN IN LEFT ANKLE AND JOINTS OF LEFT FOOT: ICD-10-CM

## 2025-02-06 DIAGNOSIS — M47.26 OSTEOARTHRITIS OF SPINE WITH RADICULOPATHY, LUMBAR REGION: Primary | ICD-10-CM

## 2025-02-06 DIAGNOSIS — N18.31 CHRONIC KIDNEY DISEASE, STAGE 3A: ICD-10-CM

## 2025-02-06 PROCEDURE — 1101F PT FALLS ASSESS-DOCD LE1/YR: CPT | Mod: CPTII,S$GLB,, | Performed by: PODIATRIST

## 2025-02-06 PROCEDURE — 99214 OFFICE O/P EST MOD 30 MIN: CPT | Mod: S$GLB,,, | Performed by: PODIATRIST

## 2025-02-06 PROCEDURE — 1160F RVW MEDS BY RX/DR IN RCRD: CPT | Mod: CPTII,S$GLB,, | Performed by: PODIATRIST

## 2025-02-06 PROCEDURE — 99999 PR PBB SHADOW E&M-EST. PATIENT-LVL III: CPT | Mod: PBBFAC,,, | Performed by: PODIATRIST

## 2025-02-06 PROCEDURE — 3008F BODY MASS INDEX DOCD: CPT | Mod: CPTII,S$GLB,, | Performed by: PODIATRIST

## 2025-02-06 PROCEDURE — 1159F MED LIST DOCD IN RCRD: CPT | Mod: CPTII,S$GLB,, | Performed by: PODIATRIST

## 2025-02-06 PROCEDURE — 3288F FALL RISK ASSESSMENT DOCD: CPT | Mod: CPTII,S$GLB,, | Performed by: PODIATRIST

## 2025-02-06 RX ORDER — MELOXICAM 15 MG/1
15 TABLET ORAL DAILY
Qty: 14 TABLET | Refills: 0 | Status: SHIPPED | OUTPATIENT
Start: 2025-02-06 | End: 2025-02-20

## 2025-02-06 NOTE — PROGRESS NOTES
Subjective:       Patient ID: Mariah Meza is a 69 y.o. female.    Chief Complaint: Bunions (Bunions, pt is wearing tennis shoes, rate pain 5/10, nondiabetic )    HPI: Mariah Meza presents to the clinic today with the complaint of persistent burning and tingling to the B/L lower extremity. Patient states these symptoms have been on going now for the past several months, and are worsening. Patient states the symptoms seem to be moreso nocturnal. Pain/Discomfort is rated at approx. 5/10. Pain/Discomfort is described as an irritant, but is occasionally sharp and shooting like. Patient does have a history of lumbosacral pathology; spinal stenosis, radiculopathy, sciatica. Patient is not a DMI or DMII. Patient is currently on Gabapentin and Cymbalta and/or etc... Patient states no trauma. Recent EMG/NCV is stated.     Review of patient's allergies indicates:   Allergen Reactions    Amlodipine Edema    Nifedipine Edema    Iodine and iodide containing products Itching    Shellfish containing products Swelling    Statins-hmg-coa reductase inhibitors Other (See Comments)     Myalgias    Adhesive Dermatitis    Iodine Other (See Comments)    Ivp dye [iodinated contrast media] Other (See Comments)       Past Medical History:   Diagnosis Date    Chronic rhinitis     DJD (degenerative joint disease), lumbar     GERD (gastroesophageal reflux disease)     Hard to intubate 12/20/2021    Headache     Hiatal hernia     Hyperlipidemia     Hypertension     Liver disease     Fatty Liver    Low back pain     Obesity     PONV (postoperative nausea and vomiting)     Thyroid disease        Family History   Problem Relation Name Age of Onset    Diabetes Mother      Hypertension Mother      Hyperlipidemia Mother      Heart disease Mother          CHF    Stroke Mother      Heart attack Mother      Hearing loss Mother      Diabetes Sister      Sickle cell anemia Other          nieces, nephews    Lupus Other          niece        Social History     Socioeconomic History    Marital status:    Tobacco Use    Smoking status: Former     Current packs/day: 0.00     Average packs/day: 0.5 packs/day for 8.0 years (4.0 ttl pk-yrs)     Types: Cigarettes     Start date: 1971     Quit date: 1979     Years since quittin.1     Passive exposure: Never    Smokeless tobacco: Never   Substance and Sexual Activity    Alcohol use: No    Drug use: Never    Sexual activity: Never     Partners: Male     Birth control/protection: See Surgical Hx   Social History Narrative    The patient is  and has 2 adult children.  Patient works in  for the [employer].     Social Drivers of Health     Financial Resource Strain: Low Risk  (9/3/2024)    Overall Financial Resource Strain (CARDIA)     Difficulty of Paying Living Expenses: Not hard at all   Food Insecurity: No Food Insecurity (9/3/2024)    Hunger Vital Sign     Worried About Running Out of Food in the Last Year: Never true     Ran Out of Food in the Last Year: Never true   Transportation Needs: No Transportation Needs (9/3/2024)    PRAPARE - Transportation     Lack of Transportation (Medical): No     Lack of Transportation (Non-Medical): No   Physical Activity: Insufficiently Active (9/3/2024)    Exercise Vital Sign     Days of Exercise per Week: 5 days     Minutes of Exercise per Session: 10 min   Stress: Stress Concern Present (9/3/2024)    [employer] - Occupational Stress Questionnaire     Feeling of Stress : To some extent   Housing Stability: Low Risk  (9/3/2024)    Housing Stability Vital Sign     Unable to Pay for Housing in the Last Year: No     Homeless in the Last Year: No       Past Surgical History:   Procedure Laterality Date    BILATERAL OOPHORECTOMY  2002    CARPAL TUNNEL RELEASE      Left wrist    CHOLECYSTECTOMY      DISSECTION OF NECK Bilateral 2021    Procedure: DISSECTION, NECK;  Surgeon: Deejay Olsen MD;  Location: [facility] OR;  " Service: ENT;  Laterality: Bilateral;  Central neck dissection    HYSTERECTOMY  1984    INJECTION OF ANESTHETIC AGENT AROUND MEDIAL BRANCH NERVES INNERVATING LUMBAR FACET JOINT Bilateral 12/12/2023    Procedure: Bilateral L3-5 * MBB #1 with RN IV sedation;  Surgeon: Franklin Sutton MD;  Location: [facility] PAIN MGT;  Service: Pain Management;  Laterality: Bilateral;    NECK EXPLORATION Bilateral 12/20/2021    Procedure: EXPLORATION, NECK;  Surgeon: Deejay Olsen MD;  Location: [facility] OR;  Service: ENT;  Laterality: Bilateral;  Parathyroid exploration    ROTATOR CUFF REPAIR      Right shoulder    ROTATOR CUFF REPAIR Left 2010    SELECTIVE INJECTION OF ANESTHETIC AGENT AROUND LUMBAR SPINAL NERVE ROOT BY TRANSFORAMINAL APPROACH Bilateral 11/8/2024    Procedure: Bilateral L5/S1 + S1 TF SUSY;  Surgeon: Franklin Sutton MD;  Location: [facility] PAIN MGT;  Service: Pain Management;  Laterality: Bilateral;    THYROIDECTOMY, BILATERAL Bilateral 12/20/2021    Procedure: THYROIDECTOMY,BILATERAL;  Surgeon: Deejay Olsen MD;  Location: [facility] OR;  Service: ENT;  Laterality: Bilateral;    TOTAL VAGINAL HYSTERECTOMY  1985       Review of Systems   Constitutional:  Negative for chills, fatigue and fever.   HENT:  Negative for hearing loss.    Eyes:  Negative for photophobia and visual disturbance.   Respiratory:  Negative for cough, chest tightness, shortness of breath and wheezing.    Cardiovascular:  Negative for chest pain and palpitations.   Gastrointestinal:  Negative for constipation, diarrhea, nausea and vomiting.   Endocrine: Negative for cold intolerance and heat intolerance.   Genitourinary:  Negative for flank pain.   Musculoskeletal:  Positive for arthralgias, back pain and gait problem. Negative for neck pain and neck stiffness.   Neurological:  Negative for light-headedness and headaches.   Psychiatric/Behavioral:  Negative for sleep disturbance.           Objective:   Ht 5' 4" (1.626 m)   Wt 109.3 kg (240 lb 15.4 oz)   LMP 02/10/1984 " (Approximate)   BMI 41.36 kg/m²     Physical Exam    LOWER EXTREMITY PHYSICAL EXAMINATION  DERMATOLOGY: Skin is supple, dry and intact.     VASCULAR: The B/L dorsalis pedis pulse is 2/4 and the posterior tibial pulse is 2/4. Hair growth is noted on the dorsal foot and digits. Proximal to distal, warm to warm. Capillary refill time is WNL at less than 3s. Mild edema is noted.    ORTHOPEDIC: Manual Muscle Testing is 5/5 in all planes on the B/L LE, without pains, with and without resistance.  Mild to moderate bunion deformity is noted bilateral, left greater than right.  Slight discomfort palpation.  Pes planus foot type is otherwise noted.  No instability at the midfoot ankle. No plantar fascial pains are noted.     NEUROLOGY: Negative Tinel's Sign and negative Valleix sign. No neurological sensations with compression of the area of Etienne's Nerve in the area of the Abductor Hallucis muscle belly.    Assessment:     1. Osteoarthritis of spine with radiculopathy, lumbar region    2. Bilateral lumbar radiculopathy    3. Pain in left ankle and joints of left foot    4. Pain in right ankle and joints of right foot    5. Chronic kidney disease, stage 3a    6. Bilateral bunions        Plan:     Osteoarthritis of spine with radiculopathy, lumbar region  -     meloxicam (MOBIC) 15 MG tablet; Take 1 tablet (15 mg total) by mouth once daily. for 14 days  Dispense: 14 tablet; Refill: 0    Bilateral lumbar radiculopathy  -     meloxicam (MOBIC) 15 MG tablet; Take 1 tablet (15 mg total) by mouth once daily. for 14 days  Dispense: 14 tablet; Refill: 0    Pain in left ankle and joints of left foot  -     meloxicam (MOBIC) 15 MG tablet; Take 1 tablet (15 mg total) by mouth once daily. for 14 days  Dispense: 14 tablet; Refill: 0    Pain in right ankle and joints of right foot  -     meloxicam (MOBIC) 15 MG tablet; Take 1 tablet (15 mg total) by mouth once daily. for 14 days  Dispense: 14 tablet; Refill: 0    Chronic kidney disease,  stage 3a    Bilateral bunions  -     meloxicam (MOBIC) 15 MG tablet; Take 1 tablet (15 mg total) by mouth once daily. for 14 days  Dispense: 14 tablet; Refill: 0      Thorough discussion is had with the patient today, concerning the diagnosis, its etiology, and the treatment algorithm at present.     XRAYS are reviewed in detail with the patient. All questions and concerns regarding findings and its/their implications are outlined and discussed.    MRI is reviewed in detail with the patient. All questions and concerns regarding findings and its/their implications are outlined and discussed.    Continue Lyrica.    Start Mobic QD for 14 days.    NSAIDs are limited due to CKD and HTN.    Patient should ambulate with proper and supportive shoe gear.  These are shoes with firm and robust arch support; medial counter.  Shoes which only bend at the metatarsophalangeal joint and which are rigid in the midfoot and hindfoot. Running type or cross training type shoes gear which are designed for pronation control is best.     Continue follow-up and treatment with Pain Management.    EMG/NCV is reviewed in detail with the patient. All questions and concerns regarding findings and its/their implications are outlined and discussed.    Rec. shoe gear with soft and accommodative foot bed and with a high toe box for alleviation of symptoms. Possible EE or EEE in width as well for alleviation of symptoms.    Consider CSI for the B/L HAV.        Future Appointments   Date Time Provider Department Center   2/10/2025  8:45 AM ECHOCARDIOGRAM, [facility] SPECCPR [facility]   3/3/2025  1:20 PM Taina Bob NP [facility]  [place]   3/4/2025  9:40 AM Brenna Johnson PA-C [facility] INT LORETTA [facility]   3/18/2025 11:30 AM Elsa Santiago NP [facility] CARDIO [facility]   10/9/2025  9:00 AM [facility] US3 [facility] ULSOUND [facility]   10/9/2025 10:15 AM Sherita Duran MD [facility] ENT [facility]

## 2025-02-10 ENCOUNTER — HOSPITAL ENCOUNTER (OUTPATIENT)
Dept: CARDIOLOGY | Facility: HOSPITAL | Age: 70
Discharge: HOME | End: 2025-02-10
Payer: MEDICARE

## 2025-02-10 ENCOUNTER — TELEPHONE (OUTPATIENT)
Dept: CARDIOLOGY | Facility: CLINIC | Age: 70
End: 2025-02-10

## 2025-02-10 VITALS
DIASTOLIC BLOOD PRESSURE: 78 MMHG | BODY MASS INDEX: 40.97 KG/M2 | SYSTOLIC BLOOD PRESSURE: 160 MMHG | WEIGHT: 240 LBS | HEIGHT: 64 IN

## 2025-02-10 DIAGNOSIS — I10 PRIMARY HYPERTENSION: ICD-10-CM

## 2025-02-10 LAB
AORTIC ROOT ANNULUS: 2.41 CM
ASCENDING AORTA: 2.75 CM
AV INDEX (PROSTH): 0.69
AV MEAN GRADIENT: 3 MMHG
AV PEAK GRADIENT: 7 MMHG
AV VALVE AREA BY VELOCITY RATIO: 1.9 CM²
AV VALVE AREA: 2.2 CM²
AV VELOCITY RATIO: 0.62
BSA FOR ECHO PROCEDURE: 2.22 M2
CV ECHO LV RWT: 0.4 CM
DOP CALC AO PEAK VEL: 1.3 M/S
DOP CALC AO VTI: 34.5 CM
DOP CALC LVOT AREA: 3.1 CM2
DOP CALC LVOT DIAMETER: 2 CM
DOP CALC LVOT PEAK VEL: 0.8 M/S
DOP CALC LVOT STROKE VOLUME: 75 CM3
DOP CALC RVOT PEAK VEL: 0.57 M/S
DOP CALC RVOT VTI: 16.2 CM
DOP CALCLVOT PEAK VEL VTI: 23.9 CM
E WAVE DECELERATION TIME: 194 MSEC
E/A RATIO: 1.3
E/E' RATIO: 11 M/S
ECHO LV POSTERIOR WALL: 1 CM (ref 0.6–1.1)
FRACTIONAL SHORTENING: 26 % (ref 28–44)
INTERVENTRICULAR SEPTUM: 0.9 CM (ref 0.6–1.1)
IVC DIAMETER: 1.61 CM
IVRT: 103 MSEC
LA MAJOR: 5.3 CM
LA MINOR: 5 CM
LA WIDTH: 4 CM
LEFT ATRIUM AREA SYSTOLIC (APICAL 2 CHAMBER): 19.13 CM2
LEFT ATRIUM AREA SYSTOLIC (APICAL 4 CHAMBER): 19.47 CM2
LEFT ATRIUM SIZE: 4.3 CM
LEFT ATRIUM VOLUME INDEX MOD: 27 ML/M2
LEFT ATRIUM VOLUME INDEX: 36 ML/M2
LEFT ATRIUM VOLUME MOD: 56 ML
LEFT ATRIUM VOLUME: 75 CM3
LEFT INTERNAL DIMENSION IN SYSTOLE: 3.7 CM (ref 2.1–4)
LEFT VENTRICLE DIASTOLIC VOLUME INDEX: 54.84 ML/M2
LEFT VENTRICLE DIASTOLIC VOLUME: 115.71 ML
LEFT VENTRICLE END SYSTOLIC VOLUME APICAL 2 CHAMBER: 55.83 ML
LEFT VENTRICLE END SYSTOLIC VOLUME APICAL 4 CHAMBER: 55.32 ML
LEFT VENTRICLE MASS INDEX: 80.5 G/M2
LEFT VENTRICLE SYSTOLIC VOLUME INDEX: 27.2 ML/M2
LEFT VENTRICLE SYSTOLIC VOLUME: 57.29 ML
LEFT VENTRICULAR INTERNAL DIMENSION IN DIASTOLE: 5 CM (ref 3.5–6)
LEFT VENTRICULAR MASS: 169.9 G
LV LATERAL E/E' RATIO: 11.1 M/S
LV SEPTAL E/E' RATIO: 10 M/S
LVED V (TEICH): 115.71 ML
LVES V (TEICH): 57.29 ML
LVOT MG: 1.62 MMHG
LVOT MV: 0.6 CM/S
MV PEAK A VEL: 0.77 M/S
MV PEAK E VEL: 1 M/S
OHS CV RV/LV RATIO: 0.58 CM
OHS LV EJECTION FRACTION SIMPSONS BIPLANE MOD: 57 %
PISA TR MAX VEL: 2.6 M/S
PULM VEIN S/D RATIO: 0.72
PV MEAN GRADIENT: 1 MMHG
PV PEAK D VEL: 0.78 M/S
PV PEAK GRADIENT: 3
PV PEAK S VEL: 0.56 M/S
PV PEAK VELOCITY: 0.81 M/S
RA MAJOR: 4.9 CM
RA PRESSURE ESTIMATED: 3 MMHG
RA WIDTH: 3.21 CM
RIGHT VENTRICLE DIASTOLIC BASEL DIMENSION: 2.9 CM
RIGHT VENTRICULAR END-DIASTOLIC DIMENSION: 2.94 CM
RV TB RVSP: 6 MMHG
SINUS: 2.41 CM
STJ: 2.21 CM
TDI LATERAL: 0.09 M/S
TDI SEPTAL: 0.1 M/S
TDI: 0.1 M/S
TR MAX PG: 27 MMHG
TRICUSPID ANNULAR PLANE SYSTOLIC EXCURSION: 2.04 CM
TV REST PULMONARY ARTERY PRESSURE: 30 MMHG
Z-SCORE OF LEFT VENTRICULAR DIMENSION IN END DIASTOLE: -2.8
Z-SCORE OF LEFT VENTRICULAR DIMENSION IN END SYSTOLE: -0.69

## 2025-02-10 PROCEDURE — 93306 TTE W/DOPPLER COMPLETE: CPT | Mod: 26,,, | Performed by: INTERNAL MEDICINE

## 2025-02-10 PROCEDURE — 93306 TTE W/DOPPLER COMPLETE: CPT

## 2025-02-10 NOTE — TELEPHONE ENCOUNTER
Contacted pt and informed her hultrasound with normal EF, some diastolic dysfunction and some leakage of valves which is okay.  Recommend good blood pressure control and avoid salty foods we will continue to monitor. Pt vu w/o q/c    ----- Message from Elsa Santiago NP sent at 2/10/2025  2:22 PM CST -----  Heart ultrasound with normal EF, some diastolic dysfunction and some leakage of valves which is okay.  Recommend good blood pressure control and avoid salty foods we will continue to monitor

## 2025-03-03 ENCOUNTER — APPOINTMENT (OUTPATIENT)
Dept: RADIOLOGY | Facility: HOSPITAL | Age: 70
End: 2025-03-03
Attending: NURSE PRACTITIONER
Payer: MEDICARE

## 2025-03-03 ENCOUNTER — OFFICE VISIT (OUTPATIENT)
Dept: INTERNAL MEDICINE | Facility: CLINIC | Age: 70
End: 2025-03-03
Payer: MEDICARE

## 2025-03-03 VITALS
TEMPERATURE: 97 F | RESPIRATION RATE: 18 BRPM | HEIGHT: 64 IN | WEIGHT: 244.25 LBS | OXYGEN SATURATION: 95 % | SYSTOLIC BLOOD PRESSURE: 116 MMHG | HEART RATE: 92 BPM | BODY MASS INDEX: 41.7 KG/M2 | DIASTOLIC BLOOD PRESSURE: 64 MMHG

## 2025-03-03 DIAGNOSIS — M79.644 FINGER PAIN, RIGHT: ICD-10-CM

## 2025-03-03 DIAGNOSIS — E78.2 MIXED HYPERLIPIDEMIA: ICD-10-CM

## 2025-03-03 DIAGNOSIS — M50.30 DDD (DEGENERATIVE DISC DISEASE), CERVICAL: ICD-10-CM

## 2025-03-03 DIAGNOSIS — Z00.00 ROUTINE MEDICAL EXAM: Primary | ICD-10-CM

## 2025-03-03 DIAGNOSIS — I10 PRIMARY HYPERTENSION: ICD-10-CM

## 2025-03-03 DIAGNOSIS — F32.1 CURRENT MODERATE EPISODE OF MAJOR DEPRESSIVE DISORDER WITHOUT PRIOR EPISODE: ICD-10-CM

## 2025-03-03 DIAGNOSIS — M47.26 OSTEOARTHRITIS OF SPINE WITH RADICULOPATHY, LUMBAR REGION: ICD-10-CM

## 2025-03-03 DIAGNOSIS — E89.0 POST-OPERATIVE HYPOTHYROIDISM: ICD-10-CM

## 2025-03-03 PROCEDURE — 99999 PR PBB SHADOW E&M-EST. PATIENT-LVL V: CPT | Mod: PBBFAC,,, | Performed by: NURSE PRACTITIONER

## 2025-03-03 PROCEDURE — 3074F SYST BP LT 130 MM HG: CPT | Mod: CPTII,S$GLB,, | Performed by: NURSE PRACTITIONER

## 2025-03-03 PROCEDURE — G2211 COMPLEX E/M VISIT ADD ON: HCPCS | Mod: S$GLB,,, | Performed by: NURSE PRACTITIONER

## 2025-03-03 PROCEDURE — 1101F PT FALLS ASSESS-DOCD LE1/YR: CPT | Mod: CPTII,S$GLB,, | Performed by: NURSE PRACTITIONER

## 2025-03-03 PROCEDURE — 1125F AMNT PAIN NOTED PAIN PRSNT: CPT | Mod: CPTII,S$GLB,, | Performed by: NURSE PRACTITIONER

## 2025-03-03 PROCEDURE — 3288F FALL RISK ASSESSMENT DOCD: CPT | Mod: CPTII,S$GLB,, | Performed by: NURSE PRACTITIONER

## 2025-03-03 PROCEDURE — 1160F RVW MEDS BY RX/DR IN RCRD: CPT | Mod: CPTII,S$GLB,, | Performed by: NURSE PRACTITIONER

## 2025-03-03 PROCEDURE — 99214 OFFICE O/P EST MOD 30 MIN: CPT | Mod: S$GLB,,, | Performed by: NURSE PRACTITIONER

## 2025-03-03 PROCEDURE — 73140 X-RAY EXAM OF FINGER(S): CPT | Mod: 26,RT,, | Performed by: RADIOLOGY

## 2025-03-03 PROCEDURE — 3008F BODY MASS INDEX DOCD: CPT | Mod: CPTII,S$GLB,, | Performed by: NURSE PRACTITIONER

## 2025-03-03 PROCEDURE — 73140 X-RAY EXAM OF FINGER(S): CPT | Mod: TC,PN,RT

## 2025-03-03 PROCEDURE — 1159F MED LIST DOCD IN RCRD: CPT | Mod: CPTII,S$GLB,, | Performed by: NURSE PRACTITIONER

## 2025-03-03 PROCEDURE — 3078F DIAST BP <80 MM HG: CPT | Mod: CPTII,S$GLB,, | Performed by: NURSE PRACTITIONER

## 2025-03-03 RX ORDER — SPIRONOLACTONE 25 MG/1
25 TABLET ORAL DAILY
Qty: 30 TABLET | Refills: 0 | Status: SHIPPED | OUTPATIENT
Start: 2025-03-03

## 2025-03-03 RX ORDER — DOXAZOSIN 1 MG/1
4 TABLET ORAL NIGHTLY
COMMUNITY

## 2025-03-03 RX ORDER — MELOXICAM 15 MG/1
1 TABLET ORAL DAILY
COMMUNITY
Start: 2025-02-06

## 2025-03-03 NOTE — PROGRESS NOTES
Patient ID: Mariah Meza is a 69 y.o. female.    Chief Complaint: Follow-up (3 mo)    History of Present Illness    CHIEF COMPLAINT:  Ms. Meza presents today for follow up of neck and spinal pain.    DEGENERATIVE DISC DISEASE:  She has been diagnosed with degenerative disc disease of the cervical and lumbar spine. She reports receiving multiple spinal injections over the last year and continues to experience pain. She is following up with pain management.    MUSCULOSKELETAL:  She reports right index finger pain with swelling and discomfort in the distal interphalangeal (DIP) joint.    CURRENT MEDICATIONS:  She takes Lyrica 75 mg prescribed by pain specialist, levothyroxine 100 mcg daily, and Vraylar 30 mg for depression.    SPECIALIST CARE:  She established care with endocrinologist Dr. Cortez at [facility] in February and with cardiology at [facility].      ROS:  Constitutional: negative diminished activity, negative appetite change, negative fatigue, negative fever  HENT: negative ear discharge, negative ear pain, negative mouth sores, negative nosebleeds, negative sore throat, negative tinnitus  Respiratory: negative apnea, negative cough, negative shortness of breath  Cardiovascular: negative chest pain, negative leg swelling  Gastrointestinal: negative abdominal distention, negative abdominal pain, negative blood in stool, negative constipation, negative diarrhea, negative nausea, negative vomiting  Endocrine: negative polydipsia, negative polyphagia, negative polyuria  Genitourinary: negative difficulty urinating, negative flank pain, negative frequency, negative hematuria  Musculoskeletal: negative back pain, negative abnormal gait, negative neck pain, negative neck stiffness, POSITIVE JOINT PAIN, POSITIVE MUSCLE PAIN, POSITIVE JOINT SWELLING  Neurological: negative dizziness, negative seizures, negative numbness, negative tingling, negative headaches, negative balance issues  Psychiatric: negative agitation,  negative confusion, negative hallucinations, negative sleep difficulty, negative suicidal ideation         Physical Exam    Vital Signs: Reviewed.  Constitutional: Well-appearing. Well-developed. No acute distress.  HENT: Normocephalic. Tympanic membranes normal bilaterally. Nares patent. Oropharynx clear. No erythema. No exudate.  Cardiovascular: Normal rate and regular rhythm. Normal heart sounds.  Pulmonary: Pulmonary effort is normal. Normal breath sounds.  Abdominal: Bowel sounds are normal. Abdomen is soft. No tenderness.  Musculoskeletal: No muscle tenderness. No joint tenderness. Normal range of motion.  Skin: Warm. Dry.  Neurological: No focal deficit is present. Alert and oriented to person, place, and time.  Psychiatric: Mood is normal. Behavior is normal. Behavior is cooperative.  Vitals: BLOOD PRESSURE /64.         Assessment & Plan    ROUTINE EXAM     CURRENT MODERATE EPISODE OF MAJOR DEPRESSIVE DISORDER WITHOUT PRIOR EPISODE:  - Acknowledged patient's stable depression.  - Continued Cymbalta 30 mg for management.    DEGENERATIVE DISC DISEASE OF CERVICAL SPINE:  - Assessed ongoing neck pain related to degenerative disc disease of cervical spine.  - Evaluated effectiveness of recent spinal injections.    DEGENERATIVE DISC DISEASE OF LUMBAR SPINE:  - Assessed ongoing lower back pain related to degenerative disc disease of lumbar spine.  - Evaluated effectiveness of recent spinal injections.    RIGHT INDEX FINGER DIP JOINT PAIN AND SWELLING:  - Evaluated right index finger DIP joint pain and swelling.  - Ordered x-ray for further assessment.    THYROID DISORDER:  - Reviewed thyroid management under care of new endocrinologist, Dr. Montemayor, at [place] in [facility].  - Noted patient's appointment with Dr. Montemayor in February.  - Continued levothyroxine 100 mcg daily for thyroid management.    CHRONIC PAIN:  - Assessed ongoing chronic pain.  - Continued Lyrica 75 mg as prescribed by pain specialist.  -  Noted patient's history of spinal injections over the past year.  - Confirmed follow-up visit with pain management.          Follow up in about 3 months (around 6/3/2025).    This note was generated with the assistance of ambient listening technology. Verbal consent was obtained by the patient and accompanying visitor(s) for the recording of patient appointment to facilitate this note. I attest to having reviewed and edited the generated note for accuracy, though some syntax or spelling errors may persist. Please contact the author of this note for any clarification.

## 2025-03-04 ENCOUNTER — OFFICE VISIT (OUTPATIENT)
Dept: PAIN MEDICINE | Facility: CLINIC | Age: 70
End: 2025-03-04
Payer: MEDICARE

## 2025-03-04 ENCOUNTER — RESULTS FOLLOW-UP (OUTPATIENT)
Dept: INTERNAL MEDICINE | Facility: CLINIC | Age: 70
End: 2025-03-04

## 2025-03-04 VITALS
SYSTOLIC BLOOD PRESSURE: 137 MMHG | HEIGHT: 64 IN | HEART RATE: 61 BPM | BODY MASS INDEX: 41.4 KG/M2 | WEIGHT: 242.5 LBS | DIASTOLIC BLOOD PRESSURE: 73 MMHG | RESPIRATION RATE: 17 BRPM

## 2025-03-04 DIAGNOSIS — M54.16 LUMBAR RADICULAR PAIN: ICD-10-CM

## 2025-03-04 DIAGNOSIS — M54.50 ACUTE EXACERBATION OF CHRONIC LOW BACK PAIN: Primary | ICD-10-CM

## 2025-03-04 DIAGNOSIS — M47.812 CERVICAL SPONDYLOSIS: ICD-10-CM

## 2025-03-04 DIAGNOSIS — G89.29 ACUTE EXACERBATION OF CHRONIC LOW BACK PAIN: Primary | ICD-10-CM

## 2025-03-04 PROCEDURE — 99214 OFFICE O/P EST MOD 30 MIN: CPT | Mod: S$GLB,,, | Performed by: PHYSICIAN ASSISTANT

## 2025-03-04 PROCEDURE — 1159F MED LIST DOCD IN RCRD: CPT | Mod: CPTII,S$GLB,, | Performed by: PHYSICIAN ASSISTANT

## 2025-03-04 PROCEDURE — 1101F PT FALLS ASSESS-DOCD LE1/YR: CPT | Mod: CPTII,S$GLB,, | Performed by: PHYSICIAN ASSISTANT

## 2025-03-04 PROCEDURE — 1125F AMNT PAIN NOTED PAIN PRSNT: CPT | Mod: CPTII,S$GLB,, | Performed by: PHYSICIAN ASSISTANT

## 2025-03-04 PROCEDURE — 3008F BODY MASS INDEX DOCD: CPT | Mod: CPTII,S$GLB,, | Performed by: PHYSICIAN ASSISTANT

## 2025-03-04 PROCEDURE — 99999 PR PBB SHADOW E&M-EST. PATIENT-LVL V: CPT | Mod: PBBFAC,,, | Performed by: PHYSICIAN ASSISTANT

## 2025-03-04 PROCEDURE — 3075F SYST BP GE 130 - 139MM HG: CPT | Mod: CPTII,S$GLB,, | Performed by: PHYSICIAN ASSISTANT

## 2025-03-04 PROCEDURE — G2211 COMPLEX E/M VISIT ADD ON: HCPCS | Mod: S$GLB,,, | Performed by: PHYSICIAN ASSISTANT

## 2025-03-04 PROCEDURE — 3078F DIAST BP <80 MM HG: CPT | Mod: CPTII,S$GLB,, | Performed by: PHYSICIAN ASSISTANT

## 2025-03-04 PROCEDURE — 1160F RVW MEDS BY RX/DR IN RCRD: CPT | Mod: CPTII,S$GLB,, | Performed by: PHYSICIAN ASSISTANT

## 2025-03-04 PROCEDURE — 3288F FALL RISK ASSESSMENT DOCD: CPT | Mod: CPTII,S$GLB,, | Performed by: PHYSICIAN ASSISTANT

## 2025-03-04 RX ORDER — PREGABALIN 75 MG/1
CAPSULE ORAL
Qty: 90 CAPSULE | Refills: 2 | Status: SHIPPED | OUTPATIENT
Start: 2025-03-04

## 2025-03-04 RX ORDER — METHYLPREDNISOLONE 4 MG/1
TABLET ORAL
Qty: 21 EACH | Refills: 0 | Status: SHIPPED | OUTPATIENT
Start: 2025-03-04

## 2025-03-04 NOTE — PROGRESS NOTES
Interventional Pain -- Established Patient (Follow-up visit)    Referring Physician: Beverly Mcpherson PA-C  - Neurology (2nd referral); Smiley Weaver (initial referral)    PCP: Taina Bob NP      Chief complaint:  Follow-up       Low back pain with BLE radicular pain   Cervical Neck Pain - facet-mediated, axial in nature   Neck pain with BUE radiation (down the LUE to the thumb in C6 distribution, down RUE into shoulder area)  Left shoulder pain - previously seen by Dr. Espinal (Orthopedics)       SUBJECTIVE:    Interval History (3/4/2025):  Mariah Meza presents today for follow-up visit.  Patient was last seen on 12/3/2024. At that visit, the plan was to continue Lyrica at night and start physical therapy. Patient reports pain as 8/10 today.  She reports she has been going to physical therapy for her neck, and she reports it did not help for her back.  She is adamantly against trying any other injections as she states they do not help-although it was documented previously that the epidural was helpful.    Interval History (12/3/2024):  Patient presents today for follow-up visit.  she underwent Bilateral L5/S1 + S1 TF SUSY on 11/8/24.  The patient reports that she is/was better following the procedure.  she reports 70% pain relief.  The changes lasted 4 weeks so far.  The changes have continued through this visit.  Patient reports pain as 7/10 today. She still has some left calf cramping, but overall, she feels much better.    At last visit, plan was to also schedule cervical medial branch block, but due to 'issues' after the epidural, she was afraid to go through with this.  She does feel that Lyrica is helping.  She can not tolerate twice a day, but she is taking at night.    Interval History (10/22/2024):  Mariah Meza presents today for follow-up visit.  Patient was last seen about 4 months ago. At that visit, the plan was to schedule lumbar SUSY, which was ultimately not  completed.  Her blood pressure was uncontrolled, so the procedure had to be canceled.  She continues to have bilateral leg pain associated with lumbar pain.  She presents today to discuss neck pain.  Neck pain is somewhat axial, but also radiates down the left arm.  She has some pain into the right shoulder area.  She was told she has left shoulder rotator cuff issues, but she does not want to have surgery.  She had 2 injections, but the 2nd time was too painful , so she did not want to repeat.  Patient reports pain as 6/10 today.  She saw PCP yesterday and had Toradol injection, which helped some short term.    Interval history 06/26/2024  Patient presents for four-month follow-up.  Today she reports exacerbation of lower back and leg pain.  Pain is constant and today is rated a 6/10.  Pain is described as burning and tingling in nature.  Today she reports pain in a bandlike distribution in the lower back which radiates down the posterior aspects of bilateral lower extremities in L5-S2 distribution to the feet.  Pain can be exacerbated with lumbar extension, standing and with ambulation.  She has continued physician directed physical therapy exercises over the last 8 weeks from 04/26/2024 through 06/26/2024 with marginal improvement in her symptoms.  She would like to refrain from any pharmacologic management at this time secondary to cognitive side effects from membrane stabilizing agents.  Pain is improved with lumbar forward flexion.    Interval history 02/05/2024  Patient presents status post bilateral L3-5 lumbar medial branch block 12/12/2023.  Patient reports 90% sustained relief in lower axial back pain following her procedure.  She reports this pain relief is still ineffective.  Today she reports burning pains down bilateral lower extremities.  Pain is intermittent and today is rated a 5/10.  Patient is concerned whether this was related to the prior injection.  She reports pain which can radiate down  "the lateral and posterior aspects of bilateral lower extremities in L4-S1 distribution to the calf.  Patient has continued judicious use of Flexeril which she does not even take daily as well as Tylenol.  During last schedule procedure, patient had hypertensive urgency, 229/100.  Of note patient went to the emergency room and received hydralazine and clonidine.  Patient has scheduled follow-up with nurse practitioner, Ms. Bob.    Interval History (11/22/2023):  Patient presents today for follow-up visit.  Patient was referred back to our clinic by Beverly Mcpherson PA-C (Neurology).  She complains mostly of low back pain.  She does have some leg pain on the left side occasionally.  She reports she stretches at home.  She takes Flexeril as needed, but she tries to avoid due to dry mouth.  She is taking gabapentin in the past with no relief.  She wants to hold off on adding additional medications at this time.  Patient reports pain as 6/10 today.    10/26/23 Neurology:  Ms. Mariah Meza is a 67 y.o. female presenting for evaluation of back pain. She noted that she has had some baseline back pain for quite some time (years) however in September she began to have increased pain. She required an injection of ketorolac at that time which helped for a day or two but then her pain returned. She did complete EMG/NCS previously unable to locate in [facility] or care everywhere. She describes her pain as sharp and some "electric shock" sensation that occasionally occurs. She does get some stiffness and weakness also which she reports is present in her entire body. She notes that her pain is worse on the L and in the lumbar region. She does endorse neck pain. She did have a cervical spine MRI completed one year ago which showed multilevel degenerative changes at C4-C5 and mild canal stenosis. She notes that her pain is constant all day but worsened with movement or prolonged sitting. She previously completed physical " therapy in Jan/Feb of this year which benefited her greatly and she found that she was able to be much more active. She then had a gout flare and seemed to regress in her ability to move. She denies any falls. She does endorse that she is having some constipation. She does have some baseline bladder problems but attributes this to her medications. She notes that she cannot lift a lot of things she use to be able to and she drops things more frequently. She reports numbness/tingling in bilateral hands and feet but worse on L hand.     Interval History (4/6/2023):  Mariah Meza presents today for follow-up visit.  Patient was last seen on 2/9/2023. At that visit, the plan was to start Gabapentin.  She reports she discontinued this medication after getting up to the 600 mg dosage due to side effects.  She reports it made her feel crazy in the head.  She reports improvement in her neck pain since last visit as she is been performing physician directed exercises at home.  She reports pain is primarily located in her ankles and feet right worse than left.  She reports that she was previously diagnosed with gout in November and since then she has had recurrent gouty flares.  She has noticed that these flares occur when she eats a lot of certain types of food.  She is had flare secondary to eating excessive amounts of shrimp, raisins, pineapple, and serial.  She reports the current flare began approximately 1 week ago and has been persistent she has noticed redness warmth and swelling in both of her ankles and great toe.  She has not taken anything other than Tylenol which has not been beneficial.  She is scheduled to follow-up with podiatry per referral from her primary care doctor.  Patient reports pain as 7/10 today.  She reports she has also noticed mild to moderate swelling in her hands and feet.  She reports this began after she started taking blood pressure medications, she thinks this may be due to  medications.    Initial HPI 02/09/2023  Mariah Meza is a 67y.o. female with past medical history significant for hyperlipidemia, hypertension, sicca syndrome, history of papillary thyroid cancer status post parathyroidectomy, obesity, GERD who presents to the clinic for the evaluation of mid back pain.  Patient reports pain has been present for the last 2 years without inciting accident injury or trauma.  Pain is constant and today is rated a 6/10.  Pain at its best is a 3/10 and at its worse is a 10/10.  Pain is described as sharp, stabbing and stiffness in nature.  Patient reports pain which begins in between the shoulder blades and radiates to the midthoracic spine.  Patient reports prior radiation down the left upper extremity to the thumb in C6 distribution.  Patient also reports pain in bilateral ankles.  Patient reports associated weakness in the lower extremities associated with this pain and with standing and ambulation.  This pain is described as burning in nature.  All of her pain is exacerbated when moving from sitting to standing, standing and with ambulation.  Patient reports in the past she was able to ambulate 10,000 steps per day but this has been reduced secondary to her pain.  Pain is improved with sitting and pain medications such as relief.  Patient reports she completed 12 months of physical therapy for the neck and lower back 2 months prior at [facility] physical therapy in [place].  Patient reports no significant improvement in her pain with completion of physical therapy.  Patient also takes Flexeril which is marginally helpful.  Patient reports significant lower extremity weakness.  Patient denies night fever/night sweats, urinary incontinence, bowel incontinence, significant weight loss, and loss of sensations.    Pain Disability Index (PDI) Score Review:      10/22/2024    10:11 AM 6/26/2024     9:59 AM 2/5/2024     7:50 AM   Last 3 PDI Scores   Pain Disability Index (PDI) 42 45 30  "      Non-Pharmacologic Treatments:  Physical Therapy/Home Exercise: yes  Ice/Heat:yes  TENS: no  Acupuncture: no  Massage: no  Chiropractic: no    Other: no      Pain Medications:  - Adjuvant Medications: Cyclobenzaprine (Flexeril) and Tylenol (Acetaminophen) Diclofenac tablet      Pain Procedures:   -12/12/2023: Bilateral L3-5 MBB with 90% pain relief  -11/8/2024: Bilateral L5/S1 + S1 TF SUSY with 70% pain relief        Review of Systems:  GENERAL:  No weight loss, malaise or fevers.  HEENT:   No recent changes in vision or hearing  NECK:  Negative for lumps, no difficulty with swallowing.  RESPIRATORY:  Negative for cough, wheezing or shortness of breath, patient denies any recent URI.  CARDIOVASCULAR:  Negative for chest pain or palpitations.  GI:  Negative for abdominal discomfort, blood in stools or black stools or change in bowel habits.  MUSCULOSKELETAL:  See HPI.  SKIN:  Negative for lesions, rash, and itching.  PSYCH:  No mood disorder or recent psychosocial stressors.   HEMATOLOGY/LYMPHOLOGY:  Negative for prolonged bleeding, bruising easily or swollen nodes.    NEURO:   No history of syncope, paralysis, seizures or tremors.  All other reviewed and negative other than HPI.      OBJECTIVE:    Physical Exam:  Vitals:    03/04/25 0919   BP: 137/73   Pulse: 61   Resp: 17   Weight: 110 kg (242 lb 8.1 oz)   Height: 5' 4" (1.626 m)   PainSc:   8   PainLoc: Back    Body mass index is 41.63 kg/m².   (reviewed on 3/4/2025)    GENERAL: Well appearing, in no acute distress, alert and oriented x3.  PSYCH:  Mood and affect appropriate.  SKIN: Skin color, texture, turgor normal, no rashes or lesions.  HEAD/FACE:  Normocephalic, atraumatic.  PULMONARY: no audible wheezing.   GI: no obvious distention.     NECK: no pain to palpation over the cervical paraspinous muscles. Spurling Negative. Mild pain with neck flexion, extension, or lateral flexion. Normal testing biceps, triceps and brachioradialis bilaterally.  Cervical " facet loading causes pain bilaterally.  LUMBAR:  Pain with flexion.  Facet loading is equivocal.  TTP diffusely over lumbar paraspinals.  SLR equivocal bilaterally. Limited ROM secondary to pain reproduction.    5/5 strength testing deltoid, biceps, triceps, wrist extensor, wrist flexor and ulnar intrinsics bilaterally.    Normal  strength bilaterally  MUSCULOSKELETAL:  Mild swelling along left ankle, moderate swelling to right ankle redness and swelling along the base of her left great toe swelling along right great toe, bilateral bunions along great toes  PULM: No evidence of respiratory difficulty, symmetric chest rise.  GI:  Soft and non-tender.    NEURO: BUE & BLE coordination and muscle stretch reflexes are physiologic and symmetric. No loss of sensation is noted.  GAIT: normal.        Imaging/ Diagnostic Studies/ Labs (Reviewed on 3/4/2025):    11/06/2023 MRI Lumbar Spine Without Contrast  COMPARISON:  Plain films of the lumbar spine from 03/06/2014.    FINDINGS:  There is equivocal 1-2 mm of anterolisthesis of L4 on L5. The vertebral body heights are well maintained, with no fracture.  No marrow signal abnormality suspicious for an infiltrative process.    The conus medullaris terminates at approximately the L1-L2 disk space.  There is a probable small cyst seen within the upper pole to interpolar right kidney that measures approximately 6 mm in size.  Consider further evaluation with ultrasound for confirmation.  The discs are relatively well hydrated with only some minimal disc desiccation seen along the periphery of the L5-S1 disc and slightly more centrally at the L2-3 disc.    L1-L2: No significant central canal or neural foraminal narrowing.Mild bilateral facet arthropathy noted.    L2-L3: No significant central canal or neural foraminal narrowing.Mild-to-moderate bilateral facet arthropathy noted.    L3-L4: No significant central canal or neural foraminal narrowing.Moderate to severe bilateral  facet arthropathy noted.    L4-L5:  Minimal diffuse disc bulge along with equivocal grade 1 spondylolisthesis and severe bilateral facet arthropathy resulting in mild effacement of the ventral thecal sac.  No significant neural foraminal canal narrowing.    L5-S1:  Mild diffuse disc bulge slightly more prominent the left paracentral region along with moderate bilateral facet arthropathy resulting in no significant central or neural foraminal canal narrowing.    Impression:   1. Multilevel degenerative changes of the lumbar spine as detailed above.  No high-grade central or neural foraminal canal narrowing noted.  Multilevel bilateral facet arthropathy.  2. Incidental note made of a probable 6 mm right renal cyst.  Consider ultrasound for confirmation.        11/06/2023 MRI Cervical Spine Without Contrast  COMPARISON:  Plain films from 04/13/2021.  Report from outside MRI dated 08/09/2022.  Films unavailable for comparison.    FINDINGS:  There is a couple mm of anterolisthesis of C2 on C3 and C3 on C4. No fracture. No marrow signal abnormality suspicious for an infiltrative process.  There is disc desiccation noted throughout the cervical spine with mild disc space narrowing seen at C3-4, C4-5 and C7-C6 with more mild-to-moderate disc space narrowing seen at C5-6.    The cervical cord is normal in caliber and signal characteristics.  The craniocervical junction and visualized intracranial contents are unremarkable.  The adjacent soft tissue structures show no significant abnormalities.    C2-C3:  No significant central canal or neural foraminal narrowing.    C3-C4:  There is a diffuse disc bulge along with some posterior spondylotic ridging that results in effacement of ventral thecal sac and moderate effacement of the ventral cord.  The AP dimension of the central canal this level measures approximately 9 mm.  No significant neural foraminal canal narrowing suggested.    C4-C5:  Diffuse disc bulge and posterior  spondylotic ridging more prominent in the left paracentral region resulting in significant effacement of the thecal sac and moderate face mint of the left side of the cord.  The AP dimension of the central canal at this level also measures approximately 9 mm.  No significant neural foraminal canal narrowing.    C5-C6:  Diffuse disc bulge resulting in effacement of ventral thecal sac but not quite abutting the cord.  The AP dimension of the central canal measures approximately 10 mm at this level.  The right C5-6 neural foraminal canal is mildly narrowed secondary to uncovertebral joint hypertrophy.  No significant left neural foraminal canal narrowing.    C6-C7:  Mild diffuse disc bulge resulting in mild effacement of ventral thecal sac.  No abutment of the anterior cord.  The AP dimension of the central canal at this level measures approximately 10.5 mm.  The right C6-7 neural foraminal canal appears to be minimally narrowed secondary to uncovertebral joint hypertrophy.    C7-T1:   Mild diffuse disc bulge resulting in no significant central canal narrowing.  The right neural foraminal canal appears to be at least mildly narrowed.  Impression:   1. Multilevel degenerative changes of the cervical spine as detailed above.      03/04/14 X-Ray Lumbar Spine Ap And Lateral  Five non-rib bearing lumbar type vertebrae are present without significant lateral curvature abnormality or subluxation.  Degenerative endplate changes as well as facet joint hypertrophy, particularly inferiorly, are noted without acute fracture or  suspicious focal bony lesion identified.  Disk spaces are fairly well-maintained.       04/13/21 X-Ray Cervical Spine Complete 5 view  Patient's hair limits the exam.  There is visualization to the C7-T1 level on the swimmer's view.  Multilevel marginal spurring and varying degrees of uniform loss of disc height throughout the C-spine.  No fracture or subluxation.  Pre dens space, prevertebral soft tissues,  "C1-2 articulation and odontoid normal in appearance allowing for positioning.  Neural foramina widely patent bilaterally at all levels.  Remaining visualized osseous structures intact.  Included upper lung fields clear.      BLE EMG 10/2021  1. ABNORMAL study  2. There is electrodiagnostic evidence of an acute on chronic radiculopathy of the L5 and S1 nerve roots-slightly worse on the right        ASSESSMENT: 69 y.o. year old female with neck, mid back, bilateral ankles pain, consistent with     1. Acute exacerbation of chronic low back pain  methylPREDNISolone (MEDROL DOSEPACK) 4 mg tablet      2. Lumbar radicular pain  pregabalin (LYRICA) 75 MG capsule      3. Cervical spondylosis  HME - OTHER              PLAN:   - Interventions:    - Hold off on any injections - as she is adamantly against at this time.     - S/p Bilateral L5/S1 + S1 TF SUSY on 11/8/24 with 70% pain relief -- previously reported.    - Anticoagulation use: no no anticoagulation    - Medications:  - Increase Lyrica (pregabalin) 75mg BID to 75mg QAM/ 75-150mg QHS. Previously could not tolerate more than QHS.  - Continue Cymbalta 30 mg QD - recently started, which she was informed could also help with chronic pain.  - Continue Flexeril 10mg PRN myofascial pain.     - Will prescribe Medrol Dose pack with instructions - for acute pain flare:  Day 1: 2 tablets before breakfast, 1 tablet after lunch, 1 tablet after dinner, 2 tablets at bedtime   Day 2: 1 tablet before breakfast, 1 tablet after lunch, 1 tablet after dinner, 2 tablets at bedtime   Day 3: 1 tablet before breakfast, 1 tablet after lunch, 1 tablet after dinner, 1 tablet at bedtime   Day 4: 1 tablet before breakfast, 1 tablet after lunch, 1 tablet at bedtime   Day 5: 1 tablet before breakfast, 1 tablet at bedtime   Day 6: 1 tablet before breakfast.    - Failed medications: Gabapentin (side effects (made her "feel crazy") also ineffective).    - LA  reviewed and appropriate.       - " Consults/referral:    -Continue follow-up with PCP: uncontrolled HTN.  -Continue follow-up with Dr. Espinal (Orthopedics) in regards to left shoulder pain (although she wants to avoid sx right now).     - Therapy:   - Recommend heating pad for neck pain as well.   - Order TENs unit to help increase ROM, reduce pain, strengthen, and allow return to ADLs.  The patient will be instructed on its use by the [employer] ([employer]) representatives. The patient was counseled that this may help treat their myofascial pain through transcutaneous electrical nerve stimulation and excitation via an electric current.  We have discussed that the unit is usually connected to the skin using two or more electrodes, which are typically conductive gel pads.  A typical battery-operated TENS unit is able to modulate pulse width, frequency, and intensity to help reduce pain. Order sent to [facility].   - [facility] with passive modalities, including ice and heat, and active modalities, including a regimen for stretching and strengthening. Currently attending for neck pain.  She went [facility] in the past and did not find it was helpful.     - Diagnostic/ Imaging:  Diagnostic imaging (lumbar & cervical MRI 11/06/2023) and diagnostic testing (lower extremity electromyography studies) reviewed and discussed with the patient.    - Follow up visit:  3 months follow-up  - in clinic (per pt request)     Future Appointments   Date Time Provider Department Center   3/18/2025 11:30 AM Elsa Santiago NP [facility] CARDIO [facility]   5/30/2025  8:20 AM LUIS LOPEZ [facility]   6/3/2025  9:00 AM Taina Bob NP ZACC [facility]   6/10/2025  9:00 AM Brenna Johnson PA-C HG INT LORETTA [facility]   10/9/2025  9:00 AM HG US3 [facility] ULSOUND [facility]   10/9/2025 10:15 AM Sherita Duran MD [facility] ENT [facility]       Visit today included increased complexity associated with the care of the episodic  problem of chronic pain which was addressed and continue to manage the longitudinal care of the patient due to the serious and/or complex managed problem(s) listed above.    - Patient Questions: Answered all of the patient's questions regarding diagnosis, therapy, and treatment.    - This condition does not require this patient to take time off of work, and the primary goal of our Pain Management services is to improve the patient's functional capacity.   - I discussed the risks, benefits, and alternatives to potential treatment options. All questions and concerns were fully addressed today in clinic.         Brenna Johnson PA-C  Interventional [facility] Management - [facility]    Disclaimer:  This note was prepared using voice recognition system and is likely to have sound alike errors that may have been overlooked even after proof reading.  Please call me with any questions.

## 2025-03-10 PROBLEM — F32.1 CURRENT MODERATE EPISODE OF MAJOR DEPRESSIVE DISORDER WITHOUT PRIOR EPISODE: Status: ACTIVE | Noted: 2025-03-10

## 2025-03-18 ENCOUNTER — TELEPHONE (OUTPATIENT)
Dept: INTERNAL MEDICINE | Facility: CLINIC | Age: 70
End: 2025-03-18
Payer: MEDICARE

## 2025-03-18 ENCOUNTER — OFFICE VISIT (OUTPATIENT)
Dept: CARDIOLOGY | Facility: CLINIC | Age: 70
End: 2025-03-18
Payer: MEDICARE

## 2025-03-18 ENCOUNTER — PATIENT MESSAGE (OUTPATIENT)
Dept: CARDIOLOGY | Facility: HOSPITAL | Age: 70
End: 2025-03-18
Payer: MEDICARE

## 2025-03-18 VITALS
WEIGHT: 241.19 LBS | HEIGHT: 64 IN | BODY MASS INDEX: 41.18 KG/M2 | HEART RATE: 61 BPM | SYSTOLIC BLOOD PRESSURE: 180 MMHG | DIASTOLIC BLOOD PRESSURE: 84 MMHG

## 2025-03-18 DIAGNOSIS — I1A.0 RESISTANT HYPERTENSION: Primary | ICD-10-CM

## 2025-03-18 DIAGNOSIS — E78.2 MIXED HYPERLIPIDEMIA: ICD-10-CM

## 2025-03-18 DIAGNOSIS — R07.9 CHEST PAIN, UNSPECIFIED TYPE: ICD-10-CM

## 2025-03-18 DIAGNOSIS — I10 PRIMARY HYPERTENSION: ICD-10-CM

## 2025-03-18 PROCEDURE — 1159F MED LIST DOCD IN RCRD: CPT | Mod: CPTII,S$GLB,,

## 2025-03-18 PROCEDURE — 99999 PR PBB SHADOW E&M-EST. PATIENT-LVL IV: CPT | Mod: PBBFAC,,,

## 2025-03-18 PROCEDURE — 1101F PT FALLS ASSESS-DOCD LE1/YR: CPT | Mod: CPTII,S$GLB,,

## 2025-03-18 PROCEDURE — 99214 OFFICE O/P EST MOD 30 MIN: CPT | Mod: S$GLB,,,

## 2025-03-18 PROCEDURE — 3077F SYST BP >= 140 MM HG: CPT | Mod: CPTII,S$GLB,,

## 2025-03-18 PROCEDURE — 3079F DIAST BP 80-89 MM HG: CPT | Mod: CPTII,S$GLB,,

## 2025-03-18 PROCEDURE — 1126F AMNT PAIN NOTED NONE PRSNT: CPT | Mod: CPTII,S$GLB,,

## 2025-03-18 PROCEDURE — 3288F FALL RISK ASSESSMENT DOCD: CPT | Mod: CPTII,S$GLB,,

## 2025-03-18 PROCEDURE — 3008F BODY MASS INDEX DOCD: CPT | Mod: CPTII,S$GLB,,

## 2025-03-18 PROCEDURE — 1160F RVW MEDS BY RX/DR IN RCRD: CPT | Mod: CPTII,S$GLB,,

## 2025-03-18 NOTE — TELEPHONE ENCOUNTER
Returned patient call and left message      ----- Message from Jyoti sent at 3/18/2025  2:36 PM CDT -----  Type:  Patient Returning CallWho Called:MACK MORSE [[id number]]Who Left Message for Patient:Does the patient know what this is regarding?:Would the patient rather a call back or a response via [employer]? Best Call Back Number: [phone] Information: Patient stated someone called from this office Patient will like a return call

## 2025-03-18 NOTE — PROGRESS NOTES
"  Subjective:   Patient ID:  Mariah Meza is a 69 y.o. female who presents for evaluation of No chief complaint on file.      HPI 69-year-old female whose current medical conditions include obesity, HTN, HLD, GERD, chronic back pain previously followed by Dr. Hanna in [facility] as well as Dr. Major in the past here today for CV follow-up recently started on 12.5 mg of spironolactone, complaining of bilateral lower extremity edema and HERNANDEZ as well as feeling fatigued and tired (recently started on Cymbalta).  Also complaining of nasal congestion. Denies any chest pain, dizziness, palpitations      3/18/25  Here today for CV follow up, recent started aldactone, echo 2/25' with nml EF Grade I DD. BP elevated in clinic today, however home readings low 90s earlier in the month. Since 130-140s recently. C/o back pain "sizzle pain", HERNANDEZ. Weight stable from previous visit, LE edema stable. Watches her diet, cooks her own foods     24' goal < 100  HGA1C 24' 4.9    Tobacco 20+ years ago  ETOH none  Exercise walking, home yoga  FH mother CAD  Past Medical History:   Diagnosis Date    Chronic rhinitis     DJD (degenerative joint disease), lumbar     GERD (gastroesophageal reflux disease)     Hard to intubate 12/20/2021    Headache     Hiatal hernia     Hyperlipidemia     Hypertension     Liver disease     Fatty Liver    Low back pain     Obesity     PONV (postoperative nausea and vomiting)     Thyroid disease        Past Surgical History:   Procedure Laterality Date    BILATERAL OOPHORECTOMY  2002    CARPAL TUNNEL RELEASE      Left wrist    CHOLECYSTECTOMY      DISSECTION OF NECK Bilateral 12/20/2021    Procedure: DISSECTION, NECK;  Surgeon: Deejay Olsen MD;  Location: [facility];  Service: ENT;  Laterality: Bilateral;  Central neck dissection    HYSTERECTOMY  1984    INJECTION OF ANESTHETIC AGENT AROUND MEDIAL BRANCH NERVES INNERVATING LUMBAR FACET JOINT Bilateral 12/12/2023    Procedure: Bilateral L3-5 * " MBB #1 with RN IV sedation;  Surgeon: Franklin Sutton MD;  Location: [facility];  Service: Pain Management;  Laterality: Bilateral;    NECK EXPLORATION Bilateral 2021    Procedure: EXPLORATION, NECK;  Surgeon: Deejay Olsen MD;  Location: [facility] OR;  Service: ENT;  Laterality: Bilateral;  Parathyroid exploration    ROTATOR CUFF REPAIR      Right shoulder    ROTATOR CUFF REPAIR Left 2010    SELECTIVE INJECTION OF ANESTHETIC AGENT AROUND LUMBAR SPINAL NERVE ROOT BY TRANSFORAMINAL APPROACH Bilateral 2024    Procedure: Bilateral L5/S1 + S1 TF SUSY;  Surgeon: Franklin Sutton MD;  Location: [facility];  Service: Pain Management;  Laterality: Bilateral;    THYROIDECTOMY, BILATERAL Bilateral 2021    Procedure: THYROIDECTOMY,BILATERAL;  Surgeon: Deejay Olsen MD;  Location: [facility] OR;  Service: ENT;  Laterality: Bilateral;    TOTAL VAGINAL HYSTERECTOMY         Social History     Tobacco Use    Smoking status: Former     Current packs/day: 0.00     Average packs/day: 0.5 packs/day for 8.0 years (4.0 ttl pk-yrs)     Types: Cigarettes     Start date: 1971     Quit date: 1979     Years since quittin.2     Passive exposure: Never    Smokeless tobacco: Never   Substance Use Topics    Alcohol use: No    Drug use: Never       Family History   Problem Relation Name Age of Onset    Diabetes Mother      Hypertension Mother      Hyperlipidemia Mother      Heart disease Mother          CHF    Stroke Mother      Heart attack Mother      Hearing loss Mother      Diabetes Sister      Sickle cell anemia Other          nieces, nephews    Lupus Other          niece       Current Outpatient Medications on File Prior to Visit   Medication Sig Dispense Refill    acetaminophen (TYLENOL) 650 MG TbSR Take 650 mg by mouth daily as needed.      calcium carbonate 470 mg calcium (1,177 mg) Chew Take one tablet by mouth twice daily 60 each 0    carvediloL (COREG) 12.5 MG tablet Take 1 tablet (12.5 mg total) by mouth 2 (two)  times daily with meals. 180 tablet 3    cyanocobalamin 500 MCG tablet Take 500 mcg by mouth once daily.      cyclobenzaprine (FLEXERIL) 10 MG tablet Take 1 tablet (10 mg total) by mouth every evening. 90 tablet 1    dicyclomine (BENTYL) 10 MG capsule Take 10 mg by mouth.      doxazosin (CARDURA) 4 MG tablet Take 1 tablet (4 mg total) by mouth every evening.      DULoxetine (CYMBALTA) 30 MG capsule Take 1 capsule (30 mg total) by mouth once daily. 30 capsule 11    ferrous sulfate 325 (65 FE) MG EC tablet Take 325 mg by mouth once daily.      levothyroxine (SYNTHROID) 100 MCG tablet Take 1 tablet (100 mcg total) by mouth before breakfast. 90 tablet 3    magnesium oxide (MAG-OX) 400 mg (241.3 mg magnesium) tablet Take 400 mg by mouth once daily.      olmesartan-hydrochlorothiazide (BENICAR HCT) 40-25 mg per tablet Take 1 tablet by mouth once daily. 90 tablet 3    pantoprazole (PROTONIX) 40 MG tablet Take 1 tablet (40 mg total) by mouth once daily. 30 tablet 5    pregabalin (LYRICA) 75 MG capsule Take 1 capsule (75 mg total) by mouth every morning AND 1-2 capsules ( mg total) every evening. 90 capsule 2    psyllium husk/aspartame (METAMUCIL FIBER SINGLES ORAL) Take by mouth.      spironolactone (ALDACTONE) 25 MG tablet Take 1 tablet (25 mg total) by mouth once daily. 30 tablet 0    vitamin D (VITAMIN D3) 1000 units Tab Take 1,000 Units by mouth once daily.      vitamin E 400 UNIT capsule Take 400 Units by mouth once daily.      cloNIDine (CATAPRES) 0.1 MG tablet Take by mouth. (Patient not taking: Reported on 3/18/2025)      docusate sodium (COLACE) 100 MG capsule Take 1 capsule (100 mg total) by mouth 3 (three) times daily as needed for Constipation. (Patient not taking: Reported on 3/18/2025) 30 capsule 0    hydrOXYzine HCL (ATARAX) 25 MG tablet Take 1 tablet (25 mg total) by mouth 3 (three) times daily as needed for Itching. (Patient not taking: Reported on 3/18/2025) 15 tablet 0    LIDOcaine (LIDODERM) 5 %  Place 1 patch onto the skin once daily. Remove & Discard patch within 12 hours or as directed by MD (Patient not taking: Reported on 3/18/2025) 30 patch 1    meloxicam (MOBIC) 15 MG tablet Take 1 tablet by mouth once daily. (Patient not taking: Reported on 3/18/2025)      methylPREDNISolone (MEDROL DOSEPACK) 4 mg tablet use as directed (Patient not taking: Reported on 3/18/2025) 21 each 0    wheat dextrin/L.acid/aspartame (FIBER WITH PROBIOTIC ORAL) Take by mouth Daily. (Patient not taking: Reported on 3/18/2025)      [DISCONTINUED] doxazosin (CARDURA) 1 MG tablet Take 4 tablets by mouth every evening. (Patient not taking: Reported on 3/18/2025)       No current facility-administered medications on file prior to visit.      Wt Readings from Last 3 Encounters:   03/18/25 109.4 kg (241 lb 2.9 oz)   03/04/25 110 kg (242 lb 8.1 oz)   03/03/25 110.8 kg (244 lb 4.3 oz)     Temp Readings from Last 3 Encounters:   03/03/25 96.7 °F (35.9 °C) (Tympanic)   12/27/24 97 °F (36.1 °C) (Tympanic)   12/02/24 97.9 °F (36.6 °C) (Tympanic)     BP Readings from Last 3 Encounters:   03/18/25 (!) 184/96   03/04/25 137/73   03/03/25 116/64     Pulse Readings from Last 3 Encounters:   03/18/25 61   03/04/25 61   03/03/25 92        Review of Systems   Constitutional: Negative.   HENT: Negative.     Eyes: Negative.    Cardiovascular:  Positive for chest pain and dyspnea on exertion.   Respiratory: Negative.     Skin: Negative.    Musculoskeletal:  Positive for arthritis, back pain and neck pain.   Gastrointestinal: Negative.    Genitourinary: Negative.    Neurological: Negative.    Psychiatric/Behavioral: Negative.         Objective:   Physical Exam  Vitals and nursing note reviewed.   Constitutional:       Appearance: Normal appearance. She is obese.   HENT:      Head: Normocephalic.   Eyes:      Pupils: Pupils are equal, round, and reactive to light.   Cardiovascular:      Rate and Rhythm: Normal rate and regular rhythm.      Heart sounds:  Normal heart sounds, S1 normal and S2 normal. No murmur heard.     No S3 or S4 sounds.   Pulmonary:      Effort: Pulmonary effort is normal.      Breath sounds: Normal breath sounds.   Abdominal:      General: Bowel sounds are normal.      Palpations: Abdomen is soft.   Musculoskeletal:         General: Normal range of motion.      Cervical back: Normal range of motion.      Right lower leg: Edema present.      Left lower leg: Edema present.      Comments: mild   Skin:     Capillary Refill: Capillary refill takes less than 2 seconds.   Neurological:      General: No focal deficit present.      Mental Status: She is alert and oriented to person, place, and time.   Psychiatric:         Mood and Affect: Mood normal.         Behavior: Behavior normal.         Thought Content: Thought content normal.         Lab Results   Component Value Date    CHOL 196 03/11/2024    CHOL 233 (H) 10/09/2023    CHOL 194 09/29/2021     Lab Results   Component Value Date    HDL 47 03/11/2024    HDL 54 10/09/2023    HDL 52 09/29/2021     Lab Results   Component Value Date    LDLCALC 130.4 03/11/2024    LDLCALC 155.6 10/09/2023    LDLCALC 124.6 09/29/2021     Lab Results   Component Value Date    TRIG 93 03/11/2024    TRIG 117 10/09/2023    TRIG 87 09/29/2021     Lab Results   Component Value Date    CHOLHDL 24.0 03/11/2024    CHOLHDL 23.2 10/09/2023    CHOLHDL 26.8 09/29/2021       Chemistry        Component Value Date/Time     12/27/2024 0948    K 3.5 12/27/2024 0948     12/27/2024 0948    CO2 24 12/27/2024 0948    BUN 17 12/27/2024 0948    CREATININE 1.1 12/27/2024 0948    GLU 88 12/27/2024 0948        Component Value Date/Time    CALCIUM 8.9 12/27/2024 0948    ALKPHOS 41 12/27/2024 0948    AST 16 12/27/2024 0948    ALT 14 12/27/2024 0948    BILITOT 0.8 12/27/2024 0948    ESTGFRAFRICA >60.0 07/18/2022 1109    EGFRNONAA >60.0 07/18/2022 1109          Lab Results   Component Value Date    TSH 0.28 (L) 02/12/2025     No results  "found for: "INR", "PROTIME"  @RESUFAST(WBC,HGB,HCT,MCV,PLT)  @LABRCNTIP(BNP,BNPTRIAGEBLO)@  CrCl cannot be calculated (Patient's most recent lab result is older than the maximum 7 days allowed.).     No results found for this or any previous visit.     No results found for this or any previous visit.     Assessment:      1. Primary hypertension    2. Mixed hyperlipidemia          Plan:   Primary hypertension    Mixed hyperlipidemia          Carvedilol, doxazosin, Benicar-HCTZ, spironolactone, low-sodium diet, profile blood pressure- HTN  Low-fat diet, allergy to statins-HTN  Risk factor modifications   Daily exercise as tolerated     RTC 3m  Stress test  Send BP log in 2 week consider inc doxazosin or adding hydralazine    Courtney Guillot, FNP-C [facility], Cardiology    "

## 2025-03-23 ENCOUNTER — PATIENT MESSAGE (OUTPATIENT)
Dept: CARDIOLOGY | Facility: CLINIC | Age: 70
End: 2025-03-23
Payer: MEDICARE

## 2025-04-02 ENCOUNTER — TELEPHONE (OUTPATIENT)
Dept: INTERNAL MEDICINE | Facility: CLINIC | Age: 70
End: 2025-04-02
Payer: MEDICARE

## 2025-04-02 DIAGNOSIS — Z12.31 BREAST CANCER SCREENING BY MAMMOGRAM: Primary | ICD-10-CM

## 2025-04-02 NOTE — TELEPHONE ENCOUNTER
----- Message from Goldy Lyn sent at 3/21/2025  4:54 PM CDT -----  Contact: self  Please order mammogram, thanks  ----- Message -----  From: Paul Mclean  Sent: 3/21/2025   4:42 PM CDT  To: Paulino Her Staff    .Type: Orders Request What orders/ testing are being requested? Mammogram Is there a future appointment scheduled for the patient with PCP?yes When? 6/3/25 Would you prefer a response via [employer]? Call [phone] Comments:the patient received a letter that it's time for her yearly mammogram

## 2025-04-05 ENCOUNTER — OFFICE VISIT (OUTPATIENT)
Dept: OTOLARYNGOLOGY | Facility: CLINIC | Age: 70
End: 2025-04-05
Payer: MEDICARE

## 2025-04-05 VITALS — BODY MASS INDEX: 41.13 KG/M2 | WEIGHT: 239.63 LBS

## 2025-04-05 DIAGNOSIS — K21.9 GASTROESOPHAGEAL REFLUX DISEASE, UNSPECIFIED WHETHER ESOPHAGITIS PRESENT: ICD-10-CM

## 2025-04-05 DIAGNOSIS — K11.8 PAROTID MASS: ICD-10-CM

## 2025-04-05 DIAGNOSIS — R13.10 DYSPHAGIA, UNSPECIFIED TYPE: Primary | ICD-10-CM

## 2025-04-05 DIAGNOSIS — C73 PAPILLARY THYROID CARCINOMA: ICD-10-CM

## 2025-04-05 PROCEDURE — 31575 DIAGNOSTIC LARYNGOSCOPY: CPT | Mod: S$GLB,,, | Performed by: ORTHOPAEDIC SURGERY

## 2025-04-05 PROCEDURE — 99999 PR PBB SHADOW E&M-EST. PATIENT-LVL III: CPT | Mod: PBBFAC,,, | Performed by: ORTHOPAEDIC SURGERY

## 2025-04-05 PROCEDURE — 1101F PT FALLS ASSESS-DOCD LE1/YR: CPT | Mod: CPTII,S$GLB,, | Performed by: ORTHOPAEDIC SURGERY

## 2025-04-05 PROCEDURE — 1159F MED LIST DOCD IN RCRD: CPT | Mod: CPTII,S$GLB,, | Performed by: ORTHOPAEDIC SURGERY

## 2025-04-05 PROCEDURE — 99214 OFFICE O/P EST MOD 30 MIN: CPT | Mod: 25,S$GLB,, | Performed by: ORTHOPAEDIC SURGERY

## 2025-04-05 PROCEDURE — 1126F AMNT PAIN NOTED NONE PRSNT: CPT | Mod: CPTII,S$GLB,, | Performed by: ORTHOPAEDIC SURGERY

## 2025-04-05 PROCEDURE — 3288F FALL RISK ASSESSMENT DOCD: CPT | Mod: CPTII,S$GLB,, | Performed by: ORTHOPAEDIC SURGERY

## 2025-04-05 PROCEDURE — 3008F BODY MASS INDEX DOCD: CPT | Mod: CPTII,S$GLB,, | Performed by: ORTHOPAEDIC SURGERY

## 2025-04-05 NOTE — PROGRESS NOTES
Subjective:      Patient ID: Mariah Meza is a 69 y.o. female.    Chief Complaint: Sore Throat (Sensation of something in her throat - constant need to clear throat - having issues with swallowing both liquids and solids - began about 3 wks ago)    Patient is a very pleasant 69 y.o. female seen today for evaluation of difficulty swallowing.  This has been an issue for the last 3 weeks and is not improving.  The patient notes difficulty swallowing both solids and liquids.  She does experience coughing with swallowing as well as constant throat clearing.  She does note pain with swallowing.  She has never smoked.  She has rare reflux symptoms, on Protonix.  She notes that at times she wakes up from her sleep with a feeling of not being able to swallow.   She follows with an outside GI, no records available, but she reports she did have an esophageal dilation done.            Review of Systems   HENT:  Positive for trouble swallowing. Negative for sore throat.        Objective:       Physical Exam  Constitutional:       General: She is not in acute distress.     Appearance: She is well-developed.   HENT:      Head: Normocephalic and atraumatic.      Right Ear: Tympanic membrane, ear canal and external ear normal.      Left Ear: Tympanic membrane, ear canal and external ear normal.      Nose: Nose normal. No nasal deformity, septal deviation, mucosal edema or rhinorrhea.      Right Sinus: No maxillary sinus tenderness or frontal sinus tenderness.      Left Sinus: No maxillary sinus tenderness or frontal sinus tenderness.      Mouth/Throat:      Mouth: Mucous membranes are not pale and not dry.      Dentition: No dental caries.      Pharynx: Uvula midline. No oropharyngeal exudate or posterior oropharyngeal erythema.   Eyes:      General: Lids are normal. No scleral icterus.     Extraocular Movements:      Right eye: Normal extraocular motion and no nystagmus.      Left eye: Normal extraocular motion and no  nystagmus.      Conjunctiva/sclera: Conjunctivae normal.      Right eye: Right conjunctiva is not injected. No chemosis.     Left eye: Left conjunctiva is not injected. No chemosis.     Pupils: Pupils are equal, round, and reactive to light.   Neck:      Trachea: Trachea and phonation normal. No tracheal tenderness or tracheal deviation.      Comments: History of total thyroidectomy  Pulmonary:      Effort: Pulmonary effort is normal. No respiratory distress.      Breath sounds: No stridor.   Abdominal:      General: There is no distension.   Lymphadenopathy:      Head:      Right side of head: No submental, submandibular, preauricular, posterior auricular or occipital adenopathy.      Left side of head: No submental, submandibular, preauricular, posterior auricular or occipital adenopathy.      Cervical: No cervical adenopathy.   Skin:     General: Skin is warm and dry.      Findings: No erythema or rash.   Neurological:      Mental Status: She is alert and oriented to person, place, and time.      Cranial Nerves: No cranial nerve deficit.   Psychiatric:         Behavior: Behavior normal.     Due to indication in patient's history, presentation or risk factors,  a fiber optic exam was performed.    SEPARATE PROCEDURE NOTE:    ANESTHESIA:  Topical xylocaine with aubrey-synephrine  FINDINGS:  Moderate to severe inaterarytenoid erythema and edema    PROCEDURE:  After verbal consent was obtained, the flexible scope was passed through the patient's nasal cavity without difficulty.  The nasopharynx (adenoid pad) and eustachian tube orifices were first visualized and were found to be normal, without masses or irregularity.  The posterior pharyngeal wall and base of tongue were then examined and no mass or irregular tissue was seen.  The scope was then advanced to the larynx, and the epiglottis, valleculae, and piriform sinuses were normal, without masses or mucosal irregularity.  The false vocal folds and true vocal folds  were then examined and were found to have normal mobility (full abduction and adduction) and no masses or mucosal irregularity was seen.  The interartyenoid area had moderate to severe edema and erythema consistent with refulx.      Assessment:       1. Dysphagia, unspecified type    2. Gastroesophageal reflux disease, unspecified whether esophagitis present    3. Parotid mass    4. Papillary thyroid carcinoma        Plan:     Dysphagia, unspecified type:  Per patient, she had an esophagoscopy several years ago and had a dilation done.  She now wishes to transfer care to [facility], E-consult placed for GI and she is comfortable with proceeding with esophagoscopy if that is the recommendation.  -     E-Consult to Gastroenterology    Gastroesophageal reflux disease, unspecified whether esophagitis present:  Findings of reflux on exam despite use of medication.  Will discuss with GI.  -     E-Consult to Gastroenterology    Parotid mass:  Keep scheduled f/up 10/2025 for repeat US.    Papillary thyroid carcinoma:  Continue to follow with endocrine for management.

## 2025-04-06 ENCOUNTER — E-CONSULT (OUTPATIENT)
Dept: GASTROENTEROLOGY | Facility: HOSPITAL | Age: 70
End: 2025-04-06
Payer: MEDICARE

## 2025-04-06 DIAGNOSIS — R13.19 ESOPHAGEAL DYSPHAGIA: Primary | ICD-10-CM

## 2025-04-06 PROCEDURE — 99451 NTRPROF PH1/NTRNET/EHR 5/>: CPT | Mod: ,,, | Performed by: INTERNAL MEDICINE

## 2025-04-06 NOTE — CONSULTS
[facility]  Response for E-Consult     Patient Name: Mariah Meza  MRN: [id number]  Primary Care Provider: Taina Bob NP   Requesting Provider: Sherita Duran MD  E-Consult to Gastroenterology  Consult performed by: Shai Stein MD  Consult ordered by: Sherita Duran MD  Reason for consult: Dysphagia      Recommendation:   Please refer for an EGD (Endo Referral and [id number] Code). The GI department does not have a relationship with the patient.     Additional future steps to consider: Office visit.     Total time of Consultation: 5 minute    I did not speak to the requesting provider verbally about this.     *This eConsult is based on the clinical data available to me and is furnished without benefit of a physical examination. The eConsult will need to be interpreted in light of any clinical issues or changes in patient status not available to me at the time of filing this eConsults. Significant changes in patient condition or level of acuity should result in immediate formal consultation and reevaluation. Please alert me if you have further questions.    Thank you for this eConsult referral.     Shai Stein MD  [facility]

## 2025-04-07 ENCOUNTER — OFFICE VISIT (OUTPATIENT)
Dept: INTERNAL MEDICINE | Facility: CLINIC | Age: 70
End: 2025-04-07
Payer: MEDICARE

## 2025-04-07 ENCOUNTER — APPOINTMENT (OUTPATIENT)
Dept: RADIOLOGY | Facility: HOSPITAL | Age: 70
End: 2025-04-07
Attending: NURSE PRACTITIONER
Payer: MEDICARE

## 2025-04-07 VITALS
OXYGEN SATURATION: 96 % | BODY MASS INDEX: 41.18 KG/M2 | HEIGHT: 64 IN | SYSTOLIC BLOOD PRESSURE: 132 MMHG | WEIGHT: 241.19 LBS | RESPIRATION RATE: 18 BRPM | TEMPERATURE: 96 F | DIASTOLIC BLOOD PRESSURE: 68 MMHG | HEART RATE: 70 BPM

## 2025-04-07 DIAGNOSIS — M54.16 BILATERAL LUMBAR RADICULOPATHY: ICD-10-CM

## 2025-04-07 DIAGNOSIS — R06.02 SOB (SHORTNESS OF BREATH) ON EXERTION: ICD-10-CM

## 2025-04-07 DIAGNOSIS — E66.01 MORBID OBESITY: ICD-10-CM

## 2025-04-07 DIAGNOSIS — R60.0 LOCALIZED EDEMA: ICD-10-CM

## 2025-04-07 DIAGNOSIS — K21.9 GASTROESOPHAGEAL REFLUX DISEASE, UNSPECIFIED WHETHER ESOPHAGITIS PRESENT: ICD-10-CM

## 2025-04-07 DIAGNOSIS — R13.10 DYSPHAGIA, UNSPECIFIED TYPE: Primary | ICD-10-CM

## 2025-04-07 DIAGNOSIS — I10 PRIMARY HYPERTENSION: ICD-10-CM

## 2025-04-07 DIAGNOSIS — M79.89 LEFT LEG SWELLING: Primary | ICD-10-CM

## 2025-04-07 PROCEDURE — 1125F AMNT PAIN NOTED PAIN PRSNT: CPT | Mod: CPTII,S$GLB,, | Performed by: NURSE PRACTITIONER

## 2025-04-07 PROCEDURE — 1101F PT FALLS ASSESS-DOCD LE1/YR: CPT | Mod: CPTII,S$GLB,, | Performed by: NURSE PRACTITIONER

## 2025-04-07 PROCEDURE — 71046 X-RAY EXAM CHEST 2 VIEWS: CPT | Mod: 26,,, | Performed by: STUDENT IN AN ORGANIZED HEALTH CARE EDUCATION/TRAINING PROGRAM

## 2025-04-07 PROCEDURE — 1159F MED LIST DOCD IN RCRD: CPT | Mod: CPTII,S$GLB,, | Performed by: NURSE PRACTITIONER

## 2025-04-07 PROCEDURE — 1160F RVW MEDS BY RX/DR IN RCRD: CPT | Mod: CPTII,S$GLB,, | Performed by: NURSE PRACTITIONER

## 2025-04-07 PROCEDURE — 3008F BODY MASS INDEX DOCD: CPT | Mod: CPTII,S$GLB,, | Performed by: NURSE PRACTITIONER

## 2025-04-07 PROCEDURE — 3075F SYST BP GE 130 - 139MM HG: CPT | Mod: CPTII,S$GLB,, | Performed by: NURSE PRACTITIONER

## 2025-04-07 PROCEDURE — 99214 OFFICE O/P EST MOD 30 MIN: CPT | Mod: S$GLB,,, | Performed by: NURSE PRACTITIONER

## 2025-04-07 PROCEDURE — 3288F FALL RISK ASSESSMENT DOCD: CPT | Mod: CPTII,S$GLB,, | Performed by: NURSE PRACTITIONER

## 2025-04-07 PROCEDURE — 3078F DIAST BP <80 MM HG: CPT | Mod: CPTII,S$GLB,, | Performed by: NURSE PRACTITIONER

## 2025-04-07 PROCEDURE — 99999 PR PBB SHADOW E&M-EST. PATIENT-LVL V: CPT | Mod: PBBFAC,,, | Performed by: NURSE PRACTITIONER

## 2025-04-07 PROCEDURE — 71046 X-RAY EXAM CHEST 2 VIEWS: CPT | Mod: TC,PN

## 2025-04-07 NOTE — PROGRESS NOTES
Patient ID: Mariah Meza is a 69 y.o. female.    Chief Complaint: Leg Pain (twyla) and Shortness of Breath    History of Present Illness    CHIEF COMPLAINT:  Ms. Meza presents today with left leg pain and swelling beneath the knee    LEFT LEG PAIN:  She reports pain and swelling in the anterior lower leg region beneath the knee. She experienced an episode of pain while clothes shopping that required rest before being able to leave the store. She takes Tylenol which provides pain relief. She has a history of lumbar disc disease causing lower back pain and leg pain, which limits her physical activity.    RESPIRATORY:  She reports shortness of breath on exertion, post-nasal draining, and coughing.    CARDIOVASCULAR:  Home blood pressure readings are mostly stable with a few elevated readings, including 158/98 and 152/79.    MEDICAL HISTORY:  She has a history of GERD.    MEDICATIONS:  She has discontinued duloxetine.      ROS:  Constitutional: negative diminished activity, negative appetite change, negative fatigue, negative fever  HENT: negative ear discharge, negative ear pain, negative mouth sores, negative nosebleeds, negative sore throat, negative tinnitus, POSITIVE POST NASAL DRIP  Respiratory: negative apnea, POSITIVE COUGH, negative shortness of breath, POSITIVE EXERTIONAL DYSPNEA  Cardiovascular: negative chest pain, POSITIVE LEG SWELLING  Gastrointestinal: negative abdominal distention, negative abdominal pain, negative blood in stool, negative constipation, negative diarrhea, negative nausea, negative vomiting  Endocrine: negative polydipsia, negative polyphagia, negative polyuria  Musculoskeletal: POSITIVE BACK PAIN, negative abnormal gait, negative neck pain, negative neck stiffness, negative joint pain, negative muscle pain, POSITIVE LIMB PAIN, POSITIVE LIMB SWELLING  Skin: negative rash, negative wound, negative abnormal moles  Allergic/Immunologic: negative seasonal allergies, negative food  allergies  Neurological: negative dizziness, negative seizures, negative numbness, negative tingling, negative headaches, negative balance issues  Psychiatric: negative agitation, negative confusion, negative hallucinations, negative sleep difficulty, negative suicidal ideation         Physical Exam    Vital Signs: Reviewed.  Constitutional: Well-appearing. Well-developed. No acute distress.  HENT: Normocephalic. Tympanic membranes normal bilaterally. Nares patent. Oropharynx clear. No erythema. No exudate.  Cardiovascular: Normal rate and regular rhythm. Normal heart sounds.  Pulmonary: Pulmonary effort is normal. Normal breath sounds.  Abdominal: Bowel sounds are normal. Abdomen is soft. No tenderness.  Musculoskeletal: No muscle tenderness. No joint tenderness. Normal range of motion.  Extremities: TENDERNESS ON PALPATION OF THE LEFT LOWER EXTREMITY. MILD SWELLING OF THE LEFT LOWER EXTREMITY.  Skin: Warm. Dry.  Neurological: No focal deficit is present. Alert and oriented to person, place, and time.  Psychiatric: Mood is normal. Behavior is normal. Behavior is cooperative.  Vitals: BP: ___. HYPERTENSIVE. BMI: ___. MORBIDLY OBESE.         Assessment & Plan        LEFT LOWER LEG PAIN AND SWELLING:  - Evaluated left leg pain and swelling beneath the knee.  - Physical exam revealed tenderness on palpation and swelling in the left lower extremity without erythema or redness.  - Ordered arterial and venous ultrasounds of left leg to assess the condition.  - Ms. Meza reports pain improves with acetaminophen and declined offered ketorolac injection for pain relief.    LUMBAR DISC DISEASE:  - Ms. Meza has lumbar disc disease causing lower back pain and leg pain, limiting their ability to be active.  - This condition is contributing to weight concerns, which will be addressed in the obesity section.    SHORTNESS OF BREATH:  - Evaluated shortness of breath on exertion.  - Ordered chest radiograph to further investigate  respiratory symptoms.  - Physical exam was negative for respiratory issues.    HYPERTENSION:  - Reviewed home blood pressure readings; noted overall stability with occasional elevations (158/98 and 152/79).    OBESITY:  - Ms. Meza is concerned about their weight, with a BMI of 41.  - Weight management is complicated by limited activity due to lumbar disc disease.    FOLLOW-UP:  - Ms. Meza has cardiac testing scheduled in 2 weeks with a follow-up visit with cardiologist Dr. Walsh.  - Follow up with primary care in 2 months.            Follow up in about 2 months (around 6/7/2025).    This note was generated with the assistance of ambient listening technology. Verbal consent was obtained by the patient and accompanying visitor(s) for the recording of patient appointment to facilitate this note. I attest to having reviewed and edited the generated note for accuracy, though some syntax or spelling errors may persist. Please contact the author of this note for any clarification.  Left leg pain and swellng--pain constantly. Tried Tylenol     Home blood pressure: overall stable.  Some elevated readings 158/111181/79  Had pain while trying on clothes 2 days ago.  Had to rest before leaving the store       Stopped PT--blood pressure was elevated.  Uanble to get aqua therapy    Mood is good     Declined shot     SOB on exertion.  Post nasal drainage and coughing.

## 2025-04-07 NOTE — H&P (VIEW-ONLY)
Patient ID: Mariah Meza is a 69 y.o. female.    Chief Complaint: Leg Pain (twyla) and Shortness of Breath    History of Present Illness    CHIEF COMPLAINT:  Ms. Meza presents today with left leg pain and swelling beneath the knee    LEFT LEG PAIN:  She reports pain and swelling in the anterior lower leg region beneath the knee. She experienced an episode of pain while clothes shopping that required rest before being able to leave the store. She takes Tylenol which provides pain relief. She has a history of lumbar disc disease causing lower back pain and leg pain, which limits her physical activity.    RESPIRATORY:  She reports shortness of breath on exertion, post-nasal draining, and coughing.    CARDIOVASCULAR:  Home blood pressure readings are mostly stable with a few elevated readings, including 158/98 and 152/79.    MEDICAL HISTORY:  She has a history of GERD.    MEDICATIONS:  She has discontinued duloxetine.      ROS:  Constitutional: negative diminished activity, negative appetite change, negative fatigue, negative fever  HENT: negative ear discharge, negative ear pain, negative mouth sores, negative nosebleeds, negative sore throat, negative tinnitus, POSITIVE POST NASAL DRIP  Respiratory: negative apnea, POSITIVE COUGH, negative shortness of breath, POSITIVE EXERTIONAL DYSPNEA  Cardiovascular: negative chest pain, POSITIVE LEG SWELLING  Gastrointestinal: negative abdominal distention, negative abdominal pain, negative blood in stool, negative constipation, negative diarrhea, negative nausea, negative vomiting  Endocrine: negative polydipsia, negative polyphagia, negative polyuria  Musculoskeletal: POSITIVE BACK PAIN, negative abnormal gait, negative neck pain, negative neck stiffness, negative joint pain, negative muscle pain, POSITIVE LIMB PAIN, POSITIVE LIMB SWELLING  Skin: negative rash, negative wound, negative abnormal moles  Allergic/Immunologic: negative seasonal allergies, negative food  allergies  Neurological: negative dizziness, negative seizures, negative numbness, negative tingling, negative headaches, negative balance issues  Psychiatric: negative agitation, negative confusion, negative hallucinations, negative sleep difficulty, negative suicidal ideation         Physical Exam    Vital Signs: Reviewed.  Constitutional: Well-appearing. Well-developed. No acute distress.  HENT: Normocephalic. Tympanic membranes normal bilaterally. Nares patent. Oropharynx clear. No erythema. No exudate.  Cardiovascular: Normal rate and regular rhythm. Normal heart sounds.  Pulmonary: Pulmonary effort is normal. Normal breath sounds.  Abdominal: Bowel sounds are normal. Abdomen is soft. No tenderness.  Musculoskeletal: No muscle tenderness. No joint tenderness. Normal range of motion.  Extremities: TENDERNESS ON PALPATION OF THE LEFT LOWER EXTREMITY. MILD SWELLING OF THE LEFT LOWER EXTREMITY.  Skin: Warm. Dry.  Neurological: No focal deficit is present. Alert and oriented to person, place, and time.  Psychiatric: Mood is normal. Behavior is normal. Behavior is cooperative.  Vitals: BP: ___. HYPERTENSIVE. BMI: ___. MORBIDLY OBESE.         Assessment & Plan        LEFT LOWER LEG PAIN AND SWELLING:  - Evaluated left leg pain and swelling beneath the knee.  - Physical exam revealed tenderness on palpation and swelling in the left lower extremity without erythema or redness.  - Ordered arterial and venous ultrasounds of left leg to assess the condition.  - Ms. Meza reports pain improves with acetaminophen and declined offered ketorolac injection for pain relief.    LUMBAR DISC DISEASE:  - Ms. Meza has lumbar disc disease causing lower back pain and leg pain, limiting their ability to be active.  - This condition is contributing to weight concerns, which will be addressed in the obesity section.    SHORTNESS OF BREATH:  - Evaluated shortness of breath on exertion.  - Ordered chest radiograph to further investigate  respiratory symptoms.  - Physical exam was negative for respiratory issues.    HYPERTENSION:  - Reviewed home blood pressure readings; noted overall stability with occasional elevations (158/98 and 152/79).    OBESITY:  - Ms. Meza is concerned about their weight, with a BMI of 41.  - Weight management is complicated by limited activity due to lumbar disc disease.    FOLLOW-UP:  - Ms. Meza has cardiac testing scheduled in 2 weeks with a follow-up visit with cardiologist Dr. Walsh.  - Follow up with primary care in 2 months.            Follow up in about 2 months (around 6/7/2025).    This note was generated with the assistance of ambient listening technology. Verbal consent was obtained by the patient and accompanying visitor(s) for the recording of patient appointment to facilitate this note. I attest to having reviewed and edited the generated note for accuracy, though some syntax or spelling errors may persist. Please contact the author of this note for any clarification.  Left leg pain and swellng--pain constantly. Tried Tylenol     Home blood pressure: overall stable.  Some elevated readings 158/052981/79  Had pain while trying on clothes 2 days ago.  Had to rest before leaving the store       Stopped PT--blood pressure was elevated.  Uanble to get aqua therapy    Mood is good     Declined shot     SOB on exertion.  Post nasal drainage and coughing.

## 2025-04-08 ENCOUNTER — RESULTS FOLLOW-UP (OUTPATIENT)
Dept: INTERNAL MEDICINE | Facility: CLINIC | Age: 70
End: 2025-04-08

## 2025-04-08 DIAGNOSIS — M25.552 PAIN OF LEFT HIP: Primary | ICD-10-CM

## 2025-04-09 ENCOUNTER — HOSPITAL ENCOUNTER (OUTPATIENT)
Dept: RADIOLOGY | Facility: HOSPITAL | Age: 70
Discharge: HOME | End: 2025-04-09
Attending: NURSE PRACTITIONER
Payer: MEDICARE

## 2025-04-09 ENCOUNTER — HOSPITAL ENCOUNTER (OUTPATIENT)
Dept: RADIOLOGY | Facility: HOSPITAL | Age: 70
Discharge: HOME | End: 2025-04-09
Attending: STUDENT IN AN ORGANIZED HEALTH CARE EDUCATION/TRAINING PROGRAM
Payer: MEDICARE

## 2025-04-09 ENCOUNTER — TELEPHONE (OUTPATIENT)
Dept: OTOLARYNGOLOGY | Facility: CLINIC | Age: 70
End: 2025-04-09
Payer: MEDICARE

## 2025-04-09 ENCOUNTER — OFFICE VISIT (OUTPATIENT)
Dept: SPORTS MEDICINE | Facility: CLINIC | Age: 70
End: 2025-04-09
Payer: MEDICARE

## 2025-04-09 VITALS — BODY MASS INDEX: 41.18 KG/M2 | WEIGHT: 241.19 LBS | HEIGHT: 64 IN

## 2025-04-09 DIAGNOSIS — M79.89 LEFT LEG SWELLING: ICD-10-CM

## 2025-04-09 DIAGNOSIS — M16.0 PRIMARY OSTEOARTHRITIS OF BOTH HIPS: ICD-10-CM

## 2025-04-09 DIAGNOSIS — M47.816 OSTEOARTHRITIS OF LUMBAR SPINE, UNSPECIFIED SPINAL OSTEOARTHRITIS COMPLICATION STATUS: ICD-10-CM

## 2025-04-09 DIAGNOSIS — M70.62 GREATER TROCHANTERIC BURSITIS OF LEFT HIP: Primary | ICD-10-CM

## 2025-04-09 DIAGNOSIS — R60.0 LOCALIZED EDEMA: ICD-10-CM

## 2025-04-09 DIAGNOSIS — M25.552 PAIN OF LEFT HIP: ICD-10-CM

## 2025-04-09 PROCEDURE — 93926 LOWER EXTREMITY STUDY: CPT | Mod: 26,59,LT, | Performed by: RADIOLOGY

## 2025-04-09 PROCEDURE — 93971 EXTREMITY STUDY: CPT | Mod: TC,LT

## 2025-04-09 PROCEDURE — 73502 X-RAY EXAM HIP UNI 2-3 VIEWS: CPT | Mod: TC,LT

## 2025-04-09 PROCEDURE — 99999 PR PBB SHADOW E&M-EST. PATIENT-LVL III: CPT | Mod: PBBFAC,,, | Performed by: STUDENT IN AN ORGANIZED HEALTH CARE EDUCATION/TRAINING PROGRAM

## 2025-04-09 PROCEDURE — 93971 EXTREMITY STUDY: CPT | Mod: 26,59,LT, | Performed by: RADIOLOGY

## 2025-04-09 PROCEDURE — 73502 X-RAY EXAM HIP UNI 2-3 VIEWS: CPT | Mod: 26,LT,, | Performed by: RADIOLOGY

## 2025-04-09 PROCEDURE — 93926 LOWER EXTREMITY STUDY: CPT | Mod: TC,LT

## 2025-04-09 RX ORDER — TRIAMCINOLONE ACETONIDE 40 MG/ML
40 INJECTION, SUSPENSION INTRA-ARTICULAR; INTRAMUSCULAR
Status: DISCONTINUED | OUTPATIENT
Start: 2025-04-09 | End: 2025-04-09 | Stop reason: HOSPADM

## 2025-04-09 RX ADMIN — TRIAMCINOLONE ACETONIDE 40 MG: 40 INJECTION, SUSPENSION INTRA-ARTICULAR; INTRAMUSCULAR at 09:04

## 2025-04-09 NOTE — PROCEDURES
Detail Level: Zone Large Joint Aspiration/Injection: L greater trochanteric bursa    Date/Time: 4/9/2025 9:45 AM    Performed by: Jean-Pierre Espinal MD  Authorized by: Jean-Pierre Espinal MD    Consent Done?:  Yes (Verbal)  Indications:  Pain  Site marked: the procedure site was marked    Timeout: prior to procedure the correct patient, procedure, and site was verified    Prep: patient was prepped and draped in usual sterile fashion      Local anesthesia used?: Yes    Anesthesia:  Local infiltration  Local anesthetic:  Lidocaine 1% without epinephrine    Details:  Needle Size:  22 G  Ultrasonic Guidance for needle placement?: No    Approach:  Posterior  Location:  Hip  Site:  L greater trochanteric bursa  Medications:  40 mg triamcinolone acetonide 40 mg/mL  Patient tolerance:  Patient tolerated the procedure well with no immediate complications

## 2025-04-09 NOTE — PROGRESS NOTES
Orthopaedics Sports Medicine     Hip Initial Visit         4/9/2025    Referring MD: Self, Aaareferral    Chief Complaint   Patient presents with    Left Hip - Pain       History of Present Illness    CHIEF COMPLAINT:  - Left hip pain    HPI:  Mariah presents with left hip pain that started approximately six months ago in November. The pain is located on the side of the hip and into the groin area, and is worse when lying down and attempting to lift the leg. She reports difficulty raising the leg, especially on the left side, due to pain. The pain is exacerbated when lying on the affected side.    She denies any specific injuries to the hip but suggests that a previous back injection may have contributed to her hip pain. She reports increased difficulty with activities due to her hip pain.    She denies any sudden onset of symptoms.    PREVIOUS TREATMENTS:  - Back injection: She received a shot in her back, which she believes may have affected her hip and caused pain.           Past Medical History:   Past Medical History:   Diagnosis Date    Chronic rhinitis     DJD (degenerative joint disease), lumbar     GERD (gastroesophageal reflux disease)     Hard to intubate 12/20/2021    Headache     Hiatal hernia     Hyperlipidemia     Hypertension     Liver disease     Fatty Liver    Low back pain     Obesity     PONV (postoperative nausea and vomiting)     Thyroid disease        Past Surgical History:   Past Surgical History:   Procedure Laterality Date    BILATERAL OOPHORECTOMY  2002    CARPAL TUNNEL RELEASE      Left wrist    CHOLECYSTECTOMY      DISSECTION OF NECK Bilateral 12/20/2021    Procedure: DISSECTION, NECK;  Surgeon: Deejay Olsen MD;  Location: [facility];  Service: ENT;  Laterality: Bilateral;  Central neck dissection    HYSTERECTOMY  1984    INJECTION OF ANESTHETIC AGENT AROUND MEDIAL BRANCH NERVES INNERVATING LUMBAR FACET JOINT Bilateral 12/12/2023    Procedure: Bilateral L3-5 * MBB #1 with RN IV sedation;   Surgeon: Franklin Sutton MD;  Location: [facility]IN [facility]GT;  Service: Pain Management;  Laterality: Bilateral;    NECK EXPLORATION Bilateral 12/20/2021    Procedure: EXPLORATION, NECK;  Surgeon: Deejay Olsen MD;  Location: [facility] OR;  Service: ENT;  Laterality: Bilateral;  Parathyroid exploration    ROTATOR CUFF REPAIR      Right shoulder    ROTATOR CUFF REPAIR Left 2010    SELECTIVE INJECTION OF ANESTHETIC AGENT AROUND LUMBAR SPINAL NERVE ROOT BY TRANSFORAMINAL APPROACH Bilateral 11/8/2024    Procedure: Bilateral L5/S1 + S1 TF SUSY;  Surgeon: Franklin Sutton MD;  Location: [facility]IN [facility];  Service: Pain Management;  Laterality: Bilateral;    THYROIDECTOMY, BILATERAL Bilateral 12/20/2021    Procedure: THYROIDECTOMY,BILATERAL;  Surgeon: Deejay Olsen MD;  Location: [facility] OR;  Service: ENT;  Laterality: Bilateral;    TOTAL VAGINAL HYSTERECTOMY  1985       Medications:  Patient's Medications   New Prescriptions    No medications on file   Previous Medications    ACETAMINOPHEN (TYLENOL) 650 MG TBSR    Take 650 mg by mouth daily as needed.    CALCIUM CARBONATE 470 MG CALCIUM (1,177 MG) CHEW    Take one tablet by mouth twice daily    CARVEDILOL (COREG) 12.5 MG TABLET    Take 1 tablet (12.5 mg total) by mouth 2 (two) times daily with meals.    CYANOCOBALAMIN 500 MCG TABLET    Take 500 mcg by mouth once daily.    CYCLOBENZAPRINE (FLEXERIL) 10 MG TABLET    Take 1 tablet (10 mg total) by mouth every evening.    DICYCLOMINE (BENTYL) 10 MG CAPSULE    Take 10 mg by mouth.    FERROUS SULFATE 325 (65 FE) MG EC TABLET    Take 325 mg by mouth once daily.    HYDROXYZINE HCL (ATARAX) 25 MG TABLET    Take 1 tablet (25 mg total) by mouth 3 (three) times daily as needed for Itching.    LEVOTHYROXINE (SYNTHROID) 100 MCG TABLET    Take 1 tablet (100 mcg total) by mouth before breakfast.    MELOXICAM (MOBIC) 15 MG TABLET    Take 1 tablet by mouth once daily.    OLMESARTAN-HYDROCHLOROTHIAZIDE (BENICAR HCT) 40-25 MG PER TABLET    Take 1 tablet by mouth once  "daily.    PANTOPRAZOLE (PROTONIX) 40 MG TABLET    Take 1 tablet (40 mg total) by mouth once daily.    PREGABALIN (LYRICA) 75 MG CAPSULE    Take 1 capsule (75 mg total) by mouth every morning AND 1-2 capsules ( mg total) every evening.    PSYLLIUM HUSK/ASPARTAME (METAMUCIL FIBER SINGLES ORAL)    Take by mouth.    SPIRONOLACTONE (ALDACTONE) 25 MG TABLET    Take 1 tablet (25 mg total) by mouth once daily.    VITAMIN D (VITAMIN D3) 1000 UNITS TAB    Take 1,000 Units by mouth once daily.    VITAMIN E 400 UNIT CAPSULE    Take 400 Units by mouth once daily.   Modified Medications    No medications on file   Discontinued Medications    No medications on file       Allergies:   Review of patient's allergies indicates:   Allergen Reactions    Amlodipine Edema    Nifedipine Edema    Iodine and iodide containing products Itching    Shellfish containing products Swelling    Statins-hmg-coa reductase inhibitors Other (See Comments)     Myalgias    Adhesive Dermatitis    Iodine Other (See Comments)    Ivp dye [iodinated contrast media] Other (See Comments)       Social History:   Home town: [place], [place]  Alcohol use: She reports no history of alcohol use.  Tobacco use: She reports that she quit smoking about 46 years ago. Her smoking use included cigarettes. She started smoking about 54 years ago. She has a 4 pack-year smoking history. She has never been exposed to tobacco smoke. She has never used smokeless tobacco.    Review of systems:  History of recent illness, fevers, shakes, or chills: no  History of cardiac problems or chest pain: no  History of pulmonary problems or asthma: no  History of diabetes: no  History of prior dvt or clotting problems: no  History of sleep apnea: no      Physical Examination:  Estimated body mass index is 41.4 kg/m² as calculated from the following:    Height as of this encounter: 5' 4" (1.626 m).    Weight as of this encounter: 109.4 kg (241 lb 2.9 oz).    General  Healthy appearing female in " no acute distress  Alert and oriented, normal mood, appropriate affect    Ortho Exam    Left Hip:    Inspection:   Normal    Palpation tenderness: greater trochanter    Range of motion:  90 deg Flexion          20 deg IR          40 deg ER    Strength:   5/5 Extension     4/5 Flexion     4/5 Abduction     5/5 Adduction    Special Tests:   Negative Log Roll     Negative RICHARD     + FADIR     + Rosendo's         N/V Exam:   Tibial:    Normal sensory (plantar foot)   Normal motor (FHL)     Sup Peroneal:  Normal sensory (dorsal foot)   Normal motor (Peroneals)             Deep Peroneal:  Normal sensory (1st web space)  Normal motor (EHL)     Sural:   Normal sensory (lateral foot)    Saphenous:   Normal sensory (medial lower leg)    Normal pedal pulses, warm and well perfused with capillary refill < 2 sec   Patella tendon reflex normal  Achilles tendon reflex normal      Imaging:    X-Ray Hip 2 or 3 views Left with Pelvis when performed  Narrative: EXAMINATION:  XR HIP WITH PELVIS WHEN PERFORMED 2 OR 3 VIEWS LEFT    CLINICAL HISTORY:  Pain in left hip    TECHNIQUE:  AP view of the pelvis and frog leg lateral view of the left hip were performed.    COMPARISON:  05/13/2021    FINDINGS:  There is no evidence to suggest acute fracture or dislocation.  The hip joint space is relatively well preserved.  No plain film evidence to suggest AVN.  Enthesophyte formation noted throughout the pelvis.  Calcified phleboliths seen projecting over the pelvis.  Impression: As above    Electronically signed by: Nik Reyes DO  Date:    04/09/2025  Time:    08:09       Physician Read:  Moderate left hip osteoarthritis, lumbar spine osteoarthritis      Impression:  69 y.o. female with lumbar spine osteoarthritis, left and right hip osteoarthritis, and left sided greater trochanteric bursitis       Plan:  Discussed diagnosis and treatment options with the patient today. She has multiple issues going on with symptoms, physical exam, and  imaging findings consistent with lumbar spine osteoarthritis, left and right hip osteoarthritis, and left sided greater trochanteric bursitis.   For her hip osteoarthritis, discussed treatment options of rest, activity modification, oral anti-inflammatories, and intra-articular hip CSI. Discussed that based on the level of her arthritis, she would be a candidate for total hip arthroplasty as well.   For her trochanteric bursitis, we reviewed non-operative treatment options in the form of rest, activity modifications, oral anti-inflammatories, corticosteroid injection, and physical therapy/physician directed home exercise program.   She states most of her pain is to the groin and lateral hip. I recommend proceeding with corticosteroid injection into the left greater trochanteric bursa at this time. If she gets limited relief from this, or continues to have symptoms more in her groin, I would recommend moving forward with intra-articular hip CSI. The patient is in agreement with this plan.   Procedure performed today and patient tolerated the procedure well with no immediate complications.   Offered patient referral to hip replacement surgeon, but patient defers at this time.   Follow up with me as needed.             Jean-Pierre Espinal MD    I, Alex Esparza, acted as a scribe for Jean-Pierre Espinal MD for the duration of this office visit.    This note was generated with the assistance of ambient listening technology. Verbal consent was obtained by the patient and accompanying visitor(s) for the recording of patient appointment to facilitate this note. I attest to having reviewed and edited the generated note for accuracy, though some syntax or spelling errors may persist. Please contact the author of this note for any clarification.

## 2025-04-09 NOTE — TELEPHONE ENCOUNTER
----- Message from Lynn sent at 4/9/2025 10:17 AM CDT -----  Name of Who is Calling: Pt  What is the request in detail:Pt would like a call back in regards to 4/14/25 appt. Pt would like more information on appt. Please advise thank you  Can the clinic reply by [facility]:no  What Number to Call Back [facility] not [facility]:Telephone Information:Mobile          [phone]

## 2025-04-10 ENCOUNTER — PATIENT MESSAGE (OUTPATIENT)
Dept: INTERNAL MEDICINE | Facility: CLINIC | Age: 70
End: 2025-04-10
Payer: MEDICARE

## 2025-04-14 ENCOUNTER — HOSPITAL ENCOUNTER (OUTPATIENT)
Dept: PREADMISSION TESTING | Facility: HOSPITAL | Age: 70
Discharge: HOME | End: 2025-04-14
Attending: ORTHOPAEDIC SURGERY
Payer: MEDICARE

## 2025-04-14 DIAGNOSIS — K21.9 GASTROESOPHAGEAL REFLUX DISEASE, UNSPECIFIED WHETHER ESOPHAGITIS PRESENT: ICD-10-CM

## 2025-04-14 DIAGNOSIS — R13.10 DYSPHAGIA, UNSPECIFIED TYPE: Primary | ICD-10-CM

## 2025-04-20 ENCOUNTER — PATIENT MESSAGE (OUTPATIENT)
Dept: INTERNAL MEDICINE | Facility: CLINIC | Age: 70
End: 2025-04-20
Payer: MEDICARE

## 2025-04-21 RX ORDER — PANTOPRAZOLE SODIUM 40 MG/1
40 TABLET, DELAYED RELEASE ORAL DAILY
Qty: 30 TABLET | Refills: 5 | Status: SHIPPED | OUTPATIENT
Start: 2025-04-21

## 2025-04-23 ENCOUNTER — HOSPITAL ENCOUNTER (OUTPATIENT)
Dept: RADIOLOGY | Facility: HOSPITAL | Age: 70
Discharge: HOME | End: 2025-04-23
Payer: MEDICARE

## 2025-04-23 ENCOUNTER — HOSPITAL ENCOUNTER (OUTPATIENT)
Dept: CARDIOLOGY | Facility: HOSPITAL | Age: 70
Discharge: HOME | End: 2025-04-23
Payer: MEDICARE

## 2025-04-23 DIAGNOSIS — I1A.0 RESISTANT HYPERTENSION: ICD-10-CM

## 2025-04-23 DIAGNOSIS — R07.9 CHEST PAIN, UNSPECIFIED TYPE: ICD-10-CM

## 2025-04-23 LAB
CV STRESS BASE HR: 51 BPM
DIASTOLIC BLOOD PRESSURE: 85 MMHG
NUC REST EJECTION FRACTION: 60
NUC STRESS EJECTION FRACTION: 70 %
OHS CV CPX 85 PERCENT MAX PREDICTED HEART RATE MALE: 127
OHS CV CPX ESTIMATED METS: 1
OHS CV CPX MAX PREDICTED HEART RATE: 149
OHS CV CPX PATIENT IS FEMALE: 1
OHS CV CPX PATIENT IS MALE: 0
OHS CV CPX PEAK DIASTOLIC BLOOD PRESSURE: 71 MMHG
OHS CV CPX PEAK HEAR RATE: 90 BPM
OHS CV CPX PEAK RATE PRESSURE PRODUCT: NORMAL
OHS CV CPX PEAK SYSTOLIC BLOOD PRESSURE: 180 MMHG
OHS CV CPX PERCENT MAX PREDICTED HEART RATE ACHIEVED: 63
OHS CV CPX RATE PRESSURE PRODUCT PRESENTING: 8364
OHS CV INITIAL DOSE: 10.2 MCG/KG/MIN
OHS CV PEAK DOSE: 30.3 MCG/KG/MIN
STRESS ECHO POST EXERCISE DUR SEC: 52 SECONDS
SYSTOLIC BLOOD PRESSURE: 164 MMHG

## 2025-04-23 PROCEDURE — A9502 TC99M TETROFOSMIN: HCPCS

## 2025-04-23 PROCEDURE — 78452 HT MUSCLE IMAGE SPECT MULT: CPT

## 2025-04-23 PROCEDURE — 63600175 PHARM REV CODE 636 W HCPCS

## 2025-04-23 PROCEDURE — 93017 CV STRESS TEST TRACING ONLY: CPT

## 2025-04-23 RX ORDER — REGADENOSON 0.08 MG/ML
0.4 INJECTION, SOLUTION INTRAVENOUS
Status: COMPLETED | OUTPATIENT
Start: 2025-04-23 | End: 2025-04-23

## 2025-04-23 RX ADMIN — TETROFOSMIN 30.3 MILLICURIE: 1.38 INJECTION, POWDER, LYOPHILIZED, FOR SOLUTION INTRAVENOUS at 10:04

## 2025-04-23 RX ADMIN — REGADENOSON 0.4 MG: 0.08 INJECTION, SOLUTION INTRAVENOUS at 10:04

## 2025-04-23 RX ADMIN — TETROFOSMIN 10.2 MILLICURIE: 1.38 INJECTION, POWDER, LYOPHILIZED, FOR SOLUTION INTRAVENOUS at 08:04

## 2025-04-24 ENCOUNTER — TELEPHONE (OUTPATIENT)
Dept: CARDIOLOGY | Facility: CLINIC | Age: 70
End: 2025-04-24
Payer: MEDICARE

## 2025-04-24 ENCOUNTER — RESULTS FOLLOW-UP (OUTPATIENT)
Dept: CARDIOLOGY | Facility: CLINIC | Age: 70
End: 2025-04-24

## 2025-04-24 ENCOUNTER — E-CONSULT (OUTPATIENT)
Dept: CARDIOLOGY | Facility: CLINIC | Age: 70
End: 2025-04-24
Payer: MEDICARE

## 2025-04-24 DIAGNOSIS — Z01.810 PREOP CARDIOVASCULAR EXAM: Primary | ICD-10-CM

## 2025-04-24 NOTE — TELEPHONE ENCOUNTER
----- Message from Elsa Santiago NP sent at 4/24/2025  1:23 PM CDT -----  Stress test nml  ----- Message -----  From: Don Walsh MD  Sent: 4/23/2025   8:32 PM CDT  To: Elsa Santiago NP

## 2025-04-24 NOTE — CONSULTS
[facility]  Response for E-Consult     Patient Name: Mariah Meza  MRN: [id number]  Primary Care Provider: Taina Bob NP   Requesting Provider: Chelsy Ludwig*  E-Consult to General Cardiology  Consult performed by: Naren Shabazz MD  Consult ordered by: Chelsy Ludwig, ATIF          E consult for preop clearance of EGD  The chart reviewed.  PMH htn hld  04/23 Phar MPI showed no ischemia and EF nml    Plan  Elevated periop risk of CV events for non-high risk procedure.  Ok to proceed the scheduled procedure without further cardiac study.    Total time of Consultation: 10 minute    I did not speak to the requesting provider verbally about this.     *This eConsult is based on the clinical data available to me and is furnished without benefit of a physical examination. The eConsult will need to be interpreted in light of any clinical issues or changes in patient status not available to me at the time of filing this eConsults. Significant changes in patient condition or level of acuity should result in immediate formal consultation and reevaluation. Please alert me if you have further questions.    Thank you for this eConsult referral.     Naren Shabazz MD  The [address]

## 2025-04-25 ENCOUNTER — ANESTHESIA EVENT (OUTPATIENT)
Dept: ENDOSCOPY | Facility: HOSPITAL | Age: 70
End: 2025-04-25
Payer: MEDICARE

## 2025-04-25 NOTE — ANESTHESIA PREPROCEDURE EVALUATION
04/25/2025  Mariah Meza is a 69 y.o., female.  Past Surgical History:   Procedure Laterality Date    BILATERAL OOPHORECTOMY  2002    CARPAL TUNNEL RELEASE      Left wrist    CHOLECYSTECTOMY      DISSECTION OF NECK Bilateral 12/20/2021    Procedure: DISSECTION, NECK;  Surgeon: Deejay Olsen MD;  Location: [facility] OR;  Service: ENT;  Laterality: Bilateral;  Central neck dissection    HYSTERECTOMY  1984    INJECTION OF ANESTHETIC AGENT AROUND MEDIAL BRANCH NERVES INNERVATING LUMBAR FACET JOINT Bilateral 12/12/2023    Procedure: Bilateral L3-5 * MBB #1 with RN IV sedation;  Surgeon: Franklin Sutton MD;  Location: [facility] PAIN [facility]GT;  Service: Pain Management;  Laterality: Bilateral;    NECK EXPLORATION Bilateral 12/20/2021    Procedure: EXPLORATION, NECK;  Surgeon: Deejay Olsen MD;  Location: [facility] OR;  Service: ENT;  Laterality: Bilateral;  Parathyroid exploration    ROTATOR CUFF REPAIR      Right shoulder    ROTATOR CUFF REPAIR Left 2010    SELECTIVE INJECTION OF ANESTHETIC AGENT AROUND LUMBAR SPINAL NERVE ROOT BY TRANSFORAMINAL APPROACH Bilateral 11/8/2024    Procedure: Bilateral L5/S1 + S1 TF SUSY;  Surgeon: Franklin Sutton MD;  Location: [facility] PAIN [facility]GT;  Service: Pain Management;  Laterality: Bilateral;    THYROIDECTOMY, BILATERAL Bilateral 12/20/2021    Procedure: THYROIDECTOMY,BILATERAL;  Surgeon: Deejay Olsen MD;  Location: [facility] OR;  Service: ENT;  Laterality: Bilateral;    TOTAL VAGINAL HYSTERECTOMY  1985     Past Medical History:   Diagnosis Date    Chronic rhinitis     DJD (degenerative joint disease), lumbar     GERD (gastroesophageal reflux disease)     Hard to intubate 12/20/2021    Headache     Hiatal hernia     Hyperlipidemia     Hypertension     Liver disease     Fatty Liver    Low back pain     Obesity     PONV (postoperative nausea and vomiting)     Thyroid disease            Pre-op Assessment    I have  reviewed the Patient Summary Reports.     I have reviewed the Nursing Notes. I have reviewed the NPO Status.   I have reviewed the Medications.     Review of Systems  Anesthesia Hx:  No problems with previous Anesthesia   History of prior surgery of interest to airway management or planning:  Previous anesthesia: General 12/2021  Thyroidectomy, with general anesthesia.  Procedure performed at an [facility].      Airway issues documented on chart review include mask, easy, GETA, videolaryngoscope used     Denies Family Hx of Anesthesia complications.    Denies Personal Hx of Anesthesia complications.                    Social:  Former Smoker, No Alcohol Use       Hematology/Oncology:  Hematology Normal   Oncology Normal                                   EENT/Dental:  EENT/Dental Normal           Cardiovascular:  Exercise tolerance: poor   Hypertension, well controlled           hyperlipidemia                         Hypertension         Pulmonary:  Pulmonary Normal                       Renal/:  Chronic Renal Disease, CKD   CKD Stage 3a     Kidney Function/Disease             Hepatic/GI:  No Bowel Prep.  Hiatal Hernia, GERD, poorly controlled Liver Disease,        Gerd    Hernia, Hiatal Hernia   Liver Disease        Musculoskeletal:         Spine Disorders: lumbar and cervical Disc disease and Degenerative disease           Neurological:    Neuromuscular Disease,  Headaches      Dx of Headaches                         Neuromuscular Disease   Endocrine:   Hypothyroidism       Hypothyroidism        Morbid Obesity / BMI > 40  Dermatological:  Skin Normal    Psych:  Psychiatric History  depression                Physical Exam  General: Well nourished, Cooperative, Alert and Oriented    Airway:  Mallampati: II   Mouth Opening: Normal  TM Distance: Normal  Tongue: Normal  Neck ROM: Normal ROM    Dental:  Dentures    Chest/Lungs:  Normal Respiratory Rate        Anesthesia Plan  Type of Anesthesia, risks & benefits  discussed:    Anesthesia Type: Gen Natural Airway  Intra-op Monitoring Plan: Standard ASA Monitors  Post Op Pain Control Plan: multimodal analgesia  Induction:  IV  Informed Consent: Informed consent signed with the Patient and all parties understand the risks and agree with anesthesia plan.  All questions answered. Patient consented to blood products? No  ASA Score: 3  Day of Surgery Review of History & Physical: H&P Update referred to the surgeon/provider.    Ready For Surgery From Anesthesia Perspective.     .

## 2025-04-29 ENCOUNTER — PATIENT MESSAGE (OUTPATIENT)
Dept: GASTROENTEROLOGY | Facility: HOSPITAL | Age: 70
End: 2025-04-29
Payer: MEDICARE

## 2025-04-29 ENCOUNTER — HOSPITAL ENCOUNTER (OUTPATIENT)
Dept: ENDOSCOPY | Facility: HOSPITAL | Age: 70
Discharge: HOME | End: 2025-04-29
Attending: ORTHOPAEDIC SURGERY
Payer: MEDICARE

## 2025-04-29 ENCOUNTER — ANESTHESIA (OUTPATIENT)
Dept: ENDOSCOPY | Facility: HOSPITAL | Age: 70
End: 2025-04-29
Payer: MEDICARE

## 2025-04-29 VITALS
RESPIRATION RATE: 17 BRPM | DIASTOLIC BLOOD PRESSURE: 71 MMHG | BODY MASS INDEX: 41.37 KG/M2 | WEIGHT: 242.31 LBS | HEIGHT: 64 IN | SYSTOLIC BLOOD PRESSURE: 159 MMHG | HEART RATE: 51 BPM | TEMPERATURE: 97 F | OXYGEN SATURATION: 100 %

## 2025-04-29 DIAGNOSIS — K21.9 GASTROESOPHAGEAL REFLUX DISEASE, UNSPECIFIED WHETHER ESOPHAGITIS PRESENT: ICD-10-CM

## 2025-04-29 DIAGNOSIS — R13.19 ESOPHAGEAL DYSPHAGIA: Primary | ICD-10-CM

## 2025-04-29 DIAGNOSIS — R13.10 DYSPHAGIA, UNSPECIFIED TYPE: ICD-10-CM

## 2025-04-29 DIAGNOSIS — K29.30 CHRONIC SUPERFICIAL GASTRITIS WITHOUT BLEEDING: ICD-10-CM

## 2025-04-29 PROCEDURE — 37000008 HC ANESTHESIA 1ST 15 MINUTES

## 2025-04-29 PROCEDURE — 88305 TISSUE EXAM BY PATHOLOGIST: CPT | Mod: TC | Performed by: INTERNAL MEDICINE

## 2025-04-29 PROCEDURE — 63600175 PHARM REV CODE 636 W HCPCS: Performed by: NURSE ANESTHETIST, CERTIFIED REGISTERED

## 2025-04-29 PROCEDURE — 27201012 HC FORCEPS, HOT/COLD, DISP: Performed by: INTERNAL MEDICINE

## 2025-04-29 RX ORDER — LIDOCAINE HYDROCHLORIDE 20 MG/ML
INJECTION INTRAVENOUS
Status: DISCONTINUED | OUTPATIENT
Start: 2025-04-29 | End: 2025-04-29

## 2025-04-29 RX ORDER — PROPOFOL 10 MG/ML
VIAL (ML) INTRAVENOUS
Status: DISCONTINUED | OUTPATIENT
Start: 2025-04-29 | End: 2025-04-29

## 2025-04-29 RX ADMIN — LIDOCAINE HYDROCHLORIDE 40 MG: 20 INJECTION INTRAVENOUS at 09:04

## 2025-04-29 RX ADMIN — PROPOFOL 150 MG: 10 INJECTION, EMULSION INTRAVENOUS at 09:04

## 2025-04-29 RX ADMIN — PROPOFOL 50 MG: 10 INJECTION, EMULSION INTRAVENOUS at 09:04

## 2025-04-29 NOTE — DISCHARGE INSTRUCTIONS
04/29/2025    Dear Mariah Meza,     Procedure findings:  The esophagus appeared normal. Performed random biopsy to rule out eosinophilic esophagitis.  Mild granular, nodular mucosa in the cardia; performed cold forceps biopsy to rule out H. pylori  The fundus of the stomach, body of the stomach, greater curve of the stomach, lesser curve of the stomach, incisura, antrum, prepyloric region, duodenal bulb, 2nd part of the duodenum, 3rd part of the duodenum and 4th part of the duodenum appeared normal.  Performed forceps biopsies in the antrum to rule out H. pylori    Below are important post-procedure care instructions to help ensure a smooth recovery.    General Post-Procedure Care    ? Discharge: You have been discharged home in stable condition.  ? Supervision: A responsible adult should stay with you for the next 12-24 hours.  ? Activity Restrictions:    You may feel drowsy due to sedation; avoid driving, operating heavy machinery, making important decisions, or drinking alcohol for the rest of the day.  Resume normal activities tomorrow unless otherwise directed by your doctor.  Diet & Hydration    ? After Upper Endoscopy (EGD):    Start with clear liquids and soft foods. You may gradually return to your normal diet as tolerated.  Avoid hot, spicy, or acidic foods for the rest of the day to prevent throat irritation.    ? After Colonoscopy:    Begin with a light meal and plenty of fluids.  Avoid heavy, greasy, or spicy foods for the first 24 hours to minimize stomach upset.  Resume fiber intake gradually to avoid bloating or discomfort.  Common Post-Procedure Symptoms    It is normal to experience:  ? Mild sore throat or hoarseness (should improve within 24 hours).  ? Bloating, gas, or mild cramping due to air introduced during the colonoscopy.  ? Some fatigue or grogginess from sedation.  ? A small amount of rectal bleeding (especially if polyps were removed).    Warning Signs - When to Call    Please  "seek immediate medical attention or call [insert emergency contact number] if you experience:  ?? Severe or persistent chest or abdominal pain.  ?? Difficulty breathing or swallowing.  ?? Vomiting blood or passing large amounts of blood in stool.  ?? Fever over 101°F or signs of infection.  ?? Severe dizziness or fainting.  ?? Persistent nausea or vomiting.    Follow-Up & Results    ?? If biopsies were taken or polyps removed, we will contact you with results via [email or web address] or via phone call.  ?? If you have any questions or concerns, please contact our office at [phone].    We recommend the following:  Psyllium husk daily.  Magnesium Glycinate daily.  Ground Flaxseed daily.  Probiotics (Florastor or align) daily.     We genuinely value your feedback and believe that it plays a crucial role in our pursuit of excellence. We kindly request you to take a few minutes of your time to share your experience with us by posting a Google review. Your honest thoughts and opinions can make a significant difference for others seeking healthcare services and will help us better understand how we can further enhance our patient care.    Thank you for trusting us with your care,          Shai Stein M.D.  click on the link for contact information.           At [facility] we offer virtual visits. Please use your [facility] allie to schedule a virtual visit.     To rate your experience with Dr. Stein, click on this link    To post a review, simply follow these quick steps:  1. Visit [employer] or search for my name on Google.  2. Click on the "Write a review" button on the left-hand side of the page.  3. Rate your experience by choosing the number of stars that reflect your satisfaction.  4. Share your thoughts and insights about your visit - we'd love to hear your feedback!        "

## 2025-04-29 NOTE — INTERVAL H&P NOTE
"The patient has been examined and the H&P has been reviewed:    I concur with the findings and no changes have occurred since H&P was written.    Vitals:    04/29/25 0911   BP: (!) 190/87   BP Location: Right arm   Pulse: (!) 54   Resp: 18   Temp: 97.1 °F (36.2 °C)   TempSrc: Temporal   SpO2: 99%   Weight: 109.9 kg (242 lb 4.6 oz)   Height: 5' 4" (1.626 m)         Active Hospital Problems    Diagnosis  POA    *GERD (gastroesophageal reflux disease) [K21.9]  Yes     Priority: 1 - High    Esophageal dysphagia [R13.19]  Yes      Resolved Hospital Problems   No resolved problems to display.     "

## 2025-04-29 NOTE — ANESTHESIA POSTPROCEDURE EVALUATION
Anesthesia Post Evaluation    Patient: Mariah Meza    Procedure(s) Performed: * No procedures listed *    Final Anesthesia Type: general      Patient location during evaluation: PACU  Patient participation: Yes- Able to Participate  Level of consciousness: awake and alert and oriented  Post-procedure vital signs: reviewed and stable  Pain management: adequate  Airway patency: patent    PONV status at discharge: No PONV  Anesthetic complications: no      Cardiovascular status: blood pressure returned to baseline, stable and hemodynamically stable  Respiratory status: unassisted  Hydration status: euvolemic  Follow-up not needed.              Vitals Value Taken Time   /71 04/29/25 10:37   Temp 36.2 °C (97.1 °F) 04/29/25 10:05   Pulse 51 04/29/25 10:38   Resp 17 04/29/25 10:38   SpO2 100 % 04/29/25 10:38   Vitals shown include unfiled device data.      No case tracking events are documented in the log.      Pain/Mady Score: No data recorded

## 2025-04-29 NOTE — TRANSFER OF CARE
"Anesthesia Transfer of Care Note    Patient: Mariah Meza    Procedure(s) Performed: * No procedures listed *    Patient location: PACU    Anesthesia Type: general    Transport from OR: Transported from OR on room air with adequate spontaneous ventilation    Post pain: adequate analgesia    Post assessment: no apparent anesthetic complications and tolerated procedure well    Post vital signs: stable    Level of consciousness: awake    Nausea/Vomiting: no nausea/vomiting    Complications: none    Transfer of care protocol was followed      Last vitals: Visit Vitals  BP (!) 190/87 (BP Location: Right arm)   Pulse (!) 54   Temp 36.2 °C (97.1 °F) (Temporal)   Resp 18   Ht 5' 4" (1.626 m)   Wt 109.9 kg (242 lb 4.6 oz)   LMP 02/10/1984 (Approximate)   SpO2 99%   Breastfeeding No   BMI 41.59 kg/m²     "

## 2025-04-29 NOTE — BRIEF OP NOTE
Endoscopy Discharge Summary      Admit Date: 4/29/2025    Discharge Date and Time:  4/29/2025 10:06 AM    Attending Physician: Shai Stein     Discharge Physician: Shai Stein     Principal Admitting Diagnoses: GERD (gastroesophageal reflux disease)         Discharge Diagnosis: The primary encounter diagnosis was Esophageal dysphagia. Diagnoses of Dysphagia, unspecified type, Gastroesophageal reflux disease, unspecified whether esophagitis present, and Chronic superficial gastritis without bleeding were also pertinent to this visit.     Discharged Condition: Good    Indication for Admission: GERD (gastroesophageal reflux disease)     Hospital Course: Patient was admitted for an inpatient procedure and tolerated the procedure well with no complications.    Significant Diagnostic Studies: EGD  The esophagus appeared normal. Performed random biopsy to rule out eosinophilic esophagitis.  Mild granular, nodular mucosa in the cardia; performed cold forceps biopsy to rule out H. pylori  The fundus of the stomach, body of the stomach, greater curve of the stomach, lesser curve of the stomach, incisura, antrum, prepyloric region, duodenal bulb, 2nd part of the duodenum, 3rd part of the duodenum and 4th part of the duodenum appeared normal.  Performed forceps biopsies in the antrum to rule out H. pylori    Pathology (if any):  Specimen (24h ago, onward)       Start     Ordered    04/29/25 1001  Specimen to Pathology Gastrointestinal tract  RELEASE UPON ORDERING        References:    Click here for ordering Quick Tip   Question:  Release to patient  Answer:  Immediate    04/29/25 1001                    Disposition: Home.    Bleeding: None    No Implants         Follow Up/Patient Instructions:   Patient's Medications   New Prescriptions    No medications on file   Previous Medications    ACETAMINOPHEN (TYLENOL) 650 MG TBSR    Take 650 mg by mouth daily as needed.    CALCIUM CARBONATE 470 MG CALCIUM (1,177 MG) CHEW    Take  one tablet by mouth twice daily    CARVEDILOL (COREG) 12.5 MG TABLET    Take 1 tablet (12.5 mg total) by mouth 2 (two) times daily with meals.    CYANOCOBALAMIN 500 MCG TABLET    Take 500 mcg by mouth once daily.    CYCLOBENZAPRINE (FLEXERIL) 10 MG TABLET    Take 1 tablet (10 mg total) by mouth every evening.    DICYCLOMINE (BENTYL) 10 MG CAPSULE    Take 10 mg by mouth.    FERROUS SULFATE 325 (65 FE) MG EC TABLET    Take 325 mg by mouth once daily.    HYDROXYZINE HCL (ATARAX) 25 MG TABLET    Take 1 tablet (25 mg total) by mouth 3 (three) times daily as needed for Itching.    LEVOTHYROXINE (SYNTHROID) 100 MCG TABLET    Take 1 tablet (100 mcg total) by mouth before breakfast.    MELOXICAM (MOBIC) 15 MG TABLET    Take 1 tablet by mouth once daily.    OLMESARTAN-HYDROCHLOROTHIAZIDE (BENICAR HCT) 40-25 MG PER TABLET    Take 1 tablet by mouth once daily.    PANTOPRAZOLE (PROTONIX) 40 MG TABLET    Take 1 tablet (40 mg total) by mouth once daily.    PREGABALIN (LYRICA) 75 MG CAPSULE    Take 1 capsule (75 mg total) by mouth every morning AND 1-2 capsules ( mg total) every evening.    PSYLLIUM HUSK/ASPARTAME (METAMUCIL FIBER SINGLES ORAL)    Take by mouth.    SPIRONOLACTONE (ALDACTONE) 25 MG TABLET    Take 1 tablet (25 mg total) by mouth once daily.    VITAMIN D (VITAMIN D3) 1000 UNITS TAB    Take 1,000 Units by mouth once daily.    VITAMIN E 400 UNIT CAPSULE    Take 400 Units by mouth once daily.   Modified Medications    No medications on file   Discontinued Medications    No medications on file       Discharge Procedure Orders   Diet general     No dressing needed     Call MD for:  temperature >100.4     Call MD for:  persistent nausea and vomiting     Call MD for:  severe uncontrolled pain     Call MD for:  difficulty breathing, headache or visual disturbances     Call MD for:  redness, tenderness, or signs of infection (pain, swelling, redness, odor or green/yellow discharge around incision site)     Call MD for:   hives     Call MD for:  persistent dizziness or light-headedness        Follow-up Information       Taina Bob NP Follow up.    Specialty: Internal Medicine  Why: As needed  Contact information:  [address] [facility] [place] [address] [phone]

## 2025-04-30 ENCOUNTER — HOSPITAL ENCOUNTER (OUTPATIENT)
Dept: RADIOLOGY | Facility: HOSPITAL | Age: 70
Discharge: HOME | End: 2025-04-30
Attending: NURSE PRACTITIONER
Payer: MEDICARE

## 2025-04-30 DIAGNOSIS — Z12.31 BREAST CANCER SCREENING BY MAMMOGRAM: ICD-10-CM

## 2025-04-30 PROCEDURE — 77063 BREAST TOMOSYNTHESIS BI: CPT | Mod: TC

## 2025-04-30 PROCEDURE — 77067 SCR MAMMO BI INCL CAD: CPT | Mod: 26,,, | Performed by: RADIOLOGY

## 2025-04-30 PROCEDURE — 77063 BREAST TOMOSYNTHESIS BI: CPT | Mod: 26,,, | Performed by: RADIOLOGY

## 2025-05-02 ENCOUNTER — RESULTS FOLLOW-UP (OUTPATIENT)
Dept: INTERNAL MEDICINE | Facility: CLINIC | Age: 70
End: 2025-05-02

## 2025-05-02 LAB
ESTROGEN SERPL-MCNC: NORMAL PG/ML
INSULIN SERPL-ACNC: NORMAL U[IU]/ML
LAB AP CLINICAL INFORMATION: NORMAL
LAB AP GROSS DESCRIPTION: NORMAL
LAB AP PERFORMING LOCATION(S): NORMAL
LAB AP REPORT FOOTNOTES: NORMAL

## 2025-05-04 ENCOUNTER — RESULTS FOLLOW-UP (OUTPATIENT)
Dept: GASTROENTEROLOGY | Facility: CLINIC | Age: 70
End: 2025-05-04

## 2025-05-04 NOTE — PROGRESS NOTES
Mariah Meza,    Biopsies obtained during your procedure are essentially benign/normal. No further action is needed at this point.    Thanks for using [facility], Aldo J. Russo, M.D.

## 2025-05-07 ENCOUNTER — PATIENT MESSAGE (OUTPATIENT)
Dept: PODIATRY | Facility: CLINIC | Age: 70
End: 2025-05-07
Payer: MEDICARE

## 2025-05-07 ENCOUNTER — PATIENT MESSAGE (OUTPATIENT)
Dept: SPORTS MEDICINE | Facility: CLINIC | Age: 70
End: 2025-05-07
Payer: MEDICARE

## 2025-05-19 ENCOUNTER — OFFICE VISIT (OUTPATIENT)
Dept: INTERNAL MEDICINE | Facility: CLINIC | Age: 70
End: 2025-05-19
Payer: MEDICARE

## 2025-05-19 ENCOUNTER — APPOINTMENT (OUTPATIENT)
Dept: RADIOLOGY | Facility: HOSPITAL | Age: 70
End: 2025-05-19
Attending: NURSE PRACTITIONER
Payer: MEDICARE

## 2025-05-19 VITALS
RESPIRATION RATE: 18 BRPM | SYSTOLIC BLOOD PRESSURE: 126 MMHG | BODY MASS INDEX: 42.14 KG/M2 | DIASTOLIC BLOOD PRESSURE: 86 MMHG | WEIGHT: 246.81 LBS | HEART RATE: 61 BPM | OXYGEN SATURATION: 96 % | TEMPERATURE: 97 F | HEIGHT: 64 IN

## 2025-05-19 DIAGNOSIS — M25.571 PAIN IN JOINTS OF BOTH FEET: ICD-10-CM

## 2025-05-19 DIAGNOSIS — M25.572 PAIN IN JOINTS OF BOTH FEET: Primary | ICD-10-CM

## 2025-05-19 DIAGNOSIS — M25.572 PAIN IN JOINTS OF BOTH FEET: ICD-10-CM

## 2025-05-19 DIAGNOSIS — M21.612 BILATERAL BUNIONS: ICD-10-CM

## 2025-05-19 DIAGNOSIS — E79.0 ELEVATED URIC ACID IN BLOOD: ICD-10-CM

## 2025-05-19 DIAGNOSIS — M25.571 PAIN IN JOINTS OF BOTH FEET: Primary | ICD-10-CM

## 2025-05-19 DIAGNOSIS — M21.611 BILATERAL BUNIONS: ICD-10-CM

## 2025-05-19 DIAGNOSIS — R53.83 FATIGUE, UNSPECIFIED TYPE: ICD-10-CM

## 2025-05-19 DIAGNOSIS — M47.816 LUMBAR SPONDYLOSIS: ICD-10-CM

## 2025-05-19 DIAGNOSIS — M47.26 OSTEOARTHRITIS OF SPINE WITH RADICULOPATHY, LUMBAR REGION: ICD-10-CM

## 2025-05-19 DIAGNOSIS — N18.31 CHRONIC KIDNEY DISEASE, STAGE 3A: ICD-10-CM

## 2025-05-19 PROCEDURE — 99214 OFFICE O/P EST MOD 30 MIN: CPT | Mod: S$GLB,,, | Performed by: NURSE PRACTITIONER

## 2025-05-19 PROCEDURE — 1159F MED LIST DOCD IN RCRD: CPT | Mod: CPTII,S$GLB,, | Performed by: NURSE PRACTITIONER

## 2025-05-19 PROCEDURE — 3008F BODY MASS INDEX DOCD: CPT | Mod: CPTII,S$GLB,, | Performed by: NURSE PRACTITIONER

## 2025-05-19 PROCEDURE — 3079F DIAST BP 80-89 MM HG: CPT | Mod: CPTII,S$GLB,, | Performed by: NURSE PRACTITIONER

## 2025-05-19 PROCEDURE — 1125F AMNT PAIN NOTED PAIN PRSNT: CPT | Mod: CPTII,S$GLB,, | Performed by: NURSE PRACTITIONER

## 2025-05-19 PROCEDURE — 1101F PT FALLS ASSESS-DOCD LE1/YR: CPT | Mod: CPTII,S$GLB,, | Performed by: NURSE PRACTITIONER

## 2025-05-19 PROCEDURE — 3074F SYST BP LT 130 MM HG: CPT | Mod: CPTII,S$GLB,, | Performed by: NURSE PRACTITIONER

## 2025-05-19 PROCEDURE — 73630 X-RAY EXAM OF FOOT: CPT | Mod: 26,50,, | Performed by: STUDENT IN AN ORGANIZED HEALTH CARE EDUCATION/TRAINING PROGRAM

## 2025-05-19 PROCEDURE — 3288F FALL RISK ASSESSMENT DOCD: CPT | Mod: CPTII,S$GLB,, | Performed by: NURSE PRACTITIONER

## 2025-05-19 PROCEDURE — 99999 PR PBB SHADOW E&M-EST. PATIENT-LVL V: CPT | Mod: PBBFAC,,, | Performed by: NURSE PRACTITIONER

## 2025-05-19 PROCEDURE — 1160F RVW MEDS BY RX/DR IN RCRD: CPT | Mod: CPTII,S$GLB,, | Performed by: NURSE PRACTITIONER

## 2025-05-19 PROCEDURE — G2211 COMPLEX E/M VISIT ADD ON: HCPCS | Mod: S$GLB,,, | Performed by: NURSE PRACTITIONER

## 2025-05-19 PROCEDURE — 73630 X-RAY EXAM OF FOOT: CPT | Mod: TC,50,PN

## 2025-05-19 RX ORDER — PANTOPRAZOLE SODIUM 40 MG/1
40 TABLET, DELAYED RELEASE ORAL DAILY
Qty: 30 TABLET | Refills: 5 | Status: SHIPPED | OUTPATIENT
Start: 2025-05-19

## 2025-05-19 RX ORDER — MELOXICAM 15 MG/1
15 TABLET ORAL DAILY
Qty: 14 TABLET | Refills: 0 | Status: SHIPPED | OUTPATIENT
Start: 2025-05-19 | End: 2025-06-02

## 2025-05-19 NOTE — PROGRESS NOTES
Patient ID: Mariah Meza is a 69 y.o. female.    Chief Complaint: Leg Pain (Maxime radiates from feet 2 wks or longer increasing ) and Foot Pain (Maxime 2 wk or longer radiates up leg increasing in pain)    History of Present Illness    CHIEF COMPLAINT:  Ms. Meza presents today with bilateral feet pain and swelling    MUSCULOSKELETAL:  She reports erythema nodosa to great toe bunion with leg swelling. She has stable back pain with a diagnosis of bilateral lumbar radiculopathy.    GOUT:  She has a history of elevated uric acid. She denies increased consumption of seafood or red meats, reports minimal pork intake, and denies alcohol consumption.    FATIGUE:  She reports experiencing fatigue, noting that it seems to occur on days when she takes her vitamin supplements, though she is uncertain if the supplements are the cause.      ROS:  Constitutional: negative diminished activity, negative appetite change, POSITIVE FATIGUE, negative fever  HENT: negative ear discharge, negative ear pain, negative mouth sores, negative nosebleeds, negative sore throat, negative tinnitus  Respiratory: negative apnea, negative cough, negative shortness of breath  Cardiovascular: negative chest pain, POSITIVE LEG SWELLING  Gastrointestinal: negative abdominal distention, negative abdominal pain, negative blood in stool, negative constipation, negative diarrhea, negative nausea, negative vomiting  Endocrine: negative polydipsia, negative polyphagia, negative polyuria  Genitourinary: negative difficulty urinating, negative flank pain, negative frequency, negative hematuria  Musculoskeletal: POSITIVE BACK PAIN, negative abnormal gait, negative neck pain, negative neck stiffness, negative joint pain, negative muscle pain, POSITIVE LIMB PAIN, POSITIVE LIMB SWELLING  Skin: negative rash, negative wound, negative abnormal moles  Allergic/Immunologic: negative seasonal allergies, negative food allergies  Neurological: negative dizziness, negative  seizures, negative numbness, negative tingling, negative headaches, negative balance issues  Psychiatric: negative agitation, negative confusion, negative hallucinations, negative sleep difficulty, negative suicidal ideation         Physical Exam    Vital Signs: Reviewed.  Constitutional: Well-appearing. Well-developed. No acute distress.  Cardiovascular: Normal rate and regular rhythm. Normal heart sounds.  Pulmonary: Pulmonary effort is normal. Normal breath sounds.  Abdominal: Bowel sounds are normal. Abdomen is soft. No tenderness.  Musculoskeletal: No muscle tenderness. No joint tenderness. Normal range of motion. RIGHT BUNION IS ERYTHEMATOUS. BILATERAL HALLUX.  Skin: Warm. Dry.  Neurological: No focal deficit is present. Alert and oriented to person, place, and time.  Psychiatric: Mood is normal. Behavior is normal. Behavior is cooperative.         Assessment & Plan      BILATERAL FOOT PAIN AND SWELLING (INCLUDING GOUT AND HALLUX VALGUS):  - Evaluated patient with bilateral feet pain and swelling.  - Noted bilateral hallux valgus on exam with erythema nodosa to right great toe bunion.  - Ms. Meza has history of elevated uric acid, which was considered as potential cause of symptoms.  - Ordered bilateral feet X-rays and uric acid level test today.  - Prescribed Meloxicam for 14 days only (limited duration due to chronic kidney disease).    LUMBAR RADICULOPATHY:  - Ms. Meza reports stable back pain.  - Evaluated patient with diagnosis of bilateral lumbar radiculopathy.  - Treatment plan considers osteoarthritis of lumbar spine and lumbar spondylosis.  - Offered steroid or Toradol injection, but patient declined.    CHRONIC KIDNEY DISEASE:  - Limited NSAID prescription duration due to CKD.    FATIGUE:  - Ms. Meza reports some fatigue, possibly related to vitamins or supplements.  - Assessed that patient may need to hold vitamins and try a different .  - Evaluated diet and supplement intake for potential  contributors to symptoms.            Follow up if symptoms worsen or fail to improve.    This note was generated with the assistance of ambient listening technology. Verbal consent was obtained by the patient and accompanying visitor(s) for the recording of patient appointment to facilitate this note. I attest to having reviewed and edited the generated note for accuracy, though some syntax or spelling errors may persist. Please contact the author of this note for any clarification.

## 2025-05-20 ENCOUNTER — RESULTS FOLLOW-UP (OUTPATIENT)
Dept: INTERNAL MEDICINE | Facility: CLINIC | Age: 70
End: 2025-05-20

## 2025-05-20 DIAGNOSIS — M10.9 ACUTE GOUT OF FOOT, UNSPECIFIED CAUSE, UNSPECIFIED LATERALITY: Primary | ICD-10-CM

## 2025-05-20 RX ORDER — PREDNISONE 10 MG/1
10 TABLET ORAL 2 TIMES DAILY
Qty: 10 TABLET | Refills: 0 | Status: SHIPPED | OUTPATIENT
Start: 2025-05-20 | End: 2025-06-03

## 2025-05-27 ENCOUNTER — PATIENT OUTREACH (OUTPATIENT)
Dept: ADMINISTRATIVE | Facility: HOSPITAL | Age: 70
End: 2025-05-27
Payer: MEDICARE

## 2025-05-27 NOTE — PROGRESS NOTES
Working CKD Lab Report.  Pt has lab appt scheduled, 5/30/25.  Micro Albumin urine linked to appt,

## 2025-05-30 ENCOUNTER — LAB VISIT (OUTPATIENT)
Dept: LAB | Facility: HOSPITAL | Age: 70
End: 2025-05-30
Attending: NURSE PRACTITIONER
Payer: MEDICARE

## 2025-05-30 DIAGNOSIS — N18.31 CHRONIC KIDNEY DISEASE, STAGE 3A: ICD-10-CM

## 2025-05-30 DIAGNOSIS — E78.2 MIXED HYPERLIPIDEMIA: ICD-10-CM

## 2025-05-30 LAB
ALBUMIN SERPL BCP-MCNC: 3.7 G/DL (ref 3.5–5.2)
ALBUMIN/CREAT UR: NORMAL
ALP SERPL-CCNC: 41 UNIT/L (ref 40–150)
ALT SERPL W/O P-5'-P-CCNC: 22 UNIT/L (ref 10–44)
ANION GAP (OHS): 9 MMOL/L (ref 8–16)
AST SERPL-CCNC: 19 UNIT/L (ref 11–45)
BILIRUB SERPL-MCNC: 1 MG/DL (ref 0.1–1)
BUN SERPL-MCNC: 30 MG/DL (ref 8–23)
CALCIUM SERPL-MCNC: 8.8 MG/DL (ref 8.7–10.5)
CHLORIDE SERPL-SCNC: 106 MMOL/L (ref 95–110)
CHOLEST SERPL-MCNC: 277 MG/DL (ref 120–199)
CHOLEST/HDLC SERPL: 4.1 {RATIO} (ref 2–5)
CO2 SERPL-SCNC: 26 MMOL/L (ref 23–29)
CREAT SERPL-MCNC: 1.4 MG/DL (ref 0.5–1.4)
CREAT UR-MCNC: 37 MG/DL (ref 15–325)
GFR SERPLBLD CREATININE-BSD FMLA CKD-EPI: 40 ML/MIN/1.73/M2
GLUCOSE SERPL-MCNC: 73 MG/DL (ref 70–110)
HDLC SERPL-MCNC: 68 MG/DL (ref 40–75)
HDLC SERPL: 24.5 % (ref 20–50)
LDLC SERPL CALC-MCNC: 185.4 MG/DL (ref 63–159)
MICROALBUMIN UR-MCNC: <5 UG/ML (ref ?–5000)
NONHDLC SERPL-MCNC: 209 MG/DL
POTASSIUM SERPL-SCNC: 4 MMOL/L (ref 3.5–5.1)
PROT SERPL-MCNC: 7.2 GM/DL (ref 6–8.4)
SODIUM SERPL-SCNC: 141 MMOL/L (ref 136–145)
TRIGL SERPL-MCNC: 118 MG/DL (ref 30–150)

## 2025-05-30 PROCEDURE — 82043 UR ALBUMIN QUANTITATIVE: CPT

## 2025-05-30 PROCEDURE — 36415 COLL VENOUS BLD VENIPUNCTURE: CPT | Mod: PN

## 2025-05-30 PROCEDURE — 80053 COMPREHEN METABOLIC PANEL: CPT

## 2025-05-30 PROCEDURE — 80061 LIPID PANEL: CPT

## 2025-06-03 ENCOUNTER — OFFICE VISIT (OUTPATIENT)
Dept: INTERNAL MEDICINE | Facility: CLINIC | Age: 70
End: 2025-06-03
Payer: MEDICARE

## 2025-06-03 ENCOUNTER — LAB VISIT (OUTPATIENT)
Dept: LAB | Facility: HOSPITAL | Age: 70
End: 2025-06-03
Attending: NURSE PRACTITIONER
Payer: MEDICARE

## 2025-06-03 VITALS
TEMPERATURE: 97 F | BODY MASS INDEX: 41.5 KG/M2 | OXYGEN SATURATION: 97 % | HEART RATE: 63 BPM | HEIGHT: 64 IN | SYSTOLIC BLOOD PRESSURE: 116 MMHG | WEIGHT: 243.06 LBS | DIASTOLIC BLOOD PRESSURE: 78 MMHG | RESPIRATION RATE: 18 BRPM

## 2025-06-03 DIAGNOSIS — R19.4 BOWEL HABIT CHANGES: ICD-10-CM

## 2025-06-03 DIAGNOSIS — I10 PRIMARY HYPERTENSION: ICD-10-CM

## 2025-06-03 DIAGNOSIS — J01.10 ACUTE FRONTAL SINUSITIS, RECURRENCE NOT SPECIFIED: Primary | ICD-10-CM

## 2025-06-03 DIAGNOSIS — N18.32 STAGE 3B CHRONIC KIDNEY DISEASE: ICD-10-CM

## 2025-06-03 DIAGNOSIS — M54.2 CERVICALGIA: ICD-10-CM

## 2025-06-03 DIAGNOSIS — M54.16 LUMBAR RADICULAR PAIN: ICD-10-CM

## 2025-06-03 DIAGNOSIS — M50.30 DDD (DEGENERATIVE DISC DISEASE), CERVICAL: ICD-10-CM

## 2025-06-03 LAB
ANION GAP (OHS): 9 MMOL/L (ref 8–16)
BUN SERPL-MCNC: 30 MG/DL (ref 8–23)
CALCIUM SERPL-MCNC: 8.8 MG/DL (ref 8.7–10.5)
CHLORIDE SERPL-SCNC: 108 MMOL/L (ref 95–110)
CO2 SERPL-SCNC: 25 MMOL/L (ref 23–29)
CREAT SERPL-MCNC: 1.4 MG/DL (ref 0.5–1.4)
GFR SERPLBLD CREATININE-BSD FMLA CKD-EPI: 40 ML/MIN/1.73/M2
GLUCOSE SERPL-MCNC: 86 MG/DL (ref 70–110)
POTASSIUM SERPL-SCNC: 4.1 MMOL/L (ref 3.5–5.1)
SODIUM SERPL-SCNC: 142 MMOL/L (ref 136–145)

## 2025-06-03 PROCEDURE — G2211 COMPLEX E/M VISIT ADD ON: HCPCS | Mod: S$GLB,,, | Performed by: NURSE PRACTITIONER

## 2025-06-03 PROCEDURE — 36415 COLL VENOUS BLD VENIPUNCTURE: CPT | Mod: PN

## 2025-06-03 PROCEDURE — 3008F BODY MASS INDEX DOCD: CPT | Mod: CPTII,S$GLB,, | Performed by: NURSE PRACTITIONER

## 2025-06-03 PROCEDURE — 3061F NEG MICROALBUMINURIA REV: CPT | Mod: CPTII,S$GLB,, | Performed by: NURSE PRACTITIONER

## 2025-06-03 PROCEDURE — 3066F NEPHROPATHY DOC TX: CPT | Mod: CPTII,S$GLB,, | Performed by: NURSE PRACTITIONER

## 2025-06-03 PROCEDURE — 3288F FALL RISK ASSESSMENT DOCD: CPT | Mod: CPTII,S$GLB,, | Performed by: NURSE PRACTITIONER

## 2025-06-03 PROCEDURE — 1159F MED LIST DOCD IN RCRD: CPT | Mod: CPTII,S$GLB,, | Performed by: NURSE PRACTITIONER

## 2025-06-03 PROCEDURE — 99214 OFFICE O/P EST MOD 30 MIN: CPT | Mod: S$GLB,,, | Performed by: NURSE PRACTITIONER

## 2025-06-03 PROCEDURE — 1160F RVW MEDS BY RX/DR IN RCRD: CPT | Mod: CPTII,S$GLB,, | Performed by: NURSE PRACTITIONER

## 2025-06-03 PROCEDURE — 1101F PT FALLS ASSESS-DOCD LE1/YR: CPT | Mod: CPTII,S$GLB,, | Performed by: NURSE PRACTITIONER

## 2025-06-03 PROCEDURE — 3074F SYST BP LT 130 MM HG: CPT | Mod: CPTII,S$GLB,, | Performed by: NURSE PRACTITIONER

## 2025-06-03 PROCEDURE — 80048 BASIC METABOLIC PNL TOTAL CA: CPT

## 2025-06-03 PROCEDURE — 99999 PR PBB SHADOW E&M-EST. PATIENT-LVL V: CPT | Mod: PBBFAC,,, | Performed by: NURSE PRACTITIONER

## 2025-06-03 PROCEDURE — 1125F AMNT PAIN NOTED PAIN PRSNT: CPT | Mod: CPTII,S$GLB,, | Performed by: NURSE PRACTITIONER

## 2025-06-03 PROCEDURE — 3078F DIAST BP <80 MM HG: CPT | Mod: CPTII,S$GLB,, | Performed by: NURSE PRACTITIONER

## 2025-06-03 RX ORDER — PREGABALIN 75 MG/1
CAPSULE ORAL
Qty: 90 CAPSULE | Refills: 2 | Status: CANCELLED | OUTPATIENT
Start: 2025-06-03

## 2025-06-03 RX ORDER — AMOXICILLIN AND CLAVULANATE POTASSIUM 875; 125 MG/1; MG/1
1 TABLET, FILM COATED ORAL 2 TIMES DAILY
Qty: 14 TABLET | Refills: 0 | Status: SHIPPED | OUTPATIENT
Start: 2025-06-03

## 2025-06-03 RX ORDER — SPIRONOLACTONE 25 MG/1
25 TABLET ORAL DAILY
Qty: 30 TABLET | Refills: 0 | Status: CANCELLED | OUTPATIENT
Start: 2025-06-03

## 2025-06-03 NOTE — PROGRESS NOTES
Chest congestion and sore throat.  Startd last week. Got worse a few days ago.  Tried Coricidin    Productive cough.  No known ill contacts.    Neck pain--shoulder blade that radiate to neck.  MRI -DDD cervical pain 11/2023.  Compression of the area with her hand makes it better. Some numbness to left hand.   Went to PT no improvement.     Reports some low readings---taking spironolactone 1/2 tab now.  Having lower readings.     TRYING TO EXERCISE UPPER BODY.      NASAL MUCOSAL SWELLING MILD ERYTHEMA PHYARN    MUSCULE TENSION UPPER BACK RIGHT TO NECK

## 2025-06-03 NOTE — PATIENT INSTRUCTIONS
HOLD OFF ON SPIRONOLACTONE 1/2 TAB OF 25 MG FOR NOW.  CONTINUE TO MONITOR BLOOD PRESSURE.     INCREASE WATER INTAKE.

## 2025-06-05 ENCOUNTER — RESULTS FOLLOW-UP (OUTPATIENT)
Dept: INTERNAL MEDICINE | Facility: CLINIC | Age: 70
End: 2025-06-05

## 2025-06-07 ENCOUNTER — PATIENT MESSAGE (OUTPATIENT)
Dept: INTERNAL MEDICINE | Facility: CLINIC | Age: 70
End: 2025-06-07
Payer: MEDICARE

## 2025-06-10 ENCOUNTER — OFFICE VISIT (OUTPATIENT)
Dept: PAIN MEDICINE | Facility: CLINIC | Age: 70
End: 2025-06-10
Payer: MEDICARE

## 2025-06-10 ENCOUNTER — TELEPHONE (OUTPATIENT)
Dept: INTERNAL MEDICINE | Facility: CLINIC | Age: 70
End: 2025-06-10
Payer: MEDICARE

## 2025-06-10 VITALS
WEIGHT: 246.94 LBS | HEIGHT: 64 IN | HEART RATE: 58 BPM | BODY MASS INDEX: 42.16 KG/M2 | RESPIRATION RATE: 17 BRPM | DIASTOLIC BLOOD PRESSURE: 74 MMHG | SYSTOLIC BLOOD PRESSURE: 131 MMHG

## 2025-06-10 DIAGNOSIS — Z13.89 OBESITY SCREEN: ICD-10-CM

## 2025-06-10 DIAGNOSIS — M25.551 BILATERAL HIP PAIN: ICD-10-CM

## 2025-06-10 DIAGNOSIS — M54.16 LUMBAR RADICULAR PAIN: ICD-10-CM

## 2025-06-10 DIAGNOSIS — M47.816 LUMBAR SPONDYLOSIS: ICD-10-CM

## 2025-06-10 DIAGNOSIS — M54.16 LUMBAR RADICULAR PAIN: Primary | ICD-10-CM

## 2025-06-10 DIAGNOSIS — M25.552 BILATERAL HIP PAIN: ICD-10-CM

## 2025-06-10 PROCEDURE — 3075F SYST BP GE 130 - 139MM HG: CPT | Mod: CPTII,S$GLB,, | Performed by: PHYSICIAN ASSISTANT

## 2025-06-10 PROCEDURE — 1125F AMNT PAIN NOTED PAIN PRSNT: CPT | Mod: CPTII,S$GLB,, | Performed by: PHYSICIAN ASSISTANT

## 2025-06-10 PROCEDURE — 3061F NEG MICROALBUMINURIA REV: CPT | Mod: CPTII,S$GLB,, | Performed by: PHYSICIAN ASSISTANT

## 2025-06-10 PROCEDURE — G2211 COMPLEX E/M VISIT ADD ON: HCPCS | Mod: S$GLB,,, | Performed by: PHYSICIAN ASSISTANT

## 2025-06-10 PROCEDURE — 3008F BODY MASS INDEX DOCD: CPT | Mod: CPTII,S$GLB,, | Performed by: PHYSICIAN ASSISTANT

## 2025-06-10 PROCEDURE — 1160F RVW MEDS BY RX/DR IN RCRD: CPT | Mod: CPTII,S$GLB,, | Performed by: PHYSICIAN ASSISTANT

## 2025-06-10 PROCEDURE — 1101F PT FALLS ASSESS-DOCD LE1/YR: CPT | Mod: CPTII,S$GLB,, | Performed by: PHYSICIAN ASSISTANT

## 2025-06-10 PROCEDURE — 99999 PR PBB SHADOW E&M-EST. PATIENT-LVL V: CPT | Mod: PBBFAC,,, | Performed by: PHYSICIAN ASSISTANT

## 2025-06-10 PROCEDURE — 3288F FALL RISK ASSESSMENT DOCD: CPT | Mod: CPTII,S$GLB,, | Performed by: PHYSICIAN ASSISTANT

## 2025-06-10 PROCEDURE — 99214 OFFICE O/P EST MOD 30 MIN: CPT | Mod: S$GLB,,, | Performed by: PHYSICIAN ASSISTANT

## 2025-06-10 PROCEDURE — 1159F MED LIST DOCD IN RCRD: CPT | Mod: CPTII,S$GLB,, | Performed by: PHYSICIAN ASSISTANT

## 2025-06-10 PROCEDURE — 3078F DIAST BP <80 MM HG: CPT | Mod: CPTII,S$GLB,, | Performed by: PHYSICIAN ASSISTANT

## 2025-06-10 PROCEDURE — 3066F NEPHROPATHY DOC TX: CPT | Mod: CPTII,S$GLB,, | Performed by: PHYSICIAN ASSISTANT

## 2025-06-10 RX ORDER — PREGABALIN 75 MG/1
CAPSULE ORAL
Qty: 270 CAPSULE | Refills: 1 | Status: SHIPPED | OUTPATIENT
Start: 2025-06-10

## 2025-06-10 NOTE — PROGRESS NOTES
"Interventional Pain -- Established Patient (Follow-up visit)    Referring Physician: Beverly Mcpherson PA-C  - Neurology (2nd referral); Smiley Weaver (initial referral)    PCP: Taina Bob NP      Chief complaint:  Follow-up       Low back pain with BLE radicular pain   Cervical Neck Pain - facet-mediated, axial in nature   Neck pain with BUE radiation (down the LUE to the thumb in C6 distribution, down RUE into shoulder area)  Left shoulder pain - previously seen by Dr. Espinal (Orthopedics)         SUBJECTIVE:    Interval History (6/10/2025):  Patient presents for follow-up regarding chronic pain management. She reports back and leg pain that worsens with movement, stating that excessive movement makes it difficult for her to walk. Pain limits her ability to stoop, cross her legs, and put on shoes. Despite these limitations, she maintains a daily 30-minute walking routine.  She has been taking Lyrica 75 mg twice daily as prescribed but feels unable to increase the dosage due to drowsiness. She discontinued Cymbalta due to adverse effects but continues to use Flexeril as needed.       She recently learned "she requires hip replacement".  About two months ago, she received an injection in her left hip (GTB) from Dr. Espinal, which she reports is still providing relief.  She reports she had an X-ray of the hips, which led to the recommendation for hip replacements.    She mentions weight management issues and thyroid concerns that may impact medication options. She complains of bloating and pain related to uric acid.    She denies wanting to try any injections at this time.  Patient reports pain as 10/10 today.    Interval History (3/4/2025):  Mariah Meza presents today for follow-up visit.  Patient was last seen on 12/3/2024. At that visit, the plan was to continue Lyrica at night and start physical therapy. Patient reports pain as 8/10 today.  She reports she has been going to " physical therapy for her neck, and she reports it did not help for her back.  She is adamantly against trying any other injections as she states they do not help-although it was documented previously that the epidural was helpful.    Interval History (12/3/2024):  Patient presents today for follow-up visit.  she underwent Bilateral L5/S1 + S1 TF SUSY on 11/8/24.  The patient reports that she is/was better following the procedure.  she reports 70% pain relief.  The changes lasted 4 weeks so far.  The changes have continued through this visit.  Patient reports pain as 7/10 today. She still has some left calf cramping, but overall, she feels much better.    At last visit, plan was to also schedule cervical medial branch block, but due to 'issues' after the epidural, she was afraid to go through with this.  She does feel that Lyrica is helping.  She can not tolerate twice a day, but she is taking at night.    Interval History (10/22/2024):  Mariah Meza presents today for follow-up visit.  Patient was last seen about 4 months ago. At that visit, the plan was to schedule lumbar SUSY, which was ultimately not completed.  Her blood pressure was uncontrolled, so the procedure had to be canceled.  She continues to have bilateral leg pain associated with lumbar pain.  She presents today to discuss neck pain.  Neck pain is somewhat axial, but also radiates down the left arm.  She has some pain into the right shoulder area.  She was told she has left shoulder rotator cuff issues, but she does not want to have surgery.  She had 2 injections, but the 2nd time was too painful , so she did not want to repeat.  Patient reports pain as 6/10 today.  She saw PCP yesterday and had Toradol injection, which helped some short term.    Interval history 06/26/2024  Patient presents for four-month follow-up.  Today she reports exacerbation of lower back and leg pain.  Pain is constant and today is rated a 6/10.  Pain is described as  burning and tingling in nature.  Today she reports pain in a bandlike distribution in the lower back which radiates down the posterior aspects of bilateral lower extremities in L5-S2 distribution to the feet.  Pain can be exacerbated with lumbar extension, standing and with ambulation.  She has continued physician directed physical therapy exercises over the last 8 weeks from 04/26/2024 through 06/26/2024 with marginal improvement in her symptoms.  She would like to refrain from any pharmacologic management at this time secondary to cognitive side effects from membrane stabilizing agents.  Pain is improved with lumbar forward flexion.    Interval history 02/05/2024  Patient presents status post bilateral L3-5 lumbar medial branch block 12/12/2023.  Patient reports 90% sustained relief in lower axial back pain following her procedure.  She reports this pain relief is still ineffective.  Today she reports burning pains down bilateral lower extremities.  Pain is intermittent and today is rated a 5/10.  Patient is concerned whether this was related to the prior injection.  She reports pain which can radiate down the lateral and posterior aspects of bilateral lower extremities in L4-S1 distribution to the calf.  Patient has continued judicious use of Flexeril which she does not even take daily as well as Tylenol.  During last schedule procedure, patient had hypertensive urgency, 229/100.  Of note patient went to the emergency room and received hydralazine and clonidine.  Patient has scheduled follow-up with nurse practitioner, Ms. Bob.    Interval History (11/22/2023):  Patient presents today for follow-up visit.  Patient was referred back to our clinic by Beverly Mcpherson PA-C (Neurology).  She complains mostly of low back pain.  She does have some leg pain on the left side occasionally.  She reports she stretches at home.  She takes Flexeril as needed, but she tries to avoid due to dry mouth.  She is taking  "gabapentin in the past with no relief.  She wants to hold off on adding additional medications at this time.  Patient reports pain as 6/10 today.    10/26/23 Neurology:  Ms. Mariah Meza is a 67 y.o. female presenting for evaluation of back pain. She noted that she has had some baseline back pain for quite some time (years) however in September she began to have increased pain. She required an injection of ketorolac at that time which helped for a day or two but then her pain returned. She did complete EMG/NCS previously unable to locate in [facility] or care everywhere. She describes her pain as sharp and some "electric shock" sensation that occasionally occurs. She does get some stiffness and weakness also which she reports is present in her entire body. She notes that her pain is worse on the L and in the lumbar region. She does endorse neck pain. She did have a cervical spine MRI completed one year ago which showed multilevel degenerative changes at C4-C5 and mild canal stenosis. She notes that her pain is constant all day but worsened with movement or prolonged sitting. She previously completed physical therapy in Jan/Feb of this year which benefited her greatly and she found that she was able to be much more active. She then had a gout flare and seemed to regress in her ability to move. She denies any falls. She does endorse that she is having some constipation. She does have some baseline bladder problems but attributes this to her medications. She notes that she cannot lift a lot of things she use to be able to and she drops things more frequently. She reports numbness/tingling in bilateral hands and feet but worse on L hand.     Interval History (4/6/2023):  Mariah Meza presents today for follow-up visit.  Patient was last seen on 2/9/2023. At that visit, the plan was to start Gabapentin.  She reports she discontinued this medication after getting up to the 600 mg dosage due to side effects.  She " reports it made her feel crazy in the head.  She reports improvement in her neck pain since last visit as she is been performing physician directed exercises at home.  She reports pain is primarily located in her ankles and feet right worse than left.  She reports that she was previously diagnosed with gout in November and since then she has had recurrent gouty flares.  She has noticed that these flares occur when she eats a lot of certain types of food.  She is had flare secondary to eating excessive amounts of shrimp, raisins, pineapple, and serial.  She reports the current flare began approximately 1 week ago and has been persistent she has noticed redness warmth and swelling in both of her ankles and great toe.  She has not taken anything other than Tylenol which has not been beneficial.  She is scheduled to follow-up with podiatry per referral from her primary care doctor.  Patient reports pain as 7/10 today.  She reports she has also noticed mild to moderate swelling in her hands and feet.  She reports this began after she started taking blood pressure medications, she thinks this may be due to medications.    Initial HPI 02/09/2023  Mariah Meza is a 67y.o. female with past medical history significant for hyperlipidemia, hypertension, sicca syndrome, history of papillary thyroid cancer status post parathyroidectomy, obesity, GERD who presents to the clinic for the evaluation of mid back pain.  Patient reports pain has been present for the last 2 years without inciting accident injury or trauma.  Pain is constant and today is rated a 6/10.  Pain at its best is a 3/10 and at its worse is a 10/10.  Pain is described as sharp, stabbing and stiffness in nature.  Patient reports pain which begins in between the shoulder blades and radiates to the midthoracic spine.  Patient reports prior radiation down the left upper extremity to the thumb in C6 distribution.  Patient also reports pain in bilateral ankles.   Patient reports associated weakness in the lower extremities associated with this pain and with standing and ambulation.  This pain is described as burning in nature.  All of her pain is exacerbated when moving from sitting to standing, standing and with ambulation.  Patient reports in the past she was able to ambulate 10,000 steps per day but this has been reduced secondary to her pain.  Pain is improved with sitting and pain medications such as relief.  Patient reports she completed 12 months of physical therapy for the neck and lower back 2 months prior at [facility].  Patient reports no significant improvement in her pain with completion of physical therapy.  Patient also takes Flexeril which is marginally helpful.  Patient reports significant lower extremity weakness.  Patient denies night fever/night sweats, urinary incontinence, bowel incontinence, significant weight loss, and loss of sensations.    Pain Disability Index (PDI) Score Review:      6/10/2025     8:43 AM 10/22/2024    10:11 AM 6/26/2024     9:59 AM   Last 3 PDI Scores   Pain Disability Index (PDI) 70 42 45       Non-Pharmacologic Treatments:  Physical Therapy/Home Exercise: yes  Ice/Heat:yes  TENS: no  Acupuncture: no  Massage: no  Chiropractic: no    Other: no      Pain Medications:  - Adjuvant Medications: Cyclobenzaprine (Flexeril) and Tylenol (Acetaminophen) Diclofenac tablet      Pain Procedures:     Dr. Sutton:  -12/12/2023: Bilateral L3-5 MBB with 90% pain relief  -11/8/2024: Bilateral L5/S1 + S1 TF SUSY with 70% pain relief    Dr. Espinal:  -4/9/25: left GT bursa injection with good pain relief       Review of Systems:  GENERAL:  No weight loss, malaise or fevers.  HEENT:   No recent changes in vision or hearing  NECK:  Negative for lumps, no difficulty with swallowing.  RESPIRATORY:  Negative for cough, wheezing or shortness of breath, patient denies any recent URI.  CARDIOVASCULAR:  Negative for chest pain or  "palpitations.  GI:  Negative for abdominal discomfort, blood in stools or black stools or change in bowel habits.  MUSCULOSKELETAL:  See HPI.  SKIN:  Negative for lesions, rash, and itching.  PSYCH:  No mood disorder or recent psychosocial stressors.   HEMATOLOGY/LYMPHOLOGY:  Negative for prolonged bleeding, bruising easily or swollen nodes.    NEURO:   No history of syncope, paralysis, seizures or tremors.  All other reviewed and negative other than HPI.      OBJECTIVE:    Physical Exam:  Vitals:    06/10/25 0841   BP: 131/74   Pulse: (!) 58   Resp: 17   Weight: 112 kg (246 lb 14.6 oz)   Height: 5' 4" (1.626 m)   PainSc: 10-Worst pain ever   PainLoc: Hip   Body mass index is 42.38 kg/m².   (reviewed on 6/11/2025)    GENERAL: Well appearing, in no acute distress, alert and oriented x3.  PSYCH:  Mood and affect appropriate.  SKIN: Skin color, texture, turgor normal, no rashes or lesions.  HEAD/FACE:  Normocephalic, atraumatic.  PULMONARY: no audible wheezing.     NECK: no pain to palpation over the cervical paraspinous muscles. Spurling Negative. Mild pain with neck flexion, extension, or lateral flexion. Normal testing biceps, triceps and brachioradialis bilaterally.  Cervical facet loading causes pain bilaterally.  LUMBAR:  Pain with flexion.  Facet loading is equivocal.  TTP diffusely over lumbar paraspinals.  SLR equivocal bilaterally. Limited ROM secondary to pain reproduction.    5/5 strength testing deltoid, biceps, triceps, wrist extensor, wrist flexor and ulnar intrinsics bilaterally.    Normal  strength bilaterally  MUSCULOSKELETAL:  Mild swelling along left ankle, moderate swelling to right ankle redness and swelling along the base of her left great toe swelling along right great toe, bilateral bunions along great toes  PULM: No evidence of respiratory difficulty, symmetric chest rise.  GI:  Soft and non-tender.    NEURO: BUE & BLE coordination and muscle stretch reflexes are physiologic and symmetric. " No loss of sensation is noted.  GAIT: normal.        Imaging/ Diagnostic Studies/ Labs (Reviewed on 6/11/2025):    11/06/2023 MRI Lumbar Spine Without Contrast  COMPARISON:  Plain films of the lumbar spine from 03/06/2014.    FINDINGS:  There is equivocal 1-2 mm of anterolisthesis of L4 on L5. The vertebral body heights are well maintained, with no fracture.  No marrow signal abnormality suspicious for an infiltrative process.    The conus medullaris terminates at approximately the L1-L2 disk space.  There is a probable small cyst seen within the upper pole to interpolar right kidney that measures approximately 6 mm in size.  Consider further evaluation with ultrasound for confirmation.  The discs are relatively well hydrated with only some minimal disc desiccation seen along the periphery of the L5-S1 disc and slightly more centrally at the L2-3 disc.    L1-L2: No significant central canal or neural foraminal narrowing. Mild bilateral facet arthropathy noted.    L2-L3: No significant central canal or neural foraminal narrowing. Mild-to-moderate bilateral facet arthropathy noted.    L3-L4: No significant central canal or neural foraminal narrowing. Moderate to severe bilateral facet arthropathy noted.    L4-L5:  Minimal diffuse disc bulge along with equivocal grade 1 spondylolisthesis and severe bilateral facet arthropathy resulting in mild effacement of the ventral thecal sac.  No significant neural foraminal canal narrowing.    L5-S1:  Mild diffuse disc bulge slightly more prominent the left paracentral region along with moderate bilateral facet arthropathy resulting in no significant central or neural foraminal canal narrowing.    Impression:   1. Multilevel degenerative changes of the lumbar spine as detailed above.  No high-grade central or neural foraminal canal narrowing noted.  Multilevel bilateral facet arthropathy.  2. Incidental note made of a probable 6 mm right renal cyst.  Consider ultrasound for  confirmation.        11/06/2023 MRI Cervical Spine Without Contrast  COMPARISON:  Plain films from 04/13/2021.  Report from outside MRI dated 08/09/2022.  Films unavailable for comparison.    FINDINGS:  There is a couple mm of anterolisthesis of C2 on C3 and C3 on C4. No fracture. No marrow signal abnormality suspicious for an infiltrative process.  There is disc desiccation noted throughout the cervical spine with mild disc space narrowing seen at C3-4, C4-5 and C7-C6 with more mild-to-moderate disc space narrowing seen at C5-6.    The cervical cord is normal in caliber and signal characteristics.  The craniocervical junction and visualized intracranial contents are unremarkable.  The adjacent soft tissue structures show no significant abnormalities.    C2-C3:  No significant central canal or neural foraminal narrowing.    C3-C4:  There is a diffuse disc bulge along with some posterior spondylotic ridging that results in effacement of ventral thecal sac and moderate effacement of the ventral cord.  The AP dimension of the central canal this level measures approximately 9 mm.  No significant neural foraminal canal narrowing suggested.    C4-C5:  Diffuse disc bulge and posterior spondylotic ridging more prominent in the left paracentral region resulting in significant effacement of the thecal sac and moderate face mint of the left side of the cord.  The AP dimension of the central canal at this level also measures approximately 9 mm.  No significant neural foraminal canal narrowing.    C5-C6:  Diffuse disc bulge resulting in effacement of ventral thecal sac but not quite abutting the cord.  The AP dimension of the central canal measures approximately 10 mm at this level.  The right C5-6 neural foraminal canal is mildly narrowed secondary to uncovertebral joint hypertrophy.  No significant left neural foraminal canal narrowing.    C6-C7:  Mild diffuse disc bulge resulting in mild effacement of ventral thecal sac.  No  abutment of the anterior cord.  The AP dimension of the central canal at this level measures approximately 10.5 mm.  The right C6-7 neural foraminal canal appears to be minimally narrowed secondary to uncovertebral joint hypertrophy.    C7-T1:   Mild diffuse disc bulge resulting in no significant central canal narrowing.  The right neural foraminal canal appears to be at least mildly narrowed.  Impression:   1. Multilevel degenerative changes of the cervical spine as detailed above.      03/04/14 X-Ray Lumbar Spine Ap And Lateral  Five non-rib bearing lumbar type vertebrae are present without significant lateral curvature abnormality or subluxation.  Degenerative endplate changes as well as facet joint hypertrophy, particularly inferiorly, are noted without acute fracture or  suspicious focal bony lesion identified.  Disk spaces are fairly well-maintained.       04/13/21 X-Ray Cervical Spine Complete 5 view  Patient's hair limits the exam.  There is visualization to the C7-T1 level on the swimmer's view.  Multilevel marginal spurring and varying degrees of uniform loss of disc height throughout the C-spine.  No fracture or subluxation.  Pre dens space, prevertebral soft tissues, C1-2 articulation and odontoid normal in appearance allowing for positioning.  Neural foramina widely patent bilaterally at all levels.  Remaining visualized osseous structures intact.  Included upper lung fields clear.      BLE EMG 10/2021  1. ABNORMAL study  2. There is electrodiagnostic evidence of an acute on chronic radiculopathy of the L5 and S1 nerve roots-slightly worse on the right        ASSESSMENT: 69 y.o. year old female with neck, mid back, bilateral ankles pain, consistent with     1. Lumbar radicular pain        2. Bilateral hip pain  Ambulatory referral/consult to Orthopedics      3. Obesity screen  Ambulatory referral/consult to Weight Management Program      4. Lumbar spondylosis  HME - OTHER            PLAN:   -  "Interventions:    - Hold off on any injections - as she is adamantly against at this time.  Patient received a GTB injection approximately two months ago with Orthopedics, which is still providing benefit.     - Anticoagulation use: no no anticoagulation    - Medications:  - Refill Lyrica (pregabalin) 75mg BID. Previously could not tolerate more than QHS. Did not increase after last visit.     - Continue Flexeril 10mg PRN myofascial pain.       - D/c Cymbalta 30 mg QD - stopped taking.    - Failed medications: Gabapentin (side effects (made her "feel crazy") also ineffective).    - LA  reviewed and appropriate.       - Consults/referral:    - Referred to weight management program.  - Discussed left hip injection as a potential alternative to surgery.  Referred to Dr. Sheldon, Hip specialist for hip pain.  -Continue follow-up with PCP: uncontrolled HTN.  -Continue follow-up with Dr. Espinal (Orthopedics) in regards to left shoulder pain (although she wants to avoid sx right now).     - Therapy:   - Recommend heating pad for neck pain as well.   - Continue use of TENs unit to help increase ROM, reduce pain, strengthen, and allow return to ADLs.   - She engages in walking for 30 minutes every morning, but this activity is not significantly helping with pain or weight loss.    - Diagnostic/ Imaging:  Diagnostic imaging (lumbar & cervical MRI 11/06/2023) and diagnostic testing (lower extremity electromyography studies).    - Follow up visit:  3 months follow-up  - in clinic (per pt request)     Future Appointments   Date Time Provider Department Center   7/2/2025  9:00 AM Taina Bob NP [facility]   7/25/2025  8:20 AM Lauro Sheldon MD ON ORTHO [facility]   7/28/2025  9:00 AM Don Walsh MD [facility] CARDIO [facility]   9/30/2025  9:30 AM Odalys Krishna NP  GASTRO [facility]   10/9/2025  9:00 AM HG US3 [facility] ULSOUND [facility]   10/9/2025 10:15 AM Sherita Duran MD [facility] ENT " [place]   11/10/2025  9:00 AM Brenna Johnson, ATIF [facility] INT LORETAT [place]       Visit today included increased complexity associated with the care of the episodic problem of chronic pain which was addressed and continue to manage the longitudinal care of the patient due to the serious and/or complex managed problem(s) listed above.    - Patient Questions: Answered all of the patient's questions regarding diagnosis, therapy, and treatment.    - This condition does not require this patient to take time off of work, and the primary goal of our Pain Management services is to improve the patient's functional capacity.   - I discussed the risks, benefits, and alternatives to potential treatment options. All questions and concerns were fully addressed today in clinic.         Brenna Johnson PA-C  Interventional Pain Management - [facility]    Disclaimer:  This note was prepared using voice recognition system and is likely to have sound alike errors that may have been overlooked even after proof reading.  Please call me with any questions.     This note was generated with the assistance of ambient listening technology to support clinical documentation. The content has been reviewed and edited for accuracy by the author, though minor syntax or spelling errors may remain. Please contact the author of this note for any clarification.

## 2025-06-10 NOTE — TELEPHONE ENCOUNTER
I called the pt and assisted her with rescheduling her 1 mo follow up with Taina Bob NP due to her being booked out. She verbalized understanding of new appt date and time . //kah  
pulse oximetry

## 2025-06-16 ENCOUNTER — TELEPHONE (OUTPATIENT)
Dept: INTERNAL MEDICINE | Facility: CLINIC | Age: 70
End: 2025-06-16
Payer: MEDICARE

## 2025-06-16 NOTE — TELEPHONE ENCOUNTER
Spoke to patient and offered for her to come in and see the provider and they could determine if patient needs a EKG, it could be several reasons why she feels pain, she stated she did do some exercises this morning so that could have caused it. Does not have chest pain, just sore in arms and back. Patient stated she would take some ibuprofen and then see how she feels.

## 2025-06-23 ENCOUNTER — PATIENT MESSAGE (OUTPATIENT)
Dept: INTERNAL MEDICINE | Facility: CLINIC | Age: 70
End: 2025-06-23
Payer: MEDICARE

## 2025-06-23 PROBLEM — N18.32 STAGE 3B CHRONIC KIDNEY DISEASE: Status: ACTIVE | Noted: 2025-06-23

## 2025-06-24 NOTE — PROGRESS NOTES
Subjective     Patient ID: Mariah Meza is a 69 y.o. female.    Chief Complaint: Follow-up (3 mo), Wheezing (Coughing up mucus), Nasal Congestion, and Cough    Patient presents with a few concerns:     Congestion/URI symptoms.  Started over one week ago.  Tried Coricidin OTC-no relief.     Reports Neck pain--shoulder blade that radiate to neck.  MRI -DDD cervical pain 11/2023.  Compression of the area with her hand makes it better. Some numbness to left hand.   Went to PT no improvement.      Blood pressure: Reports some low readings---taking spironolactone 1/2 tab now.  Having lower readings.       Cough  This is a new problem. The current episode started in the past 7 days. The problem has been gradually worsening. The problem occurs constantly. The cough is Productive of sputum. Associated symptoms include myalgias and nasal congestion. Pertinent negatives include no chest pain, eye redness, fever, headaches or rash. She has tried OTC cough suppressant for the symptoms. The treatment provided no relief. There is no history of environmental allergies.     Review of Systems   Constitutional:  Negative for activity change, appetite change, fatigue and fever.   Eyes:  Negative for discharge, redness and itching.   Cardiovascular:  Negative for chest pain and leg swelling.   Gastrointestinal:  Negative for abdominal distention, abdominal pain, blood in stool and constipation.   Endocrine: Negative for polydipsia, polyphagia and polyuria.   Genitourinary:  Negative for difficulty urinating, flank pain, frequency and hematuria.   Musculoskeletal:  Positive for back pain, myalgias, neck pain and neck stiffness. Negative for gait problem.   Integumentary:  Negative for rash and wound.   Allergic/Immunologic: Negative for environmental allergies, food allergies and immunocompromised state.   Neurological:  Negative for dizziness, seizures, numbness and headaches.   Psychiatric/Behavioral:  Negative for agitation,  confusion, hallucinations, self-injury and suicidal ideas.           Objective     Physical Exam  Vitals reviewed.   Constitutional:       Appearance: Normal appearance. She is well-developed.   HENT:      Head: Normocephalic.      Right Ear: Tympanic membrane normal.      Left Ear: Tympanic membrane normal.      Nose: Mucosal edema and congestion present.   Cardiovascular:      Rate and Rhythm: Normal rate and regular rhythm.   Pulmonary:      Effort: Pulmonary effort is normal.      Breath sounds: Normal breath sounds.   Musculoskeletal:         General: Normal range of motion.      Cervical back: Muscular tenderness present.   Skin:     General: Skin is warm and dry.   Neurological:      General: No focal deficit present.      Mental Status: She is alert and oriented to person, place, and time.   Psychiatric:         Mood and Affect: Mood normal.         Behavior: Behavior normal. Behavior is cooperative.            Assessment and Plan     1. Acute frontal sinusitis, recurrence not specified  Comments:  Start Augmentin.  Orders:  -     amoxicillin-clavulanate 875-125mg (AUGMENTIN) 875-125 mg per tablet; Take 1 tablet by mouth 2 (two) times daily. TAKE WITH FOOD  Dispense: 14 tablet; Refill: 0    2. Lumbar radicular pain  Comments:  Intermittent symptoms.  Continue Lyrica 75 mg.    3. Primary hypertension  Comments:  Stable and controlled.  Orders:  -     Basic Metabolic Panel; Future; Expected date: 06/03/2025    4. Cervicalgia  Comments:  Intermittent symptoms. Continue Lyrica 75 mg.    5. DDD (degenerative disc disease), cervical  Comments:  Intermittent symptoms. Continue Lyrica 75 mg.  Overview:  FINDINGS:     Straightening and slight reversal of the cervical lordosis. Vertebral body heights are maintained. STIR hyperintense signal within the right posterior elements surrounding the C3-C4 facet joint. Mild surrounding paraspinal muscle STIR hyperintense signal. 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 No abnormal cord signal.     C2-C3: No significant canal or foraminal stenosis.     C3-C4: Posterior disc osteophyte complex which effaces the ventral CSF space and creates an indentation upon the spinal cord. Mild canal stenosis. Uncovertebral and facet joint hypertrophy with mild right foraminal stenosis. No significant left foraminal stenosis.     C4-C5: Posterior disc osteophyte complex eccentric to the left which effaces the ventral CSF space, contacts and creates an indentation upon the spinal cord. Mild canal stenosis. Uncovertebral joint hypertrophy without significant foraminal stenosis.     C5-C6: Posterior disc osteophyte complex which partially effaces the ventral CSF space. Mild canal stenosis. Uncovertebral and facet joint hypertrophy with mild bilateral foraminal stenosis.     C6-C7: Posterior disc osteophyte complex which partially effaces the ventral CSF space. Mild canal stenosis. Uncovertebral joint hypertrophy with mild bilateral foraminal stenosis.     C7-T1: No significant canal or foraminal stenosis.       6. Bowel habit changes  Comments:  GI referral.  Orders:  -     Ambulatory referral/consult to Gastroenterology; Future; Expected date: 06/10/2025    7. Stage 3b chronic kidney disease  Comments:  Stable/routine labs.             Follow up in about 1 month (around 7/3/2025).

## 2025-07-02 ENCOUNTER — LAB VISIT (OUTPATIENT)
Dept: LAB | Facility: HOSPITAL | Age: 70
End: 2025-07-02
Attending: NURSE PRACTITIONER
Payer: MEDICARE

## 2025-07-02 ENCOUNTER — OFFICE VISIT (OUTPATIENT)
Dept: INTERNAL MEDICINE | Facility: CLINIC | Age: 70
End: 2025-07-02
Payer: MEDICARE

## 2025-07-02 VITALS
HEIGHT: 64 IN | SYSTOLIC BLOOD PRESSURE: 150 MMHG | RESPIRATION RATE: 18 BRPM | DIASTOLIC BLOOD PRESSURE: 70 MMHG | TEMPERATURE: 96 F | HEART RATE: 66 BPM | WEIGHT: 246.38 LBS | BODY MASS INDEX: 42.06 KG/M2 | OXYGEN SATURATION: 98 %

## 2025-07-02 DIAGNOSIS — R52 GENERALIZED PAIN: ICD-10-CM

## 2025-07-02 DIAGNOSIS — I10 PRIMARY HYPERTENSION: ICD-10-CM

## 2025-07-02 DIAGNOSIS — R07.89 CHEST DISCOMFORT: ICD-10-CM

## 2025-07-02 DIAGNOSIS — M47.26 OSTEOARTHRITIS OF SPINE WITH RADICULOPATHY, LUMBAR REGION: ICD-10-CM

## 2025-07-02 DIAGNOSIS — Z09 FOLLOW-UP EXAM: Primary | ICD-10-CM

## 2025-07-02 LAB
CRP SERPL-MCNC: 1.1 MG/L
ERYTHROCYTE [SEDIMENTATION RATE] IN BLOOD BY PHOTOMETRIC METHOD: 27 MM/HR
RHEUMATOID FACT SERPL-ACNC: <13 IU/ML

## 2025-07-02 PROCEDURE — 86140 C-REACTIVE PROTEIN: CPT

## 2025-07-02 PROCEDURE — 84484 ASSAY OF TROPONIN QUANT: CPT

## 2025-07-02 PROCEDURE — 99999 PR PBB SHADOW E&M-EST. PATIENT-LVL V: CPT | Mod: PBBFAC,,, | Performed by: NURSE PRACTITIONER

## 2025-07-02 PROCEDURE — 36415 COLL VENOUS BLD VENIPUNCTURE: CPT | Mod: PN

## 2025-07-02 PROCEDURE — 93010 ELECTROCARDIOGRAM REPORT: CPT | Mod: S$GLB,,, | Performed by: INTERNAL MEDICINE

## 2025-07-02 PROCEDURE — 85652 RBC SED RATE AUTOMATED: CPT

## 2025-07-02 PROCEDURE — 86431 RHEUMATOID FACTOR QUANT: CPT

## 2025-07-02 RX ORDER — OLMESARTAN MEDOXOMIL 40 MG/1
40 TABLET ORAL DAILY
Qty: 90 TABLET | Refills: 3 | Status: SHIPPED | OUTPATIENT
Start: 2025-07-02 | End: 2026-07-02

## 2025-07-02 NOTE — PROGRESS NOTES
Subjective     Patient ID: Mariah Meza is a 69 y.o. female.    Chief Complaint: Sinusitis (1 month f/u)    Patient presents one month follow up.     Has concerned that health is not getting better.  Express frustration of continuing to be on medication different prescriptions and not feeling any better.  Continues to have neck pain and feels as if we are not addressing the concerns of her neck.  Her spine symptoms are causing her to be in discomfort during the day and limited wrist at night.    Patient routinely checks her blood pressure at home.  Blood pressure is stable.  Minimal elevated numbers.   She is concerned about her blood pressure medicine making her uric acid elevated.  We discussed the hydrochlorothiazide and the way it works and also that it can cause elevated uric acid.  We will discontinue this medication but advised the patient that she may start to see a little bit more fluid retaining in her legs.  Highly recommend to wear compression stockings every day.  Wear them at least for a few hours throughout the day and especially when walking.    Has GI appointment with her old provider 07/21/25---has new referral to GI provider but the appointment was in September.  Patient wanted to see someone a little sooner.    Reports she is doing her exercise regimen every day Monday through Saturday for a total of about 45 minutes a day.  Concerned that she is not losing weight she reports that she also is eating very minimal.  Concerned about her weight: eating more sugary fruits.  She is drinking 4 bottles of water a day 16 oz.  Also drinks no sugar flavored water-started 33 oz.    Requested to be tested for rheumatoid arthritis.  Patient had RH factor about 2 years ago and 4 years ago and both tests were negative.  We will repeat today.    Sinusitis  Pertinent negatives include no coughing, ear pain, headaches, neck pain, shortness of breath or sore throat.     Review of Systems   Constitutional:   Positive for fatigue. Negative for activity change, appetite change and fever.   HENT:  Negative for ear discharge, ear pain, mouth sores, nosebleeds, sore throat and tinnitus.    Eyes:  Negative for discharge, redness and itching.   Respiratory:  Negative for apnea, cough and shortness of breath.    Cardiovascular:  Negative for chest pain and leg swelling.   Gastrointestinal:  Positive for abdominal pain. Negative for abdominal distention, blood in stool and constipation.   Endocrine: Negative for polydipsia, polyphagia and polyuria.   Genitourinary:  Negative for difficulty urinating, flank pain, frequency and hematuria.   Musculoskeletal:  Positive for arthralgias, back pain and myalgias. Negative for gait problem, neck pain and neck stiffness.   Integumentary:  Negative for rash and wound.   Allergic/Immunologic: Negative for environmental allergies, food allergies and immunocompromised state.   Neurological:  Negative for dizziness, seizures, numbness and headaches.   Psychiatric/Behavioral:  Negative for agitation, confusion, hallucinations, self-injury and suicidal ideas.           Objective     Physical Exam  Vitals reviewed.   Constitutional:       Appearance: Normal appearance.   Cardiovascular:      Rate and Rhythm: Normal rate and regular rhythm.   Pulmonary:      Effort: Pulmonary effort is normal.      Breath sounds: Normal breath sounds.   Musculoskeletal:         General: Swelling and tenderness present.      Cervical back: Muscular tenderness (Tense muscular) present.      Right ankle: Swelling present.      Left ankle: Swelling present.   Skin:     General: Skin is warm.   Neurological:      General: No focal deficit present.      Mental Status: She is alert.   Psychiatric:         Mood and Affect: Mood normal.            Assessment and Plan     1. Follow-up exam    2. Primary hypertension  Comments:  Stable blood pressure.  Discontinue with the hydrochlorothiazide 25 mg.  Continue olmesartan  40  Orders:  -     olmesartan (BENICAR) 40 MG tablet; Take 1 tablet (40 mg total) by mouth once daily.  Dispense: 90 tablet; Refill: 3    3. Osteoarthritis of spine with radiculopathy, lumbar region  Comments:  Has routine visits with pain management.  Continue routine activity and medications as needed    4. Generalized pain  Comments:  Inflammatory markers and Rh factor today  Orders:  -     C-Reactive Protein (In-House); Future; Expected date: 07/02/2025  -     Sedimentation rate; Future; Expected date: 07/02/2025  -     Rheumatoid Factor; Future; Expected date: 07/02/2025    5. Chest discomfort  Comments:  Intermittent chest discomfort.  Has establish care with a new cardiologist later this month.  We will get EKG and troponin today.  Orders:  -     IN OFFICE EKG 12-LEAD (to [facility])  -     Troponin I; Future; Expected date: 07/02/2025    Two week follow up to assess blood pressure after medication change.  We will tests BMP and uric acid at that time         Follow up in about 2 weeks (around 7/16/2025).

## 2025-07-03 LAB
OHS QRS DURATION: 76 MS
OHS QTC CALCULATION: 405 MS
TROPONIN I SERPL HS-MCNC: <3 NG/L

## 2025-07-04 ENCOUNTER — RESULTS FOLLOW-UP (OUTPATIENT)
Dept: INTERNAL MEDICINE | Facility: CLINIC | Age: 70
End: 2025-07-04

## 2025-07-06 ENCOUNTER — PATIENT MESSAGE (OUTPATIENT)
Dept: PAIN MEDICINE | Facility: CLINIC | Age: 70
End: 2025-07-06
Payer: MEDICARE

## 2025-07-08 ENCOUNTER — OFFICE VISIT (OUTPATIENT)
Dept: PAIN MEDICINE | Facility: CLINIC | Age: 70
End: 2025-07-08
Payer: MEDICARE

## 2025-07-08 VITALS — HEIGHT: 64 IN | BODY MASS INDEX: 42.29 KG/M2

## 2025-07-08 DIAGNOSIS — M54.16 LUMBAR RADICULAR PAIN: Primary | ICD-10-CM

## 2025-07-08 DIAGNOSIS — M47.816 LUMBAR SPONDYLOSIS: ICD-10-CM

## 2025-07-08 DIAGNOSIS — M25.552 BILATERAL HIP PAIN: ICD-10-CM

## 2025-07-08 DIAGNOSIS — M50.30 DDD (DEGENERATIVE DISC DISEASE), CERVICAL: ICD-10-CM

## 2025-07-08 DIAGNOSIS — M25.551 BILATERAL HIP PAIN: ICD-10-CM

## 2025-07-08 PROCEDURE — 4010F ACE/ARB THERAPY RXD/TAKEN: CPT | Mod: CPTII,95,, | Performed by: PHYSICIAN ASSISTANT

## 2025-07-08 PROCEDURE — 3066F NEPHROPATHY DOC TX: CPT | Mod: CPTII,95,, | Performed by: PHYSICIAN ASSISTANT

## 2025-07-08 PROCEDURE — 1159F MED LIST DOCD IN RCRD: CPT | Mod: CPTII,95,, | Performed by: PHYSICIAN ASSISTANT

## 2025-07-08 PROCEDURE — 1160F RVW MEDS BY RX/DR IN RCRD: CPT | Mod: CPTII,95,, | Performed by: PHYSICIAN ASSISTANT

## 2025-07-08 PROCEDURE — 3061F NEG MICROALBUMINURIA REV: CPT | Mod: CPTII,95,, | Performed by: PHYSICIAN ASSISTANT

## 2025-07-08 PROCEDURE — 98006 SYNCH AUDIO-VIDEO EST MOD 30: CPT | Mod: 95,,, | Performed by: PHYSICIAN ASSISTANT

## 2025-07-08 NOTE — PROGRESS NOTES
Interventional Pain -- Established Patient (Follow-up visit)    Referring Physician: Beverly Mcpherson PA-C  - Neurology (2nd referral); Smiley Weaver (initial referral)    PCP: Taina Bob NP      Chief complaint:  Follow-up       Low back pain with BLE radicular pain, L>R   Cervical Neck Pain - facet-mediated, axial in nature   Neck pain with BUE radiation (down the LUE to the thumb in C6 distribution, down RUE into shoulder area)  Left shoulder pain - previously seen by Dr. Espinal (Orthopedics)         SUBJECTIVE:    Interval History (7/8/2025):  Mariah Meza presents for follow-up regarding worsening leg pain and swelling, particularly in the left leg.   Patient was last seen on 6/10/2025. She reports worsening bilateral leg pain, worse on the left, ongoing for 3-4 weeks. She describes the pain as shooting from her feet up to her hip. Significant leg swelling has persisted for the same duration. She reports severe difficulty sleeping due to leg pain, stating she is unable to sleep at night due to the leg pain. She recently saw her primary care physician for these issues and underwent tests, with a follow-up visit scheduled for next week to discuss further. She has an upcoming orthopedics appointment to evaluate her left hip. She reports fluid retention, describing significant fluid intake without corresponding output. She believes this might be lymphedema. She also mentions issues with uric acid levels and a recent change in her blood pressure medication due to high levels.   For treatment, she is currently taking Lyrica (pregabalin) for sciatic nerve pain. She recently adjusted her dosage to 75mg in the morning and two (150mg) at night, just recently increasing this regimen last week. She reports feeling drowsy from the medication.  Patient received an epidural injection in November, likely for sciatic nerve pain with limited pain relief. She is not interested in further lumbar  "injections at this time.  She underwent a nerve conduction study in February 2024. Patient had a lumbar MRI approximately 2 years ago.    Interval History (6/10/2025):  Patient presents for follow-up regarding chronic pain management. She reports back and leg pain that worsens with movement, stating that excessive movement makes it difficult for her to walk. Pain limits her ability to stoop, cross her legs, and put on shoes. Despite these limitations, she maintains a daily 30-minute walking routine.  She has been taking Lyrica 75 mg twice daily as prescribed but feels unable to increase the dosage due to drowsiness. She discontinued Cymbalta due to adverse effects but continues to use Flexeril as needed.       She recently learned "she requires hip replacement".  About two months ago, she received an injection in her left hip (GTB) from Dr. Espinal, which she reports is still providing relief.  She reports she had an X-ray of the hips, which led to the recommendation for hip replacements.    She mentions weight management issues and thyroid concerns that may impact medication options. She complains of bloating and pain related to uric acid.    She denies wanting to try any injections at this time.  Patient reports pain as 10/10 today.    Interval History (3/4/2025):  Mariah Meza presents today for follow-up visit.  Patient was last seen on 12/3/2024. At that visit, the plan was to continue Lyrica at night and start physical therapy. Patient reports pain as 8/10 today.  She reports she has been going to physical therapy for her neck, and she reports it did not help for her back.  She is adamantly against trying any other injections as she states they do not help-although it was documented previously that the epidural was helpful.    Interval History (12/3/2024):  Patient presents today for follow-up visit.  she underwent Bilateral L5/S1 + S1 TF SUSY on 11/8/24.  The patient reports that she is/was better " following the procedure.  she reports 70% pain relief.  The changes lasted 4 weeks so far.  The changes have continued through this visit.  Patient reports pain as 7/10 today. She still has some left calf cramping, but overall, she feels much better.    At last visit, plan was to also schedule cervical medial branch block, but due to 'issues' after the epidural, she was afraid to go through with this.  She does feel that Lyrica is helping.  She can not tolerate twice a day, but she is taking at night.    Interval History (10/22/2024):  Mariah Meza presents today for follow-up visit.  Patient was last seen about 4 months ago. At that visit, the plan was to schedule lumbar SUSY, which was ultimately not completed.  Her blood pressure was uncontrolled, so the procedure had to be canceled.  She continues to have bilateral leg pain associated with lumbar pain.  She presents today to discuss neck pain.  Neck pain is somewhat axial, but also radiates down the left arm.  She has some pain into the right shoulder area.  She was told she has left shoulder rotator cuff issues, but she does not want to have surgery.  She had 2 injections, but the 2nd time was too painful , so she did not want to repeat.  Patient reports pain as 6/10 today.  She saw PCP yesterday and had Toradol injection, which helped some short term.    Interval history 06/26/2024  Patient presents for four-month follow-up.  Today she reports exacerbation of lower back and leg pain.  Pain is constant and today is rated a 6/10.  Pain is described as burning and tingling in nature.  Today she reports pain in a bandlike distribution in the lower back which radiates down the posterior aspects of bilateral lower extremities in L5-S2 distribution to the feet.  Pain can be exacerbated with lumbar extension, standing and with ambulation.  She has continued physician directed physical therapy exercises over the last 8 weeks from 04/26/2024 through 06/26/2024  with marginal improvement in her symptoms.  She would like to refrain from any pharmacologic management at this time secondary to cognitive side effects from membrane stabilizing agents.  Pain is improved with lumbar forward flexion.    Interval history 02/05/2024  Patient presents status post bilateral L3-5 lumbar medial branch block 12/12/2023.  Patient reports 90% sustained relief in lower axial back pain following her procedure.  She reports this pain relief is still ineffective.  Today she reports burning pains down bilateral lower extremities.  Pain is intermittent and today is rated a 5/10.  Patient is concerned whether this was related to the prior injection.  She reports pain which can radiate down the lateral and posterior aspects of bilateral lower extremities in L4-S1 distribution to the calf.  Patient has continued judicious use of Flexeril which she does not even take daily as well as Tylenol.  During last schedule procedure, patient had hypertensive urgency, 229/100.  Of note patient went to the emergency room and received hydralazine and clonidine.  Patient has scheduled follow-up with nurse practitioner, Ms. Bob.    Interval History (11/22/2023):  Patient presents today for follow-up visit.  Patient was referred back to our clinic by Beverly Mcpherson PA-C (Neurology).  She complains mostly of low back pain.  She does have some leg pain on the left side occasionally.  She reports she stretches at home.  She takes Flexeril as needed, but she tries to avoid due to dry mouth.  She is taking gabapentin in the past with no relief.  She wants to hold off on adding additional medications at this time.  Patient reports pain as 6/10 today.    10/26/23 Neurology:  Ms. Mariah Meza is a 67 y.o. female presenting for evaluation of back pain. She noted that she has had some baseline back pain for quite some time (years) however in September she began to have increased pain. She required an  "injection of ketorolac at that time which helped for a day or two but then her pain returned. She did complete EMG/NCS previously unable to locate in epic or care everywhere. She describes her pain as sharp and some "electric shock" sensation that occasionally occurs. She does get some stiffness and weakness also which she reports is present in her entire body. She notes that her pain is worse on the L and in the lumbar region. She does endorse neck pain. She did have a cervical spine MRI completed one year ago which showed multilevel degenerative changes at C4-C5 and mild canal stenosis. She notes that her pain is constant all day but worsened with movement or prolonged sitting. She previously completed physical therapy in Jan/Feb of this year which benefited her greatly and she found that she was able to be much more active. She then had a gout flare and seemed to regress in her ability to move. She denies any falls. She does endorse that she is having some constipation. She does have some baseline bladder problems but attributes this to her medications. She notes that she cannot lift a lot of things she use to be able to and she drops things more frequently. She reports numbness/tingling in bilateral hands and feet but worse on L hand.     Interval History (4/6/2023):  Mariah Meza presents today for follow-up visit.  Patient was last seen on 2/9/2023. At that visit, the plan was to start Gabapentin.  She reports she discontinued this medication after getting up to the 600 mg dosage due to side effects.  She reports it made her feel crazy in the head.  She reports improvement in her neck pain since last visit as she is been performing physician directed exercises at home.  She reports pain is primarily located in her ankles and feet right worse than left.  She reports that she was previously diagnosed with gout in November and since then she has had recurrent gouty flares.  She has noticed that these " flares occur when she eats a lot of certain types of food.  She is had flare secondary to eating excessive amounts of shrimp, raisins, pineapple, and serial.  She reports the current flare began approximately 1 week ago and has been persistent she has noticed redness warmth and swelling in both of her ankles and great toe.  She has not taken anything other than Tylenol which has not been beneficial.  She is scheduled to follow-up with podiatry per referral from her primary care doctor.  Patient reports pain as 7/10 today.  She reports she has also noticed mild to moderate swelling in her hands and feet.  She reports this began after she started taking blood pressure medications, she thinks this may be due to medications.    Initial HPI 02/09/2023  Mariah Meza is a 67y.o. female with past medical history significant for hyperlipidemia, hypertension, sicca syndrome, history of papillary thyroid cancer status post parathyroidectomy, obesity, GERD who presents to the clinic for the evaluation of mid back pain.  Patient reports pain has been present for the last 2 years without inciting accident injury or trauma.  Pain is constant and today is rated a 6/10.  Pain at its best is a 3/10 and at its worse is a 10/10.  Pain is described as sharp, stabbing and stiffness in nature.  Patient reports pain which begins in between the shoulder blades and radiates to the midthoracic spine.  Patient reports prior radiation down the left upper extremity to the thumb in C6 distribution.  Patient also reports pain in bilateral ankles.  Patient reports associated weakness in the lower extremities associated with this pain and with standing and ambulation.  This pain is described as burning in nature.  All of her pain is exacerbated when moving from sitting to standing, standing and with ambulation.  Patient reports in the past she was able to ambulate 10,000 steps per day but this has been reduced secondary to her pain.  Pain is  improved with sitting and pain medications such as relief.  Patient reports she completed 12 months of physical therapy for the neck and lower back 2 months prior at [facility] in [place].  Patient reports no significant improvement in her pain with completion of physical therapy.  Patient also takes Flexeril which is marginally helpful.  Patient reports significant lower extremity weakness.  Patient denies night fever/night sweats, urinary incontinence, bowel incontinence, significant weight loss, and loss of sensations.    Pain Disability Index (PDI) Score Review:      6/10/2025     8:43 AM 10/22/2024    10:11 AM 6/26/2024     9:59 AM   Last 3 PDI Scores   Pain Disability Index (PDI) 70 42 45       Non-Pharmacologic Treatments:  Physical Therapy/Home Exercise: yes  Ice/Heat:yes  TENS: no  Acupuncture: no  Massage: no  Chiropractic: no    Other: no      Pain Medications:  - Adjuvant Medications: Cyclobenzaprine (Flexeril) and Tylenol (Acetaminophen) Diclofenac tablet      Pain Procedures:     Dr. Sutton:  -12/12/2023: Bilateral L3-5 MBB with 90% pain relief  -11/8/2024: Bilateral L5/S1 + S1 TF SUSY with 70% pain relief    Dr. Espinal:  -4/9/25: left GT bursa injection with good pain relief       Review of Systems:  GENERAL:  No weight loss, malaise or fevers.  HEENT:   No recent changes in vision or hearing  NECK:  Negative for lumps, no difficulty with swallowing.  RESPIRATORY:  Negative for cough, wheezing or shortness of breath, patient denies any recent URI.  CARDIOVASCULAR:  Negative for chest pain or palpitations.  GI:  Negative for abdominal discomfort, blood in stools or black stools or change in bowel habits.  MUSCULOSKELETAL:  See HPI.  SKIN:  Negative for lesions, rash, and itching.  PSYCH:  No mood disorder or recent psychosocial stressors.   HEMATOLOGY/LYMPHOLOGY:  Negative for prolonged bleeding, bruising easily or swollen nodes.    NEURO:   No history of syncope, paralysis, seizures or  "tremors.  All other reviewed and negative other than HPI.      OBJECTIVE:    Physical Exam:  Vitals:    07/08/25 0846   Height: 5' 4" (1.626 m)   Body mass index is 42.29 kg/m².   (reviewed on 7/8/2025)    GENERAL: Well appearing, in no acute distress, alert and oriented x3.  PSYCH:  Mood and affect appropriate.  SKIN: Skin color, texture, turgor normal, no rashes or lesions.  HEAD/FACE:  Normocephalic, atraumatic.  PULMONARY: no audible wheezing.     NECK: no pain to palpation over the cervical paraspinous muscles. Spurling Negative. Mild pain with neck flexion, extension, or lateral flexion. Normal testing biceps, triceps and brachioradialis bilaterally.  Cervical facet loading causes pain bilaterally.  LUMBAR:  Pain with flexion.  Facet loading is equivocal.  TTP diffusely over lumbar paraspinals.  SLR equivocal bilaterally. Limited ROM secondary to pain reproduction.    5/5 strength testing deltoid, biceps, triceps, wrist extensor, wrist flexor and ulnar intrinsics bilaterally.    Normal  strength bilaterally  MUSCULOSKELETAL:  Mild swelling along left ankle, moderate swelling to right ankle redness and swelling along the base of her left great toe swelling along right great toe, bilateral bunions along great toes  PULM: No evidence of respiratory difficulty, symmetric chest rise.  GI:  Soft and non-tender.    NEURO: BUE & BLE coordination and muscle stretch reflexes are physiologic and symmetric. No loss of sensation is noted.  GAIT: normal.        Imaging/ Diagnostic Studies/ Labs (Reviewed on 7/8/2025):    2/2024 BLE EMG/NCS   1. ABNORMAL study  2. There is electrodiagnostic evidence of an acute on chronic radiculopathy of the L5 and S1 nerve roots-slightly worse on the right    11/06/2023 MRI Lumbar Spine Without Contrast  COMPARISON:  Plain films of the lumbar spine from 03/06/2014.    FINDINGS:  There is equivocal 1-2 mm of anterolisthesis of L4 on L5. The vertebral body heights are well maintained, " with no fracture.  No marrow signal abnormality suspicious for an infiltrative process.    The conus medullaris terminates at approximately the L1-L2 disk space.  There is a probable small cyst seen within the upper pole to interpolar right kidney that measures approximately 6 mm in size.  Consider further evaluation with ultrasound for confirmation.  The discs are relatively well hydrated with only some minimal disc desiccation seen along the periphery of the L5-S1 disc and slightly more centrally at the L2-3 disc.    L1-L2: No significant central canal or neural foraminal narrowing. Mild bilateral facet arthropathy noted.    L2-L3: No significant central canal or neural foraminal narrowing. Mild-to-moderate bilateral facet arthropathy noted.    L3-L4: No significant central canal or neural foraminal narrowing. Moderate to severe bilateral facet arthropathy noted.    L4-L5:  Minimal diffuse disc bulge along with equivocal grade 1 spondylolisthesis and severe bilateral facet arthropathy resulting in mild effacement of the ventral thecal sac.  No significant neural foraminal canal narrowing.    L5-S1:  Mild diffuse disc bulge slightly more prominent the left paracentral region along with moderate bilateral facet arthropathy resulting in no significant central or neural foraminal canal narrowing.    Impression:   1. Multilevel degenerative changes of the lumbar spine as detailed above.  No high-grade central or neural foraminal canal narrowing noted.  Multilevel bilateral facet arthropathy.  2. Incidental note made of a probable 6 mm right renal cyst.  Consider ultrasound for confirmation.        11/06/2023 MRI Cervical Spine Without Contrast  COMPARISON:  Plain films from 04/13/2021.  Report from outside MRI dated 08/09/2022.  Films unavailable for comparison.    FINDINGS:  There is a couple mm of anterolisthesis of C2 on C3 and C3 on C4. No fracture. No marrow signal abnormality suspicious for an infiltrative  process.  There is disc desiccation noted throughout the cervical spine with mild disc space narrowing seen at C3-4, C4-5 and C7-C6 with more mild-to-moderate disc space narrowing seen at C5-6.    The cervical cord is normal in caliber and signal characteristics.  The craniocervical junction and visualized intracranial contents are unremarkable.  The adjacent soft tissue structures show no significant abnormalities.    C2-C3:  No significant central canal or neural foraminal narrowing.    C3-C4:  There is a diffuse disc bulge along with some posterior spondylotic ridging that results in effacement of ventral thecal sac and moderate effacement of the ventral cord.  The AP dimension of the central canal this level measures approximately 9 mm.  No significant neural foraminal canal narrowing suggested.    C4-C5:  Diffuse disc bulge and posterior spondylotic ridging more prominent in the left paracentral region resulting in significant effacement of the thecal sac and moderate face mint of the left side of the cord.  The AP dimension of the central canal at this level also measures approximately 9 mm.  No significant neural foraminal canal narrowing.    C5-C6:  Diffuse disc bulge resulting in effacement of ventral thecal sac but not quite abutting the cord.  The AP dimension of the central canal measures approximately 10 mm at this level.  The right C5-6 neural foraminal canal is mildly narrowed secondary to uncovertebral joint hypertrophy.  No significant left neural foraminal canal narrowing.    C6-C7:  Mild diffuse disc bulge resulting in mild effacement of ventral thecal sac.  No abutment of the anterior cord.  The AP dimension of the central canal at this level measures approximately 10.5 mm.  The right C6-7 neural foraminal canal appears to be minimally narrowed secondary to uncovertebral joint hypertrophy.    C7-T1:   Mild diffuse disc bulge resulting in no significant central canal narrowing.  The right neural  foraminal canal appears to be at least mildly narrowed.  Impression:   1. Multilevel degenerative changes of the cervical spine as detailed above.      03/04/14 X-Ray Lumbar Spine Ap And Lateral  Five non-rib bearing lumbar type vertebrae are present without significant lateral curvature abnormality or subluxation.  Degenerative endplate changes as well as facet joint hypertrophy, particularly inferiorly, are noted without acute fracture or  suspicious focal bony lesion identified.  Disk spaces are fairly well-maintained.       04/13/21 X-Ray Cervical Spine Complete 5 view  Patient's hair limits the exam.  There is visualization to the C7-T1 level on the swimmer's view.  Multilevel marginal spurring and varying degrees of uniform loss of disc height throughout the C-spine.  No fracture or subluxation.  Pre dens space, prevertebral soft tissues, C1-2 articulation and odontoid normal in appearance allowing for positioning.  Neural foramina widely patent bilaterally at all levels.  Remaining visualized osseous structures intact.  Included upper lung fields clear.      BLE EMG 10/2021  1. ABNORMAL study  2. There is electrodiagnostic evidence of an acute on chronic radiculopathy of the L5 and S1 nerve roots-slightly worse on the right        ASSESSMENT: 69 y.o. year old female with neck, mid back, bilateral ankles pain, consistent with     1. Lumbar radicular pain        2. Bilateral hip pain        3. Lumbar spondylosis        4. DDD (degenerative disc disease), cervical                PLAN:   - Interventions:    - Hold off on any injections - as she is adamantly against at this time.  Patient received a GTB injection approximately two months ago with Orthopedics, which is still providing benefit.     - Anticoagulation use: no no anticoagulation    - Medications:  - Refill Lyrica (pregabalin) 75mg QAM/ 150mg QHS. Previously could not tolerate more than QHS.   Contact the office next week to report on effectiveness of  "Lyrica dosage adjustment. Consider further increase if no concerns that Lyrica is contributing to worsening swelling.     - Continue Flexeril 10mg PRN myofascial pain.       - Failed medications: Gabapentin (side effects (made her "feel crazy") also ineffective), Cymbalta 30mg (stopped taking).     - LA  reviewed and appropriate.       - Consults/referral:    - Previously referred to weight management program.  - Discussed left hip injection as a potential alternative to surgery.  Referred to Dr. Sheldon, Hip specialist for hip pain to further discuss.  - Continue follow-up with PCP: uncontrolled HTN. Recommend follow-up ASAP to discuss edema with PCP - as this is not related to lumbar radiculopathy. Would also recommend follow-up with Cardiology.  - Continue follow-up with Dr. Espinal (Orthopedics) in regards to left shoulder pain (although she wants to avoid sx right now).     - Therapy:   - Recommend heating pad for neck pain.  - Continue use of TENs unit to help increase ROM, reduce pain, strengthen, and allow return to ADLs.   - Continue activity of walking for 30 minutes every morning, but this activity is not significantly helping with pain or weight loss.    - Diagnostic/ Imaging:  Diagnostic imaging (lumbar & cervical MRI 11/06/2023) and diagnostic testing (lower extremity electromyography studies).  Do not recommend updating lumbar MRI or EMG/NCS as this will not change treatment plan or give any insight on major concern of swelling.     - Follow up visit:  3 months follow-up  - in clinic (per pt request)     Future Appointments   Date Time Provider Department Center   7/16/2025 10:20 AM Taina Bob NP [facility] [place]   7/25/2025  8:20 AM Lauro Sheldon MD [facility] ORTHO [facility]   7/28/2025  9:00 AM Don Walsh MD [facility] CARDIO [facility]   9/30/2025  9:30 AM Odalys Krishna NP [facility] GASTRO [place]   10/9/2025  9:00 AM [facility] US3 [facility] [place]   10/9/2025 10:15 " AM Sherita Duran MD [facility] ENT [facility] [place]   11/10/2025  9:00 AM Brenna Johnson PA-C [facility]         - Patient Questions: Answered all of the patient's questions regarding diagnosis, therapy, and treatment.    - This condition does not require this patient to take time off of work, and the primary goal of our Pain Management services is to improve the patient's functional capacity.   - I discussed the risks, benefits, and alternatives to potential treatment options. All questions and concerns were fully addressed today in clinic.         Brenna Johnson PA-C  Interventional Pain Management - [facility]    Disclaimer:  This note was prepared using voice recognition system and is likely to have sound alike errors that may have been overlooked even after proof reading.  Please call me with any questions.     This note was generated with the assistance of ambient listening technology to support clinical documentation. The content has been reviewed and edited for accuracy by the author, though minor syntax or spelling errors may remain. Please contact the author of this note for any clarification.

## 2025-07-16 ENCOUNTER — LAB VISIT (OUTPATIENT)
Dept: LAB | Facility: HOSPITAL | Age: 70
End: 2025-07-16
Attending: NURSE PRACTITIONER
Payer: MEDICARE

## 2025-07-16 ENCOUNTER — OFFICE VISIT (OUTPATIENT)
Dept: INTERNAL MEDICINE | Facility: CLINIC | Age: 70
End: 2025-07-16
Payer: MEDICARE

## 2025-07-16 VITALS
HEART RATE: 56 BPM | DIASTOLIC BLOOD PRESSURE: 70 MMHG | SYSTOLIC BLOOD PRESSURE: 140 MMHG | BODY MASS INDEX: 41.32 KG/M2 | TEMPERATURE: 97 F | RESPIRATION RATE: 20 BRPM | WEIGHT: 248 LBS | HEIGHT: 65 IN | OXYGEN SATURATION: 98 %

## 2025-07-16 DIAGNOSIS — I10 PRIMARY HYPERTENSION: ICD-10-CM

## 2025-07-16 DIAGNOSIS — Z09 FOLLOW-UP EXAM: Primary | ICD-10-CM

## 2025-07-16 DIAGNOSIS — E79.0 ELEVATED URIC ACID IN BLOOD: ICD-10-CM

## 2025-07-16 DIAGNOSIS — R33.9 URINARY RETENTION: ICD-10-CM

## 2025-07-16 PROCEDURE — 3008F BODY MASS INDEX DOCD: CPT | Mod: CPTII,S$GLB,, | Performed by: NURSE PRACTITIONER

## 2025-07-16 PROCEDURE — 3066F NEPHROPATHY DOC TX: CPT | Mod: CPTII,S$GLB,, | Performed by: NURSE PRACTITIONER

## 2025-07-16 PROCEDURE — 1160F RVW MEDS BY RX/DR IN RCRD: CPT | Mod: CPTII,S$GLB,, | Performed by: NURSE PRACTITIONER

## 2025-07-16 PROCEDURE — 99999 PR PBB SHADOW E&M-EST. PATIENT-LVL V: CPT | Mod: PBBFAC,,, | Performed by: NURSE PRACTITIONER

## 2025-07-16 PROCEDURE — 1125F AMNT PAIN NOTED PAIN PRSNT: CPT | Mod: CPTII,S$GLB,, | Performed by: NURSE PRACTITIONER

## 2025-07-16 PROCEDURE — 36415 COLL VENOUS BLD VENIPUNCTURE: CPT | Mod: PN

## 2025-07-16 PROCEDURE — 82310 ASSAY OF CALCIUM: CPT

## 2025-07-16 PROCEDURE — 3077F SYST BP >= 140 MM HG: CPT | Mod: CPTII,S$GLB,, | Performed by: NURSE PRACTITIONER

## 2025-07-16 PROCEDURE — 84550 ASSAY OF BLOOD/URIC ACID: CPT

## 2025-07-16 PROCEDURE — G2211 COMPLEX E/M VISIT ADD ON: HCPCS | Mod: S$GLB,,, | Performed by: NURSE PRACTITIONER

## 2025-07-16 PROCEDURE — 3288F FALL RISK ASSESSMENT DOCD: CPT | Mod: CPTII,S$GLB,, | Performed by: NURSE PRACTITIONER

## 2025-07-16 PROCEDURE — 4010F ACE/ARB THERAPY RXD/TAKEN: CPT | Mod: CPTII,S$GLB,, | Performed by: NURSE PRACTITIONER

## 2025-07-16 PROCEDURE — 3061F NEG MICROALBUMINURIA REV: CPT | Mod: CPTII,S$GLB,, | Performed by: NURSE PRACTITIONER

## 2025-07-16 PROCEDURE — 1101F PT FALLS ASSESS-DOCD LE1/YR: CPT | Mod: CPTII,S$GLB,, | Performed by: NURSE PRACTITIONER

## 2025-07-16 PROCEDURE — 1159F MED LIST DOCD IN RCRD: CPT | Mod: CPTII,S$GLB,, | Performed by: NURSE PRACTITIONER

## 2025-07-16 PROCEDURE — 3078F DIAST BP <80 MM HG: CPT | Mod: CPTII,S$GLB,, | Performed by: NURSE PRACTITIONER

## 2025-07-16 PROCEDURE — 99214 OFFICE O/P EST MOD 30 MIN: CPT | Mod: S$GLB,,, | Performed by: NURSE PRACTITIONER

## 2025-07-16 NOTE — PROGRESS NOTES
Subjective     Patient ID: Mariah Meza is a 69 y.o. female.    Chief Complaint: No chief complaint on file.    Patient presents for a follow up.  Stopped HCTZ with concerns of uric acid.   Reports she's been having headaches/and swelling feet.   Was taking the spironolactone daily.       Reports flush feeling to her face to upper body and radiate to lower body.    Started gabapentin at 2 at bedtime.  Helped the pain and was able to fall asleep around 1 am or 2 am through the afternoon.     Decreased appetite.   Has appointment with GI on Tuesday--[facility].      Reports having a good urinary flow in the am but sometimes has retention.      Continues to have shortness of breath/chest discomfort.   Has cardiology visit on 07/28/2025    Continues to have hip and leg pain.  Has Orthopedic appointment.       Review of Systems   Constitutional:  Negative for activity change, appetite change, fatigue and fever.   HENT:  Negative for ear discharge, ear pain, mouth sores, nosebleeds, sore throat and tinnitus.    Eyes:  Negative for discharge, redness and itching.   Respiratory:  Negative for apnea, cough and shortness of breath.    Cardiovascular:  Positive for leg swelling. Negative for chest pain.   Gastrointestinal:  Negative for abdominal distention, abdominal pain, blood in stool and constipation.   Endocrine: Negative for polydipsia, polyphagia and polyuria.   Genitourinary:  Negative for difficulty urinating, flank pain, frequency and hematuria.   Musculoskeletal:  Negative for back pain, gait problem, neck pain and neck stiffness.   Integumentary:  Negative for rash and wound.   Allergic/Immunologic: Negative for environmental allergies, food allergies and immunocompromised state.   Neurological:  Positive for headaches. Negative for dizziness, seizures and numbness.   Psychiatric/Behavioral:  Negative for agitation, confusion, hallucinations, self-injury and suicidal ideas.           Objective      Physical Exam  Vitals reviewed.   Constitutional:       Appearance: She is well-developed.   HENT:      Head: Normocephalic.   Cardiovascular:      Rate and Rhythm: Normal rate and regular rhythm.   Pulmonary:      Effort: Pulmonary effort is normal.      Breath sounds: Normal breath sounds.   Musculoskeletal:         General: Swelling present. Normal range of motion.      Right lower le+ Edema present.      Left lower le+ Edema present.   Skin:     General: Skin is warm and dry.   Neurological:      General: No focal deficit present.      Mental Status: She is alert and oriented to person, place, and time.   Psychiatric:         Mood and Affect: Mood normal.         Behavior: Behavior normal. Behavior is cooperative.            Assessment and Plan     1. Follow-up exam    2. Elevated uric acid in blood  Comments:  We will check the uric acid in BNP on today since she stopped the hydrochlorothiazide.  Orders:  -     Basic Metabolic Panel; Future; Expected date: 2025  -     Uric Acid; Future; Expected date: 2025    3. Primary hypertension  Comments:  Blood pressure is borderline normal.  Advised to continue monitoring at home  Orders:  -     Basic Metabolic Panel; Future; Expected date: 2025  -     Uric Acid; Future; Expected date: 2025    4. Urinary retention  Comments:  We will order ultrasound of the bladder.  Orders:  -     Cancel: US Retroperitoneal Complete; Future; Expected date: 2025  -     US Bladder; Future; Expected date: 2025               Follow up in about 1 month (around 2025).

## 2025-07-17 LAB
ANION GAP (OHS): 9 MMOL/L (ref 8–16)
BUN SERPL-MCNC: 15 MG/DL (ref 8–23)
CALCIUM SERPL-MCNC: 8.5 MG/DL (ref 8.7–10.5)
CHLORIDE SERPL-SCNC: 111 MMOL/L (ref 95–110)
CO2 SERPL-SCNC: 24 MMOL/L (ref 23–29)
CREAT SERPL-MCNC: 1.3 MG/DL (ref 0.5–1.4)
GFR SERPLBLD CREATININE-BSD FMLA CKD-EPI: 44 ML/MIN/1.73/M2
GLUCOSE SERPL-MCNC: 93 MG/DL (ref 70–110)
POTASSIUM SERPL-SCNC: 4 MMOL/L (ref 3.5–5.1)
SODIUM SERPL-SCNC: 144 MMOL/L (ref 136–145)
URATE SERPL-MCNC: 8.5 MG/DL (ref 2.4–5.7)

## 2025-07-19 ENCOUNTER — PATIENT MESSAGE (OUTPATIENT)
Dept: CARDIOLOGY | Facility: CLINIC | Age: 70
End: 2025-07-19
Payer: MEDICARE

## 2025-07-25 ENCOUNTER — OFFICE VISIT (OUTPATIENT)
Dept: ORTHOPEDICS | Facility: CLINIC | Age: 70
End: 2025-07-25
Payer: MEDICARE

## 2025-07-25 ENCOUNTER — HOSPITAL ENCOUNTER (OUTPATIENT)
Dept: RADIOLOGY | Facility: HOSPITAL | Age: 70
Discharge: HOME | End: 2025-07-25
Attending: NURSE PRACTITIONER
Payer: MEDICARE

## 2025-07-25 ENCOUNTER — OFFICE VISIT (OUTPATIENT)
Dept: CARDIOLOGY | Facility: CLINIC | Age: 70
End: 2025-07-25
Payer: MEDICARE

## 2025-07-25 VITALS
HEART RATE: 58 BPM | SYSTOLIC BLOOD PRESSURE: 148 MMHG | OXYGEN SATURATION: 99 % | HEIGHT: 65 IN | DIASTOLIC BLOOD PRESSURE: 83 MMHG | BODY MASS INDEX: 40.77 KG/M2 | WEIGHT: 244.69 LBS

## 2025-07-25 VITALS — WEIGHT: 243.5 LBS | BODY MASS INDEX: 40.57 KG/M2 | HEIGHT: 65 IN

## 2025-07-25 DIAGNOSIS — M70.62 GREATER TROCHANTERIC BURSITIS OF LEFT HIP: Primary | ICD-10-CM

## 2025-07-25 DIAGNOSIS — F41.9 ANXIETY: Primary | ICD-10-CM

## 2025-07-25 DIAGNOSIS — R33.9 URINARY RETENTION: ICD-10-CM

## 2025-07-25 DIAGNOSIS — E78.2 MIXED HYPERLIPIDEMIA: ICD-10-CM

## 2025-07-25 DIAGNOSIS — M47.816 LUMBAR SPONDYLOSIS: ICD-10-CM

## 2025-07-25 DIAGNOSIS — M47.26 OSTEOARTHRITIS OF SPINE WITH RADICULOPATHY, LUMBAR REGION: ICD-10-CM

## 2025-07-25 DIAGNOSIS — K21.9 GASTROESOPHAGEAL REFLUX DISEASE, UNSPECIFIED WHETHER ESOPHAGITIS PRESENT: ICD-10-CM

## 2025-07-25 DIAGNOSIS — M94.252 CHONDROMALACIA, LEFT HIP: ICD-10-CM

## 2025-07-25 DIAGNOSIS — N18.32 STAGE 3B CHRONIC KIDNEY DISEASE: ICD-10-CM

## 2025-07-25 DIAGNOSIS — M25.552 BILATERAL HIP PAIN: ICD-10-CM

## 2025-07-25 DIAGNOSIS — I10 PRIMARY HYPERTENSION: ICD-10-CM

## 2025-07-25 DIAGNOSIS — M94.252 CHONDROMALACIA, LEFT HIP: Primary | ICD-10-CM

## 2025-07-25 DIAGNOSIS — M94.251 CHONDROMALACIA, RIGHT HIP: ICD-10-CM

## 2025-07-25 DIAGNOSIS — M25.551 BILATERAL HIP PAIN: ICD-10-CM

## 2025-07-25 DIAGNOSIS — I10 PRIMARY HYPERTENSION: Primary | ICD-10-CM

## 2025-07-25 DIAGNOSIS — M35.00 SICCA SYNDROME: ICD-10-CM

## 2025-07-25 PROCEDURE — 99999 PR PBB SHADOW E&M-EST. PATIENT-LVL IV: CPT | Mod: PBBFAC,,, | Performed by: ORTHOPAEDIC SURGERY

## 2025-07-25 PROCEDURE — 76857 US EXAM PELVIC LIMITED: CPT | Mod: 26,,, | Performed by: RADIOLOGY

## 2025-07-25 PROCEDURE — 99999 PR PBB SHADOW E&M-EST. PATIENT-LVL IV: CPT | Mod: PBBFAC,,,

## 2025-07-25 PROCEDURE — 76857 US EXAM PELVIC LIMITED: CPT | Mod: TC

## 2025-07-25 RX ORDER — FUROSEMIDE 40 MG/1
40 TABLET ORAL DAILY PRN
COMMUNITY
Start: 2025-07-22

## 2025-07-25 RX ORDER — DIAZEPAM 5 MG/1
TABLET ORAL
Qty: 2 TABLET | Refills: 0 | Status: SHIPPED | OUTPATIENT
Start: 2025-07-25

## 2025-07-25 NOTE — PATIENT INSTRUCTIONS
You having pain in the groin and every time we turned the hip in you have pain in the groin you also have pain on the outside of the hip   Both on your exam suggestive of trochanteric bursitis of the left hip as well as labral tear inside the hip joint   Your x-ray did not show you are at a point that you need a hip replacement that is very mild arthritis and we called that chondromalacia  Definite diagnosis would be from obtaining MRI arthrogram of your left hip  If that is positive and you would like to avoid surgery then we can schedule you to have injection of her left hip under anesthesia and x-ray machine and we can inject both the inside of her hip joint in the outside of it at that point   At this point I will avoid injecting your greater trochanter even though when Dr. De Oliveira head gave it to you it helped some until we get the final results on the MRI   I will see you right after you get the MRI done

## 2025-07-25 NOTE — PROGRESS NOTES
Subjective:     Patient ID: Mariah Meza is a 69 y.o. female.    Chief Complaint: Pain of the Left Hip and Pain of the Right Hip    HPI:  07/25/2025   The left hip seems to hurt more than the right   This is been going on for the last 6 months.  She did go to physical therapy and she could not lift her leg up.  She had left greater trochanteric injection by Dr. Yennifer mcdaniel on 04/09/2025 which seems to have helped for roughly 3 months.  She can not sleep on either hip with the left side is worse than the right.  If he has catching locking feeling inside his her groin.  She does have back issues and recently had nerve conduction studies showed L5 and S1 radiculopathy.  She does see pain management and she had a previous MRI 11/04/2023 which I went over the report.  She does ambulate without any assistive devices.  Denies any loss of bowel bladder control denies any fever or chills or shortness of breath or difficulty with chewing or swallowing loss of bowel bladder control blurry vision double vision loss sense smell or taste  Can not take NSAIDs  Pain is 7/10    Past Medical History:   Diagnosis Date    Chronic rhinitis     DJD (degenerative joint disease), lumbar     GERD (gastroesophageal reflux disease)     Hard to intubate 12/20/2021    Headache     Hiatal hernia     Hyperlipidemia     Hypertension     Liver disease     Fatty Liver    Low back pain     Obesity     PONV (postoperative nausea and vomiting)     Thyroid disease      Past Surgical History:   Procedure Laterality Date    BILATERAL OOPHORECTOMY  2002    CARPAL TUNNEL RELEASE      Left wrist    CHOLECYSTECTOMY      DISSECTION OF NECK Bilateral 12/20/2021    Procedure: DISSECTION, NECK;  Surgeon: Deejay Olsen MD;  Location: [facility];  Service: ENT;  Laterality: Bilateral;  Central neck dissection    HYSTERECTOMY  1984    INJECTION OF ANESTHETIC AGENT AROUND MEDIAL BRANCH NERVES INNERVATING LUMBAR FACET JOINT Bilateral 12/12/2023    Procedure: Bilateral  L3-5 * MBB #1 with RN IV sedation;  Surgeon: Franklin Sutton MD;  Location: [facility];  Service: Pain Management;  Laterality: Bilateral;    NECK EXPLORATION Bilateral 12/20/2021    Procedure: EXPLORATION, NECK;  Surgeon: Deejay Olsen MD;  Location: [facility] OR;  Service: ENT;  Laterality: Bilateral;  Parathyroid exploration    ROTATOR CUFF REPAIR      Right shoulder    ROTATOR CUFF REPAIR Left 2010    SELECTIVE INJECTION OF ANESTHETIC AGENT AROUND LUMBAR SPINAL NERVE ROOT BY TRANSFORAMINAL APPROACH Bilateral 11/8/2024    Procedure: Bilateral L5/S1 + S1 TF SUSY;  Surgeon: Franklin Sutton MD;  Location: [facility];  Service: Pain Management;  Laterality: Bilateral;    THYROIDECTOMY, BILATERAL Bilateral 12/20/2021    Procedure: THYROIDECTOMY,BILATERAL;  Surgeon: Deejay Olsen MD;  Location: [facility] OR;  Service: ENT;  Laterality: Bilateral;    TOTAL VAGINAL HYSTERECTOMY  1985     Family History   Problem Relation Name Age of Onset    Diabetes Mother      Hypertension Mother      Hyperlipidemia Mother      Heart disease Mother          CHF    Stroke Mother      Heart attack Mother      Hearing loss Mother      Diabetes Sister      Sickle cell anemia Other          nieces, nephews    Lupus Other          niece     Social History[1]  Medication List with Changes/Refills   Current Medications    ACETAMINOPHEN (TYLENOL) 650 MG TBSR    Take 650 mg by mouth daily as needed.    CALCIUM CARBONATE 470 MG CALCIUM (1,177 MG) CHEW    Take one tablet by mouth twice daily    CARVEDILOL (COREG) 12.5 MG TABLET    Take 1 tablet (12.5 mg total) by mouth 2 (two) times daily with meals.    CYANOCOBALAMIN 500 MCG TABLET    Take 500 mcg by mouth once daily.    CYCLOBENZAPRINE (FLEXERIL) 10 MG TABLET    Take 1 tablet (10 mg total) by mouth every evening.    DICYCLOMINE (BENTYL) 10 MG CAPSULE    Take 10 mg by mouth.    FERROUS SULFATE 325 (65 FE) MG EC TABLET    Take 325 mg by mouth once daily.    FUROSEMIDE (LASIX) 40 MG TABLET    Take 40 mg by  mouth daily as needed.    HYDROXYZINE HCL (ATARAX) 25 MG TABLET    Take 1 tablet (25 mg total) by mouth 3 (three) times daily as needed for Itching.    LEVOTHYROXINE (SYNTHROID) 100 MCG TABLET    Take 1 tablet (100 mcg total) by mouth before breakfast.    OLMESARTAN (BENICAR) 40 MG TABLET    Take 1 tablet (40 mg total) by mouth once daily.    PANTOPRAZOLE (PROTONIX) 40 MG TABLET    Take 1 tablet (40 mg total) by mouth once daily.    PREGABALIN (LYRICA) 75 MG CAPSULE    Take 1 capsule (75 mg total) by mouth every morning AND 1-2 capsules ( mg total) every evening.    PSYLLIUM HUSK/ASPARTAME (METAMUCIL FIBER SINGLES ORAL)    Take by mouth.    SPIRONOLACTONE (ALDACTONE) 25 MG TABLET    Take 1 tablet (25 mg total) by mouth once daily.    VITAMIN D (VITAMIN D3) 1000 UNITS TAB    Take 1,000 Units by mouth once daily.    VITAMIN E 400 UNIT CAPSULE    Take 400 Units by mouth once daily.     Review of patient's allergies indicates:   Allergen Reactions    Amlodipine Edema    Nifedipine Edema    Iodine and iodide containing products Itching    Shellfish containing products Swelling    Statins-hmg-coa reductase inhibitors Other (See Comments)     Myalgias    Adhesive Dermatitis    Iodine Other (See Comments)    Ivp dye [iodinated contrast media] Other (See Comments)     Review of Systems   Constitutional: Negative for decreased appetite.   HENT:  Negative for tinnitus.    Eyes:  Negative for double vision.   Cardiovascular:  Negative for chest pain.   Respiratory:  Negative for wheezing.    Hematologic/Lymphatic: Negative for bleeding problem.   Skin:  Negative for dry skin.   Musculoskeletal:  Positive for arthritis, back pain, joint pain and muscle weakness. Negative for gout, neck pain and stiffness.   Gastrointestinal:  Negative for abdominal pain.   Genitourinary:  Negative for bladder incontinence.   Neurological:  Negative for numbness, paresthesias and sensory change.   Psychiatric/Behavioral:  Negative for  altered mental status.        Objective:   Body mass index is 40.52 kg/m².  There were no vitals filed for this visit.       General    Constitutional: She is oriented to person, place, and time. She appears well-developed.   HENT:   Head: Atraumatic.   Eyes: EOM are normal.   Pulmonary/Chest: Effort normal.   Neurological: She is alert and oriented to person, place, and time.   Psychiatric: Judgment normal.             Ambulating with a slight limp without any assistive devices   Nonspecific tenderness in the lumbar spine in mild sacroiliac joints tenderness   Negative straight leg raising bilaterally today   Right hip with mild pain over the greater trochanter to palpation.  Passive internal external rotation without pain and there is no impingement.  Hip flexors and abductors and adductors was slightly weak at 5-/5   The left hip with severe pain to palpation over the greater trochanter.  Has positive impingement of the hip joint with pain that radiates into the groin.  Hip flexors and abductors are slightly weak at 4/5 compared to the right side.    Bilateral knees with good motion and mild crepitus.  Collaterals and cruciates are stable   Ankle up and down without difficulty  Relevant imaging results reviewed and interpreted by me, discussed with the patient and / or family today   X-RAY 04/09/2025 SHOWING BILATERAL HIPS WITH VERY MILD DEGENERATION.  JOINT SPACE VERY WELL MAINTAINED.  SOME DEGENERATION IN THE LUMBAR SPINE.  NO FRACTURE SEEN    EMG nerve conduction studies showing L5 and S1 acute and chronic radiculopathy   Assessment:     Encounter Diagnoses   Name Primary?    Chondromalacia, left hip     Chondromalacia, right hip     Osteoarthritis of spine with radiculopathy, lumbar region     Lumbar spondylosis     Sicca syndrome     Stage 3b chronic kidney disease     Gastroesophageal reflux disease, unspecified whether esophagitis present     Bilateral hip pain     Greater trochanteric bursitis of left hip  Yes        Plan:   Greater trochanteric bursitis of left hip    Chondromalacia, left hip    Chondromalacia, right hip    Osteoarthritis of spine with radiculopathy, lumbar region    Lumbar spondylosis    Sicca syndrome    Stage 3b chronic kidney disease    Gastroesophageal reflux disease, unspecified whether esophagitis present    Bilateral hip pain  -     Ambulatory referral/consult to Orthopedics         Patient Instructions   You having pain in the groin and every time we turned the hip in you have pain in the groin you also have pain on the outside of the hip   Both on your exam suggestive of trochanteric bursitis of the left hip as well as labral tear inside the hip joint   Your x-ray did not show you are at a point that you need a hip replacement that is very mild arthritis and we called that chondromalacia  Definite diagnosis would be from obtaining MRI arthrogram of your left hip  If that is positive and you would like to avoid surgery then we can schedule you to have injection of her left hip under anesthesia and x-ray machine and we can inject both the inside of her hip joint in the outside of it at that point   At this point I will avoid injecting your greater trochanter even though when Dr. De Oliveira head gave it to you it helped some until we get the final results on the MRI   I will see you right after you get the MRI done        Disclaimer: This note was prepared using a voice recognition system and is likely to have sound alike errors within the text.          [1]   Social History  Socioeconomic History    Marital status:    Tobacco Use    Smoking status: Former     Current packs/day: 0.00     Average packs/day: 0.5 packs/day for 8.0 years (4.0 ttl pk-yrs)     Types: Cigarettes     Start date: 1971     Quit date: 1979     Years since quittin.5     Passive exposure: Never    Smokeless tobacco: Never   Substance and Sexual Activity    Alcohol use: No    Drug use: Never    Sexual  activity: Not Currently     Partners: Male     Birth control/protection: See Surgical Hx   Social History Narrative    The patient is  and has 2 adult children.  Patient works in  for the [employer].     Social Drivers of Health     Financial Resource Strain: Medium Risk (7/9/2025)    Overall Financial Resource Strain (CARDIA)     Difficulty of Paying Living Expenses: Somewhat hard   Food Insecurity: No Food Insecurity (7/9/2025)    Hunger Vital Sign     Worried About Running Out of Food in the Last Year: Never true     Ran Out of Food in the Last Year: Never true   Transportation Needs: No Transportation Needs (7/9/2025)    PRAPARE - Transportation     Lack of Transportation (Medical): No     Lack of Transportation (Non-Medical): No   Physical Activity: Insufficiently Active (9/3/2024)    Exercise Vital Sign     Days of Exercise per Week: 5 days     Minutes of Exercise per Session: 10 min   Stress: Stress Concern Present (9/3/2024)    [place] [employer] - Occupational Stress Questionnaire     Feeling of Stress : To some extent   Housing Stability: Unknown (9/3/2024)    Housing Stability Vital Sign     Unable to Pay for Housing in the Last Year: No     Homeless in the Last Year: No

## 2025-07-25 NOTE — PROGRESS NOTES
"  Subjective:   Patient ID:  Mariah Meza is a 69 y.o. female who presents for evaluation of Follow-up (3 months )      HPI 69-year-old female whose current medical conditions include obesity, HTN, HLD, GERD, chronic back pain previously followed by Dr. Hanna in [facility] as well as Dr. Major in the past here today for CV follow-up recently started on 12.5 mg of spironolactone, complaining of bilateral lower extremity edema and HERNANDEZ as well as feeling fatigued and tired (recently started on Cymbalta).  Also complaining of nasal congestion. Denies any chest pain, dizziness, palpitations      3/18/25  Here today for CV follow up, recent started aldactone, echo 2/25' with nml EF Grade I DD. BP elevated in clinic today, however home readings low 90s earlier in the month. Since 130-140s recently. C/o back pain "sizzle pain", HERNANDEZ. Weight stable from previous visit, LE edema stable. Watches her diet, cooks her own foods     24' goal < 100  HGA1C 24' 4.9    Tobacco 20+ years ago  ETOH none  Exercise walking, home yoga  FH mother CAD    7/25/25  Here today for CV follow-up still having occasional swelling, takes her Lasix every day.  Blood pressure elevated in clinic today heart rate sinus Jordan.  Denies any chest pain, patient also complaining of wheezing at night  Past Medical History:   Diagnosis Date    Chronic rhinitis     DJD (degenerative joint disease), lumbar     GERD (gastroesophageal reflux disease)     Hard to intubate 12/20/2021    Headache     Hiatal hernia     Hyperlipidemia     Hypertension     Liver disease     Fatty Liver    Low back pain     Obesity     PONV (postoperative nausea and vomiting)     Thyroid disease        Past Surgical History:   Procedure Laterality Date    BILATERAL OOPHORECTOMY  2002    CARPAL TUNNEL RELEASE      Left wrist    CHOLECYSTECTOMY      DISSECTION OF NECK Bilateral 12/20/2021    Procedure: DISSECTION, NECK;  Surgeon: Deejay Olsen MD;  Location: [facility] OR;  " Service: ENT;  Laterality: Bilateral;  Central neck dissection    HYSTERECTOMY  1984    INJECTION OF ANESTHETIC AGENT AROUND MEDIAL BRANCH NERVES INNERVATING LUMBAR FACET JOINT Bilateral 2023    Procedure: Bilateral L3-5 * MBB #1 with RN IV sedation;  Surgeon: Franklin Sutton MD;  Location: [facility]IN [facility]GT;  Service: Pain Management;  Laterality: Bilateral;    NECK EXPLORATION Bilateral 2021    Procedure: EXPLORATION, NECK;  Surgeon: Deejay Olsen MD;  Location: [facility] OR;  Service: ENT;  Laterality: Bilateral;  Parathyroid exploration    ROTATOR CUFF REPAIR      Right shoulder    ROTATOR CUFF REPAIR Left 2010    SELECTIVE INJECTION OF ANESTHETIC AGENT AROUND LUMBAR SPINAL NERVE ROOT BY TRANSFORAMINAL APPROACH Bilateral 2024    Procedure: Bilateral L5/S1 + S1 TF SUSY;  Surgeon: Franklin Sutton MD;  Location: [facility]IN [facility];  Service: Pain Management;  Laterality: Bilateral;    THYROIDECTOMY, BILATERAL Bilateral 2021    Procedure: THYROIDECTOMY,BILATERAL;  Surgeon: Deejay Olsen MD;  Location: [facility] OR;  Service: ENT;  Laterality: Bilateral;    TOTAL VAGINAL HYSTERECTOMY         Social History     Tobacco Use    Smoking status: Former     Current packs/day: 0.00     Average packs/day: 0.5 packs/day for 8.0 years (4.0 ttl pk-yrs)     Types: Cigarettes     Start date: 1971     Quit date: 1979     Years since quittin.5     Passive exposure: Never    Smokeless tobacco: Never   Substance Use Topics    Alcohol use: No    Drug use: Never       Family History   Problem Relation Name Age of Onset    Diabetes Mother      Hypertension Mother      Hyperlipidemia Mother      Heart disease Mother          CHF    Stroke Mother      Heart attack Mother      Hearing loss Mother      Diabetes Sister      Sickle cell anemia Other          nieces, nephews    Lupus Other          niece       Current Outpatient Medications on File Prior to Visit   Medication Sig Dispense Refill    acetaminophen (TYLENOL) 650 MG  TbSR Take 650 mg by mouth daily as needed.      calcium carbonate 470 mg calcium (1,177 mg) Chew Take one tablet by mouth twice daily 60 each 0    carvediloL (COREG) 12.5 MG tablet Take 1 tablet (12.5 mg total) by mouth 2 (two) times daily with meals. 180 tablet 3    cyanocobalamin 500 MCG tablet Take 500 mcg by mouth once daily.      cyclobenzaprine (FLEXERIL) 10 MG tablet Take 1 tablet (10 mg total) by mouth every evening. 90 tablet 1    dicyclomine (BENTYL) 10 MG capsule Take 10 mg by mouth.      ferrous sulfate 325 (65 FE) MG EC tablet Take 325 mg by mouth once daily.      furosemide (LASIX) 40 MG tablet Take 40 mg by mouth daily as needed.      hydrOXYzine HCL (ATARAX) 25 MG tablet Take 1 tablet (25 mg total) by mouth 3 (three) times daily as needed for Itching. 15 tablet 0    levothyroxine (SYNTHROID) 100 MCG tablet Take 1 tablet (100 mcg total) by mouth before breakfast. 90 tablet 3    olmesartan (BENICAR) 40 MG tablet Take 1 tablet (40 mg total) by mouth once daily. 90 tablet 3    pantoprazole (PROTONIX) 40 MG tablet Take 1 tablet (40 mg total) by mouth once daily. 30 tablet 5    pregabalin (LYRICA) 75 MG capsule Take 1 capsule (75 mg total) by mouth every morning AND 1-2 capsules ( mg total) every evening. 270 capsule 1    psyllium husk/aspartame (METAMUCIL FIBER SINGLES ORAL) Take by mouth.      spironolactone (ALDACTONE) 25 MG tablet Take 1 tablet (25 mg total) by mouth once daily. 30 tablet 0    vitamin D (VITAMIN D3) 1000 units Tab Take 1,000 Units by mouth once daily.      vitamin E 400 UNIT capsule Take 400 Units by mouth once daily.       No current facility-administered medications on file prior to visit.      Wt Readings from Last 3 Encounters:   07/25/25 111 kg (244 lb 11.4 oz)   07/25/25 110.5 kg (243 lb 8 oz)   07/16/25 112.5 kg (248 lb 0.3 oz)     Temp Readings from Last 3 Encounters:   07/16/25 96.5 °F (35.8 °C) (Tympanic)   07/02/25 96.4 °F (35.8 °C) (Tympanic)   06/03/25 96.9 °F (36.1  °C) (Tympanic)     BP Readings from Last 3 Encounters:   07/25/25 (!) 148/83   07/16/25 (!) 140/70   07/02/25 (!) 150/70     Pulse Readings from Last 3 Encounters:   07/25/25 (!) 58   07/16/25 (!) 56   07/02/25 66        Review of Systems   Constitutional: Negative.   HENT: Negative.     Eyes: Negative.    Cardiovascular:  Positive for dyspnea on exertion and leg swelling.   Respiratory:  Positive for wheezing.    Skin: Negative.    Musculoskeletal:  Positive for arthritis and back pain.   Gastrointestinal: Negative.    Genitourinary: Negative.    Neurological: Negative.    Psychiatric/Behavioral: Negative.         Objective:   Physical Exam  Vitals and nursing note reviewed.   Constitutional:       Appearance: Normal appearance. She is obese.   HENT:      Head: Normocephalic.   Eyes:      Pupils: Pupils are equal, round, and reactive to light.   Cardiovascular:      Rate and Rhythm: Normal rate and regular rhythm.      Heart sounds: Normal heart sounds, S1 normal and S2 normal. No murmur heard.     No S3 or S4 sounds.   Pulmonary:      Effort: Pulmonary effort is normal.      Breath sounds: Normal breath sounds.   Abdominal:      General: Bowel sounds are normal.      Palpations: Abdomen is soft.   Musculoskeletal:         General: Normal range of motion.      Cervical back: Normal range of motion.      Right lower leg: Edema present.      Left lower leg: Edema present.      Comments: mild   Skin:     Capillary Refill: Capillary refill takes less than 2 seconds.   Neurological:      General: No focal deficit present.      Mental Status: She is alert and oriented to person, place, and time.   Psychiatric:         Mood and Affect: Mood normal.         Behavior: Behavior normal.         Thought Content: Thought content normal.         Lab Results   Component Value Date    CHOL 277 (H) 05/30/2025    CHOL 196 03/11/2024    CHOL 233 (H) 10/09/2023     Lab Results   Component Value Date    HDL 68 05/30/2025    HDL 47  "03/11/2024    HDL 54 10/09/2023     Lab Results   Component Value Date    LDLCALC 185.4 (H) 05/30/2025    LDLCALC 130.4 03/11/2024    LDLCALC 155.6 10/09/2023     Lab Results   Component Value Date    TRIG 118 05/30/2025    TRIG 93 03/11/2024    TRIG 117 10/09/2023     Lab Results   Component Value Date    CHOLHDL 24.5 05/30/2025    CHOLHDL 24.0 03/11/2024    CHOLHDL 23.2 10/09/2023       Chemistry        Component Value Date/Time     07/16/2025 1155     12/27/2024 0948    K 4.0 07/16/2025 1155    K 3.5 12/27/2024 0948     (H) 07/16/2025 1155     12/27/2024 0948    CO2 24 07/16/2025 1155    CO2 24 12/27/2024 0948    BUN 15 07/16/2025 1155    CREATININE 1.3 07/16/2025 1155    GLU 93 07/16/2025 1155    GLU 88 12/27/2024 0948        Component Value Date/Time    CALCIUM 8.5 (L) 07/16/2025 1155    CALCIUM 8.9 12/27/2024 0948    ALKPHOS 41 05/30/2025 0956    ALKPHOS 41 12/27/2024 0948    AST 19 05/30/2025 0956    AST 16 12/27/2024 0948    ALT 22 05/30/2025 0956    ALT 14 12/27/2024 0948    BILITOT 1.0 05/30/2025 0956    BILITOT 0.8 12/27/2024 0948    ESTGFRAFRICA >60.0 07/18/2022 1109    EGFRNONAA >60.0 07/18/2022 1109          Lab Results   Component Value Date    TSH 0.28 (L) 02/12/2025     No results found for: "INR", "PROTIME"  @RESUFAST(WBC,HGB,HCT,MCV,PLT)  @LABRCNTIP(BNP,BNPTRIAGEBLO)@  CrCl cannot be calculated (Patient's most recent lab result is older than the maximum 7 days allowed.).     No results found for this or any previous visit.     No results found for this or any previous visit.     Assessment:      1. Primary hypertension    2. Mixed hyperlipidemia    3. Primary hypertension            Plan:   Primary hypertension  -     Basic metabolic panel; Future; Expected date: 07/25/2025  -     NT-Pro Natriuretic Peptide; Future; Expected date: 07/25/2025    Mixed hyperlipidemia    Primary hypertension  Comments:  Stable blood pressure.  Discontinue with the hydrochlorothiazide 25 mg.  " Continue olmesartan 40  Orders:  -     Basic metabolic panel; Future; Expected date: 07/25/2025  -     NT-Pro Natriuretic Peptide; Future; Expected date: 07/25/2025      Carvedilol, Lasix, olmesartan, spironolactone, low-sodium diet, profile blood pressure- HTN  Low-fat diet, allergy to statins-HLP  Risk factor modifications   Daily exercise as tolerated     Lab today with further recs to follow on Lasix   Return to clinic as scheduled or sooner if needed    Courtney Guillot, FNP-C [facility], Cardiology

## 2025-07-28 ENCOUNTER — TELEPHONE (OUTPATIENT)
Dept: CARDIOLOGY | Facility: CLINIC | Age: 70
End: 2025-07-28
Payer: MEDICARE

## 2025-07-28 NOTE — TELEPHONE ENCOUNTER
Contacted patient with test results per provider.     ----- Message from Elsa Santiago NP sent at 7/28/2025  3:52 PM CDT -----  Fluid marker is nml, can use lasix as needed for swelling.  ----- Message -----  From: Lab, Background User  Sent: 7/25/2025   9:12 PM CDT  To: Elsa Santiago NP

## 2025-08-25 DIAGNOSIS — Z00.00 ENCOUNTER FOR MEDICARE ANNUAL WELLNESS EXAM: ICD-10-CM

## 2025-08-26 ENCOUNTER — HOSPITAL ENCOUNTER (OUTPATIENT)
Dept: RADIOLOGY | Facility: HOSPITAL | Age: 70
Discharge: HOME | End: 2025-08-26
Attending: ORTHOPAEDIC SURGERY
Payer: MEDICARE

## 2025-08-26 DIAGNOSIS — M94.252 CHONDROMALACIA, LEFT HIP: ICD-10-CM

## 2025-09-02 ENCOUNTER — TELEPHONE (OUTPATIENT)
Dept: ORTHOPEDICS | Facility: CLINIC | Age: 70
End: 2025-09-02
Payer: MEDICARE

## 2025-09-04 DIAGNOSIS — F41.9 ANXIETY: ICD-10-CM

## 2025-09-04 RX ORDER — DIAZEPAM 5 MG/1
TABLET ORAL
Qty: 2 TABLET | Refills: 0 | Status: SHIPPED | OUTPATIENT
Start: 2025-09-04

## (undated) DEVICE — TOWEL OR DISP STRL BLUE 4/PK

## (undated) DEVICE — SEE MEDLINE ITEM 157117

## (undated) DEVICE — KIT EVACUATOR 3-SPRING 1/8 DRN

## (undated) DEVICE — SEE MEDLINE ITEM 157194

## (undated) DEVICE — SUT VICRYL 3-0 27 SH

## (undated) DEVICE — GAUZE SPONGE PEANUT STRL

## (undated) DEVICE — ELECTRODE REM PLYHSV RETURN 9

## (undated) DEVICE — GLOVE SURGEONS ULTRA TOUCH 5.5

## (undated) DEVICE — PROBE SIMULATOR KRAFF

## (undated) DEVICE — APPLICATOR CHLORAPREP ORN 26ML

## (undated) DEVICE — SHEARS HARMONIC CRVD 9 CM

## (undated) DEVICE — SHEET THYROID W/ISO-BAC

## (undated) DEVICE — ELECTRODE BLADE W/SLEEVE 2.75

## (undated) DEVICE — SUT VICRYL 4-0 27 SH

## (undated) DEVICE — SPONGE GAUZE 16PLY 4X4

## (undated) DEVICE — COVER LIGHT HANDLE 80/CA

## (undated) DEVICE — RETRACTOR RADIALUX LIGHTED

## (undated) DEVICE — CONTAINER SPECIMEN 4.5OZ STRL

## (undated) DEVICE — SUT MONOCRYL 4-0 PS-1 UND

## (undated) DEVICE — SYR 10CC LUER LOCK

## (undated) DEVICE — EVACUATOR WOUND BULB 100CC

## (undated) DEVICE — SOL NS 1000CC

## (undated) DEVICE — SUT STRATAFIX SPIRAL MONO

## (undated) DEVICE — NDL HYPO 27G X 1 1/2

## (undated) DEVICE — COVER CAMERA OPERATING ROOM

## (undated) DEVICE — HOOK STAY ELAS 5MM 8EA/PK

## (undated) DEVICE — CORD BIPOLAR ELECTROSURGICAL

## (undated) DEVICE — SEE MEDLINE ITEM 157027

## (undated) DEVICE — DRESSING TRANS 2X2 TEGADERM

## (undated) DEVICE — GLOVE SURG BIOGEL LATEX SZ 7.5